# Patient Record
Sex: MALE | Race: WHITE | NOT HISPANIC OR LATINO | Employment: FULL TIME | URBAN - METROPOLITAN AREA
[De-identification: names, ages, dates, MRNs, and addresses within clinical notes are randomized per-mention and may not be internally consistent; named-entity substitution may affect disease eponyms.]

---

## 2017-01-27 ENCOUNTER — ALLSCRIPTS OFFICE VISIT (OUTPATIENT)
Dept: OTHER | Facility: OTHER | Age: 47
End: 2017-01-27

## 2017-01-27 DIAGNOSIS — M79.10 MYALGIA: ICD-10-CM

## 2017-01-27 DIAGNOSIS — M75.01 ADHESIVE CAPSULITIS OF RIGHT SHOULDER: ICD-10-CM

## 2017-01-27 DIAGNOSIS — R76.8 OTHER SPECIFIED ABNORMAL IMMUNOLOGICAL FINDINGS IN SERUM: ICD-10-CM

## 2017-01-31 ENCOUNTER — LAB CONVERSION - ENCOUNTER (OUTPATIENT)
Dept: OTHER | Facility: OTHER | Age: 47
End: 2017-01-31

## 2017-01-31 ENCOUNTER — ALLSCRIPTS OFFICE VISIT (OUTPATIENT)
Dept: OTHER | Facility: OTHER | Age: 47
End: 2017-01-31

## 2017-01-31 LAB
ANA PATTERN 1 (HISTORICAL): ABNORMAL
ANA TITER 1 (HISTORICAL): ABNORMAL TITER
ANTI-NUCLEAR ANTIBODY (ANA) (HISTORICAL): POSITIVE
CK SERPL-CCNC: 60 U/L (ref 44–196)
CREATININE, RANDOM URINE (HISTORICAL): 70 MG/DL (ref 20–370)
HBA1C MFR BLD HPLC: 7.5 % OF TOTAL HGB
LYME IGG/IGM AB (HISTORICAL): NORMAL INDEX
MAGNESIUM, UR (HISTORICAL): 0.4 MG/DL
MICROALBUMIN/CREATININE RATIO (HISTORICAL): 6 MCG/MG CREAT

## 2017-02-02 ENCOUNTER — GENERIC CONVERSION - ENCOUNTER (OUTPATIENT)
Dept: OTHER | Facility: OTHER | Age: 47
End: 2017-02-02

## 2017-02-17 ENCOUNTER — LAB CONVERSION - ENCOUNTER (OUTPATIENT)
Dept: OTHER | Facility: OTHER | Age: 47
End: 2017-02-17

## 2017-02-17 LAB
ALDOLASE (HISTORICAL): 7.4 U/L
ANTI DNA DOUBLE STRANDED (HISTORICAL): <1 IU/ML
BILIRUB UR QL STRIP: NEGATIVE
C3 COMPLEMENT (HISTORICAL): 147 MG/DL (ref 90–180)
C4 COMPLEMENT (HISTORICAL): 29 MG/DL (ref 16–47)
CK SERPL-CCNC: 188 U/L (ref 44–196)
COLOR UR: YELLOW
COMMENT (HISTORICAL): CLEAR
FECAL OCCULT BLOOD DIAGNOSTIC (HISTORICAL): NEGATIVE
GLUCOSE (HISTORICAL): NEGATIVE
KETONES UR STRIP-MCNC: NEGATIVE MG/DL
LEUKOCYTE ESTERASE UR QL STRIP: NEGATIVE
NITRITE UR QL STRIP: NEGATIVE
PH UR STRIP.AUTO: 5.5 [PH] (ref 5–8)
PROT UR STRIP-MCNC: NEGATIVE MG/DL
SCLERODERMA (SCL-70) AB (HISTORICAL): NORMAL AI
SP GR UR STRIP.AUTO: 1.01 (ref 1–1.03)

## 2017-02-21 ENCOUNTER — LAB CONVERSION - ENCOUNTER (OUTPATIENT)
Dept: OTHER | Facility: OTHER | Age: 47
End: 2017-02-21

## 2017-02-21 LAB
25(OH)D3 SERPL-MCNC: 23 NG/ML (ref 30–100)
ALDOLASE (HISTORICAL): 7.4 U/L
ANTI DNA DOUBLE STRANDED (HISTORICAL): <1 IU/ML
C3 COMPLEMENT (HISTORICAL): 147 MG/DL (ref 90–180)
C4 COMPLEMENT (HISTORICAL): 29 MG/DL (ref 16–47)
CK SERPL-CCNC: 188 U/L (ref 44–196)
CONTACT: (HISTORICAL): NORMAL
SCLERODERMA (SCL-70) AB (HISTORICAL): NORMAL AI
SM/RNP ANTIBODY (HISTORICAL): NORMAL AI
SSA (RO) ANTIBODY (HISTORICAL): NORMAL AI
SSB (LA) ANTIBODY (HISTORICAL): NORMAL AI
TEST INFORMATION (HISTORICAL): NORMAL
TEST NAME (HISTORICAL): NORMAL

## 2017-02-23 ENCOUNTER — GENERIC CONVERSION - ENCOUNTER (OUTPATIENT)
Dept: OTHER | Facility: OTHER | Age: 47
End: 2017-02-23

## 2017-03-08 ENCOUNTER — ALLSCRIPTS OFFICE VISIT (OUTPATIENT)
Dept: OTHER | Facility: OTHER | Age: 47
End: 2017-03-08

## 2017-03-30 RX ORDER — ASPIRIN 81 MG/1
TABLET, CHEWABLE ORAL
COMMUNITY
Start: 2007-10-11

## 2017-03-30 RX ORDER — QUINAPRIL 5 1/1
TABLET ORAL
COMMUNITY
Start: 2012-08-31 | End: 2018-12-17 | Stop reason: SDUPTHER

## 2017-03-30 RX ORDER — ESOMEPRAZOLE MAGNESIUM 40 MG/1
CAPSULE, DELAYED RELEASE ORAL
COMMUNITY
Start: 2013-03-06 | End: 2018-03-12 | Stop reason: SDUPTHER

## 2017-03-30 RX ORDER — ALPRAZOLAM 0.25 MG/1
TABLET ORAL
COMMUNITY
Start: 2014-01-03 | End: 2018-01-07

## 2017-03-30 RX ORDER — ROSUVASTATIN CALCIUM 10 MG/1
TABLET, COATED ORAL
COMMUNITY
Start: 2015-03-13 | End: 2018-03-12 | Stop reason: SDUPTHER

## 2017-03-30 RX ORDER — LEVOCETIRIZINE DIHYDROCHLORIDE 5 MG/1
TABLET, FILM COATED ORAL
COMMUNITY
Start: 2015-09-16 | End: 2018-11-14

## 2017-04-03 ENCOUNTER — HOSPITAL ENCOUNTER (OUTPATIENT)
Facility: HOSPITAL | Age: 47
Setting detail: OUTPATIENT SURGERY
Discharge: HOME | End: 2017-04-03
Attending: ORTHOPAEDIC SURGERY | Admitting: ORTHOPAEDIC SURGERY
Payer: COMMERCIAL

## 2017-04-03 ENCOUNTER — ANESTHESIA (OUTPATIENT)
Dept: PERIOP | Facility: HOSPITAL | Age: 47
End: 2017-04-03
Payer: COMMERCIAL

## 2017-04-03 ENCOUNTER — ANESTHESIA EVENT (OUTPATIENT)
Dept: PERIOP | Facility: HOSPITAL | Age: 47
End: 2017-04-03
Payer: COMMERCIAL

## 2017-04-03 VITALS
SYSTOLIC BLOOD PRESSURE: 143 MMHG | BODY MASS INDEX: 26.52 KG/M2 | DIASTOLIC BLOOD PRESSURE: 87 MMHG | HEART RATE: 113 BPM | TEMPERATURE: 97 F | OXYGEN SATURATION: 96 % | HEIGHT: 66 IN | WEIGHT: 165 LBS | RESPIRATION RATE: 18 BRPM

## 2017-04-03 LAB — GLUCOSE SERPL-MCNC: 163 MG/DL (ref 65–140)

## 2017-04-03 PROCEDURE — 82948 REAGENT STRIP/BLOOD GLUCOSE: CPT

## 2017-04-03 RX ORDER — ONDANSETRON 2 MG/ML
4 INJECTION INTRAMUSCULAR; INTRAVENOUS ONCE AS NEEDED
Status: DISCONTINUED | OUTPATIENT
Start: 2017-04-03 | End: 2017-04-03 | Stop reason: HOSPADM

## 2017-04-03 RX ORDER — MORPHINE SULFATE 2 MG/ML
2 INJECTION, SOLUTION INTRAMUSCULAR; INTRAVENOUS EVERY 2 HOUR PRN
Status: DISCONTINUED | OUTPATIENT
Start: 2017-04-03 | End: 2017-04-03 | Stop reason: HOSPADM

## 2017-04-03 RX ORDER — ONDANSETRON 2 MG/ML
4 INJECTION INTRAMUSCULAR; INTRAVENOUS EVERY 6 HOURS PRN
Status: DISCONTINUED | OUTPATIENT
Start: 2017-04-03 | End: 2017-04-03 | Stop reason: HOSPADM

## 2017-04-03 RX ORDER — METOCLOPRAMIDE HYDROCHLORIDE 5 MG/ML
10 INJECTION INTRAMUSCULAR; INTRAVENOUS ONCE
Status: DISCONTINUED | OUTPATIENT
Start: 2017-04-03 | End: 2017-04-03 | Stop reason: HOSPADM

## 2017-04-03 RX ORDER — ACETAMINOPHEN 325 MG/1
650 TABLET ORAL EVERY 4 HOURS PRN
Status: DISCONTINUED | OUTPATIENT
Start: 2017-04-03 | End: 2017-04-03 | Stop reason: HOSPADM

## 2017-04-03 RX ORDER — OXYCODONE HYDROCHLORIDE AND ACETAMINOPHEN 5; 325 MG/1; MG/1
TABLET ORAL
Qty: 50 TABLET | Refills: 0 | Status: SHIPPED | OUTPATIENT
Start: 2017-04-03 | End: 2018-01-07

## 2017-04-03 RX ORDER — SODIUM CHLORIDE, SODIUM LACTATE, POTASSIUM CHLORIDE, CALCIUM CHLORIDE 600; 310; 30; 20 MG/100ML; MG/100ML; MG/100ML; MG/100ML
INJECTION, SOLUTION INTRAVENOUS CONTINUOUS PRN
Status: DISCONTINUED | OUTPATIENT
Start: 2017-04-03 | End: 2017-04-03 | Stop reason: SURG

## 2017-04-03 RX ORDER — PROPOFOL 10 MG/ML
INJECTION, EMULSION INTRAVENOUS AS NEEDED
Status: DISCONTINUED | OUTPATIENT
Start: 2017-04-03 | End: 2017-04-03 | Stop reason: SURG

## 2017-04-03 RX ORDER — MEPERIDINE HYDROCHLORIDE 25 MG/ML
12.5 INJECTION INTRAMUSCULAR; INTRAVENOUS; SUBCUTANEOUS ONCE AS NEEDED
Status: DISCONTINUED | OUTPATIENT
Start: 2017-04-03 | End: 2017-04-03 | Stop reason: HOSPADM

## 2017-04-03 RX ORDER — FENTANYL CITRATE/PF 50 MCG/ML
50 SYRINGE (ML) INJECTION
Status: DISCONTINUED | OUTPATIENT
Start: 2017-04-03 | End: 2017-04-03 | Stop reason: HOSPADM

## 2017-04-03 RX ORDER — LIDOCAINE HYDROCHLORIDE 10 MG/ML
INJECTION, SOLUTION INFILTRATION; PERINEURAL AS NEEDED
Status: DISCONTINUED | OUTPATIENT
Start: 2017-04-03 | End: 2017-04-03 | Stop reason: SURG

## 2017-04-03 RX ORDER — OXYCODONE HYDROCHLORIDE AND ACETAMINOPHEN 5; 325 MG/1; MG/1
2 TABLET ORAL EVERY 4 HOURS PRN
Status: DISCONTINUED | OUTPATIENT
Start: 2017-04-03 | End: 2017-04-03 | Stop reason: HOSPADM

## 2017-04-03 RX ORDER — SODIUM CHLORIDE, SODIUM LACTATE, POTASSIUM CHLORIDE, CALCIUM CHLORIDE 600; 310; 30; 20 MG/100ML; MG/100ML; MG/100ML; MG/100ML
100 INJECTION, SOLUTION INTRAVENOUS CONTINUOUS
Status: DISCONTINUED | OUTPATIENT
Start: 2017-04-03 | End: 2017-04-03 | Stop reason: HOSPADM

## 2017-04-03 RX ORDER — FENTANYL CITRATE 50 UG/ML
INJECTION, SOLUTION INTRAMUSCULAR; INTRAVENOUS AS NEEDED
Status: DISCONTINUED | OUTPATIENT
Start: 2017-04-03 | End: 2017-04-03 | Stop reason: SURG

## 2017-04-03 RX ORDER — MIDAZOLAM HYDROCHLORIDE 1 MG/ML
INJECTION INTRAMUSCULAR; INTRAVENOUS AS NEEDED
Status: DISCONTINUED | OUTPATIENT
Start: 2017-04-03 | End: 2017-04-03 | Stop reason: SURG

## 2017-04-03 RX ORDER — ONDANSETRON 2 MG/ML
INJECTION INTRAMUSCULAR; INTRAVENOUS AS NEEDED
Status: DISCONTINUED | OUTPATIENT
Start: 2017-04-03 | End: 2017-04-03 | Stop reason: SURG

## 2017-04-03 RX ADMIN — LIDOCAINE HYDROCHLORIDE 50 MG: 10 INJECTION, SOLUTION INFILTRATION; PERINEURAL at 12:37

## 2017-04-03 RX ADMIN — FENTANYL CITRATE 50 MCG: 50 INJECTION INTRAMUSCULAR; INTRAVENOUS at 12:43

## 2017-04-03 RX ADMIN — FENTANYL CITRATE 50 MCG: 50 INJECTION INTRAMUSCULAR; INTRAVENOUS at 12:55

## 2017-04-03 RX ADMIN — PROPOFOL 200 MG: 10 INJECTION, EMULSION INTRAVENOUS at 12:37

## 2017-04-03 RX ADMIN — DEXAMETHASONE SODIUM PHOSPHATE 10 MG: 10 INJECTION INTRAMUSCULAR; INTRAVENOUS at 12:42

## 2017-04-03 RX ADMIN — FENTANYL CITRATE 50 MCG: 50 INJECTION INTRAMUSCULAR; INTRAVENOUS at 13:07

## 2017-04-03 RX ADMIN — SODIUM CHLORIDE, SODIUM LACTATE, POTASSIUM CHLORIDE, AND CALCIUM CHLORIDE: .6; .31; .03; .02 INJECTION, SOLUTION INTRAVENOUS at 11:59

## 2017-04-03 RX ADMIN — FENTANYL CITRATE 50 MCG: 50 INJECTION INTRAMUSCULAR; INTRAVENOUS at 13:12

## 2017-04-03 RX ADMIN — MIDAZOLAM HYDROCHLORIDE 2 MG: 1 INJECTION, SOLUTION INTRAMUSCULAR; INTRAVENOUS at 12:30

## 2017-04-03 RX ADMIN — SODIUM CHLORIDE, SODIUM LACTATE, POTASSIUM CHLORIDE, AND CALCIUM CHLORIDE 100 ML/HR: .6; .31; .03; .02 INJECTION, SOLUTION INTRAVENOUS at 11:30

## 2017-04-03 RX ADMIN — OXYCODONE HYDROCHLORIDE AND ACETAMINOPHEN 2 TABLET: 5; 325 TABLET ORAL at 13:40

## 2017-04-03 RX ADMIN — ONDANSETRON 4 MG: 2 INJECTION INTRAMUSCULAR; INTRAVENOUS at 12:46

## 2017-04-04 ENCOUNTER — GENERIC CONVERSION - ENCOUNTER (OUTPATIENT)
Dept: OTHER | Facility: OTHER | Age: 47
End: 2017-04-04

## 2017-04-04 ENCOUNTER — APPOINTMENT (OUTPATIENT)
Dept: PHYSICAL THERAPY | Facility: CLINIC | Age: 47
End: 2017-04-04
Payer: COMMERCIAL

## 2017-04-04 PROCEDURE — 97140 MANUAL THERAPY 1/> REGIONS: CPT

## 2017-04-04 PROCEDURE — 97162 PT EVAL MOD COMPLEX 30 MIN: CPT

## 2017-04-04 PROCEDURE — 97110 THERAPEUTIC EXERCISES: CPT

## 2017-04-05 ENCOUNTER — APPOINTMENT (OUTPATIENT)
Dept: PHYSICAL THERAPY | Facility: CLINIC | Age: 47
End: 2017-04-05
Payer: COMMERCIAL

## 2017-04-05 PROCEDURE — 97110 THERAPEUTIC EXERCISES: CPT

## 2017-04-05 PROCEDURE — 97140 MANUAL THERAPY 1/> REGIONS: CPT

## 2017-04-06 ENCOUNTER — APPOINTMENT (OUTPATIENT)
Dept: PHYSICAL THERAPY | Facility: CLINIC | Age: 47
End: 2017-04-06
Payer: COMMERCIAL

## 2017-04-06 PROCEDURE — 97110 THERAPEUTIC EXERCISES: CPT

## 2017-04-06 PROCEDURE — 97140 MANUAL THERAPY 1/> REGIONS: CPT

## 2017-04-07 ENCOUNTER — APPOINTMENT (OUTPATIENT)
Dept: PHYSICAL THERAPY | Facility: CLINIC | Age: 47
End: 2017-04-07
Payer: COMMERCIAL

## 2017-04-07 PROCEDURE — 97140 MANUAL THERAPY 1/> REGIONS: CPT

## 2017-04-07 PROCEDURE — 97110 THERAPEUTIC EXERCISES: CPT

## 2017-04-10 ENCOUNTER — APPOINTMENT (OUTPATIENT)
Dept: PHYSICAL THERAPY | Facility: CLINIC | Age: 47
End: 2017-04-10
Payer: COMMERCIAL

## 2017-04-10 PROCEDURE — 97140 MANUAL THERAPY 1/> REGIONS: CPT

## 2017-04-10 PROCEDURE — 97110 THERAPEUTIC EXERCISES: CPT

## 2017-04-11 ENCOUNTER — APPOINTMENT (OUTPATIENT)
Dept: PHYSICAL THERAPY | Facility: CLINIC | Age: 47
End: 2017-04-11
Payer: COMMERCIAL

## 2017-04-11 PROCEDURE — 97110 THERAPEUTIC EXERCISES: CPT

## 2017-04-11 PROCEDURE — 97140 MANUAL THERAPY 1/> REGIONS: CPT

## 2017-04-12 ENCOUNTER — APPOINTMENT (OUTPATIENT)
Dept: PHYSICAL THERAPY | Facility: CLINIC | Age: 47
End: 2017-04-12
Payer: COMMERCIAL

## 2017-04-12 PROCEDURE — 97110 THERAPEUTIC EXERCISES: CPT

## 2017-04-12 PROCEDURE — 97140 MANUAL THERAPY 1/> REGIONS: CPT

## 2017-04-13 ENCOUNTER — APPOINTMENT (OUTPATIENT)
Dept: PHYSICAL THERAPY | Facility: CLINIC | Age: 47
End: 2017-04-13
Payer: COMMERCIAL

## 2017-04-13 ENCOUNTER — GENERIC CONVERSION - ENCOUNTER (OUTPATIENT)
Dept: OTHER | Facility: OTHER | Age: 47
End: 2017-04-13

## 2017-04-13 PROCEDURE — 97110 THERAPEUTIC EXERCISES: CPT

## 2017-04-13 PROCEDURE — 97140 MANUAL THERAPY 1/> REGIONS: CPT

## 2017-04-14 ENCOUNTER — ALLSCRIPTS OFFICE VISIT (OUTPATIENT)
Dept: OTHER | Facility: OTHER | Age: 47
End: 2017-04-14

## 2017-04-14 ENCOUNTER — APPOINTMENT (OUTPATIENT)
Dept: PHYSICAL THERAPY | Facility: CLINIC | Age: 47
End: 2017-04-14
Payer: COMMERCIAL

## 2017-04-14 PROCEDURE — 97110 THERAPEUTIC EXERCISES: CPT

## 2017-04-14 PROCEDURE — 97140 MANUAL THERAPY 1/> REGIONS: CPT

## 2017-04-19 ENCOUNTER — APPOINTMENT (OUTPATIENT)
Dept: PHYSICAL THERAPY | Facility: CLINIC | Age: 47
End: 2017-04-19
Payer: COMMERCIAL

## 2017-04-19 PROCEDURE — 97110 THERAPEUTIC EXERCISES: CPT

## 2017-04-19 PROCEDURE — 97140 MANUAL THERAPY 1/> REGIONS: CPT

## 2017-04-20 ENCOUNTER — APPOINTMENT (OUTPATIENT)
Dept: PHYSICAL THERAPY | Facility: CLINIC | Age: 47
End: 2017-04-20
Payer: COMMERCIAL

## 2017-04-20 PROCEDURE — 97140 MANUAL THERAPY 1/> REGIONS: CPT

## 2017-04-26 ENCOUNTER — APPOINTMENT (OUTPATIENT)
Dept: PHYSICAL THERAPY | Facility: CLINIC | Age: 47
End: 2017-04-26
Payer: COMMERCIAL

## 2017-04-26 PROCEDURE — 97112 NEUROMUSCULAR REEDUCATION: CPT

## 2017-04-26 PROCEDURE — 97140 MANUAL THERAPY 1/> REGIONS: CPT

## 2017-04-27 ENCOUNTER — APPOINTMENT (OUTPATIENT)
Dept: PHYSICAL THERAPY | Facility: CLINIC | Age: 47
End: 2017-04-27
Payer: COMMERCIAL

## 2017-05-03 ENCOUNTER — APPOINTMENT (OUTPATIENT)
Dept: PHYSICAL THERAPY | Facility: CLINIC | Age: 47
End: 2017-05-03
Payer: COMMERCIAL

## 2017-05-04 ENCOUNTER — APPOINTMENT (OUTPATIENT)
Dept: PHYSICAL THERAPY | Facility: CLINIC | Age: 47
End: 2017-05-04
Payer: COMMERCIAL

## 2017-05-04 PROCEDURE — 97110 THERAPEUTIC EXERCISES: CPT

## 2017-05-04 PROCEDURE — 97140 MANUAL THERAPY 1/> REGIONS: CPT

## 2017-05-08 ENCOUNTER — APPOINTMENT (OUTPATIENT)
Dept: PHYSICAL THERAPY | Facility: CLINIC | Age: 47
End: 2017-05-08
Payer: COMMERCIAL

## 2017-05-08 PROCEDURE — 97140 MANUAL THERAPY 1/> REGIONS: CPT

## 2017-05-08 PROCEDURE — 97110 THERAPEUTIC EXERCISES: CPT

## 2017-05-10 ENCOUNTER — APPOINTMENT (OUTPATIENT)
Dept: PHYSICAL THERAPY | Facility: CLINIC | Age: 47
End: 2017-05-10
Payer: COMMERCIAL

## 2017-05-10 PROCEDURE — 97140 MANUAL THERAPY 1/> REGIONS: CPT

## 2017-05-10 PROCEDURE — 97110 THERAPEUTIC EXERCISES: CPT

## 2017-05-12 ENCOUNTER — ALLSCRIPTS OFFICE VISIT (OUTPATIENT)
Dept: OTHER | Facility: OTHER | Age: 47
End: 2017-05-12

## 2017-05-15 ENCOUNTER — APPOINTMENT (OUTPATIENT)
Dept: PHYSICAL THERAPY | Facility: CLINIC | Age: 47
End: 2017-05-15
Payer: COMMERCIAL

## 2017-05-17 ENCOUNTER — APPOINTMENT (OUTPATIENT)
Dept: PHYSICAL THERAPY | Facility: CLINIC | Age: 47
End: 2017-05-17
Payer: COMMERCIAL

## 2017-05-17 PROCEDURE — 97140 MANUAL THERAPY 1/> REGIONS: CPT

## 2017-05-17 PROCEDURE — 97110 THERAPEUTIC EXERCISES: CPT

## 2017-05-22 ENCOUNTER — APPOINTMENT (OUTPATIENT)
Dept: PHYSICAL THERAPY | Facility: CLINIC | Age: 47
End: 2017-05-22
Payer: COMMERCIAL

## 2017-05-22 PROCEDURE — 97140 MANUAL THERAPY 1/> REGIONS: CPT

## 2017-05-22 PROCEDURE — 97110 THERAPEUTIC EXERCISES: CPT

## 2017-05-24 ENCOUNTER — APPOINTMENT (OUTPATIENT)
Dept: PHYSICAL THERAPY | Facility: CLINIC | Age: 47
End: 2017-05-24
Payer: COMMERCIAL

## 2017-05-24 PROCEDURE — 97110 THERAPEUTIC EXERCISES: CPT

## 2017-05-24 PROCEDURE — 97140 MANUAL THERAPY 1/> REGIONS: CPT

## 2017-05-31 ENCOUNTER — APPOINTMENT (OUTPATIENT)
Dept: PHYSICAL THERAPY | Facility: CLINIC | Age: 47
End: 2017-05-31
Payer: COMMERCIAL

## 2017-05-31 PROCEDURE — 97110 THERAPEUTIC EXERCISES: CPT

## 2017-05-31 PROCEDURE — 97140 MANUAL THERAPY 1/> REGIONS: CPT

## 2017-06-02 ENCOUNTER — APPOINTMENT (OUTPATIENT)
Dept: PHYSICAL THERAPY | Facility: CLINIC | Age: 47
End: 2017-06-02
Payer: COMMERCIAL

## 2017-06-02 PROCEDURE — 97140 MANUAL THERAPY 1/> REGIONS: CPT

## 2017-06-02 PROCEDURE — 97110 THERAPEUTIC EXERCISES: CPT

## 2017-06-07 ENCOUNTER — APPOINTMENT (OUTPATIENT)
Dept: PHYSICAL THERAPY | Facility: CLINIC | Age: 47
End: 2017-06-07
Payer: COMMERCIAL

## 2017-06-07 PROCEDURE — 97110 THERAPEUTIC EXERCISES: CPT

## 2017-06-07 PROCEDURE — 97140 MANUAL THERAPY 1/> REGIONS: CPT

## 2017-06-09 ENCOUNTER — APPOINTMENT (OUTPATIENT)
Dept: PHYSICAL THERAPY | Facility: CLINIC | Age: 47
End: 2017-06-09
Payer: COMMERCIAL

## 2017-06-09 PROCEDURE — 97140 MANUAL THERAPY 1/> REGIONS: CPT

## 2017-06-09 PROCEDURE — 97110 THERAPEUTIC EXERCISES: CPT

## 2017-06-14 ENCOUNTER — APPOINTMENT (OUTPATIENT)
Dept: PHYSICAL THERAPY | Facility: CLINIC | Age: 47
End: 2017-06-14
Payer: COMMERCIAL

## 2017-06-16 ENCOUNTER — APPOINTMENT (OUTPATIENT)
Dept: PHYSICAL THERAPY | Facility: CLINIC | Age: 47
End: 2017-06-16
Payer: COMMERCIAL

## 2017-06-16 PROCEDURE — 97110 THERAPEUTIC EXERCISES: CPT

## 2017-06-16 PROCEDURE — 97140 MANUAL THERAPY 1/> REGIONS: CPT

## 2017-06-21 ENCOUNTER — APPOINTMENT (OUTPATIENT)
Dept: PHYSICAL THERAPY | Facility: CLINIC | Age: 47
End: 2017-06-21
Payer: COMMERCIAL

## 2017-06-21 PROCEDURE — 97140 MANUAL THERAPY 1/> REGIONS: CPT

## 2017-06-21 PROCEDURE — 97110 THERAPEUTIC EXERCISES: CPT

## 2017-06-23 ENCOUNTER — APPOINTMENT (OUTPATIENT)
Dept: PHYSICAL THERAPY | Facility: CLINIC | Age: 47
End: 2017-06-23
Payer: COMMERCIAL

## 2017-06-23 PROCEDURE — 97110 THERAPEUTIC EXERCISES: CPT

## 2017-06-23 PROCEDURE — 97140 MANUAL THERAPY 1/> REGIONS: CPT

## 2017-06-28 ENCOUNTER — APPOINTMENT (OUTPATIENT)
Dept: PHYSICAL THERAPY | Facility: CLINIC | Age: 47
End: 2017-06-28
Payer: COMMERCIAL

## 2017-06-28 PROCEDURE — 97110 THERAPEUTIC EXERCISES: CPT

## 2017-06-28 PROCEDURE — 97140 MANUAL THERAPY 1/> REGIONS: CPT

## 2017-06-30 ENCOUNTER — APPOINTMENT (OUTPATIENT)
Dept: PHYSICAL THERAPY | Facility: CLINIC | Age: 47
End: 2017-06-30
Payer: COMMERCIAL

## 2017-06-30 PROCEDURE — 97110 THERAPEUTIC EXERCISES: CPT

## 2017-06-30 PROCEDURE — 97140 MANUAL THERAPY 1/> REGIONS: CPT

## 2017-07-03 ENCOUNTER — APPOINTMENT (OUTPATIENT)
Dept: PHYSICAL THERAPY | Facility: CLINIC | Age: 47
End: 2017-07-03
Payer: COMMERCIAL

## 2017-07-03 PROCEDURE — 97140 MANUAL THERAPY 1/> REGIONS: CPT

## 2017-07-03 PROCEDURE — 97110 THERAPEUTIC EXERCISES: CPT

## 2017-07-05 ENCOUNTER — APPOINTMENT (OUTPATIENT)
Dept: PHYSICAL THERAPY | Facility: CLINIC | Age: 47
End: 2017-07-05
Payer: COMMERCIAL

## 2017-07-05 PROCEDURE — 97140 MANUAL THERAPY 1/> REGIONS: CPT

## 2017-07-05 PROCEDURE — 97110 THERAPEUTIC EXERCISES: CPT

## 2017-07-08 DIAGNOSIS — E78.5 HYPERLIPIDEMIA: ICD-10-CM

## 2017-07-08 DIAGNOSIS — I10 ESSENTIAL (PRIMARY) HYPERTENSION: ICD-10-CM

## 2017-07-08 DIAGNOSIS — M75.01 ADHESIVE CAPSULITIS OF RIGHT SHOULDER: ICD-10-CM

## 2017-07-08 DIAGNOSIS — R76.8 OTHER SPECIFIED ABNORMAL IMMUNOLOGICAL FINDINGS IN SERUM: ICD-10-CM

## 2017-07-08 DIAGNOSIS — E11.9 TYPE 2 DIABETES MELLITUS WITHOUT COMPLICATIONS (HCC): ICD-10-CM

## 2017-07-17 ENCOUNTER — APPOINTMENT (OUTPATIENT)
Dept: PHYSICAL THERAPY | Facility: CLINIC | Age: 47
End: 2017-07-17
Payer: COMMERCIAL

## 2017-07-17 PROCEDURE — 97140 MANUAL THERAPY 1/> REGIONS: CPT

## 2017-07-17 PROCEDURE — 97110 THERAPEUTIC EXERCISES: CPT

## 2017-07-19 ENCOUNTER — APPOINTMENT (OUTPATIENT)
Dept: PHYSICAL THERAPY | Facility: CLINIC | Age: 47
End: 2017-07-19
Payer: COMMERCIAL

## 2017-07-19 PROCEDURE — 97140 MANUAL THERAPY 1/> REGIONS: CPT

## 2017-07-19 PROCEDURE — 97110 THERAPEUTIC EXERCISES: CPT

## 2017-07-24 ENCOUNTER — APPOINTMENT (OUTPATIENT)
Dept: PHYSICAL THERAPY | Facility: CLINIC | Age: 47
End: 2017-07-24
Payer: COMMERCIAL

## 2017-07-24 PROCEDURE — 97110 THERAPEUTIC EXERCISES: CPT

## 2017-07-24 PROCEDURE — 97140 MANUAL THERAPY 1/> REGIONS: CPT

## 2017-07-26 ENCOUNTER — APPOINTMENT (OUTPATIENT)
Dept: PHYSICAL THERAPY | Facility: CLINIC | Age: 47
End: 2017-07-26
Payer: COMMERCIAL

## 2017-07-26 PROCEDURE — 97110 THERAPEUTIC EXERCISES: CPT

## 2017-07-26 PROCEDURE — 97140 MANUAL THERAPY 1/> REGIONS: CPT

## 2017-07-31 ENCOUNTER — APPOINTMENT (OUTPATIENT)
Dept: PHYSICAL THERAPY | Facility: CLINIC | Age: 47
End: 2017-07-31
Payer: COMMERCIAL

## 2017-07-31 PROCEDURE — 97110 THERAPEUTIC EXERCISES: CPT

## 2017-07-31 PROCEDURE — 97140 MANUAL THERAPY 1/> REGIONS: CPT

## 2017-08-02 ENCOUNTER — APPOINTMENT (OUTPATIENT)
Dept: PHYSICAL THERAPY | Facility: CLINIC | Age: 47
End: 2017-08-02
Payer: COMMERCIAL

## 2017-08-02 PROCEDURE — 97140 MANUAL THERAPY 1/> REGIONS: CPT

## 2017-08-02 PROCEDURE — 97110 THERAPEUTIC EXERCISES: CPT

## 2017-08-03 ENCOUNTER — ALLSCRIPTS OFFICE VISIT (OUTPATIENT)
Dept: OTHER | Facility: OTHER | Age: 47
End: 2017-08-03

## 2017-08-14 ENCOUNTER — APPOINTMENT (OUTPATIENT)
Dept: PHYSICAL THERAPY | Facility: CLINIC | Age: 47
End: 2017-08-14
Payer: COMMERCIAL

## 2017-08-14 PROCEDURE — 97140 MANUAL THERAPY 1/> REGIONS: CPT

## 2017-08-14 PROCEDURE — 97110 THERAPEUTIC EXERCISES: CPT

## 2017-08-16 ENCOUNTER — APPOINTMENT (OUTPATIENT)
Dept: PHYSICAL THERAPY | Facility: CLINIC | Age: 47
End: 2017-08-16
Payer: COMMERCIAL

## 2017-08-21 ENCOUNTER — APPOINTMENT (OUTPATIENT)
Dept: PHYSICAL THERAPY | Facility: CLINIC | Age: 47
End: 2017-08-21
Payer: COMMERCIAL

## 2017-08-22 ENCOUNTER — GENERIC CONVERSION - ENCOUNTER (OUTPATIENT)
Dept: OTHER | Facility: OTHER | Age: 47
End: 2017-08-22

## 2017-09-21 ENCOUNTER — LAB CONVERSION - ENCOUNTER (OUTPATIENT)
Dept: OTHER | Facility: OTHER | Age: 47
End: 2017-09-21

## 2017-09-21 LAB
A/G RATIO (HISTORICAL): 2.1 (CALC) (ref 1–2.5)
ALBUMIN SERPL BCP-MCNC: 4.8 G/DL (ref 3.6–5.1)
ALP SERPL-CCNC: 101 U/L (ref 40–115)
ALT SERPL W P-5'-P-CCNC: 33 U/L (ref 9–46)
AST SERPL W P-5'-P-CCNC: 20 U/L (ref 10–40)
BASOPHILS # BLD AUTO: 0.8 %
BASOPHILS # BLD AUTO: 50 CELLS/UL (ref 0–200)
BILIRUB SERPL-MCNC: 0.5 MG/DL (ref 0.2–1.2)
BUN SERPL-MCNC: 18 MG/DL (ref 7–25)
BUN/CREA RATIO (HISTORICAL): ABNORMAL (CALC) (ref 6–22)
CALCIUM SERPL-MCNC: 10.1 MG/DL (ref 8.6–10.3)
CHLORIDE SERPL-SCNC: 100 MMOL/L (ref 98–110)
CHOLEST SERPL-MCNC: 132 MG/DL
CHOLEST/HDLC SERPL: 4.9 (CALC)
CO2 SERPL-SCNC: 29 MMOL/L (ref 20–31)
CREAT SERPL-MCNC: 0.92 MG/DL (ref 0.6–1.35)
CREATININE, RANDOM URINE (HISTORICAL): 106 MG/DL (ref 20–370)
DEPRECATED RDW RBC AUTO: 13.4 % (ref 11–15)
EGFR AFRICAN AMERICAN (HISTORICAL): 115 ML/MIN/1.73M2
EGFR-AMERICAN CALC (HISTORICAL): 99 ML/MIN/1.73M2
EOSINOPHIL # BLD AUTO: 233 CELLS/UL (ref 15–500)
EOSINOPHIL # BLD AUTO: 3.7 %
GAMMA GLOBULIN (HISTORICAL): 2.3 G/DL (CALC) (ref 1.9–3.7)
GLUCOSE (HISTORICAL): 197 MG/DL (ref 65–99)
HBA1C MFR BLD HPLC: 8.2 % OF TOTAL HGB
HCT VFR BLD AUTO: 46.2 % (ref 38.5–50)
HDLC SERPL-MCNC: 27 MG/DL
HGB BLD-MCNC: 14.8 G/DL (ref 13.2–17.1)
LDL CHOLESTEROL (HISTORICAL): 61 MG/DL (CALC)
LYMPHOCYTES # BLD AUTO: 1966 CELLS/UL (ref 850–3900)
LYMPHOCYTES # BLD AUTO: 31.2 %
MAGNESIUM, UR (HISTORICAL): 0.3 MG/DL
MCH RBC QN AUTO: 26.2 PG (ref 27–33)
MCHC RBC AUTO-ENTMCNC: 32 G/DL (ref 32–36)
MCV RBC AUTO: 81.9 FL (ref 80–100)
MICROALBUMIN/CREATININE RATIO (HISTORICAL): 3 MCG/MG CREAT
MONOCYTES # BLD AUTO: 473 CELLS/UL (ref 200–950)
MONOCYTES (HISTORICAL): 7.5 %
NEUTROPHILS # BLD AUTO: 3578 CELLS/UL (ref 1500–7800)
NEUTROPHILS # BLD AUTO: 56.8 %
NON-HDL-CHOL (CHOL-HDL) (HISTORICAL): 105 MG/DL (CALC)
PLATELET # BLD AUTO: 286 THOUSAND/UL (ref 140–400)
PMV BLD AUTO: 10.5 FL (ref 7.5–12.5)
POTASSIUM SERPL-SCNC: 4.4 MMOL/L (ref 3.5–5.3)
RBC # BLD AUTO: 5.64 MILLION/UL (ref 4.2–5.8)
SODIUM SERPL-SCNC: 138 MMOL/L (ref 135–146)
TOTAL PROTEIN (HISTORICAL): 7.1 G/DL (ref 6.1–8.1)
TRIGL SERPL-MCNC: 366 MG/DL
WBC # BLD AUTO: 6.3 THOUSAND/UL (ref 3.8–10.8)

## 2017-09-22 ENCOUNTER — GENERIC CONVERSION - ENCOUNTER (OUTPATIENT)
Dept: OTHER | Facility: OTHER | Age: 47
End: 2017-09-22

## 2017-09-27 ENCOUNTER — ALLSCRIPTS OFFICE VISIT (OUTPATIENT)
Dept: OTHER | Facility: OTHER | Age: 47
End: 2017-09-27

## 2017-11-01 ENCOUNTER — ALLSCRIPTS OFFICE VISIT (OUTPATIENT)
Dept: OTHER | Facility: OTHER | Age: 47
End: 2017-11-01

## 2017-11-02 NOTE — PROGRESS NOTES
Assessment  1  Abdominal pain of multiple sites (789 09) (R10 9)   2  Diarrhea due to drug (787 91,E980 5) (K52 1)   3  Family history of liver cancer (V16 0) (Z80 0) : Mother    Plan  Abdominal pain of multiple sites, Diarrhea due to drug    · Loperamide HCl - 2 MG Oral Capsule; TAKE 2 CAPSULES AT 1ST DIARRHEAL  BOWEL MOVEMENT, THEN TAKE 1 CAPSULE AT [facility] ADDITIONAL BOWEL  MOVEMENT  MAXIMUM 8 CAPSULES IN 24 HOURS    Discussion/Summary    -patient developed gastrointestinal symptoms and frequent episodes of diarrhea following increase of his dose of GI LP 1 agonist  These are at the side effects that are common to medication including feeling of malaise, nausea, vomiting, abdominal pain and diarrhea  advised not to take this upcoming dose of Trulcity to make certain that his symptoms resolvedfor Imodium 2 milligrams provided  Instructions given on use of medicationhis gastrointestinal side effects are indeed related to the Trulicity then we will have to reduce his dosage back to 0 75 milligrams weekly which she was toleratingthis time I do not feel that there is a need for the patient to have labs completed  The patient was counseled regarding instructions for management,-- impressions  Possible side effects of new medications were reviewed with the patient/guardian today  The treatment plan was reviewed with the patient/guardian  The patient/guardian understands and agrees with the treatment plan      Chief Complaint  Shakiness and diarrhea for about 2 weeks  History of Present Illness  HPI: Patient presents complaining of watery diarrhea and stomach upset for the past 2 weeks  No documented fever but the patient did have chills  He states that his symptoms initially started 2 weeks ago on Tuesday  No foreign travel  Patient states that he had gone to sleep perfectly fine on Tuesday and then woke up Tuesday morning very early with sweating, shaking chills abdominal discomfort and watery brown diarrhea   No blood in stool  No fever  Patient states that he is eating and drinking fluids but he has not had a normal bowel movement  No camping or hiking  No sick contacts at home  The only significant change was that the patient's dose of true [place] was doubled and he took his initial dose this Saturday prior to his symptoms developing  Patient did try taking probiotic for the diarrhea and he has frequently been eating bananas with no improvement      Review of Systems    Constitutional: feeling poorly,-- chills,-- feeling tired-- and-- recent 6 lb weight loss, but-- as noted in HPI-- and-- no fever  ENT: no complaints of earache, no loss of hearing, no nosebleeds or nasal discharge, no sore throat or hoarseness  Cardiovascular: no complaints of slow or fast heart rate, no chest pain, no palpitations, no leg claudication or lower extremity edema  Respiratory: no complaints of shortness of breath, no wheezing or cough, no dyspnea on exertion, no orthopnea or PND  Gastrointestinal: abdominal pain-- and-- diarrhea, but-- as noted in HPI,-- no nausea,-- no vomiting-- and-- no blood in stools  Musculoskeletal: no complaints of arthralgia, no myalgia, no joint swelling or stiffness, no limb pain or swelling  Integumentary: no complaints of skin rash or lesion, no itching or dry skin, no skin wounds  Neurological: no complaints of headache, no confusion, no numbness or tingling, no dizziness or fainting  Active Problems  1  Agatston coronary artery calcium score between 100 and 199 (793 2) (R93 1)   2  Allergic rhinitis, seasonal (477 9) (J30 2)   3  Decreased range of motion of right shoulder (719 51) (M25 611)   4  Essential hypertension (401 9) (I10)   5  Eustachian tube dysfunction, right (381 81) (H69 81)   6  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)   7  Headache (784 0) (R51)   8  Hyperlipidemia (272 4) (E78 5)   9  Incomplete tear of right rotator cuff (840 4) (M75 111)   10   Intractable headache (784 0) (R51)   11  Myalgia (729 1) (M79 1)   12  Positive ALESHIA (antinuclear antibody) (795 79) (R76 8)   13  Type 2 diabetes mellitus (250 00) (E11 9)    Past Medical History  1  History of _   2  History of Acute frontal sinusitis, recurrence not specified (461 1) (J01 10)   3  History of Acute maxillary sinusitis (461 0) (J01 00)   4  History of Adhesive capsulitis of right shoulder (726 0) (M75 01)   5  History of Anal fissure (565 0) (K60 2)   6  History of Blood blister (919 2) (T14 8XXA)   7  History of Chest pain (786 50) (R07 9)   8  History of Chronic frontal sinusitis (473 1) (J32 1)   9  History of Cough (786 2) (R05)   10  History of Fracture of great toe (826 0) (S92 403A)   11  History of acute bronchitis (V12 69) (Z87 09)   12  History of acute bronchitis (V12 69) (Z87 09)   13  History of acute bronchitis (V12 69) (Z87 09)   14  History of acute conjunctivitis (V12 49) (Z86 69)   15  History of acute sinusitis (V12 69) (Z87 09)   16  History of benign neoplasm of skin (V13 3) (Z87 2)   17  History of chest pain (V13 89) (Z87 898)   18  History of folliculitis (G02 7) (C34 2)   19  History of gastroesophageal reflux (GERD) (V12 79) (Z87 19)   20  History of headache (V13 89) (Z87 898)   21  History of low back pain (V13 59) (Z87 39)   22  History of Itching (698 9) (L29 9)   23  History of Muscle weakness (generalized) (728 87) (M62 81)   24  History of Pain in wrist, unspecified laterality (719 43) (M25 539)   25  History of Palpitations (785 1) (R00 2)   26  History of Palpitations (785 1) (R00 2)   27  History of Rectal pain (569 42) (K62 89)   28  History of Shoulder joint pain, unspecified laterality  Active Problems And Past Medical History Reviewed: The active problems and past medical history were reviewed and updated today  Family History  Mother    1  Family history of Diabetes Mellitus (V18 0)   2  Family history of liver cancer (V16 0) (Z80 0)   3   Family history of Hypertension (V17 49)    Social History   · Alcohol Use (History)   · Caffeine Use   · Marital History - Currently    · Never A Smoker   · 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  The social history was reviewed and updated today  The social history was reviewed and is unchanged  Surgical History  1  History of Hernia Repair    Current Meds   1  Adult Aspirin Low Strength 81 MG CHEW; 1 PO Q D;   Therapy: 73EQB7118 to  Requested for: 51HNL3173 Recorded   2  ALPRAZolam 0 25 MG Oral Tablet; 1 po q 8 hrs prn; Therapy: 31ZYO4727 to (Last Rx:35Zwf6528) Ordered   3  Esomeprazole Magnesium 40 MG Oral Capsule Delayed Release; TAKE ONE   CAPSULE BY MOUTH EVERY DAY; Therapy: 19SAD2351 to (HFCIUAJB:04XIN7781)  Requested for: 19QXT6866; Last   Rx:00Lac7826 Ordered   4  FreeStyle InsuLinx Test In Vitro Strip; USE 1 STRIP TWICE DAILY; Therapy: 04PQF1571 to (Evaluate:82Rgj9750)  Requested for: 09XBK2315; Last   Rx:20Jan2016 Ordered   5  Janumet XR  MG Oral Tablet Extended Release 24 Hour; TAKE TWO TABLETS   BY MOUTH EVERY DAY; Therapy: 98GXM8094 to (Evaluate:02Jan2018)  Requested for: 59AAY3027; Last   Rx:71Bkw6350 Ordered   6  Levocetirizine Dihydrochloride 5 MG Oral Tablet; take 1 tab po QD; Therapy: 90KZV5727 to (Last Rx:40Ruc5087)  Requested for: 17Pum0426 Ordered   7  Mometasone Furoate 50 MCG/ACT Nasal Suspension; use 2 sprays in each nostril once   daily; Therapy: 95KRW0954 to (Evaluate:72Umo3122)  Requested for: 77JRL0751; Last   EE:80XQM6896 Ordered   8  Montelukast Sodium 10 MG Oral Tablet; take one tablet by mouth every day; Therapy: 93AWC7865 to (Evaluate:12Nov2017)  Requested for: 96FWB6369; Last   Rx:17Nov2016 Ordered   9  Qnasl 80 MCG/ACT Nasal Aerosol Solution; INSTILL 1 SQUIRT Twice daily; Therapy: 33Qgj4845 to (Last Rx:82Kzh9882)  Requested for: 02Nzb5200 Ordered   10  Quinapril HCl - 5 MG Oral Tablet; take one tablet by mouth every day;     Therapy: 53Fnc0368 to (Evaluate:96Yqb6074)  Requested for: 02ABC4032; Last    Rx:41Nxo2422 Ordered   11  Rosuvastatin Calcium 10 MG Oral Tablet; TAKE ONE TABLET BY MOUTH EVERY DAY    AT BEDTIME; Therapy: 24GOY8502 to (Rosie Cabezas)  Requested for: 73ENI6199; Last    Rx:74Axl4010 Ordered    The medication list was reviewed and updated today  Allergies  1  No Known Drug Allergies    Vitals   Recorded: 75OFS6824 10:36AM   Temperature 97 7 F   Heart Rate 104   Respiration 16   Systolic 520   Diastolic 74   Height 5 ft 6 in   Weight 172 lb    BMI Calculated 27 76   BSA Calculated 1 88     Physical Exam    Constitutional   General appearance: No acute distress, well appearing and well nourished  Ears, Nose, Mouth, and Throat   Oropharynx: Normal with no erythema, edema, exudate or lesions  Pulmonary   Respiratory effort: No increased work of breathing or signs of respiratory distress  Auscultation of lungs: Clear to auscultation, equal breath sounds bilaterally, no wheezes, no rales, no rhonci  Cardiovascular   Auscultation of heart: Normal rate and rhythm, normal S1 and S2, without murmurs  Abdomen   Abdomen: Non-tender, no masses  Liver and spleen: No hepatomegaly or splenomegaly           Future Appointments    Date/Time Provider Specialty Site   02/14/2018 09:00 AM Talat Lara DO Family Medicine [address]      Signatures   Electronically signed by : John Braun DO; Nov 1 2017 12:38PM EST                       (Author)

## 2017-12-27 ENCOUNTER — ALLSCRIPTS OFFICE VISIT (OUTPATIENT)
Dept: OTHER | Facility: OTHER | Age: 47
End: 2017-12-27

## 2017-12-28 NOTE — PROGRESS NOTES
Assessment   1  Peyronie's disease (607 85) (N48 6)   2  Essential hypertension (401 9) (I10)   3  Diarrhea due to drug (787 91,E980 5) (K52 1)    Plan   Essential hypertension    · DilTIAZem HCl - 30 MG Oral Tablet; Take 1 tablet daily    Discussion/Summary      -once again with seem that the patient had reaction toTrulicity with significant gastrointestinal side effects  Not certain if this would be a coincidence from him is having a gastrointestinal illness and also resuming the drug  Gastrointestinal side effects of the most common of this class of agent  He was able to tolerate the lower dosage previously very well refrain from replacing Trulicity at this time and the patient will only continue on Janumet for treatment of his diabetes  He is due for repeat hemoglobin A1c in February describes would be Peyronie's disease however normally the calcified plaque and Wagner on the same side  Intralesional calcium channel blockers or collagenase are usually recommended  At this time I will place him on a low-dose oral calcium channel blocker as a trial to see if this actually makes a difference  I am not certain that this is an accurate diagnosis for him at this time  May need referral to Urology is due for follow-up after labs are completed in February  The was counseled regarding instructions for management,-- prognosis,-- impressions,-- risks and benefits of treatment options  Possible side effects of new medications were reviewed with the patient/guardian today  The treatment plan was reviewed with the patient/guardian  The patient/guardian understands and agrees with the treatment plan      Chief Complaint   Discuss medications  History of Present Illness   Patient presents to discuss medications  Patient had significant gastrointestinal upset including nausea, vomiting and diarrhea after having his dose of Trulicity increased to 1 5 mg weekly   After that episode had resolved in November he was reduced back down to 0 75 mg once weekly however 3-4 days afterwards he once again developed the same gastrointestinal symptoms which did frandy on their own  Patient is presently only on Janumet for treatment of his diabetes  Patient also complains of a bend of his penis to the right with erection  He has noticed this the last 2 times while having sexual intercourse  The bend is not painful  Review of Systems        Constitutional: No fever or chills, feels well, no tiredness, no recent weight gain or weight loss-- and-- not feeling poorly  Eyes: as noted in HPI,-- no eye pain,-- no eyesight problems,-- eyes not red-- and-- no purulent discharge from the eyes  ENT: no complaints of earache, no hearing loss, no nosebleeds, no nasal discharge, no sore throat, no hoarseness  Cardiovascular: No complaints of slow heart rate, no fast heart rate, no chest pain, no palpitations, no leg claudication, no lower extremity  Respiratory: No complaints of shortness of breath, no wheezing, no cough, no SOB on exertion, no orthopnea or PND  Gastrointestinal: No complaints of abdominal pain, no constipation, no nausea or vomiting, no diarrhea or bloody stools-- and-- as noted in HPI  Genitourinary: as noted in HPI  Musculoskeletal: as noted in HPI,-- no arthralgias,-- no myalgias-- and-- no joint stiffness  Integumentary: No complaints of skin rash or skin lesions, no itching, no skin wound, no dry skin  Neurological: as noted in HPI-- and-- no headache  Psychiatric: Is not suicidal, no sleep disturbances, no anxiety or depression, no change in personality, no emotional problems  Endocrine: No complaints of proptosis, no hot flashes, no muscle weakness, no erectile dysfunction, no deepening of the voice, no feelings of weakness  Hematologic/Lymphatic: No complaints of swollen glands, no swollen glands in the neck, does not bleed easily, no easy bruising        Active Problems 1  Abdominal pain of multiple sites (789 09) (R10 9)   2  Agatston coronary artery calcium score between 100 and 199 (793 2) (R93 1)   3  Allergic rhinitis, seasonal (477 9) (J30 2)   4  Decreased range of motion of right shoulder (719 51) (M25 611)   5  Diarrhea due to drug (787 91,E980 5) (K52 1)   6  Essential hypertension (401 9) (I10)   7  Eustachian tube dysfunction, right (381 81) (H69 81)   8  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)   9  Headache (784 0) (R51)   10  Hyperlipidemia (272 4) (E78 5)   11  Incomplete tear of right rotator cuff (840 4) (M75 111)   12  Intractable headache (784 0) (R51)   13  Myalgia (729 1) (M79 1)   14  Positive ALESHIA (antinuclear antibody) (795 79) (R76 8)   15  Type 2 diabetes mellitus (250 00) (E11 9)    Past Medical History   1  History of _   2  History of Acute frontal sinusitis, recurrence not specified (461 1) (J01 10)   3  History of Acute maxillary sinusitis (461 0) (J01 00)   4  History of Adhesive capsulitis of right shoulder (726 0) (M75 01)   5  History of Anal fissure (565 0) (K60 2)   6  History of Blood blister (919 2) (T14 8XXA)   7  History of Chest pain (786 50) (R07 9)   8  History of Chronic frontal sinusitis (473 1) (J32 1)   9  History of Cough (786 2) (R05)   10  History of Fracture of great toe (826 0) (S92 403A)   11  History of acute bronchitis (V12 69) (Z87 09)   12  History of acute bronchitis (V12 69) (Z87 09)   13  History of acute bronchitis (V12 69) (Z87 09)   14  History of acute conjunctivitis (V12 49) (Z86 69)   15  History of acute sinusitis (V12 69) (Z87 09)   16  History of benign neoplasm of skin (V13 3) (Z87 2)   17  History of chest pain (V13 89) (Z87 898)   18  History of folliculitis (H97 9) (O83 6)   19  History of gastroesophageal reflux (GERD) (V12 79) (Z87 19)   20  History of headache (V13 89) (Z87 898)   21  History of low back pain (V13 59) (Z87 39)   22  History of Itching (698 9) (L29 9)   23   History of Muscle weakness (generalized) (728 87) (M62 81)   24  History of Pain in wrist, unspecified laterality (719 43) (M25 539)   25  History of Palpitations (785 1) (R00 2)   26  History of Palpitations (785 1) (R00 2)   27  History of Rectal pain (569 42) (K62 89)   28  History of Shoulder joint pain, unspecified laterality     The active problems and past medical history were reviewed and updated today  Surgical History   1  History of Hernia Repair     The surgical history was reviewed and updated today  Family History   Mother    1  Family history of Diabetes Mellitus (V18 0)   2  Family history of liver cancer (V16 0) (Z80 0)   3  Family history of Hypertension (V17 49)     The family history was reviewed and updated today  Social History    · Alcohol Use (History)   · Caffeine Use   · Marital History - Currently    · Never A Smoker   · Occupation:  The social history was reviewed and updated today  The social history was reviewed and is unchanged  Current Meds    1  Adult Aspirin Low Strength 81 MG CHEW; 1 PO Q D;     Therapy: 48WES7882 to  Requested for: 18UEY3618 Recorded   2  ALPRAZolam 0 25 MG Oral Tablet; 1 po q 8 hrs prn; Therapy: 15BGN2677 to (Last Rx:23Xfz1551) Ordered   3  Esomeprazole Magnesium 40 MG Oral Capsule Delayed Release; TAKE ONE CAPSULE     BY MOUTH EVERY DAY; Therapy: 27NZI0631 to (SLLUTRFT:45PVS0442)  Requested for: 81EFQ4820; Last     Rx:29Kdj9120 Ordered   4  FreeStyle InsuLinx Test In Vitro Strip; USE 1 STRIP TWICE DAILY; Therapy: 85UAC0260 to (Evaluate:37Xbn4010)  Requested for: 38PGB4669; Last     Rx:20Jan2016 Ordered   5  Janumet XR  MG Oral Tablet Extended Release 24 Hour; TAKE TWO TABLETS     BY MOUTH EVERY DAY; Therapy: 58FKG9579 to (Evaluate:02Jan2018)  Requested for: 45DOH4514; Last     Rx:64Zks0311 Ordered   6  Levocetirizine Dihydrochloride 5 MG Oral Tablet; take 1 tab po QD;      Therapy: 16FNA5042 to (Last Rx:16Sep2015)  Requested for: 15WBT9504 Ordered   7  Loperamide HCl - 2 MG Oral Capsule; TAKE 2 CAPSULES AT 1ST DIARRHEAL BOWEL     MOVEMENT, THEN TAKE 1 CAPSULE AT [facility] ADDITIONAL BOWEL MOVEMENT  MAXIMUM 8 CAPSULES IN 24 HOURS; Therapy: 04HFN6739 to (Last Rx:43Dia4234)  Requested for: 47AHG2956 Ordered   8  Mometasone Furoate 50 MCG/ACT Nasal Suspension; use 2 sprays in each nostril once     daily; Therapy: 01TMZ9827 to (Evaluate:43Pwr3876)  Requested for: 74RVX4297; Last     YT:21DCD2318 Ordered   9  Montelukast Sodium 10 MG Oral Tablet; take one tablet by mouth every day; Therapy: 20PBL5454 to (Evaluate:66Baj9925)  Requested for: 79NLD3745; Last     Rx:59Aso5203 Ordered   10  Qnasl 80 MCG/ACT Nasal Aerosol Solution; INSTILL 1 SQUIRT Twice daily; Therapy: 27Kdl7770 to (Last Rx:13Dvi4776)  Requested for: 80Vyj0825 Ordered   11  Quinapril HCl - 5 MG Oral Tablet; take one tablet by mouth every day; Therapy: 38Jji2942 to (Evaluate:43Rbc6843)  Requested for: 65Dao4780; Last      Rx:21Wbo7589 Ordered   12  Rosuvastatin Calcium 10 MG Oral Tablet; TAKE ONE TABLET BY MOUTH EVERY DAY AT      BEDTIME; Therapy: 74PDH4041 to (41820 61 83 15)  Requested for: 92DSV7008; Last      Rx:47Vco6507 Ordered     The medication list was reviewed and updated today  Allergies   1  No Known Drug Allergies    Vitals   Vital Signs    Recorded: 04XOI1211 04:08PM   Heart Rate 90   Respiration 16   Systolic 864   Diastolic 64   Height 5 ft 6 in   Weight 171 lb    BMI Calculated 27 6   BSA Calculated 1 87   O2 Saturation 98     Physical Exam        Constitutional      General appearance: No acute distress, well appearing and well nourished  Ears, Nose, Mouth, and Throat      Oropharynx: Normal with no erythema, edema, exudate or lesions  Neck      Neck: Supple, symmetric, trachea midline, no masses  Thyroid: Normal, no thyromegaly         Pulmonary      Respiratory effort: No increased work of breathing or signs of respiratory distress  Auscultation of lungs: Clear to auscultation  Cardiovascular      Palpation of heart: Normal PMI, no thrills  Auscultation of heart: Normal rate and rhythm, normal S1 and S2, no murmurs  Abdomen      Abdomen: Non-tender, no masses  Genitourinary      Penis: Normal, no lesions  -- Normal appearing flaccid penis  No plaques were actually able to be palpated        Future Appointments      Date/Time Provider Specialty Site   02/14/2018 09:00 AM Abbie Hicks DO [facility]     Signatures    Electronically signed by : Hammad Fitzpatrick DO; Dec 27 2017  5:03PM EST                       (Author)

## 2018-01-07 ENCOUNTER — HOSPITAL ENCOUNTER (OUTPATIENT)
Facility: HOSPITAL | Age: 48
Setting detail: OBSERVATION
Discharge: HOME | End: 2018-01-08
Attending: EMERGENCY MEDICINE | Admitting: FAMILY MEDICINE
Payer: COMMERCIAL

## 2018-01-07 ENCOUNTER — APPOINTMENT (EMERGENCY)
Dept: RADIOLOGY | Facility: HOSPITAL | Age: 48
End: 2018-01-07
Payer: COMMERCIAL

## 2018-01-07 DIAGNOSIS — R07.9 CHEST PAIN, UNSPECIFIED TYPE: Primary | ICD-10-CM

## 2018-01-07 PROBLEM — E78.5 HLD (HYPERLIPIDEMIA): Chronic | Status: ACTIVE | Noted: 2018-01-07

## 2018-01-07 PROBLEM — E11.65 TYPE 2 DIABETES MELLITUS WITH HYPERGLYCEMIA, WITHOUT LONG-TERM CURRENT USE OF INSULIN (HCC): Chronic | Status: ACTIVE | Noted: 2018-01-07

## 2018-01-07 PROBLEM — I10 ESSENTIAL HYPERTENSION: Chronic | Status: ACTIVE | Noted: 2018-01-07

## 2018-01-07 LAB
ALBUMIN SERPL BCP-MCNC: 4 G/DL (ref 3.5–5)
ALP SERPL-CCNC: 122 U/L (ref 46–116)
ALT SERPL W P-5'-P-CCNC: 36 U/L (ref 12–78)
ANION GAP SERPL CALCULATED.3IONS-SCNC: 9 MMOL/L (ref 4–13)
APTT PPP: 28 SECONDS (ref 23–35)
AST SERPL W P-5'-P-CCNC: 13 U/L (ref 5–45)
BASOPHILS # BLD AUTO: 0.03 THOUSANDS/ΜL (ref 0–0.1)
BASOPHILS NFR BLD AUTO: 1 % (ref 0–1)
BILIRUB SERPL-MCNC: 0.3 MG/DL (ref 0.2–1)
BUN SERPL-MCNC: 18 MG/DL (ref 5–25)
CALCIUM SERPL-MCNC: 9.5 MG/DL (ref 8.3–10.1)
CHLORIDE SERPL-SCNC: 99 MMOL/L (ref 100–108)
CO2 SERPL-SCNC: 26 MMOL/L (ref 21–32)
CREAT SERPL-MCNC: 0.91 MG/DL (ref 0.6–1.3)
EOSINOPHIL # BLD AUTO: 0.25 THOUSAND/ΜL (ref 0–0.61)
EOSINOPHIL NFR BLD AUTO: 4 % (ref 0–6)
ERYTHROCYTE [DISTWIDTH] IN BLOOD BY AUTOMATED COUNT: 12.7 % (ref 11.6–15.1)
GFR SERPL CREATININE-BSD FRML MDRD: 101 ML/MIN/1.73SQ M
GLUCOSE SERPL-MCNC: 291 MG/DL (ref 65–140)
GLUCOSE SERPL-MCNC: 377 MG/DL (ref 65–140)
HCT VFR BLD AUTO: 39.3 % (ref 36.5–49.3)
HGB BLD-MCNC: 13 G/DL (ref 12–17)
INR PPP: 0.78 (ref 0.86–1.16)
LYMPHOCYTES # BLD AUTO: 1.74 THOUSANDS/ΜL (ref 0.6–4.47)
LYMPHOCYTES NFR BLD AUTO: 30 % (ref 14–44)
MCH RBC QN AUTO: 25.8 PG (ref 26.8–34.3)
MCHC RBC AUTO-ENTMCNC: 33.1 G/DL (ref 31.4–37.4)
MCV RBC AUTO: 78 FL (ref 82–98)
MONOCYTES # BLD AUTO: 0.41 THOUSAND/ΜL (ref 0.17–1.22)
MONOCYTES NFR BLD AUTO: 7 % (ref 4–12)
NEUTROPHILS # BLD AUTO: 3.34 THOUSANDS/ΜL (ref 1.85–7.62)
NEUTS SEG NFR BLD AUTO: 58 % (ref 43–75)
PLATELET # BLD AUTO: 238 THOUSANDS/UL (ref 149–390)
PMV BLD AUTO: 10.9 FL (ref 8.9–12.7)
POTASSIUM SERPL-SCNC: 4.1 MMOL/L (ref 3.5–5.3)
PROT SERPL-MCNC: 7.2 G/DL (ref 6.4–8.2)
PROTHROMBIN TIME: 11.1 SECONDS (ref 12.1–14.4)
RBC # BLD AUTO: 5.04 MILLION/UL (ref 3.88–5.62)
SODIUM SERPL-SCNC: 134 MMOL/L (ref 136–145)
TROPONIN I SERPL-MCNC: <0.02 NG/ML
TROPONIN I SERPL-MCNC: <0.02 NG/ML
WBC # BLD AUTO: 5.77 THOUSAND/UL (ref 4.31–10.16)

## 2018-01-07 PROCEDURE — 82948 REAGENT STRIP/BLOOD GLUCOSE: CPT

## 2018-01-07 PROCEDURE — 96360 HYDRATION IV INFUSION INIT: CPT

## 2018-01-07 PROCEDURE — 85610 PROTHROMBIN TIME: CPT | Performed by: PHYSICIAN ASSISTANT

## 2018-01-07 PROCEDURE — 93005 ELECTROCARDIOGRAM TRACING: CPT

## 2018-01-07 PROCEDURE — 83036 HEMOGLOBIN GLYCOSYLATED A1C: CPT | Performed by: PHYSICIAN ASSISTANT

## 2018-01-07 PROCEDURE — 71046 X-RAY EXAM CHEST 2 VIEWS: CPT

## 2018-01-07 PROCEDURE — 85025 COMPLETE CBC W/AUTO DIFF WBC: CPT | Performed by: PHYSICIAN ASSISTANT

## 2018-01-07 PROCEDURE — 80053 COMPREHEN METABOLIC PANEL: CPT | Performed by: PHYSICIAN ASSISTANT

## 2018-01-07 PROCEDURE — 85730 THROMBOPLASTIN TIME PARTIAL: CPT | Performed by: PHYSICIAN ASSISTANT

## 2018-01-07 PROCEDURE — 93005 ELECTROCARDIOGRAM TRACING: CPT | Performed by: EMERGENCY MEDICINE

## 2018-01-07 PROCEDURE — 84484 ASSAY OF TROPONIN QUANT: CPT | Performed by: PHYSICIAN ASSISTANT

## 2018-01-07 PROCEDURE — 99285 EMERGENCY DEPT VISIT HI MDM: CPT

## 2018-01-07 PROCEDURE — 36415 COLL VENOUS BLD VENIPUNCTURE: CPT | Performed by: PHYSICIAN ASSISTANT

## 2018-01-07 RX ORDER — ONDANSETRON 2 MG/ML
4 INJECTION INTRAMUSCULAR; INTRAVENOUS EVERY 6 HOURS PRN
Status: DISCONTINUED | OUTPATIENT
Start: 2018-01-07 | End: 2018-01-08 | Stop reason: HOSPADM

## 2018-01-07 RX ORDER — QUINAPRIL 10 MG/1
5 TABLET ORAL DAILY
Status: DISCONTINUED | OUTPATIENT
Start: 2018-01-08 | End: 2018-01-08 | Stop reason: HOSPADM

## 2018-01-07 RX ORDER — MONTELUKAST SODIUM 10 MG/1
10 TABLET ORAL
COMMUNITY
End: 2018-03-05 | Stop reason: SDUPTHER

## 2018-01-07 RX ORDER — IBUPROFEN 200 MG
200 TABLET ORAL EVERY 6 HOURS PRN
COMMUNITY
End: 2019-12-11

## 2018-01-07 RX ORDER — ASPIRIN 81 MG/1
81 TABLET, CHEWABLE ORAL DAILY
Status: DISCONTINUED | OUTPATIENT
Start: 2018-01-08 | End: 2018-01-08 | Stop reason: HOSPADM

## 2018-01-07 RX ORDER — PANTOPRAZOLE SODIUM 40 MG/1
40 TABLET, DELAYED RELEASE ORAL
Status: DISCONTINUED | OUTPATIENT
Start: 2018-01-08 | End: 2018-01-08 | Stop reason: HOSPADM

## 2018-01-07 RX ORDER — PRAVASTATIN SODIUM 80 MG/1
80 TABLET ORAL
Status: DISCONTINUED | OUTPATIENT
Start: 2018-01-08 | End: 2018-01-08 | Stop reason: HOSPADM

## 2018-01-07 RX ORDER — MONTELUKAST SODIUM 10 MG/1
10 TABLET ORAL
Status: DISCONTINUED | OUTPATIENT
Start: 2018-01-07 | End: 2018-01-08 | Stop reason: HOSPADM

## 2018-01-07 RX ORDER — ASPIRIN 81 MG/1
162 TABLET, CHEWABLE ORAL ONCE
Status: COMPLETED | OUTPATIENT
Start: 2018-01-07 | End: 2018-01-07

## 2018-01-07 RX ORDER — AMOXICILLIN 500 MG
1 CAPSULE ORAL DAILY
COMMUNITY

## 2018-01-07 RX ADMIN — INSULIN LISPRO 4 UNITS: 100 INJECTION, SOLUTION INTRAVENOUS; SUBCUTANEOUS at 23:03

## 2018-01-07 RX ADMIN — MONTELUKAST SODIUM 10 MG: 10 TABLET, FILM COATED ORAL at 23:04

## 2018-01-07 RX ADMIN — ASPIRIN 81 MG 162 MG: 81 TABLET ORAL at 19:58

## 2018-01-07 RX ADMIN — SODIUM CHLORIDE 1000 ML: 0.9 INJECTION, SOLUTION INTRAVENOUS at 19:32

## 2018-01-08 ENCOUNTER — APPOINTMENT (OUTPATIENT)
Dept: NON INVASIVE DIAGNOSTICS | Facility: HOSPITAL | Age: 48
End: 2018-01-08
Payer: COMMERCIAL

## 2018-01-08 VITALS
HEART RATE: 106 BPM | OXYGEN SATURATION: 97 % | WEIGHT: 169.97 LBS | TEMPERATURE: 98.1 F | DIASTOLIC BLOOD PRESSURE: 79 MMHG | SYSTOLIC BLOOD PRESSURE: 122 MMHG | RESPIRATION RATE: 18 BRPM | BODY MASS INDEX: 27.43 KG/M2

## 2018-01-08 LAB
ANION GAP SERPL CALCULATED.3IONS-SCNC: 11 MMOL/L (ref 4–13)
ATRIAL RATE: 98 BPM
BUN SERPL-MCNC: 15 MG/DL (ref 5–25)
CALCIUM SERPL-MCNC: 8.8 MG/DL (ref 8.3–10.1)
CHLORIDE SERPL-SCNC: 100 MMOL/L (ref 100–108)
CO2 SERPL-SCNC: 24 MMOL/L (ref 21–32)
CREAT SERPL-MCNC: 0.76 MG/DL (ref 0.6–1.3)
ERYTHROCYTE [DISTWIDTH] IN BLOOD BY AUTOMATED COUNT: 12.8 % (ref 11.6–15.1)
EST. AVERAGE GLUCOSE BLD GHB EST-MCNC: 220 MG/DL
GFR SERPL CREATININE-BSD FRML MDRD: 109 ML/MIN/1.73SQ M
GLUCOSE P FAST SERPL-MCNC: 303 MG/DL (ref 65–99)
GLUCOSE SERPL-MCNC: 290 MG/DL (ref 65–140)
GLUCOSE SERPL-MCNC: 303 MG/DL (ref 65–140)
GLUCOSE SERPL-MCNC: 325 MG/DL (ref 65–140)
HBA1C MFR BLD: 9.3 % (ref 4.2–6.3)
HCT VFR BLD AUTO: 39.7 % (ref 36.5–49.3)
HGB BLD-MCNC: 13 G/DL (ref 12–17)
MCH RBC QN AUTO: 25.7 PG (ref 26.8–34.3)
MCHC RBC AUTO-ENTMCNC: 32.7 G/DL (ref 31.4–37.4)
MCV RBC AUTO: 79 FL (ref 82–98)
P AXIS: 39 DEGREES
PLATELET # BLD AUTO: 216 THOUSANDS/UL (ref 149–390)
PMV BLD AUTO: 10.8 FL (ref 8.9–12.7)
POTASSIUM SERPL-SCNC: 3.8 MMOL/L (ref 3.5–5.3)
PR INTERVAL: 170 MS
QRS AXIS: 13 DEGREES
QRSD INTERVAL: 76 MS
QT INTERVAL: 342 MS
QTC INTERVAL: 426 MS
RBC # BLD AUTO: 5.05 MILLION/UL (ref 3.88–5.62)
SODIUM SERPL-SCNC: 135 MMOL/L (ref 136–145)
T WAVE AXIS: 21 DEGREES
TROPONIN I SERPL-MCNC: <0.02 NG/ML
TROPONIN I SERPL-MCNC: <0.02 NG/ML
VENTRICULAR RATE: 93 BPM
WBC # BLD AUTO: 5.54 THOUSAND/UL (ref 4.31–10.16)

## 2018-01-08 PROCEDURE — 84484 ASSAY OF TROPONIN QUANT: CPT | Performed by: FAMILY MEDICINE

## 2018-01-08 PROCEDURE — 82948 REAGENT STRIP/BLOOD GLUCOSE: CPT

## 2018-01-08 PROCEDURE — 85027 COMPLETE CBC AUTOMATED: CPT | Performed by: PHYSICIAN ASSISTANT

## 2018-01-08 PROCEDURE — 93017 CV STRESS TEST TRACING ONLY: CPT

## 2018-01-08 PROCEDURE — 80048 BASIC METABOLIC PNL TOTAL CA: CPT | Performed by: PHYSICIAN ASSISTANT

## 2018-01-08 RX ADMIN — PANTOPRAZOLE SODIUM 40 MG: 40 TABLET, DELAYED RELEASE ORAL at 05:10

## 2018-01-08 RX ADMIN — QUINAPRIL 5 MG: 10 TABLET ORAL at 09:53

## 2018-01-08 RX ADMIN — ASPIRIN 81 MG 81 MG: 81 TABLET ORAL at 10:00

## 2018-01-08 RX ADMIN — INSULIN LISPRO 4 UNITS: 100 INJECTION, SOLUTION INTRAVENOUS; SUBCUTANEOUS at 10:01

## 2018-01-08 RX ADMIN — DILTIAZEM HYDROCHLORIDE 30 MG: 30 TABLET, FILM COATED ORAL at 10:00

## 2018-01-08 NOTE — ED PROVIDER NOTES
History  Chief Complaint   Patient presents with    Chest Pain     Pt presents to the ED with chest pain onset 1 hr ago  Pt reports onset of across the chest discomfort desribed as tightness with radiation into the right arm  Denies dizziness  Denies nausea  Denies SOB  56-year-old male presents to the emergency department with complaints of chest pain  States that he has not been feeling his normal self over the course of the day and took a nap earlier  States that when he woke up approximately 2 hours ago had some dull centralized chest pain with radiation to both arms  States that he was slightly sweaty upon awakening and has had some nausea without vomiting  No diarrhea or fevers  No history of her conditions  Patient states that his right upper arm was also bothering him a little bit yesterday and felt more like a squeezing feeling  He denies any injuries to these areas  Patient has history of hypertension, hyperlipidemia, and diabetes  States that he goes for a stress test every year through work  States that he takes a baby aspirin every day but that when pain started earlier he took a 2nd baby aspirin          History provided by:  Patient   used: No    Chest Pain   Pain location:  Substernal area  Pain quality: dull and pressure    Pain radiates to:  L arm and R arm  Pain radiates to the back: no    Pain severity:  Mild  Onset quality:  Gradual  Duration:  2 hours  Timing:  Constant  Progression:  Improving  Chronicity:  New  Context: not breathing, no drug use, not eating, no intercourse, not lifting, no movement, not raising an arm, not at rest, no stress and no trauma    Worsened by:  Nothing tried  Ineffective treatments:  Aspirin  Associated symptoms: diaphoresis and nausea    Associated symptoms: no abdominal pain, no AICD problem, no altered mental status, no anorexia, no anxiety, no back pain, no claudication, no cough, no dizziness, no dysphagia, no fatigue, no fever, no headache, no heartburn, no lower extremity edema, no numbness, no orthopnea, no palpitations, no PND, no shortness of breath, no syncope, not vomiting and no weakness        Prior to Admission Medications   Prescriptions Last Dose Informant Patient Reported? Taking?    Omega-3 Fatty Acids (FISH OIL) 1200 MG CAPS 1/7/2018 at Unknown time  Yes Yes   Sig: Take 1 capsule by mouth daily   SITagliptin-MetFORMIN HCl ER (JANUMET XR)  MG TB24 1/7/2018 at Unknown time  Yes Yes   Sig: Janumet XR  MG Oral Tablet Extended Release 24 Hour  TAKE TWO TABLETS BY MOUTH EVERY DAY   Quantity: 60;  Refills: 5       Manchester Lewis DO;  Started 11-Oct-2013  Active   aspirin 81 mg chewable tablet 1/7/2018 at Unknown time  Yes Yes   Sig: Adult Aspirin Low Strength 81 MG CHEW  1 PO Q D   Quantity: 0;  Refills: 0       [employer], Provider M D ;  Started 11-Oct-2007  Active   diltiazem (CARDIZEM) 30 mg tablet 1/7/2018 at Unknown time  Yes Yes   Sig: Take 30 mg by mouth daily   esomeprazole (NexIUM) 40 MG capsule 1/7/2018 at Unknown time  Yes Yes   Sig: Esomeprazole Magnesium 40 MG Oral Capsule Delayed Release  TAKE ONE CAPSULE BY MOUTH EVERY DAY   Quantity: 30;  Refills: 5       Manchester Lewis DO;  Started 6-Mar-2013  Active   levocetirizine (XYZAL) 5 MG tablet 1/7/2018 at Unknown time  Yes Yes   Sig: Levocetirizine Dihydrochloride 5 MG Oral Tablet  take 1 tab po QD   Quantity: 30;  Refills: 1       George Lilly DO;  Started 16-Sep-2015  Active   montelukast (SINGULAIR) 10 mg tablet 1/7/2018 at Unknown time  Yes Yes   Sig: Take 10 mg by mouth daily at bedtime   quinapril (ACCUPRIL) 5 mg tablet 1/7/2018 at Unknown time  Yes Yes   Sig: Quinapril HCl - 5 MG Oral Tablet  take one tablet by mouth every day   Quantity: 90;  Refills: 3       Blossom Lewis DO;  Started 31-Aug-2012  Active   rosuvastatin (CRESTOR) 10 MG tablet 1/7/2018 at Unknown time  Yes Yes   Sig: Crestor 10 MG Oral Tablet  TAKE ONE TABLET BY MOUTH EVERY DAY AT BEDTIME   Quantity: 30;  Refills: 5       Hobert Mom DO;  Started 13-Mar-2015  Active      Facility-Administered Medications: None       Past Medical History:   Diagnosis Date    Anxiety     only when flying    Diabetes mellitus ([address] )     GERD (gastroesophageal reflux disease)     Hyperlipidemia     Hypertension        Past Surgical History:   Procedure Laterality Date    HERNIA REPAIR Bilateral     [place] MANIPULATAZ SHLDR JT W ANESTHESIA Right 4/3/2017    Procedure: SHOULDER MANIPULATION UNDER ANESTHESIA ;  Surgeon: Leo Corado MD;  Location: [facility];  Service: Orthopedics       History reviewed  No pertinent family history  I have reviewed and agree with the history as documented  Social History   Substance Use Topics    Smoking status: Never Smoker    Smokeless tobacco: Never Used    Alcohol use Yes      Comment: socially        Review of Systems   Constitutional: Positive for diaphoresis  Negative for activity change, appetite change, chills, fatigue and fever  HENT: Negative for congestion, dental problem, drooling, ear discharge, ear pain, mouth sores, nosebleeds, rhinorrhea, sore throat and trouble swallowing  Eyes: Negative for pain, discharge and itching  Respiratory: Negative for cough, chest tightness, shortness of breath and wheezing  Cardiovascular: Positive for chest pain  Negative for palpitations, orthopnea, claudication, syncope and PND  Gastrointestinal: Positive for nausea  Negative for abdominal pain, anorexia, blood in stool, constipation, diarrhea, heartburn and vomiting  Endocrine: Negative for cold intolerance and heat intolerance  Genitourinary: Negative for difficulty urinating, dysuria, flank pain, frequency and urgency  Musculoskeletal: Negative for back pain  Skin: Negative for rash and wound  Allergic/Immunologic: Negative for food allergies and immunocompromised state     Neurological: Negative for dizziness, seizures, syncope, weakness, numbness and headaches  Psychiatric/Behavioral: Negative for agitation, behavioral problems and confusion  Physical Exam  ED Triage Vitals   Temperature Pulse Respirations Blood Pressure SpO2   01/07/18 1747 01/07/18 1747 01/07/18 1747 01/07/18 1747 01/07/18 1747   98 1 °F (36 7 °C) 98 18 147/80 97 %      Temp Source Heart Rate Source Patient Position - Orthostatic VS BP Location FiO2 (%)   01/07/18 1747 01/07/18 2015 01/07/18 1747 01/07/18 1747 --   Oral Monitor Sitting Left arm       Pain Score       01/07/18 1747       5           Orthostatic Vital Signs  Vitals:    01/07/18 1747 01/07/18 2015   BP: 147/80 137/90   Pulse: 98 96   Patient Position - Orthostatic VS: Sitting Sitting       Physical Exam   Constitutional: He is oriented to person, place, and time  He appears well-developed and well-nourished  No distress  HENT:   Head: Normocephalic and atraumatic  Right Ear: External ear normal    Left Ear: External ear normal    Mouth/Throat: Oropharynx is clear and moist  No oropharyngeal exudate  Eyes: Conjunctivae are normal    Neck: No JVD present  No tracheal deviation present  Cardiovascular: Normal rate, regular rhythm and normal heart sounds  Exam reveals no gallop and no friction rub  No murmur heard  Pulmonary/Chest: Effort normal and breath sounds normal  No respiratory distress  He has no wheezes  He has no rales  He exhibits no tenderness  Abdominal: Soft  Bowel sounds are normal  He exhibits no distension  There is no tenderness  There is no guarding  Musculoskeletal: Normal range of motion  He exhibits no edema, tenderness or deformity  Lymphadenopathy:     He has no cervical adenopathy  Neurological: He is alert and oriented to person, place, and time  Skin: Skin is warm and dry  No rash noted  He is not diaphoretic  No erythema  Psychiatric: He has a normal mood and affect  His behavior is normal    Nursing note and vitals reviewed        ED Medications  Medications sodium chloride 0 9 % bolus 1,000 mL (1,000 mL Intravenous New Bag 1/7/18 1932)   aspirin chewable tablet 162 mg (162 mg Oral Given 1/7/18 1958)       Diagnostic Studies  Results Reviewed     Procedure Component Value Units Date/Time    Protime-INR [[id number]]  (Abnormal) Collected:  01/07/18 1931    Lab Status:  Final result Specimen:  Blood from Arm, Right Updated:  01/07/18 2013     Protime 11 1 (L) seconds      INR 0 78 (L)    APTT [[id number]]  (Normal) Collected:  01/07/18 1931    Lab Status:  Final result Specimen:  Blood from Arm, Right Updated:  01/07/18 2013     PTT 28 seconds     Narrative: Therapeutic Heparin Range = 60-90 seconds    Troponin I [[id number]]  (Normal) Collected:  01/07/18 1931    Lab Status:  Final result Specimen:  Blood from Arm, Right Updated:  01/07/18 2007     Troponin I <0 02 ng/mL     Narrative:         [employer] Chemistry analyzer 99% cutoff is > 0 04 ng/mL in network labs    o cTnI 99% cutoff is useful only when applied to patients in the clinical setting of myocardial ischemia  o cTnI 99% cutoff should be interpreted in the context of clinical history, ECG findings and possibly cardiac imaging to establish correct diagnosis  o cTnI 99% cutoff may be suggestive but clearly not indicative of a coronary event without the clinical setting of myocardial ischemia      Comprehensive metabolic panel [[id number]]  (Abnormal) Collected:  01/07/18 1931    Lab Status:  Final result Specimen:  Blood from Arm, Right Updated:  01/07/18 2005     Sodium 134 (L) mmol/L      Potassium 4 1 mmol/L      Chloride 99 (L) mmol/L      CO2 26 mmol/L      Anion Gap 9 mmol/L      BUN 18 mg/dL      Creatinine 0 91 mg/dL      Glucose 377 (H) mg/dL      Calcium 9 5 mg/dL      AST 13 U/L      ALT 36 U/L      Alkaline Phosphatase 122 (H) U/L      Total Protein 7 2 g/dL      Albumin 4 0 g/dL      Total Bilirubin 0 30 mg/dL      eGFR 101 ml/min/1 73sq m     Narrative:         National Kidney Disease Education Program recommendations are as follows:  GFR calculation is accurate only with a steady state creatinine  Chronic Kidney disease less than 60 ml/min/1 73 sq  meters  Kidney failure less than 15 ml/min/1 73 sq  meters  CBC and differential [[id number]]  (Abnormal) Collected:  01/07/18 1931    Lab Status:  Final result Specimen:  Blood from Arm, Right Updated:  01/07/18 1958     WBC 5 77 Thousand/uL      RBC 5 04 Million/uL      Hemoglobin 13 0 g/dL      Hematocrit 39 3 %      MCV 78 (L) fL      MCH 25 8 (L) pg      MCHC 33 1 g/dL      RDW 12 7 %      MPV 10 9 fL      Platelets 645 Thousands/uL      Neutrophils Relative 58 %      Lymphocytes Relative 30 %      Monocytes Relative 7 %      Eosinophils Relative 4 %      Basophils Relative 1 %      Neutrophils Absolute 3 34 Thousands/µL      Lymphocytes Absolute 1 74 Thousands/µL      Monocytes Absolute 0 41 Thousand/µL      Eosinophils Absolute 0 25 Thousand/µL      Basophils Absolute 0 03 Thousands/µL     POCT urinalysis dipstick [[id number]]     Lab Status:  No result Specimen:  Urine                  XR chest 2 views   ED Interpretation by Louise Ruelas PA-C (01/07 1946)   Clear lungs      Final Result by Catie Dudley MD (01/07 2026)      No active pulmonary disease           Workstation performed: [id number]                    Procedures  ECG 12 Lead Documentation  Date/Time: 1/7/2018 7:08 PM  Performed by: Onesimo Santana  Authorized by: Jade WELLER     Indications / Diagnosis:  Pain  ECG reviewed by me, the ED Provider: yes    Patient location:  ED  Previous ECG:     Previous ECG:  Unavailable  Interpretation:     Interpretation: normal    Rate:     ECG rate:  94    ECG rate assessment: tachycardic    Rhythm:     Rhythm: sinus tachycardia    Ectopy:     Ectopy: none    QRS:     QRS axis:  Normal    QRS intervals:  Normal  Conduction:     Conduction: normal    ST segments:     ST segments:  Normal  T waves:     T waves: normal             Phone Contacts  ED Phone Contact    ED Course  ED Course                                MDM  Number of Diagnoses or Management Options  Chest pain, unspecified type:   Diagnosis management comments: Differential diagnosis includes but not limited to:  Acute coronary syndrome, viral syndrome  Amount and/or Complexity of Data Reviewed  Clinical lab tests: ordered and reviewed  Tests in the radiology section of CPT®: ordered and reviewed  Independent visualization of images, tracings, or specimens: yes      CritCare Time    Disposition  Final diagnoses:   Chest pain, unspecified type     Time reflects when diagnosis was documented in both MDM as applicable and the Disposition within this note     Time User Action Codes Description Comment    1/7/2018  8:50 PM Denisse Rahman Add [R07 9] Chest pain, unspecified type       ED Disposition     ED Disposition Condition Comment    Admit  Case was discussed with MATIAS and the patient's admission status was agreed to be Admission Status: observation status to the service of Dr Nelson Late   Follow-up Information    None       Patient's Medications   Discharge Prescriptions    No medications on file     No discharge procedures on file      ED Provider  Electronically Signed by           Femi Aden PA-C  01/07/18 8151

## 2018-01-08 NOTE — DISCHARGE SUMMARY
Discharge- Rigoberto Unger 1970, 56 y o  male MRN: [id number]    Unit/Bed#: -01 Encounter: [id number]    Primary Care Provider: Nissa Loera DO   Date and time admitted to hospital: 1/7/2018  6:46 PM        * Chest pain   Assessment & Plan    · Patient with atypical chest pain  · Stress test reviewed with Cardiology verbally stress test negative discharge to home outpatient follow-up        Type 2 diabetes mellitus with hyperglycemia, without long-term current use of insulin (HCC)   Assessment & Plan    · Elevated sugars this admission reviewed with patient  · Patient has not changed outpatient regimen with last hemoglobin A1c of 7 1 will follow up as an outpatient        Essential hypertension   Assessment & Plan    · Continue current treatment regimen            Consultations During Hospital Stay:  · None    Procedures Performed:     · Stress EKG negative per discussion with Cardiology  · Chest x-ray no active disease    Significant Findings / Test Results:     · None    Incidental Findings:   · None     Test Results Pending at Discharge (will require follow up): · None     Outpatient Tests Requested:  · None    Complications:  None    Reason for Admission:  Chest pain    Hospital Course:     Rigoberto Unger is a 56 y o  male patient who originally presented to the hospital on 1/7/2018 due to chest pain  Patient reported waking up with substernal chest pain radiating to the right arm was no real associated symptoms other than maybe some clammy feelings  Pain was self-limited no aggravating or relieving maneuvers patient was admitted for observation underwent a stress EKG as above which was negative being discharged home with advice to follow up with his PCP    Please see above list of diagnoses and related plan for additional information  Condition at Discharge: good     Discharge Day Visit / Exam:     Subjective:  Can I go home?   Vitals: Blood Pressure: 122/79 (01/08/18 0956)  Pulse: (!) 106 (01/08/18 3449)  Temperature: 98 1 °F (36 7 °C) (01/08/18 0956)  Temp Source: Oral (01/08/18 0956)  Respirations: 18 (01/08/18 0956)  Weight - Scale: 77 1 kg (169 lb 15 6 oz) (01/07/18 2205)  SpO2: 97 % (01/07/18 2205)  Exam:   Physical Exam   Constitutional: He is oriented to person, place, and time  No distress  Cardiovascular: Normal rate  Exam reveals no gallop and no friction rub  No murmur heard  Pulmonary/Chest: Effort normal  No respiratory distress  He has no wheezes  He has no rales  Abdominal: Soft  He exhibits no distension  There is no tenderness  There is no rebound and no guarding  Musculoskeletal: He exhibits no edema or tenderness  Neurological: He is alert and oriented to person, place, and time  Skin: He is not diaphoretic  Discussion with Family:  Patient's wife at bedside    Discharge instructions/Information to patient and family:   See after visit summary for information provided to patient and family  Provisions for Follow-Up Care:  See after visit summary for information related to follow-up care and any pertinent home health orders  Disposition:     Home    For Discharges to [facility] SNF:   · Not Applicable to this Patient - Not Applicable to this Patient    Planned Readmission: no     Discharge Statement:  I spent 30 minutes discharging the patient  This time was spent on the day of discharge  I had direct contact with the patient on the day of discharge  Greater than 50% of the total time was spent examining patient, answering all patient questions, arranging and discussing plan of care with patient as well as directly providing post-discharge instructions  Additional time then spent on discharge activities  Discharge Medications:  See after visit summary for reconciled discharge medications provided to patient and family        ** Please Note: This note has been constructed using a voice recognition system **

## 2018-01-08 NOTE — ASSESSMENT & PLAN NOTE
· Elevated sugars this admission reviewed with patient    · Patient has not changed outpatient regimen with last hemoglobin A1c of 7 1 will follow up as an outpatient

## 2018-01-08 NOTE — ASSESSMENT & PLAN NOTE
Assessment: POA, patient with chest pain at home, central discomfort radiating to right arm  NO exacerbating or alleviating factors  MAGDA = 2  EKG WNL, Troponin WNL  CXR normal      Plan: Admit to med/surg under observation status with telemetry monitoring  Troponin  EKG in AM  Exercise stress test  Consider cardiology consultation if abnormalities arise

## 2018-01-08 NOTE — PLAN OF CARE
CARDIOVASCULAR - ADULT     Maintains optimal cardiac output and hemodynamic stability Progressing     Absence of cardiac dysrhythmias or at baseline rhythm Progressing        DISCHARGE PLANNING     Discharge to home or other facility with appropriate resources Progressing        GASTROINTESTINAL - ADULT     Minimal or absence of nausea and/or vomiting Progressing     Maintains or returns to baseline bowel function Progressing     Maintains adequate nutritional intake Progressing        INFECTION - ADULT     Absence or prevention of progression during hospitalization Progressing     Absence of fever/infection during neutropenic period Progressing        Knowledge Deficit     Patient/family/caregiver demonstrates understanding of disease process, treatment plan, medications, and discharge instructions Progressing        PAIN - ADULT     Verbalizes/displays adequate comfort level or baseline comfort level Progressing        Potential for Falls     Patient will remain free of falls Progressing        RESPIRATORY - ADULT     Achieves optimal ventilation and oxygenation Progressing        SAFETY ADULT     Maintain or return to baseline ADL function Progressing     Maintain or return mobility status to optimal level Progressing

## 2018-01-08 NOTE — H&P
[address] [facility]  H&P- Kira Gallegos 1970, 56 y o  male MRN: [id number]    Unit/Bed#: [facility] Encounter: [id number]    Primary Care Provider: Roel Hart DO   Date and time admitted to hospital: 1/7/2018  6:46 PM        * Chest pain   Assessment & Plan    Assessment: POA, patient with chest pain at home, central discomfort radiating to right arm  NO exacerbating or alleviating factors  MAGDA = 2  EKG WNL, Troponin WNL  CXR normal      Plan: Admit to med/surg under observation status with telemetry monitoring  Troponin  EKG in AM  Exercise stress test  Consider cardiology consultation if abnormalities arise  Type 2 diabetes mellitus with hyperglycemia, without long-term current use of insulin (HCC)   Assessment & Plan    Assessment: BG elevated on admission at 377  On Janumet at home, as per patient last A1c was 7 1  Plan: Check A1c  SSI by weight, patient may refuse  Amicable to fingersticks  Essential hypertension   Assessment & Plan    Assessment: BP acceptable    Plan: continue home management - Diltiazem, Accupril        HLD (hyperlipidemia)   Assessment & Plan    Assessment: Stable    Plan: continue statin              VTE Prophylaxis: Patient has refused VTE prophylaxis  / reason for no mechanical VTE prophylaxis None, patient ambulatory    Code Status: Full code   POLST: POLST form is not discussed and not completed at this time  Discussion with family: Discussed with family at bedside     Anticipated Length of Stay:  Patient will be admitted on an Observation basis with an anticipated length of stay of  Less than 2 midnights  Justification for Hospital Stay: ACS rule out     Total Time for Visit, including Counseling / Coordination of Care: 1 hour  Greater than 50% of this total time spent on direct patient counseling and coordination of care      Chief Complaint:   Chest pain    History of Present Illness:    Kira Gallegos is a 56 y o  male with a history of type 2 diabetes mellitus, HTN, HLD who presents with chest pain  Patient states when he off from work today he laid down on the couch take a nap  He states that he got up he had some centralized chest discomfort that radiated to his right arm  He also reports that he woke up slightly clammy  He states there are no exacerbating or alleviating factors to his chest pain  Still reports some slight discomfort at this time  Denies any shortness of breath, wheezing, or coughing with this  Denies any palpitations  Denies any headaches or changes in vision  Denies any abdominal pain, nausea, vomiting, or diarrhea, however he does state that he was recently taken off [facility] as was given him multiple abdominal problems  He states that this never happened to him before  He denies history of smoking  He states that he does have a family history of heart attack in his grandmother  Reports that he has multiple risk factors from his mother including hypertension, hyperlipidemia, and type 2 diabetes  And he has refused VTE prophylaxis due to a fear of needles as well as he is considering possibly refusing sliding scale insulin due to fear of needles  I encouraged the patient to at least take sliding scale insulin given his blood sugar was elevated at 377 on admission, he will think about it  Review of Systems:    Review of Systems   Constitutional: Positive for diaphoresis  Negative for appetite change, chills, fatigue and fever  HENT: Negative for congestion, rhinorrhea and sore throat  Respiratory: Negative for cough, chest tightness, shortness of breath and wheezing  Cardiovascular: Positive for chest pain  Negative for palpitations and leg swelling  Gastrointestinal: Positive for abdominal pain (due to trulicity, resolving) and diarrhea (due to trulicity, resolving)  Negative for constipation, nausea and vomiting  Genitourinary: Negative for dysuria     Musculoskeletal: Negative for arthralgias and myalgias  Neurological: Negative for dizziness, syncope, weakness, light-headedness, numbness and headaches  All other systems reviewed and are negative  Past Medical and Surgical History:     Past Medical History:   Diagnosis Date    Anxiety     only when flying    Diabetes mellitus ([address] )     GERD (gastroesophageal reflux disease)     Hyperlipidemia     Hypertension        Past Surgical History:   Procedure Laterality Date    HERNIA REPAIR Bilateral     W[place] MANIPULATN SHLDR JT W ANESTHESIA Right 4/3/2017    Procedure: SHOULDER MANIPULATION UNDER ANESTHESIA ;  Surgeon: Judit Mart MD;  Location: [facility];  Service: Orthopedics       Meds/Allergies:    Prior to Admission medications    Medication Sig Start Date End Date Taking?  Authorizing Provider   aspirin 81 mg chewable tablet Adult Aspirin Low Strength 81 MG CHEW  1 PO Q D   Quantity: 0;  Refills: 0       Allscripts, Provider M D ;  Started 11-Oct-2007  Active 10/11/07  Yes Historical Provider, MD   diltiazem (CARDIZEM) 30 mg tablet Take 30 mg by mouth daily   Yes Historical Provider, MD   esomeprazole (NexIUM) 40 MG capsule Esomeprazole Magnesium 40 MG Oral Capsule Delayed Release  TAKE ONE CAPSULE BY MOUTH EVERY DAY   Quantity: 30;  Refills: 5       [place] Meera DO;  Started 6-Mar-2013  Active 3/6/13  Yes Historical Provider, MD   levocetirizine (XYZAL) 5 MG tablet Levocetirizine Dihydrochloride 5 MG Oral Tablet  take 1 tab po QD   Quantity: 30;  Refills: 1       Girma Meera DO;  Started 16-Sep-2015  Active 9/16/15  Yes Historical Provider, MD   montelukast (SINGULAIR) 10 mg tablet Take 10 mg by mouth daily at bedtime   Yes Historical Provider, MD   Omega-3 Fatty Acids (FISH OIL) 1200 MG CAPS Take 1 capsule by mouth daily   Yes Historical Provider, MD   quinapril (ACCUPRIL) 5 mg tablet Quinapril HCl - 5 MG Oral Tablet  take one tablet by mouth every day   Quantity: 90;  Refills: 3       [place] Meera DO;  Started 31-Aug-2012  Active 8/31/12  Yes Historical Provider, MD   rosuvastatin (CRESTOR) 10 MG tablet Crestor 10 MG Oral Tablet  TAKE ONE TABLET BY MOUTH EVERY DAY AT BEDTIME   Quantity: 30;  Refills: 5       Cynthia Dad DO;  Started 13-Mar-2015  Active 3/13/15  Yes Historical Provider, MD   SITagliptin-MetFORMIN HCl ER (JANUMET XR)  MG TB24 Janumet XR  MG Oral Tablet Extended Release 24 Hour  TAKE TWO TABLETS BY MOUTH EVERY DAY   Quantity: 60;  Refills: 5       Cynthia Dad DO;  Started 11-Oct-2013  Active 10/11/13  Yes Historical Provider, MD   ALPRAZolam [place]) 0 25 mg tablet ALPRAZolam 0 25 MG Oral Tablet  1 po q 8 hrs prn   Quantity: 30;  Refills: 1       Cynthia Dad DO;  Started 3-Laton-2014  Active 1/3/14 1/7/18  Historical Provider, MD   Dulaglutide (TRULICITY) 6 53 CP/6 3WB SOPN Trulicity 1 57 MD/5 2ID Subcutaneous Solution Pen-injector  INJECT 1 UNIT Weekly   Quantity: 1;  Refills: 5       Cynthia Dad DO;  Started 27-Jan-2016  Active0 5 ML Pen (4 Pens) 1/27/16 1/7/18  Historical Provider, MD   oxyCODONE-acetaminophen (PERCOCET) 5-325 mg per tablet Take 1-2 tablets by mouth every 4 hours as needed for pain  4/3/17 1/7/18  Mikel Qiu MD     I have reviewed home medications with patient personally  Allergies: No Known Allergies    Social History:     Marital Status: /Civil Union   Occupation: Noncontributory  Patient Pre-hospital Living Situation: Home with family  Patient Pre-hospital Level of Mobility: Full  Patient Pre-hospital Diet Restrictions: None  Substance Use History:   History   Alcohol Use    Yes     Comment: socially     History   Smoking Status    Never Smoker   Smokeless Tobacco    Never Used     History   Drug Use No       Family History:    History reviewed  No pertinent family history      Physical Exam:     Vitals:   Blood Pressure: 139/86 (01/07/18 2100)  Pulse: 81 (01/07/18 2100)  Temperature: 98 1 °F (36 7 °C) (01/07/18 1747)  Temp Source: Oral (01/07/18 1747)  Respirations: 18 (01/07/18 2100)  Weight - Scale: 77 1 kg (170 lb) (01/07/18 1747)  SpO2: 97 % (01/07/18 2100)    Physical Exam   Constitutional: He is oriented to person, place, and time  Vital signs are normal  He appears well-developed and well-nourished  Non-toxic appearance  No distress  HENT:   Head: Normocephalic and atraumatic  Mouth/Throat: Mucous membranes are not dry  Eyes: Conjunctivae and EOM are normal  Pupils are equal, round, and reactive to light  Pupils are equal    Neck: Neck supple  Cardiovascular: Normal rate, regular rhythm, S1 normal, S2 normal, normal heart sounds and intact distal pulses  Exam reveals no S3 and no S4  No murmur heard  Pulmonary/Chest: Effort normal and breath sounds normal  No accessory muscle usage  No respiratory distress  He has no decreased breath sounds  He has no wheezes  He has no rhonchi  He has no rales  He exhibits no tenderness  Abdominal: Soft  He exhibits no distension and no mass  Bowel sounds are increased  There is no tenderness  There is no rigidity, no rebound and no guarding  Neurological: He is alert and oriented to person, place, and time  He has normal strength  He displays no tremor  No cranial nerve deficit or sensory deficit  He displays no seizure activity  GCS eye subscore is 4  GCS verbal subscore is 5  GCS motor subscore is 6  Skin: Skin is warm and dry  Vitals reviewed  Additional Data:     Lab Results: I have personally reviewed pertinent reports          Results from last 7 days  Lab Units 01/07/18 1931   WBC Thousand/uL 5 77   HEMOGLOBIN g/dL 13 0   HEMATOCRIT % 39 3   PLATELETS Thousands/uL 238   NEUTROS PCT % 58   LYMPHS PCT % 30   MONOS PCT % 7   EOS PCT % 4       Results from last 7 days  Lab Units 01/07/18 1931   SODIUM mmol/L 134*   POTASSIUM mmol/L 4 1   CHLORIDE mmol/L 99*   CO2 mmol/L 26   BUN mg/dL 18   CREATININE mg/dL 0 91   CALCIUM mg/dL 9 5   TOTAL PROTEIN g/dL 7 2   BILIRUBIN TOTAL mg/dL 0 30   ALK PHOS U/L 122* ALT U/L 36   AST U/L 13   GLUCOSE RANDOM mg/dL 377*       Results from last 7 days  Lab Units 01/07/18 1931   INR  0 78*       Imaging: I have personally reviewed pertinent reports  XR chest 2 views   ED Interpretation by Fransisca Cosme PA-C (01/07 1946)   Clear lungs      Final Result by Abraham Lind MD (01/07 2026)      No active pulmonary disease  Workstation performed: [id number]             EKG, Pathology, and Other Studies Reviewed on Admission:   · EKG: NSR, 93 BPM  · CXR: no active pulmonary disease     Allscripts / Epic Records Reviewed: Yes     ** Please Note: This note has been constructed using a voice recognition system   **

## 2018-01-08 NOTE — ASSESSMENT & PLAN NOTE
· Patient with atypical chest pain  · Stress test reviewed with Cardiology verbally stress test negative discharge to home outpatient follow-up

## 2018-01-08 NOTE — ASSESSMENT & PLAN NOTE
Assessment: BG elevated on admission at 377  On Janumet at home, as per patient last A1c was 7 1  Plan: Check A1c  SSI by weight, patient may refuse  Amicable to fingersticks

## 2018-01-08 NOTE — CASE MANAGEMENT
Initial Clinical Review    Admission: Date/Time/Statement: 01/07/18 @ 2052 Observation Written     Orders Placed This Encounter   Procedures    Place in Observation (expected length of stay for this patient is less than two midnights)     Standing Status:   Standing     Number of Occurrences:   1     Order Specific Question:   Admitting Physician     Answer:   Kimmie Aguilera     Order Specific Question:   Level of Care     Answer:   Med Surg [16]         ED: Date/Time/Mode of Arrival:   ED Arrival Information     Expected Arrival Acuity Means of Arrival Escorted By Service Admission Type    - 1/7/2018 17:38 - [facility]    Arrival Complaint    Chest Pain           Chief Complaint:   Chief Complaint   Patient presents with    Chest Pain     Pt presents to the ED with chest pain onset 1 hr ago  Pt reports onset of across the chest discomfort desribed as tightness with radiation into the right arm  Denies dizziness  Denies nausea  Denies SOB  History of Illness: 60-year-old male presents to the emergency department with complaints of chest pain  States that he has not been feeling his normal self over the course of the day and took a nap earlier  States that when he woke up approximately 2 hours ago had some dull centralized chest pain with radiation to both arms  States that he was slightly sweaty upon awakening and has had some nausea without vomiting  No diarrhea or fevers  No history of her conditions  Patient states that his right upper arm was also bothering him a little bit yesterday and felt more like a squeezing feeling  He denies any injuries to these areas  Patient has history of hypertension, hyperlipidemia, and diabetes  States that he goes for a stress test every year through work  States that he takes a baby aspirin every day but that when pain started earlier he took a 2nd baby aspirin      ED Vital Signs:   ED Triage Vitals   Temperature Pulse Respirations Blood Pressure SpO2   01/07/18 1747 01/07/18 1747 01/07/18 1747 01/07/18 1747 01/07/18 1747   98 1 °F (36 7 °C) 98 18 147/80 97 %      Temp Source Heart Rate Source Patient Position - Orthostatic VS BP Location FiO2 (%)   01/07/18 1747 01/07/18 2015 01/07/18 1747 01/07/18 1747 --   Oral Monitor Sitting Left arm       Pain Score       01/07/18 1747       5        Wt Readings from Last 1 Encounters:   01/07/18 77 1 kg (169 lb 15 6 oz)       Vital Signs (abnormal): WNL    Abnormal Labs/Diagnostic Test Results:   SODIUM 134*   CHLORIDE 99*   ALK PHOS 122*   GLUCOSE RANDOM 377*     INR   0 78*     CXR:  NAD  EKG:  NSR, rate 93    ED Treatment:   Medication Administration from 01/07/2018 1738 to 01/07/2018 2134       Date/Time Order Dose Route Action     01/07/2018 1932 sodium chloride 0 9 % bolus 1,000 mL 1,000 mL Intravenous New Bag     01/07/2018 1958 aspirin chewable tablet 162 mg 162 mg Oral Given          Past Medical/Surgical History: Active Ambulatory Problems     Diagnosis Date Noted    No Active Ambulatory Problems     Resolved Ambulatory Problems     Diagnosis Date Noted    No Resolved Ambulatory Problems     Past Medical History:   Diagnosis Date    Anxiety     Diabetes mellitus ([facility] )     GERD (gastroesophageal reflux disease)     Hyperlipidemia     Hypertension        Admitting Diagnosis: Chest pain [R07 9]  Chest pain, unspecified type [R07 9]    Age/Sex: 56 y o  male    Assessment/Plan:   * Chest pain   Assessment & Plan     Assessment: POA, patient with chest pain at home, central discomfort radiating to right arm  NO exacerbating or alleviating factors  MAGDA = 2  EKG WNL, Troponin WNL  CXR normal       Plan: Admit to med/surg under observation status with telemetry monitoring  Troponin   EKG in AM  Exercise stress test  Consider cardiology consultation if abnormalities arise         Type 2 diabetes mellitus with hyperglycemia, without long-term current use of insulin ([facility] ) Assessment & Plan     Assessment: BG elevated on admission at 377  On Janumet at home, as per patient last A1c was 7  1      Plan: Check A1c  SSI by weight, patient may refuse  Amicable to fingersticks         Essential hypertension   Assessment & Plan     Assessment: BP acceptable     Plan: continue home management - Diltiazem, Accupril       HLD (hyperlipidemia)   Assessment & Plan     Assessment: Stable     Plan: continue statin            VTE Prophylaxis: Patient has refused VTE prophylaxis  / reason for no mechanical VTE prophylaxis None, patient ambulatory    Code Status: Full code   POLST: POLST form is not discussed and not completed at this time  Discussion with family: Discussed with family at bedside      Anticipated Length of Stay:  Patient will be admitted on an Observation basis with an anticipated length of stay of  Less than 2 midnights     Justification for Hospital Stay: ACS rule out     Admission Orders:  Telemetry, trop q3h  Accuchecks QAC and QHS with coverage  Exercise stress test    Scheduled Meds:   aspirin 81 mg Oral Daily   diltiazem 30 mg Oral Daily   insulin lispro 1-6 Units Subcutaneous TID AC   insulin lispro 1-6 Units Subcutaneous HS   montelukast 10 mg Oral HS   pantoprazole 40 mg Oral Early Morning   pravastatin 80 mg Oral Daily With Dinner   quinapril 5 mg Oral Daily     Continuous Infusions:    PRN Meds: ondansetron

## 2018-01-09 LAB
ARRHY DURING EX: NORMAL
CHEST PAIN STATEMENT: NORMAL
MAX DIASTOLIC BP: 90 MMHG
MAX HEART RATE: 173 BPM
MAX PREDICTED HEART RATE: 174 BPM
MAX. SYSTOLIC BP: 188 MMHG
PROTOCOL NAME: NORMAL
REASON FOR TERMINATION: NORMAL
TARGET HR FORMULA: NORMAL
TEST INDICATION: NORMAL
TIME IN EXERCISE PHASE: NORMAL

## 2018-01-10 NOTE — RESULT NOTES
Discussion/Summary   Hemoglobin A1c elevated at 8 2% and triglycerides are also elevated at 366     Verified Results  (1) LIPID PANEL, FASTING 20Sep2017 08:29AM [employer] [facility]     Test Name Result Flag Reference   CHOLESTEROL, TOTAL 132 mg/dL  <200   HDL CHOLESTEROL 27 mg/dL L >35   TRIGLICERIDES 130 mg/dL H <150   LDL-CHOLESTEROL 61 mg/dL (calc)     Reference range: <100     Desirable range <100 mg/dL for patients with CHD or  diabetes and <70 mg/dL for diabetic patients with  known heart disease  LDL-C is now calculated using the Benjamin-Velázquez   calculation, which is a validated novel method providing   better accuracy than the Friedewald equation in the   estimation of LDL-C  Monisha Sinha  [employer]  2036;171(78): 1818-3378   (http://iTwixie/faq/QLV555)   CHOL/HDLC RATIO 4 9 (calc)  <5 0   NON HDL CHOLESTEROL 105 mg/dL (calc)  <130   For patients with diabetes plus 1 major ASCVD risk   factor, treating to a non-HDL-C goal of <100 mg/dL   (LDL-C of <70 mg/dL) is considered a therapeutic   option  (Q) MICROALBUMIN, RANDOM URINE (W/CREATININE) 20Sep2017 08:29AM [employer] [facility]     Test Name Result Flag Reference   CREATININE, RANDOM URINE 106 mg/dL     MICROALBUMIN 0 3 mg/dL     Reference Range  Not established   MICROALBUMIN/CREATININE$RATIO, RANDOM URINE 3 mcg/mg creat  <30   The ADA defines abnormalities in albumin  excretion as follows:     Category         Result (mcg/mg creatinine)     Normal                    <30  Microalbuminuria            Clinical albuminuria   > OR = 300     The ADA recommends that at least two of three  specimens collected within a 3-6 month period be  abnormal before considering a patient to be  within a diagnostic category       (1) COMPREHENSIVE METABOLIC PANEL [id number] 56:65LA [employer] [facility]     Test Name Result Flag Reference   GLUCOSE 197 mg/dL H 65-99   Fasting reference interval     For someone without known diabetes, a glucose  value >125 mg/dL indicates that they may have  diabetes and this should be confirmed with a  follow-up test    UREA NITROGEN (BUN) 18 mg/dL  7-25   CREATININE 0 92 mg/dL  0 60-1 35   eGFR NON-AFR   AMERICAN 99 mL/min/1 73m2  > OR = 60   eGFR AFRICAN AMERICAN 115 mL/min/1 73m2  > OR = 60   BUN/CREATININE RATIO   0-09   NOT APPLICABLE (calc)   SODIUM 138 mmol/L  135-146   POTASSIUM 4 4 mmol/L  3 5-5 3   CHLORIDE 100 mmol/L     CARBON DIOXIDE 29 mmol/L  20-31   CALCIUM 10 1 mg/dL  8 6-10 3   PROTEIN, TOTAL 7 1 g/dL  6 1-8 1   ALBUMIN 4 8 g/dL  3 6-5 1   GLOBULIN 2 3 g/dL (calc)  1 9-3 7   ALBUMIN/GLOBULIN RATIO 2 1 (calc)  1 0-2 5   BILIRUBIN, TOTAL 0 5 mg/dL  0 2-1 2   ALKALINE PHOSPHATASE 101 U/L     AST 20 U/L  10-40   ALT 33 U/L  9-46     (1) CBC/PLT/DIFF 20Sep2017 08:29AM Shanna Pérez     Test Name Result Flag Reference   WHITE BLOOD CELL COUNT 6 3 Thousand/uL  3 8-10 8   RED BLOOD CELL COUNT 5 64 Million/uL  4 20-5 80   HEMOGLOBIN 14 8 g/dL  13 2-17 1   HEMATOCRIT 46 2 %  38 5-50 0   MCV 81 9 fL  80 0-100 0   MCH 26 2 pg L 27 0-33 0   MCHC 32 0 g/dL  32 0-36 0   RDW 13 4 %  11 0-15 0   PLATELET COUNT 347 Thousand/uL  140-400   ABSOLUTE NEUTROPHILS 3578 cells/uL  4677-4635   ABSOLUTE LYMPHOCYTES 1966 cells/uL  850-3900   ABSOLUTE MONOCYTES 473 cells/uL  200-950   ABSOLUTE EOSINOPHILS 233 cells/uL     ABSOLUTE BASOPHILS 50 cells/uL  0-200   NEUTROPHILS 56 8 %     LYMPHOCYTES 31 2 %     MONOCYTES 7 5 %     EOSINOPHILS 3 7 %     BASOPHILS 0 8 %     MPV 10 5 fL  7 5-12 5     (Q) HEMOGLOBIN A1c [id number] 08:29AM Shanna Pérez   REPORT COMMENT:  FASTING:YES     Test Name Result Flag Reference   HEMOGLOBIN A1c 8 2 % of total Hgb H <5 7   For someone without known diabetes, a hemoglobin A1c  value of 6 5% or greater indicates that they may have   diabetes and this should be confirmed with a follow-up   test      For someone with known diabetes, a value <7% indicates   that their diabetes is well controlled and a value   greater than or equal to 7% indicates suboptimal   control  A1c targets should be individualized based on   duration of diabetes, age, comorbid conditions, and   other considerations  Currently, no consensus exists regarding use of  hemoglobin A1c for diagnosis of diabetes for children

## 2018-01-10 NOTE — RESULT NOTES
Verified Results  (1) MICROALBUMIN CREATININE RATIO, RANDOM URINE 20Jan2016 09:47PM [facility] Staff     Test Name Result Flag Reference   MICROALBUMIN/ CREAT R <13 mg/g creatinine  0-30   MICROALBUMIN,URINE <5 0 mg/L  0 0-20 0   CREATININE URINE 38 3 mg/dL       (1) COMPREHENSIVE METABOLIC PANEL [id number] 33:82JQ Blasi, [address] [facility] Kidney Disease Education Program recommendations are as follows:  GFR calculation is accurate only with a steady state creatinine  Chronic Kidney disease less than 60 ml/min/1 73 sq  meters  Kidney failure less than 15 ml/min/1 73 sq  meters  Test Name Result Flag Reference   GLUCOSE,RANDM 214 mg/dL H    SODIUM 137 mmol/L  136-145   POTASSIUM 4 3 mmol/L  3 5-5 3   CHLORIDE 103 mmol/L  100-108   CARBON DIOXIDE 24 mmol/L  21-32   ANION GAP (CALC) 10 mmol/L  4-13   BLOOD UREA NITROGEN 18 mg/dL  5-25   CREATININE 0 91 mg/dL  0 60-1 30   Standardized to IDMS reference method   CALCIUM 9 1 mg/dL  8 3-10 1   BILI, TOTAL 0 29 mg/dL  0 20-1 00   ALK PHOSPHATAS 92 U/L     ALT (SGPT) 27 U/L  12-78   AST(SGOT) 15 U/L  5-45   ALBUMIN 4 5 g/dL  3 5-5 0   TOTAL PROTEIN 7 6 g/dL  6 4-8 2   eGFR Non-African American      >60 0 ml/min/1 73sq m     (1) LIPID PANEL FASTING W DIRECT LDL REFLEX 20Jan2016 09:30PM Jacqualyn Staff   Triglyceride:         Normal              <150 mg/dl       Borderline High    150-199 mg/dl       High               200-499 mg/dl       Very High          >499 mg/dl  Cholesterol:         Desirable        <200 mg/dl      Borderline High  200-239 mg/dl      High             >239 mg/dl  HDL Cholesterol:        High    >59 mg/dL      Low     <41 mg/dL  LDL Cholesterol:        Optimal          <100 mg/dl         Near Optimal     100-129 mg/dl        Above Optimal          Borderline High   130-159 mg/dl          High              160-189 mg/dl          Very High        >189 mg/dl  LDL CALCULATED:    This screening LDL is a calculated result    It does not have the accuracy of the Direct Measured LDL in the monitoring of patients with hyperlipidemia and/or statin therapy  Direct Measure LDL ([id number]) must be ordered separately in these patients  Test Name Result Flag Reference   CHOLESTEROL 150 mg/dL     LDL CHOLESTEROL CALCULATED 44 mg/dL  0-100   TRIGLYCERIDES 390 mg/dL H <=150   HDL,DIRECT 28 mg/dL L 40-60     (1) HEMOGLOBIN A1C 20Jan2016 09:21AM [facility]   5 7-6 4% impaired fasting glucose  >=6 5% diagnosis of diabetes    Falsely low levels are seen in conditions linked to short RBC life span-  hemolytic anemia, and splenomegaly  Falsely elevated levels are seen in situations where there is an increased production of RBC- receipt of erythropoietin or blood transfusions  Adopted from ADA-Clinical Practice Recommendations     Test Name Result Flag Reference   HEMOGLOBIN A1C 8 9 % H 4 0-5 6   EST  AVG   GLUCOSE 209 mg/dl

## 2018-01-11 NOTE — RESULT NOTES
Message   Patient is due to schedule result follow-up and follow-up of diabetes  Please notify     Verified Results  (1) CBC/PLT/DIFF [id number] 12:00AM Delcie Stallion     Test Name Result Flag Reference   WHITE BLOOD CELL COUNT 7 6 Thousand/uL  3 8-10 8   RED BLOOD CELL COUNT 5 93 Million/uL H 4 20-5 80   HEMOGLOBIN 15 0 g/dL  13 2-17 1   HEMATOCRIT 47 8 %  38 5-50 0   MCV 80 7 fL  80 0-100 0   MCH 25 4 pg L 27 0-33 0   MCHC 31 4 g/dL L 32 0-36 0   RDW 14 0 %  11 0-15 0   PLATELET COUNT 123 Thousand/uL  140-400   MPV 9 1 fL  7 5-11 5   ABSOLUTE NEUTROPHILS 4887 cells/uL  0904-3463   ABSOLUTE LYMPHOCYTES 1854 cells/uL  850-3900   ABSOLUTE MONOCYTES 608 cells/uL  200-950   ABSOLUTE EOSINOPHILS 205 cells/uL     ABSOLUTE BASOPHILS 46 cells/uL  0-200   NEUTROPHILS 64 3 %     LYMPHOCYTES 24 4 %     MONOCYTES 8 0 %     EOSINOPHILS 2 7 %     BASOPHILS 0 6 %       (1) COMPREHENSIVE METABOLIC PANEL [id number] 59:83TE Delcie Stallion     Test Name Result Flag Reference   GLUCOSE 170 mg/dL H 65-99   Fasting reference interval   UREA NITROGEN (BUN) 16 mg/dL  7-25   CREATININE 0 82 mg/dL  0 60-1 35   eGFR NON-AFR   AMERICAN 107 mL/min/1 73m2  > OR = 60   eGFR AFRICAN AMERICAN 124 mL/min/1 73m2  > OR = 60   BUN/CREATININE RATIO   1-82   NOT APPLICABLE (calc)   SODIUM 138 mmol/L  135-146   POTASSIUM 4 3 mmol/L  3 5-5 3   CHLORIDE 100 mmol/L     CARBON DIOXIDE 28 mmol/L  20-31   CALCIUM 9 9 mg/dL  8 6-10 3   PROTEIN, TOTAL 7 3 g/dL  6 1-8 1   ALBUMIN 4 8 g/dL  3 6-5 1   GLOBULIN 2 5 g/dL (calc)  1 9-3 7   ALBUMIN/GLOBULIN RATIO 1 9 (calc)  1 0-2 5   BILIRUBIN, TOTAL 0 5 mg/dL  0 2-1 2   ALKALINE PHOSPHATASE 79 U/L     AST 20 U/L  10-40   ALT 29 U/L  9-46     (1) LIPID PANEL, FASTING [id number] 12:00AM Delcie Stallion     Test Name Result Flag Reference   CHOLESTEROL, TOTAL 145 mg/dL  125-200   HDL CHOLESTEROL 36 mg/dL L > OR = 40   TRIGLICERIDES 537 mg/dL H <150   LDL-CHOLESTEROL 74 mg/dL (calc)  <130   Desirable range <100 mg/dL for patients with CHD or  diabetes and <70 mg/dL for diabetic patients with  known heart disease  CHOL/HDLC RATIO 4 0 (calc)  < OR = 5 0   NON HDL CHOLESTEROL 109 mg/dL (calc)     Target for non-HDL cholesterol is 30 mg/dL higher than   LDL cholesterol target  See Below     We received your handwritten test order and  performed the AMA defined lipid panel  If  this is not what you intended to order, please  contact your local   immediately so that we may adjust our billing  appropriately  You may also inquire about  alternative or additional testing  (Q) MICROALBUMIN, RANDOM URINE (W/CREATININE) [id number] 12:00AM eNovancela ZIMPERIUM     Test Name Result Flag Reference   CREATININE, RANDOM URINE 20 mg/dL     MICROALBUMIN <0 2 mg/dL     Reference Range  Not established   MICROALBUMIN/CREATININE$RATIO, RANDOM URINE NOTE mcg/mg creat  <30   The microalbumin value is less than 0 2 mg/dL  therefore we are unable to calculate excretion  and/or creatinine ratio  The ADA defines abnormalities in albumin  excretion as follows:     Category         Result (mcg/mg creatinine)     Normal                    <30  Microalbuminuria            Clinical albuminuria   > OR = 300     The ADA recommends that at least two of three  specimens collected within a 3-6 month period be  abnormal before considering a patient to be  within a diagnostic category  (Q) HEMOGLOBIN A1c [id number] 12:00AM eNovancela ZIMPERIUM     Test Name Result Flag Reference   HEMOGLOBIN A1c 7 2 % of total Hgb H <5 7   According to ADA guidelines, hemoglobin A1c <7 0%  represents optimal control in non-pregnant diabetic  patients  Different metrics may apply to specific  patient populations  Standards of Medical Care in    Diabetes Care   2013;36:s11-s66     For the purpose of screening for the presence of  diabetes  <5 7%       Consistent with the absence of diabetes  5 7-6 4%    Consistent with increased risk for diabetes              (prediabetes)  >or=6 5%    Consistent with diabetes     This assay result is consistent with diabetes  mellitus  Currently, no consensus exists for use of hemoglobin  A1c for diagnosis of diabetes for children

## 2018-01-12 VITALS
HEART RATE: 88 BPM | BODY MASS INDEX: 27.48 KG/M2 | HEIGHT: 66 IN | RESPIRATION RATE: 16 BRPM | WEIGHT: 171 LBS | DIASTOLIC BLOOD PRESSURE: 64 MMHG | SYSTOLIC BLOOD PRESSURE: 128 MMHG

## 2018-01-12 VITALS
SYSTOLIC BLOOD PRESSURE: 126 MMHG | DIASTOLIC BLOOD PRESSURE: 77 MMHG | BODY MASS INDEX: 27.48 KG/M2 | HEART RATE: 94 BPM | WEIGHT: 171 LBS | HEIGHT: 66 IN

## 2018-01-12 VITALS
HEIGHT: 66 IN | DIASTOLIC BLOOD PRESSURE: 78 MMHG | HEART RATE: 84 BPM | WEIGHT: 166 LBS | BODY MASS INDEX: 26.68 KG/M2 | RESPIRATION RATE: 16 BRPM | SYSTOLIC BLOOD PRESSURE: 122 MMHG

## 2018-01-13 VITALS
WEIGHT: 169 LBS | RESPIRATION RATE: 16 BRPM | SYSTOLIC BLOOD PRESSURE: 128 MMHG | BODY MASS INDEX: 27.16 KG/M2 | HEIGHT: 66 IN | HEART RATE: 72 BPM | DIASTOLIC BLOOD PRESSURE: 64 MMHG

## 2018-01-13 VITALS
RESPIRATION RATE: 16 BRPM | DIASTOLIC BLOOD PRESSURE: 74 MMHG | SYSTOLIC BLOOD PRESSURE: 132 MMHG | HEART RATE: 104 BPM | TEMPERATURE: 97.7 F | WEIGHT: 172 LBS | HEIGHT: 66 IN | BODY MASS INDEX: 27.64 KG/M2

## 2018-01-13 VITALS
HEIGHT: 66 IN | SYSTOLIC BLOOD PRESSURE: 128 MMHG | BODY MASS INDEX: 27.48 KG/M2 | HEART RATE: 74 BPM | RESPIRATION RATE: 16 BRPM | DIASTOLIC BLOOD PRESSURE: 68 MMHG | WEIGHT: 171 LBS

## 2018-01-13 NOTE — RESULT NOTES
Verified Results  *(Q) VITAMIN D, 25-HYDROXY, LC/MS/MS [id number] 08:17AM Ada Kurt     Test Name Result Flag Reference   VITAMIN D, 25-OH, TOTAL 23 ng/mL L    Vitamin D Status         25-OH Vitamin D:     Deficiency:                    <20 ng/mL  Insufficiency:             20 - 29 ng/mL  Optimal:                 > or = 30 ng/mL     For 25-OH Vitamin D testing on patients on   D2-supplementation and patients for whom quantitation   of D2 and D3 fractions is required, the QuestAssureD(TM)  25-OH VIT D, (D2,D3), LC/MS/MS is recommended: order   code 15414 (patients >2yrs)  For more information on this test, go to:  http://"[employer]/faq/WAD484  (This link is being provided for   informational/educational purposes only )     (Q) COMPLEMENT COMP C3 + C4 [id number] 08:17AM Ada Kurt     Test Name Result Flag Reference   COMPLEMENT COMPONENT C3C 147 mg/dL     COMPLEMENT COMPONENT C4C 29 mg/dL  16-47     (1) CK (CPK) [id number] 08:17AM Ada Mahnomen     Test Name Result Flag Reference   CREATINE KINASE, TOTAL 188 U/L       (1) DNA (DS) ANTIBODY [id number] 08:17AM Ada Mahnomen     Test Name Result Flag Reference   DNA (DS) ANTIBODY <1 IU/mL     IU/mL       Interpretation                             < or = 4    Negative                             5-9         Indeterminate                             > or = 10   Positive     (Q) SCL-70 ANTIBODY [id number] 08:17AM Ada Mahnomen     Test Name Result Flag Reference   SCL-70 ANTIBODY <1 0 NEG AI  <1 0 NEG     (1) ALDOLASE [id number] 08:17AM Ada Kurt   REPORT COMMENT:  OTHER ID [id number] REQS [id number] AND [id number]  FASTING:YES PATIENT UNABLE TO VOID; ADVISED TO RETURN FOR CO     Test Name Result Flag Reference   ALDOLASE 7 4 U/L  < OR = 8 1     (Q) COMPLEMENT COMP C3 + C4 [id number] 08:17AM Ada Mahnomen     Test Name Result Flag Reference   COMPLEMENT COMPONENT C3C 147 mg/dL     COMPLEMENT COMPONENT C4C 29 mg/dL  16-47 (1) CK (CPK) [id number] 08:17AM Hartwell Gutter     Test Name Result Flag Reference   CREATINE KINASE, TOTAL 188 U/L       (1) DNA (DS) ANTIBODY [id number] 08:17AM Hartwell Gutter     Test Name Result Flag Reference   DNA (DS) ANTIBODY <1 IU/mL     IU/mL       Interpretation                             < or = 4    Negative                             5-9         Indeterminate                             > or = 10   Positive     (Q) SM/RNP ANTIBODY [id number] 08:17AM Kolton Gutter     Test Name Result Flag Reference   SM/RNP ANTIBODY <1 0 NEG AI  <1 0 NEG     (Q) SCL-70 ANTIBODY [id number] 08:17AM Hartwell Gutter     Test Name Result Flag Reference   SCL-70 ANTIBODY <1 0 NEG AI  <1 0 NEG     (Q) SJOGRENS ANTIBODIES (SS-A,SS-B) [id number] 08:17AM Kolton Gutter     Test Name Result Flag Reference   SJOGREN'S ANTIBODY (SS-A) <1 0 NEG AI  <1 0 NEG   SJOGREN'S ANTIBODY (SS-B) <1 0 NEG AI  <1 0 NEG     (Q) TEST AUTHORIZATION [id number] 08:17AM Kolton Hollingsworthter     Test Name Result Flag Reference   TEST(S) ORDERED ON$REQUISITION      SM/RNP ANTIBODY SJOGREN'S ANT   TEST CODE: [id number]     CLIENT CONTACT: CYNTHIA S     REPORT ALWAYS MESSAGE$SIGNATURE See Below     The laboratory testing on this patient was verbally requested  or confirmed by the ordering physician or his or her authorized  representative after contact with an employee of [facility]  Federal regulations require that we maintain on file written  authorization for all laboratory testing  Accordingly we are asking  that the ordering physician or his or her authorized representative  sign a copy of this report and promptly return it to the client            Signature:____________________________________________________     (1) ALDOLASE [id number] 08:17AM Kolton Fuller   REPORT COMMENT:  OTHER ID [id number] REQS [id number] AND [id number]  FASTING:YES PATIENT UNABLE TO VOID; ADVISED TO RETURN FOR CO     Test Name Result Flag Reference ALDOLASE 7 4 U/L  < OR = 8 1       Discussion/Summary   Extensive specialized testing does not show any significant abnormality that would explain the high ALESHIA titer previously reported  I would still like you to see Dr Naik Dose the rheumatologist  I did notice that between January and February there was an elevation of your CK which is muscle enzymes  Please stop taking Niacin at this time as the combination of Crestor and niacin can cause muscle inflammation  As discussed

## 2018-01-14 VITALS
HEART RATE: 106 BPM | WEIGHT: 166 LBS | BODY MASS INDEX: 26.68 KG/M2 | SYSTOLIC BLOOD PRESSURE: 124 MMHG | HEIGHT: 66 IN | DIASTOLIC BLOOD PRESSURE: 80 MMHG

## 2018-01-15 VITALS
DIASTOLIC BLOOD PRESSURE: 86 MMHG | SYSTOLIC BLOOD PRESSURE: 135 MMHG | HEIGHT: 66 IN | BODY MASS INDEX: 27.48 KG/M2 | HEART RATE: 111 BPM | WEIGHT: 171 LBS

## 2018-01-15 NOTE — PROGRESS NOTES
Assessment    1  Headache (784 0) (R51)   2  Acute frontal sinusitis, recurrence not specified (461 1) (J01 10)    Plan  Acute frontal sinusitis, recurrence not specified    · Moxifloxacin HCl - 400 MG Oral Tablet (Avelox); TAKE 1 TABLET ONCE DAILY  Paronychia of toe of left foot    · Cephalexin 500 MG Oral Capsule  Type 2 diabetes mellitus    · FreeStyle InsuLinx Test In Vitro Strip; USE 1 STRIP TWICE DAILY    Discussion/Summary    - I believe that the patient's weeklong history of headaches are likely related to acute frontal sinusitis  Will treat patient with a ten-day course of Avelox  He should start to notice improvement in his headaches  If his headaches do not improve or worsening then I would recommend evaluation with ophthalmologist  He is overdue for eye examination  Patient is a diabetic   -Patient will be having his annual physical examination done in February which is comprehensive including a whole-body CT scan, dilated eye examination, full neurologic examination  The patient was counseled regarding instructions for management, prognosis, impressions, risks and benefits of treatment options  Chief Complaint  Discuss headaches      History of Present Illness  HPI: Vision presents with his wife complaining of a one-week history of severe temporal headache which has been every day for the past 7 days  No fevers or chills  No blurring of vision  No nausea or vomiting  No diplopia  Patient did try taking ibuprofen which does help transiently  Patient does note some nasal congestion  He is having difficulty falling asleep due to the headaches  Headaches do not awaken him from sleep      Review of Systems    Constitutional: feeling poorly and feeling tired, but no fever and no chills  ENT: nasal discharge, but no earache, no nosebleeds, no sore throat, no hearing loss and no hoarseness     Cardiovascular: no complaints of slow or fast heart rate, no chest pain, no palpitations, no leg claudication or lower extremity edema  Respiratory: no complaints of shortness of breath, no wheezing or cough, no dyspnea on exertion, no orthopnea or PND  Neurological: headache, but as noted in HPI, no numbness, no confusion and no dizziness  Active Problems    1  Acromioclavicular sprain (840 0) (S43 50XA)   2  Anxiety disorder (300 00) (F41 9)   3  Decreased libido (799 81) (R68 82)   4  Esophageal reflux (530 81) (K21 9)   5  Essential hypertension (401 9) (I10)   6  Eustachian tube dysfunction, right (381 81) (H69 81)   7  Hyperlipidemia (272 4) (E78 5)   8  Paronychia of toe of left foot (681 11) (L03 032)   9  Skin lesion (709 9) (L98 9)   10  Subungual hemorrhage (703 8) (L60 8)   11  Tinea corporis (110 5) (B35 4)   12  Tinea cruris (110 3) (B35 6)   13  Type 2 diabetes mellitus (250 00) (E11 9)    Past Medical History    1  History of _   2  History of Acute maxillary sinusitis (461 0) (J01 00)   3  History of Anal fissure (565 0) (K60 2)   4  History of Blood blister (919 2) (T14 8)   5  History of Chest pain (786 50) (R07 9)   6  History of Cough (786 2) (R05)   7  History of Fracture of great toe (826 0) (S92 403A)   8  History of acute bronchitis (V12 69) (Z87 09)   9  History of acute bronchitis (V12 69) (Z87 09)   10  History of acute bronchitis (V12 69) (Z87 09)   11  History of acute conjunctivitis (V12 49) (Z86 69)   12  History of acute sinusitis (V12 69) (Z87 09)   13  History of benign neoplasm of skin (V13 3) (Z87 2)   14  History of chest pain (V13 89) (Z87 898)   15  History of folliculitis (K49 5) (W89 9)   16  History of headache (V13 89) (Z87 898)   17  History of low back pain (V13 59) (Z87 39)   18  History of Itching (698 9) (L29 9)   19  History of Muscle weakness (generalized) (728 87) (M62 81)   20  History of Pain in wrist, unspecified laterality (719 43) (M25 539)   21  History of Palpitations (785 1) (R00 2)   22  History of Palpitations (785 1) (R00 2)   23   History of Rectal pain (566 42) (D81 46)   24  History of Shoulder joint pain, unspecified laterality (719 41) (R54 155)  Active Problems And Past Medical History Reviewed: The active problems and past medical history were reviewed and updated today  Family History    1  Family history of Diabetes Mellitus (V18 0)   2  Family history of Hypertension (V17 49)  Family History Reviewed: The family history was reviewed and updated today  Social History    · Alcohol Use (History)   · Caffeine Use   · Marital History - Currently    · Never A Smoker   · 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  The social history was reviewed and updated today  The social history was reviewed and is unchanged  Surgical History    1  History of Hernia Repair    Current Meds   1  Adult Aspirin Low Strength 81 MG CHEW; 1 PO Q D;   Therapy: 01HRI4653 to  Requested for: 88XUJ1017 Recorded   2  ALPRAZolam 0 25 MG Oral Tablet; 1 po  bid prn; Therapy: 69AAV8198 to (Last Rx:81Pbd5249) Ordered   3  Cephalexin 500 MG Oral Capsule; TAKE 1 CAPSULE 3 times daily; Therapy: 27YDA1337 to 9615 9032)  Requested for: 48CDD2252; Last   Rx:22Oct2015 Ordered   4  Crestor 10 MG Oral Tablet; TAKE 1 TABLET AT BEDTIME; Therapy: 13XBI8406 to (Arti Edgar)  Requested for: 74ZTP8027; Last   Rx:30Sep2015 Ordered   5  Esomeprazole Magnesium 40 MG Oral Capsule Delayed Release; take 1 tablet by   mouth every day; Therapy: 06LHH1971 to (Last Rx:30Sep2015)  Requested for: 99Kcc3476 Ordered   6  Farxiga 5 MG Oral Tablet; Take 1 tablet daily; Therapy: 75LCX5710 to (Arti Edgar)  Requested for: 15UPY5803; Last   Rx:30Sep2015 Ordered   7  FreeStyle InsuLinx Test In Vitro Strip; USE 1 STRIP TWICE DAILY; Therapy: 29TPO8679 to 9822 7859)  Requested for: 95UXU1440; Last   Rx:08Jan2014 Ordered   8  Janumet XR  MG Oral Tablet Extended Release 24 Hour; TAKE TWO TABLETS   BY MOUTH EVERY DAY;    Therapy: 19XKS5730 to (Last Rx:12Jan2016) Requested for: 12Jan2016 Ordered   9  Levocetirizine Dihydrochloride 5 MG Oral Tablet; take 1 tab po QD; Therapy: 96NLY5992 to (Last Rx:16Sep2015)  Requested for: 16Sep2015 Ordered   10  Niacin ER (Antihyperlipidemic) 500 MG Oral Tablet Extended Release; TAKE ONE    TABLET BY MOUTH AT BEDTIME; Therapy: 67VVY3639 to (Last Rx:30Sep2015)  Requested for: 30Sep2015 Ordered   11  Quinapril HCl - 5 MG Oral Tablet; take one tablet by mouth every day; Therapy: 42Kvw0812 to (Evaluate:04Jan2017)  Requested for: 16TZX8761; Last    Rx:10Jan2016 Ordered    The medication list was reviewed and updated today  Allergies    1  No Known Drug Allergies    Vitals   Recorded: 20Jan2016 08:34AM   Heart Rate 72   Respiration 14   Systolic 445   Diastolic 62   Height 5 ft 6 in   Weight 171 lb    BMI Calculated 27 6   BSA Calculated 1 88     Physical Exam    Constitutional   General appearance: No acute distress, well appearing and well nourished  Head and Face   Head and face: Normal     Palpation of the face and sinuses: Abnormal   Examination of the Sinuses: right frontal tenderness, left frontal tenderness, right ethmoid tenderness and left ethmoid tenderness, but non-tender right maxillary and non-tender left maxillary  Eyes   Conjunctiva and lids: No erythema, swelling or discharge  Pupils and irises: Equal, round, reactive to light  Ophthalmoscopic examination: Normal fundi and optic discs  Ears, Nose, Mouth, and Throat   Nasal mucosa, septum, and turbinates: Abnormal   There was a mucoid discharge from both nares  The bilateral nasal mucosa was boggy and edematous  Oropharynx: Normal with no erythema, edema, exudate or lesions  Neck   Neck: Supple, symmetric, trachea midline, no masses  Thyroid: Normal, no thyromegaly  Pulmonary   Respiratory effort: No increased work of breathing or signs of respiratory distress  Auscultation of lungs: Clear to auscultation      Cardiovascular   Auscultation of heart: Normal rate and rhythm, normal S1 and S2, no murmurs         Signatures   Electronically signed by : Mary Link DO; Jan 20 2016  9:05AM EST                       (Author)

## 2018-01-15 NOTE — MISCELLANEOUS
Message  Called and spoke with patient  MRI of the shoulder shows a partial thickness tear of the critical zone of the distal supraspinatus tendon not exceeding a cross-sectional involvement of 50%  Will trial 6 weeks of physical therapy  If no improvement then we'll provide referral to Dr Dee Malcolm for surgical intervention  Plan  Tear of right supraspinatus tendon, initial encounter    · *1 - [facility]  Consult, partial thickness tear of the right  supraspinatus tendon   6 weeks of physical therapy treatment  Status: Hold For -  Scheduling  Requested for: 28LLU8146  Care Summary provided  : Yes    Signatures   Electronically signed by : Jaylan Ahn DO; Sep 19 2016  8:08PM EST                       (Author)

## 2018-01-15 NOTE — RESULT NOTES
Verified Results  CT SINUS WO CONTRAST [id number] 04:27PM Marietta Rising     Test Name Result Flag Reference   CT SINUS WO CONTRAST (Report)     CT SINUSES     INDICATION: Sinusitis, unresponsive to antibiotic therapy     COMPARISON: None  TECHNIQUE: Axial CT imaging through the sinuses was performed  In addition, sagittal and coronal reformatted images were submitted for interpretation  This examination, like all CT scans performed in the [place], was performed    utilizing techniques to minimize radiation dose exposure, including the use of iterative reconstruction and automated exposure control  IMAGE QUALITY: Diagnostic  FINDINGS:      FRONTAL SINUSES: Very minimal mucosal disease the base of the left frontal sinus     ETHMOID AIR CELLS: Clear save for a right posterior ethmoid air cell  MAXILLARY SINUSES: Tiny retention cyst or polyp along the medial wall of the right maxillary sinus  SPHENOID SINUSES: Clear  NASAL SEPTUM AND CAVITY: Minor leftward deviation of the anterior nasal septum  Normal nasal cavity  ANTERIOR OSTEOMEATAL COMPLEX: Patent  Sphenoethmoidal recesses intact  OSSEOUS STRUCTURES: No bony erosion or destruction  Normal cribiform plate and Planum Spendoidale  Visulaized mastoid temporal bones are clear  The bony walls of the carotid canals are intact  SOFT TISSUES: Normal        IMPRESSION:     Minor scattered mucosal disease  Primary ostia of the paranasal sinuses are clear  Workstation performed: [id number]     Signed by: Kaur Mcgee MD   3/15/16       Plan  Allergic rhinitis, seasonal    · Montelukast Sodium 10 MG Oral Tablet; 1 every day    Discussion/Summary   Mild mucosal disease  Continue Nasonex, levocetirizine  Will add montelukast 10 mg once daily as discussed   No other significant abnormality on CT scan

## 2018-01-18 NOTE — RESULT NOTES
Verified Results  (1) LIPID PANEL, FASTING [id number] 10:16AM [employer]     Test Name Result Flag Reference   CHOLESTEROL, TOTAL 118 mg/dL L 125-200   HDL CHOLESTEROL 31 mg/dL L > OR = 40   TRIGLICERIDES 275 mg/dL H <150   LDL-CHOLESTEROL 50 mg/dL (calc)  <130   Desirable range <100 mg/dL for patients with CHD or  diabetes and <70 mg/dL for diabetic patients with  known heart disease  CHOL/HDLC RATIO 3 8 (calc)  < OR = 5 0   NON HDL CHOLESTEROL 87 mg/dL (calc)     Target for non-HDL cholesterol is 30 mg/dL higher than   LDL cholesterol target  (1) CBC/PLT/DIFF [id number] 10:16AM [employer]     Test Name Result Flag Reference   WHITE BLOOD CELL COUNT 8 0 Thousand/uL  3 8-10 8   RED BLOOD CELL COUNT 5 87 Million/uL H 4 20-5 80   HEMOGLOBIN 14 8 g/dL  13 2-17 1   HEMATOCRIT 45 5 %  38 5-50 0   MCV 77 5 fL L 80 0-100 0   MCH 25 1 pg L 27 0-33 0   MCHC 32 4 g/dL  32 0-36 0   RDW 13 4 %  11 0-15 0   PLATELET COUNT 213 Thousand/uL  140-400   MPV 8 6 fL  7 5-12 5   ABSOLUTE NEUTROPHILS 5552 cells/uL  4868-8457   ABSOLUTE LYMPHOCYTES 1768 cells/uL  850-3900   ABSOLUTE MONOCYTES 504 cells/uL  200-950   ABSOLUTE EOSINOPHILS 120 cells/uL     ABSOLUTE BASOPHILS 56 cells/uL  0-200   NEUTROPHILS 69 4 %     LYMPHOCYTES 22 1 %     MONOCYTES 6 3 %     EOSINOPHILS 1 5 %     BASOPHILS 0 7 %       (1) COMPREHENSIVE METABOLIC PANEL [id number] 75:12OD [employer]     Test Name Result Flag Reference   GLUCOSE 185 mg/dL H 65-99   Fasting reference interval   UREA NITROGEN (BUN) 17 mg/dL  7-25   CREATININE 0 83 mg/dL  0 60-1 35   eGFR NON-AFR   AMERICAN 106 mL/min/1 73m2  > OR = 60   eGFR AFRICAN AMERICAN 123 mL/min/1 73m2  > OR = 60   BUN/CREATININE RATIO   7-91   NOT APPLICABLE (calc)   SODIUM 137 mmol/L  135-146   POTASSIUM 4 5 mmol/L  3 5-5 3   CHLORIDE 97 mmol/L L    CARBON DIOXIDE 30 mmol/L  20-31   CALCIUM 9 6 mg/dL  8 6-10 3   PROTEIN, TOTAL 7 1 g/dL  6 1-8 1   ALBUMIN 4 7 g/dL  3 6-5 1   GLOBULIN 2 4 g/dL (calc)  1 9-3 7   ALBUMIN/GLOBULIN RATIO 2 0 (calc)  1 0-2 5   BILIRUBIN, TOTAL 0 6 mg/dL  0 2-1 2   ALKALINE PHOSPHATASE 100 U/L     AST 13 U/L  10-40   ALT 20 U/L  9-46     (Q) MICROALBUMIN, RANDOM URINE (W/CREATININE) [id number] 10:16AM [facility]     Test Name Result Flag Reference   CREATININE, RANDOM URINE 70 mg/dL     MICROALBUMIN 0 4 mg/dL     Reference Range  Not established   MICROALBUMIN/CREATININE$RATIO, RANDOM URINE 6 mcg/mg creat  <30   The ADA defines abnormalities in albumin  excretion as follows:     Category         Result (mcg/mg creatinine)     Normal                    <30  Microalbuminuria            Clinical albuminuria   > OR = 300     The ADA recommends that at least two of three  specimens collected within a 3-6 month period be  abnormal before considering a patient to be  within a diagnostic category  (1) CK (CPK) [id number] 10:16AM [employer] [employer]     Test Name Result Flag Reference   CREATINE KINASE, TOTAL 60 U/L       (Q) LYME DISEASE AB, TOTAL W/REFL WB (IGG, IGM) [id number] 10:16AM Gia Lax     Test Name Result Flag Reference   LYME AB SCREEN < OR = 0 90 index     Index                 Interpretation                     -----                 --------------                     < or = 0 90           Negative                     0  91-1 09             Equivocal                     > or = 1 10           Positive     The use of purified VlsE-1 and PepC10 antigens in this  assay provides improved specificity compared to assays  that utilize whole cell lysates of B  burgdorferi, the  causative agent of Lyme disease, and slightly better  sensitivity compared to the C6 antibody assay  As recommended by the Food and Drug   Administration (FDA), all samples with positive or   equivocal results in a Borrelia burgdorferi antibody    EIA (screening) will be tested using a blot method     Positive or equivocal screening test results should not   be interpreted as truly positive until verified as such   using a supplemental assay (e g , B  burgdorferi blot)  The screening test and/or blot for B  burgdorferi   antibodies may be falsely negative in early stages of   Lyme disease, including the period when erythema   migrans is apparent  (Q) ALESHIA SCREEN, IFA, WITH REFLEX TO TITER AND PATTERN [id number] 10:16AM Squire Gowers     Test Name Result Flag Reference   ALESHIA SCREEN, IFA POSITIVE A NEGATIVE   ALESHIA IFA is a first line screen for detecting the  presence of up to approximately 150 autoantibodies in  various autoimmune diseases  A positive ALESHIA IFA result  is suggestive of autoimmune disease and reflexes to  titer and pattern  Further laboratory testing may be  considered if clinically indicated  Visit Physician FAQs for interpretation of all  antibodies in the [place], prevalence, and association  with diseases at [email or web address]   ALESHIA PATTERN SPECKLED A    Speckled pattern is associated with mixed connective  tissue disease (MCTD), systemic lupus erythematosus  (SLE), Sjogren's syndrome, dermatomyositis, and  systemic sclerosis/polymyositis overlap  ALESHIA TITER 1:640 titer H    Reference Range                  <1:40        Negative                  1:40-1:80    Low Antibody Level                  >1:80        Elevated Antibody Level     (Q) HEMOGLOBIN A1c [id number] 10:16AM S[facility] Gowers   REPORT COMMENT:  FASTING:YES     Test Name Result Flag Reference   HEMOGLOBIN A1c 7 5 % of total Hgb H <5 7   According to ADA guidelines, hemoglobin A1c <7 0%  represents optimal control in non-pregnant diabetic  patients  Different metrics may apply to specific  patient populations  Standards of Medical Care in    Diabetes Care   2013;36:s11-s66     For the purpose of screening for the presence of  diabetes  <5 7%       Consistent with the absence of diabetes  5 7-6 4%    Consistent with increased risk for diabetes (prediabetes)  >or=6 5%    Consistent with diabetes     This assay result is consistent with diabetes  mellitus  Currently, no consensus exists for use of hemoglobin  A1c for diagnosis of diabetes for children  Discussion/Summary   He will A1c is fairly well-controlled at 7 5%  Cholesterol profile is excellent  Lyme serology is negative  ALESHIA is strongly positive  As discussed he will need to be seen by rheumatologist  Further blood work and urinalysis to be completed for rheumatologist  See order  I will contact you when I have obtained an appointment with Dr Mónica Barbosa or one of her partners   No change in medications

## 2018-01-23 VITALS
HEIGHT: 66 IN | SYSTOLIC BLOOD PRESSURE: 128 MMHG | OXYGEN SATURATION: 98 % | DIASTOLIC BLOOD PRESSURE: 64 MMHG | WEIGHT: 171 LBS | RESPIRATION RATE: 16 BRPM | BODY MASS INDEX: 27.48 KG/M2 | HEART RATE: 90 BPM

## 2018-01-30 DIAGNOSIS — E11.9 TYPE 2 DIABETES MELLITUS WITHOUT COMPLICATION, WITHOUT LONG-TERM CURRENT USE OF INSULIN (HCC): ICD-10-CM

## 2018-01-30 DIAGNOSIS — I10 ESSENTIAL HYPERTENSION: Primary | ICD-10-CM

## 2018-02-03 DIAGNOSIS — F40.243 FEAR OF FLYING: ICD-10-CM

## 2018-02-03 DIAGNOSIS — E78.5 HYPERLIPIDEMIA: ICD-10-CM

## 2018-02-03 DIAGNOSIS — E11.9 TYPE 2 DIABETES MELLITUS WITHOUT COMPLICATIONS (HCC): ICD-10-CM

## 2018-02-03 DIAGNOSIS — I10 ESSENTIAL (PRIMARY) HYPERTENSION: ICD-10-CM

## 2018-02-09 LAB
ALBUMIN SERPL-MCNC: 4.4 G/DL (ref 3.6–5.1)
ALBUMIN/CREAT UR: 4 MCG/MG CREAT
ALBUMIN/GLOB SERPL: 2 (CALC) (ref 1–2.5)
ALP SERPL-CCNC: 85 U/L (ref 40–115)
ALT SERPL-CCNC: 17 U/L (ref 9–46)
AST SERPL-CCNC: 11 U/L (ref 10–40)
BASOPHILS # BLD AUTO: 51 CELLS/UL (ref 0–200)
BASOPHILS NFR BLD AUTO: 0.8 %
BILIRUB SERPL-MCNC: 0.5 MG/DL (ref 0.2–1.2)
BUN SERPL-MCNC: 18 MG/DL (ref 7–25)
BUN/CREAT SERPL: ABNORMAL (CALC) (ref 6–22)
CALCIUM SERPL-MCNC: 9.4 MG/DL (ref 8.6–10.3)
CHLORIDE SERPL-SCNC: 98 MMOL/L (ref 98–110)
CHOLEST SERPL-MCNC: 138 MG/DL
CHOLEST/HDLC SERPL: 4.6 (CALC)
CO2 SERPL-SCNC: 28 MMOL/L (ref 20–31)
CREAT SERPL-MCNC: 0.9 MG/DL (ref 0.6–1.35)
CREAT UR-MCNC: 141 MG/DL (ref 20–370)
EOSINOPHIL # BLD AUTO: 198 CELLS/UL (ref 15–500)
EOSINOPHIL NFR BLD AUTO: 3.1 %
ERYTHROCYTE [DISTWIDTH] IN BLOOD BY AUTOMATED COUNT: 12.7 % (ref 11–15)
GLOBULIN SER CALC-MCNC: 2.2 G/DL (CALC) (ref 1.9–3.7)
GLUCOSE SERPL-MCNC: 322 MG/DL (ref 65–99)
HBA1C MFR BLD: 10.1 % OF TOTAL HGB
HCT VFR BLD AUTO: 44.2 % (ref 38.5–50)
HDLC SERPL-MCNC: 30 MG/DL
HGB BLD-MCNC: 14.2 G/DL (ref 13.2–17.1)
LDLC SERPL CALC-MCNC: 73 MG/DL (CALC)
LYMPHOCYTES # BLD AUTO: 1798 CELLS/UL (ref 850–3900)
LYMPHOCYTES NFR BLD AUTO: 28.1 %
MCH RBC QN AUTO: 26.2 PG (ref 27–33)
MCHC RBC AUTO-ENTMCNC: 32.1 G/DL (ref 32–36)
MCV RBC AUTO: 81.5 FL (ref 80–100)
MICROALBUMIN UR-MCNC: 0.6 MG/DL
MONOCYTES # BLD AUTO: 474 CELLS/UL (ref 200–950)
MONOCYTES NFR BLD AUTO: 7.4 %
NEUTROPHILS # BLD AUTO: 3878 CELLS/UL (ref 1500–7800)
NEUTROPHILS NFR BLD AUTO: 60.6 %
NONHDLC SERPL-MCNC: 108 MG/DL (CALC)
PLATELET # BLD AUTO: 250 THOUSAND/UL (ref 140–400)
PMV BLD REES-ECKER: 10.7 FL (ref 7.5–12.5)
POTASSIUM SERPL-SCNC: 4.4 MMOL/L (ref 3.5–5.3)
PROT SERPL-MCNC: 6.6 G/DL (ref 6.1–8.1)
RBC # BLD AUTO: 5.42 MILLION/UL (ref 4.2–5.8)
SL AMB EGFR AFRICAN AMERICAN: 118 ML/MIN/1.73M2
SL AMB EGFR NON AFRICAN AMERICAN: 102 ML/MIN/1.73M2
SODIUM SERPL-SCNC: 136 MMOL/L (ref 135–146)
TRIGL SERPL-MCNC: 293 MG/DL
WBC # BLD AUTO: 6.4 THOUSAND/UL (ref 3.8–10.8)

## 2018-02-13 RX ORDER — LOPERAMIDE HYDROCHLORIDE 2 MG/1
CAPSULE ORAL
COMMUNITY
Start: 2017-11-01 | End: 2018-09-05 | Stop reason: ALTCHOICE

## 2018-02-13 RX ORDER — MOMETASONE FUROATE 50 UG/1
2 SPRAY, METERED NASAL DAILY
COMMUNITY
Start: 2016-03-02 | End: 2018-09-05 | Stop reason: ALTCHOICE

## 2018-02-14 ENCOUNTER — OFFICE VISIT (OUTPATIENT)
Dept: FAMILY MEDICINE CLINIC | Facility: CLINIC | Age: 48
End: 2018-02-14
Payer: COMMERCIAL

## 2018-02-14 VITALS
WEIGHT: 168 LBS | SYSTOLIC BLOOD PRESSURE: 124 MMHG | HEART RATE: 92 BPM | HEIGHT: 66 IN | RESPIRATION RATE: 16 BRPM | DIASTOLIC BLOOD PRESSURE: 68 MMHG | BODY MASS INDEX: 27 KG/M2

## 2018-02-14 DIAGNOSIS — I10 ESSENTIAL HYPERTENSION: Chronic | ICD-10-CM

## 2018-02-14 DIAGNOSIS — M70.62 TROCHANTERIC BURSITIS OF BOTH HIPS: ICD-10-CM

## 2018-02-14 DIAGNOSIS — M70.61 TROCHANTERIC BURSITIS OF BOTH HIPS: ICD-10-CM

## 2018-02-14 DIAGNOSIS — E78.2 MIXED HYPERLIPIDEMIA: Primary | Chronic | ICD-10-CM

## 2018-02-14 DIAGNOSIS — E11.65 TYPE 2 DIABETES MELLITUS WITH HYPERGLYCEMIA, WITHOUT LONG-TERM CURRENT USE OF INSULIN (HCC): Chronic | ICD-10-CM

## 2018-02-14 PROBLEM — R76.8 ANA POSITIVE: Status: ACTIVE | Noted: 2017-05-01

## 2018-02-14 PROBLEM — N48.6 PEYRONIE'S DISEASE: Status: ACTIVE | Noted: 2017-12-27

## 2018-02-14 PROBLEM — M25.511 SHOULDER PAIN, RIGHT: Status: ACTIVE | Noted: 2017-05-01

## 2018-02-14 PROBLEM — F40.243 FEAR OF FLYING: Status: ACTIVE | Noted: 2017-08-03

## 2018-02-14 PROBLEM — E55.9 VITAMIN D DEFICIENCY: Status: ACTIVE | Noted: 2017-05-01

## 2018-02-14 PROBLEM — R07.9 CHEST PAIN: Status: RESOLVED | Noted: 2018-01-07 | Resolved: 2018-02-14

## 2018-02-14 PROBLEM — E11.9 TYPE 2 DIABETES MELLITUS (HCC): Chronic | Status: ACTIVE | Noted: 2018-01-07

## 2018-02-14 PROBLEM — M79.10 MYALGIA: Status: ACTIVE | Noted: 2017-01-27

## 2018-02-14 PROBLEM — R76.8 POSITIVE ANA (ANTINUCLEAR ANTIBODY): Status: ACTIVE | Noted: 2017-02-02

## 2018-02-14 PROCEDURE — 99215 OFFICE O/P EST HI 40 MIN: CPT | Performed by: FAMILY MEDICINE

## 2018-02-14 RX ORDER — MELOXICAM 15 MG/1
15 TABLET ORAL DAILY
Qty: 30 TABLET | Refills: 1 | Status: SHIPPED | OUTPATIENT
Start: 2018-02-14 | End: 2018-05-04 | Stop reason: SDUPTHER

## 2018-02-14 NOTE — PROGRESS NOTES
Assessment/Plan:    No problem-specific Assessment & Plan notes found for this encounter  Problem List Items Addressed This Visit     Type 2 diabetes mellitus ([address] ) (Chronic)     Not ideally controlled  Hemoglobin A1c of 10 1%  Patient will continue on Janumet  Will add [place]  Recheck diabetic parameters in 4 months  Relevant Medications    Dapagliflozin Propanediol (FARXIGA) 10 MG TABS    Other Relevant Orders    CBC and differential    Comprehensive metabolic panel    Hemoglobin A1c    Microalbumin / creatinine urine ratio    Lipid panel    Essential hypertension (Chronic)     Hypertension is well controlled  No change in medications  Relevant Orders    CBC and differential    Comprehensive metabolic panel    Hemoglobin A1c    Microalbumin / creatinine urine ratio    Lipid panel    Hyperlipidemia - Primary (Chronic)     Hyperlipidemia is well controlled on current dose of Crestor  Continue same  Recheck lipid profile in 4 months         Relevant Orders    CBC and differential    Comprehensive metabolic panel    Hemoglobin A1c    Microalbumin / creatinine urine ratio    Lipid panel    Trochanteric bursitis of both hips     Patient will be having examination through [facility] which is a comprehensive examination  They do performed whole body scan including x-rays of the hips  I believe that he has trochanteric bursitis  Meloxicam prescribed  It is something of a question since the patient did have positive ALESHIA but no significant rheumatologic findings  May need referral back to Rheumatology  Relevant Medications    meloxicam (MOBIC) 15 mg tablet    Other Relevant Orders    XR hip/pelv 2-3 vws left if performed    XR hip/pelv 2-3 vws right if performed            Subjective:      Patient ID: Lilliana Bejarano is a 56 y o  male  Patient presents for 6 month follow-up of chronic conditions including hypertension, hyperlipidemia, diabetes mellitus type 2    Patient does complain of recent upper respiratory symptoms for the past 2 days including sore throat and minimally productive cough  No fevers or chills  No sick contacts in his home  Labs reviewed which showed normal CBC, CMP with the exception of a glucose of 322  Hemoglobin A1c increased from 9 3-10  1  Patient had the discontinue trucityl to gastrointestinal side effects  He was unable to tolerate even lower dosage of medication  Patient did have ER evaluation for chest pain going down his right arm  It was felt that this was not cardiac  He did have an exercise stress test where he exercised for 10 minutes and achieved target heart rate  No evidence of ischemia on EKG  No subsequent episodes  Patient does not need refills of medications at this time  Patient also complains of pain in bilateral hips and bilateral shoulders  He does have history of adhesive capsulitis of both shoulders  Left shoulder required manipulation under anesthesia  Right shoulder he was able to improve with physical therapy  The patient did have rheumatologic workup which did show a positive ALESHIA with a concentration of 1-640 in a speckled pattern  He was sent over to Rheumatology but nothing was ever found  He does take ibuprofen at times           Past Medical History:   Diagnosis Date    Anxiety     only when flying    Chronic frontal sinusitis     last assessed 03/02/2016    Diabetes mellitus [address] [address] )     Fracture of great toe     last assessed 11/06/2014    GERD (gastroesophageal reflux disease)     Hyperlipidemia     Hypertension     Palpitations     last assessed 11/06/2012       Family History   Problem Relation Age of Onset    Diabetes Mother     Liver cancer Mother     Hypertension Mother        Past Surgical History:   Procedure Laterality Date    HERNIA REPAIR Bilateral     PA INDERJIT RUIZ JT W ANESTHESIA Right 4/3/2017    Procedure: SHOULDER MANIPULATION UNDER ANESTHESIA ;  Surgeon: Elizabeth Lara MD; [facility];  Service: Orthopedics        reports that he has never smoked  He has never used smokeless tobacco  He reports that he drinks alcohol  He reports that he does not use drugs        Current Outpatient Prescriptions:     aspirin 81 mg chewable tablet, Adult Aspirin Low Strength 81 MG CHEW 1 PO Q D  Quantity: 0;  Refills: 0    [employer], Provider M D ;  Started 11-Oct-2007 Active, Disp: , Rfl:     Beclomethasone Dipropionate (QNASL) 80 MCG/ACT AERS, 1 Squirt into each nostril 2 (two) times a day, Disp: , Rfl:     CHOLECALCIFEROL PO, Take 1,000 Units by mouth, Disp: , Rfl:     diltiazem (CARDIZEM) 30 mg tablet, Take 30 mg by mouth daily, Disp: , Rfl:     diltiazem (CARDIZEM) 30 mg tablet, Take 1 tablet (30 mg total) by mouth daily, Disp: 90 tablet, Rfl: 1    esomeprazole (NexIUM) 40 MG capsule, Esomeprazole Magnesium 40 MG Oral Capsule Delayed Release TAKE ONE CAPSULE BY MOUTH EVERY DAY  Quantity: 30;  Refills: 5    Priya Phan DO;  Started 6-Mar-2013 Active, Disp: , Rfl:     glucose blood (FREESTYLE INSULINX TEST) test strip, 1 Squirt by In Vitro route 2 (two) times a day, Disp: , Rfl:     ibuprofen (MOTRIN) 200 mg tablet, Take 200 mg by mouth every 6 (six) hours as needed for mild pain, Disp: , Rfl:     levocetirizine (XYZAL) 5 MG tablet, Levocetirizine Dihydrochloride 5 MG Oral Tablet take 1 tab po QD  Quantity: 30;  Refills: 1    George Lilly DO;  Started 16-Sep-2015 Active, Disp: , Rfl:     loperamide (IMODIUM) 2 mg capsule, Take by mouth, Disp: , Rfl:     mometasone (NASONEX) 50 mcg/act nasal spray, 2 sprays into each nostril daily, Disp: , Rfl:     montelukast (SINGULAIR) 10 mg tablet, Take 10 mg by mouth daily at bedtime, Disp: , Rfl:     Omega-3 Fatty Acids (FISH OIL) 1200 MG CAPS, Take 1 capsule by mouth daily, Disp: , Rfl:     quinapril (ACCUPRIL) 5 mg tablet, Quinapril HCl - 5 MG Oral Tablet take one tablet by mouth every day  Quantity: 90;  Refills: 3    Priya Phan DO;  Started 31-Aug-2012 Active, Disp: , Rfl:     rosuvastatin (CRESTOR) 10 MG tablet, Crestor 10 MG Oral Tablet TAKE ONE TABLET BY MOUTH EVERY DAY AT BEDTIME  Quantity: 30;  Refills: 5    Stockton Moots DO;  Started 13-Mar-2015 Active, Disp: , Rfl:     SITagliptin-MetFORMIN HCl ER (JANUMET XR)  MG TB24, Take 2 tablets by mouth daily, Disp: , Rfl:     SITagliptin-MetFORMIN HCl ER (JANUMET XR)  MG TB24, Take 2 tablets by mouth daily, Disp: 180 tablet, Rfl: 1    The following portions of the patient's history were reviewed and updated as appropriate: allergies, current medications, past family history, past medical history, past social history, past surgical history and problem list     Review of Systems   Constitutional: Negative  HENT: Negative  Eyes: Negative  Respiratory: Negative  Cardiovascular: Negative  Gastrointestinal: Negative  Endocrine: Negative  Genitourinary: Negative  Musculoskeletal: Positive for arthralgias (Bilateral hip and shoulder pain  )  Skin: Negative  Allergic/Immunologic: Negative  Neurological: Negative  Hematological: Negative  Psychiatric/Behavioral: Negative  All other systems reviewed and are negative  @lab@    Objective:    /68 (Cuff Size: Standard)   Pulse 92   Resp 16   Ht 5' 6" (1 676 m)   Wt 76 2 kg (168 lb)   BMI 27 12 kg/m²      Physical Exam   Constitutional: He is oriented to person, place, and time  He appears well-developed and well-nourished  HENT:   Head: Normocephalic  Eyes: Conjunctivae and EOM are normal  Pupils are equal, round, and reactive to light  Neck: Normal range of motion  Neck supple  Cardiovascular: Normal rate, regular rhythm and normal heart sounds  Pulmonary/Chest: Effort normal and breath sounds normal    Abdominal: Soft  Bowel sounds are normal    Musculoskeletal: He exhibits tenderness (Tenderness with palpation of bilateral hips  No crepitation    Negative TAYLER maneuver)  Neurological: He is alert and oriented to person, place, and time  Psychiatric: He has a normal mood and affect  His behavior is normal  Judgment and thought content normal    Nursing note and vitals reviewed  Problem List Items Addressed This Visit     Type 2 diabetes mellitus ([address] ) (Chronic)     Not ideally controlled  Hemoglobin A1c of 10 1%  Patient will continue on Janumet  Will add [place]  Recheck diabetic parameters in 4 months  Relevant Medications    Dapagliflozin Propanediol (FARXIGA) 10 MG TABS    Other Relevant Orders    CBC and differential    Comprehensive metabolic panel    Hemoglobin A1c    Microalbumin / creatinine urine ratio    Lipid panel    Essential hypertension (Chronic)     Hypertension is well controlled  No change in medications  Relevant Orders    CBC and differential    Comprehensive metabolic panel    Hemoglobin A1c    Microalbumin / creatinine urine ratio    Lipid panel    Hyperlipidemia - Primary (Chronic)     Hyperlipidemia is well controlled on current dose of Crestor  Continue same  Recheck lipid profile in 4 months         Relevant Orders    CBC and differential    Comprehensive metabolic panel    Hemoglobin A1c    Microalbumin / creatinine urine ratio    Lipid panel    Trochanteric bursitis of both hips     Patient will be having examination through [facility] which is a comprehensive examination  They do performed whole body scan including x-rays of the hips  I believe that he has trochanteric bursitis  Meloxicam prescribed  It is something of a question since the patient did have positive ALESHIA but no significant rheumatologic findings  May need referral back to Rheumatology           Relevant Medications    meloxicam (MOBIC) 15 mg tablet    Other Relevant Orders    XR hip/pelv 2-3 vws left if performed    XR hip/pelv 2-3 vws right if performed

## 2018-02-14 NOTE — ASSESSMENT & PLAN NOTE
Hyperlipidemia is well controlled on current dose of Crestor  Continue same    Recheck lipid profile in 4 months

## 2018-02-14 NOTE — ASSESSMENT & PLAN NOTE
Patient will be having examination through [facility] which is a comprehensive examination  They do performed whole body scan including x-rays of the hips  I believe that he has trochanteric bursitis  Meloxicam prescribed  It is something of a question since the patient did have positive ALESHIA but no significant rheumatologic findings  May need referral back to Rheumatology

## 2018-02-14 NOTE — ASSESSMENT & PLAN NOTE
Not ideally controlled  Hemoglobin A1c of 10 1%  Patient will continue on Janumet  Will add [place]  Recheck diabetic parameters in 4 months

## 2018-03-05 DIAGNOSIS — J30.9 ALLERGIC RHINITIS, UNSPECIFIED CHRONICITY, UNSPECIFIED SEASONALITY, UNSPECIFIED TRIGGER: ICD-10-CM

## 2018-03-05 DIAGNOSIS — I10 ESSENTIAL HYPERTENSION: Primary | ICD-10-CM

## 2018-03-05 RX ORDER — MONTELUKAST SODIUM 10 MG/1
10 TABLET ORAL
Qty: 90 TABLET | Refills: 0 | Status: SHIPPED | OUTPATIENT
Start: 2018-03-05 | End: 2020-11-09 | Stop reason: SDUPTHER

## 2018-03-12 DIAGNOSIS — E78.5 HYPERLIPIDEMIA, UNSPECIFIED HYPERLIPIDEMIA TYPE: ICD-10-CM

## 2018-03-12 DIAGNOSIS — K21.9 GASTROESOPHAGEAL REFLUX DISEASE, ESOPHAGITIS PRESENCE NOT SPECIFIED: Primary | ICD-10-CM

## 2018-03-12 RX ORDER — ROSUVASTATIN CALCIUM 10 MG/1
10 TABLET, COATED ORAL DAILY
Qty: 90 TABLET | Refills: 1 | Status: SHIPPED | OUTPATIENT
Start: 2018-03-12 | End: 2018-09-17 | Stop reason: SDUPTHER

## 2018-03-12 RX ORDER — ESOMEPRAZOLE MAGNESIUM 40 MG/1
40 CAPSULE, DELAYED RELEASE ORAL DAILY
Qty: 90 CAPSULE | Refills: 1 | Status: SHIPPED | OUTPATIENT
Start: 2018-03-12 | End: 2018-09-17 | Stop reason: SDUPTHER

## 2018-03-27 DIAGNOSIS — E11.9 TYPE 2 DIABETES MELLITUS WITHOUT COMPLICATIONS (HCC): ICD-10-CM

## 2018-03-27 DIAGNOSIS — I10 ESSENTIAL (PRIMARY) HYPERTENSION: ICD-10-CM

## 2018-03-27 DIAGNOSIS — E78.5 HYPERLIPIDEMIA: ICD-10-CM

## 2018-04-11 LAB
LEFT EYE DIABETIC RETINOPATHY: NORMAL
RIGHT EYE DIABETIC RETINOPATHY: NORMAL

## 2018-04-11 PROCEDURE — 3072F LOW RISK FOR RETINOPATHY: CPT | Performed by: FAMILY MEDICINE

## 2018-05-04 DIAGNOSIS — M70.62 TROCHANTERIC BURSITIS OF BOTH HIPS: ICD-10-CM

## 2018-05-04 DIAGNOSIS — M70.61 TROCHANTERIC BURSITIS OF BOTH HIPS: ICD-10-CM

## 2018-05-04 RX ORDER — MELOXICAM 15 MG/1
15 TABLET ORAL DAILY
Qty: 90 TABLET | Refills: 0 | Status: SHIPPED | OUTPATIENT
Start: 2018-05-04 | End: 2019-12-11 | Stop reason: SDUPTHER

## 2018-05-16 ENCOUNTER — OFFICE VISIT (OUTPATIENT)
Dept: FAMILY MEDICINE CLINIC | Facility: CLINIC | Age: 48
End: 2018-05-16
Payer: COMMERCIAL

## 2018-05-16 VITALS
RESPIRATION RATE: 16 BRPM | HEIGHT: 66 IN | BODY MASS INDEX: 27.32 KG/M2 | DIASTOLIC BLOOD PRESSURE: 74 MMHG | WEIGHT: 170 LBS | TEMPERATURE: 97.3 F | SYSTOLIC BLOOD PRESSURE: 118 MMHG

## 2018-05-16 DIAGNOSIS — M62.830 MUSCLE SPASM OF BACK: Primary | ICD-10-CM

## 2018-05-16 PROCEDURE — 99213 OFFICE O/P EST LOW 20 MIN: CPT | Performed by: FAMILY MEDICINE

## 2018-05-16 RX ORDER — METAXALONE 800 MG/1
800 TABLET ORAL 3 TIMES DAILY PRN
Qty: 60 TABLET | Refills: 1 | Status: SHIPPED | OUTPATIENT
Start: 2018-05-16 | End: 2021-10-18 | Stop reason: ALTCHOICE

## 2018-05-16 NOTE — ASSESSMENT & PLAN NOTE
I believe that the but the pain at the patient is manifesting is likely related from muscular spasm  Questionable if there is something discogenic which is causing radiation of pain in a dermatomal distribution bilaterally  Patient has not tried taking any nonsteroidal anti-inflammatories  I would recommend this as well as muscle relaxant  Pain is not terribly severe  He will be going for whole body assessment in 2 weeks at [facility]  The facility normally performs imaging of the spine which is part of their assessment  If he can wait until that time then I would do so

## 2018-05-16 NOTE — PROGRESS NOTES
Subjective:      Patient ID: Sander Massey is a 56 y o  male  Patient presents complaining of a 7 to ten-day history of bilateral back pain that does seem to radiate to the front of his abdomen  Normal bowel movements  Urinating normally  Patient states that he did move his cousin out of his basement at around the time when the back discomfort started  Describes the pain as a throbbing almost like a pulsating sensation  It is not always there  There are times when it can resolve  He has not tried taking any anti-inflammatories for this  Pain is not severe  Does not keep him up at night  Past Medical History:   Diagnosis Date    Anxiety     only when flying    Chronic frontal sinusitis     last assessed 03/02/2016    Diabetes mellitus ([address] )     Fracture of great toe     last assessed 11/06/2014    GERD (gastroesophageal reflux disease)     Hyperlipidemia     Hypertension     Palpitations     last assessed 11/06/2012       Family History   Problem Relation Age of Onset    Diabetes Mother     Liver cancer Mother     Hypertension Mother        Past Surgical History:   Procedure Laterality Date    HERNIA REPAIR Bilateral     IN MANIPULATN SHLDR JT W ANESTHESIA Right 4/3/2017    Procedure: SHOULDER MANIPULATION UNDER ANESTHESIA ;  Surgeon: Paul Bergman MD;  [facility];  Service: Orthopedics        reports that he has never smoked  He has never used smokeless tobacco  He reports that he drinks alcohol  He reports that he does not use drugs        Current Outpatient Prescriptions:     aspirin 81 mg chewable tablet, Adult Aspirin Low Strength 81 MG CHEW 1 PO Q D  Quantity: 0;  Refills: 0    [employer], Provider M D ;  Started 11-Oct-2007 Active, Disp: , Rfl:     Beclomethasone Dipropionate (QNASL) 80 MCG/ACT AERS, 1 Squirt into each nostril 2 (two) times a day, Disp: , Rfl:     CHOLECALCIFEROL PO, Take 1,000 Units by mouth, Disp: , Rfl:     Dapagliflozin Propanediol (Eleazar Garcia) 10 MG TABS, Take 1 tablet (10 mg total) by mouth daily, Disp: 30 tablet, Rfl: 5    diltiazem (CARDIZEM) 30 mg tablet, Take 1 tablet (30 mg total) by mouth daily, Disp: 90 tablet, Rfl: 1    diltiazem (CARDIZEM) 30 mg tablet, Take 1 tablet (30 mg total) by mouth daily, Disp: 90 tablet, Rfl: 1    esomeprazole (NexIUM) 40 MG capsule, Take 1 capsule (40 mg total) by mouth daily, Disp: 90 capsule, Rfl: 1    glucose blood (FREESTYLE INSULINX TEST) test strip, 1 Squirt by In Vitro route 2 (two) times a day, Disp: , Rfl:     ibuprofen (MOTRIN) 200 mg tablet, Take 200 mg by mouth every 6 (six) hours as needed for mild pain, Disp: , Rfl:     levocetirizine (XYZAL) 5 MG tablet, Levocetirizine Dihydrochloride 5 MG Oral Tablet take 1 tab po QD  Quantity: 30;  Refills: 1    Cyn Ao DO;  Started 16-Sep-2015 Active, Disp: , Rfl:     loperamide (IMODIUM) 2 mg capsule, Take by mouth, Disp: , Rfl:     meloxicam (MOBIC) 15 mg tablet, Take 1 tablet (15 mg total) by mouth daily, Disp: 90 tablet, Rfl: 0    mometasone (NASONEX) 50 mcg/act nasal spray, 2 sprays into each nostril daily, Disp: , Rfl:     montelukast (SINGULAIR) 10 mg tablet, Take 1 tablet (10 mg total) by mouth daily at bedtime, Disp: 90 tablet, Rfl: 0    Omega-3 Fatty Acids (FISH OIL) 1200 MG CAPS, Take 1 capsule by mouth daily, Disp: , Rfl:     quinapril (ACCUPRIL) 5 mg tablet, Quinapril HCl - 5 MG Oral Tablet take one tablet by mouth every day  Quantity: 90;  Refills: 3    Cyn Ao DO;  Started 31-Aug-2012 Active, Disp: , Rfl:     rosuvastatin (CRESTOR) 10 MG tablet, Take 1 tablet (10 mg total) by mouth daily, Disp: 90 tablet, Rfl: 1    SITagliptin-MetFORMIN HCl ER (JANUMET XR)  MG TB24, Take 2 tablets by mouth daily, Disp: 180 tablet, Rfl: 1    metaxalone (SKELAXIN) 800 mg tablet, Take 1 tablet (800 mg total) by mouth 3 (three) times a day as needed for muscle spasms, Disp: 60 tablet, Rfl: 1        Review of Systems   Constitutional: Negative  Respiratory: Negative  Cardiovascular: Negative  Musculoskeletal: Positive for back pain and myalgias  Objective:    /74   Temp (!) 97 3 °F (36 3 °C) (Tympanic)   Resp 16   Ht 5' 6" (1 676 m)   Wt 77 1 kg (170 lb)   BMI 27 44 kg/m²      Physical Exam   Constitutional: He appears well-developed and well-nourished  Neck: Normal range of motion  Neck supple  Cardiovascular: Normal rate, regular rhythm and normal heart sounds  No murmur heard  Musculoskeletal:        Thoracic back: He exhibits tenderness and spasm  He exhibits no bony tenderness and no swelling  Back:    Nursing note and vitals reviewed  No results found for this or any previous visit (from the past 1008 hour(s))  Assessment/Plan:    No problem-specific Assessment & Plan notes found for this encounter  Problem List Items Addressed This Visit     Muscle spasm of back - Primary       I believe that the but the pain at the patient is manifesting is likely related from muscular spasm  Questionable if there is something discogenic which is causing radiation of pain in a dermatomal distribution bilaterally  Patient has not tried taking any nonsteroidal anti-inflammatories  I would recommend this as well as muscle relaxant  Pain is not terribly severe  He will be going for whole body assessment in 2 weeks at [facility]  The facility normally performs imaging of the spine which is part of their assessment  If he can wait until that time then I would do so           Relevant Medications    metaxalone (SKELAXIN) 800 mg tablet

## 2018-06-01 DIAGNOSIS — Z86.59 HISTORY OF ANXIETY: Primary | ICD-10-CM

## 2018-06-01 RX ORDER — ALPRAZOLAM 0.25 MG/1
0.25 TABLET ORAL EVERY 8 HOURS PRN
Qty: 30 TABLET | Refills: 0 | OUTPATIENT
Start: 2018-06-01 | End: 2019-02-27 | Stop reason: SDUPTHER

## 2018-06-20 ENCOUNTER — OFFICE VISIT (OUTPATIENT)
Dept: FAMILY MEDICINE CLINIC | Facility: CLINIC | Age: 48
End: 2018-06-20
Payer: COMMERCIAL

## 2018-06-20 VITALS
OXYGEN SATURATION: 98 % | HEIGHT: 66 IN | DIASTOLIC BLOOD PRESSURE: 68 MMHG | WEIGHT: 170 LBS | SYSTOLIC BLOOD PRESSURE: 112 MMHG | BODY MASS INDEX: 27.32 KG/M2 | HEART RATE: 90 BPM | RESPIRATION RATE: 16 BRPM

## 2018-06-20 DIAGNOSIS — A09 TRAVELER'S DIARRHEA: ICD-10-CM

## 2018-06-20 DIAGNOSIS — E78.2 MIXED HYPERLIPIDEMIA: Chronic | ICD-10-CM

## 2018-06-20 DIAGNOSIS — R93.1 AGATSTON CORONARY ARTERY CALCIUM SCORE BETWEEN 100 AND 199: ICD-10-CM

## 2018-06-20 DIAGNOSIS — N52.2 DRUG-INDUCED ERECTILE DYSFUNCTION: ICD-10-CM

## 2018-06-20 DIAGNOSIS — E55.9 VITAMIN D DEFICIENCY: ICD-10-CM

## 2018-06-20 DIAGNOSIS — I10 ESSENTIAL HYPERTENSION: Chronic | ICD-10-CM

## 2018-06-20 DIAGNOSIS — F52.8 PSYCHOSEXUAL DYSFUNCTION ASSOCIATED WITH INHIBITED LIBIDO: ICD-10-CM

## 2018-06-20 DIAGNOSIS — E11.9 TYPE 2 DIABETES MELLITUS WITHOUT COMPLICATION, WITHOUT LONG-TERM CURRENT USE OF INSULIN (HCC): Primary | Chronic | ICD-10-CM

## 2018-06-20 PROCEDURE — 99215 OFFICE O/P EST HI 40 MIN: CPT | Performed by: FAMILY MEDICINE

## 2018-06-20 RX ORDER — LEVOFLOXACIN 500 MG/1
500 TABLET, FILM COATED ORAL EVERY 24 HOURS
Qty: 10 TABLET | Refills: 0 | Status: SHIPPED | OUTPATIENT
Start: 2018-06-20 | End: 2018-06-30

## 2018-06-20 RX ORDER — PIOGLITAZONEHYDROCHLORIDE 30 MG/1
30 TABLET ORAL DAILY
Qty: 30 TABLET | Refills: 3 | Status: SHIPPED | OUTPATIENT
Start: 2018-06-20 | End: 2018-09-05 | Stop reason: SDUPTHER

## 2018-06-20 RX ORDER — TADALAFIL 10 MG/1
TABLET ORAL
Qty: 10 TABLET | Refills: 3 | Status: SHIPPED | OUTPATIENT
Start: 2018-06-20 | End: 2018-06-27

## 2018-06-20 RX ORDER — ONDANSETRON 4 MG/1
4 TABLET, FILM COATED ORAL EVERY 8 HOURS PRN
Qty: 30 TABLET | Refills: 0 | Status: SHIPPED | OUTPATIENT
Start: 2018-06-20 | End: 2018-09-05 | Stop reason: ALTCHOICE

## 2018-06-20 RX ORDER — METRONIDAZOLE 500 MG/1
500 TABLET ORAL EVERY 12 HOURS SCHEDULED
Qty: 20 TABLET | Refills: 0 | Status: SHIPPED | OUTPATIENT
Start: 2018-06-20 | End: 2018-06-30

## 2018-06-20 NOTE — PROGRESS NOTES
Subjective:      Patient ID: Haile Santillan is a 56 y o  male  Patient presents for follow-up of chronic medical conditions including diabetes mellitus type 2, hypertension, hyperlipidemia  Patient did go for his annual comprehensive evaluation and [facility]  Patient did bring in copies of his results from that testing  Patient's hemoglobin A1c is elevated at 10 1  His last hemoglobin A1c was 9 3  He was taken off of trulicity due to severe gastrointestinal  Side effects thus we knew that his hemoglobin A1c would increase  Patient does admit that his diet has not been ideal  And he is hoping to get back on to his regular dietary routine  Patient has been compliant with taking Janumet and [place]  It was recommended that he double his dose of rosuvastatin however his cholesterol profile showed  Total cholesterol 142, triglycerides of 365, HDL 34, LDL of 66  High sensitivity C-reactive protein was 1 1  The remainder of his labs looked good  PSA of 0 7, TSH of 2 17, free T4 of 1 18, normal renal functions  Patient did undergo stress test and whole body CT scan  He did well in the stress test   No evidence of ischemia  Patient states that he generally feels well  He has no complaints at this time  No muscle or joint complaints  I did review his summary report from the [facility] in detail  This is an expansive volume          Past Medical History:   Diagnosis Date    Anxiety     only when flying    Chronic frontal sinusitis     last assessed 03/02/2016    Diabetes mellitus ([address] )     Fracture of great toe     last assessed 11/06/2014    GERD (gastroesophageal reflux disease)     Hyperlipidemia     Hypertension     Palpitations     last assessed 11/06/2012       Family History   Problem Relation Age of Onset    Diabetes Mother     Liver cancer Mother     Hypertension Mother        Past Surgical History:   Procedure Laterality Date    HERNIA REPAIR Bilateral     AL MANIPULATN SHLDR JT W ANESTHESIA Right 4/3/2017    Procedure: SHOULDER MANIPULATION UNDER ANESTHESIA ;  Surgeon: Yumi Zhao MD;  Location: [facility];  Service: Orthopedics        reports that he has never smoked  He has never used smokeless tobacco  He reports that he drinks alcohol  He reports that he does not use drugs        Current Outpatient Prescriptions:     ALPRAZolam (XANAX) 0 25 mg tablet, Take 1 tablet (0 25 mg total) by mouth every 8 (eight) hours as needed for anxiety, Disp: 30 tablet, Rfl: 0    aspirin 81 mg chewable tablet, Adult Aspirin Low Strength 81 MG CHEW 1 PO Q D  Quantity: 0;  Refills: 0    [employer], Provider M D ;  Started 11-Oct-2007 Active, Disp: , Rfl:     Beclomethasone Dipropionate (QNASL) 80 MCG/ACT AERS, 1 Squirt into each nostril 2 (two) times a day, Disp: , Rfl:     CHOLECALCIFEROL PO, Take 1,000 Units by mouth, Disp: , Rfl:     Dapagliflozin Propanediol (FARXIGA) 10 MG TABS, Take 1 tablet (10 mg total) by mouth daily, Disp: 30 tablet, Rfl: 5    diltiazem (CARDIZEM) 30 mg tablet, Take 1 tablet (30 mg total) by mouth daily, Disp: 90 tablet, Rfl: 1    diltiazem (CARDIZEM) 30 mg tablet, Take 1 tablet (30 mg total) by mouth daily, Disp: 90 tablet, Rfl: 1    esomeprazole (NexIUM) 40 MG capsule, Take 1 capsule (40 mg total) by mouth daily, Disp: 90 capsule, Rfl: 1    glucose blood (FREESTYLE INSULINX TEST) test strip, 1 Squirt by In Vitro route 2 (two) times a day, Disp: , Rfl:     levocetirizine (XYZAL) 5 MG tablet, Levocetirizine Dihydrochloride 5 MG Oral Tablet take 1 tab po QD  Quantity: 30;  Refills: 1    George Lilly DO;  Started 16-Sep-2015 Active, Disp: , Rfl:     meloxicam (MOBIC) 15 mg tablet, Take 1 tablet (15 mg total) by mouth daily, Disp: 90 tablet, Rfl: 0    metaxalone (SKELAXIN) 800 mg tablet, Take 1 tablet (800 mg total) by mouth 3 (three) times a day as needed for muscle spasms, Disp: 60 tablet, Rfl: 1    mometasone (NASONEX) 50 mcg/act nasal spray, 2 sprays into each nostril daily, Disp: , Rfl:     montelukast (SINGULAIR) 10 mg tablet, Take 1 tablet (10 mg total) by mouth daily at bedtime, Disp: 90 tablet, Rfl: 0    Omega-3 Fatty Acids (FISH OIL) 1200 MG CAPS, Take 1 capsule by mouth daily, Disp: , Rfl:     quinapril (ACCUPRIL) 5 mg tablet, Quinapril HCl - 5 MG Oral Tablet take one tablet by mouth every day  Quantity: 90;  Refills: 3    Rohini Craig DO;  Started 31-Aug-2012 Active, Disp: , Rfl:     rosuvastatin (CRESTOR) 10 MG tablet, Take 1 tablet (10 mg total) by mouth daily, Disp: 90 tablet, Rfl: 1    SITagliptin-MetFORMIN HCl ER (JANUMET XR)  MG TB24, Take 2 tablets by mouth daily, Disp: 180 tablet, Rfl: 1    ibuprofen (MOTRIN) 200 mg tablet, Take 200 mg by mouth every 6 (six) hours as needed for mild pain, Disp: , Rfl:     loperamide (IMODIUM) 2 mg capsule, Take by mouth, Disp: , Rfl:     The following portions of the patient's history were reviewed and updated as appropriate: allergies, current medications, past family history, past medical history, past social history, past surgical history and problem list     Review of Systems   Constitutional: Negative  HENT: Negative  Eyes: Negative  Respiratory: Negative  Cardiovascular: Negative  Gastrointestinal: Negative  Endocrine: Negative  Genitourinary: Negative  Musculoskeletal: Negative  Skin: Negative  Allergic/Immunologic: Negative  Neurological: Negative  Hematological: Negative  Psychiatric/Behavioral: Negative  All other systems reviewed and are negative  Objective:    /68   Pulse 90   Resp 16   Ht 5' 6" (1 676 m)   Wt 77 1 kg (170 lb)   SpO2 98%   BMI 27 44 kg/m²      Physical Exam   Constitutional: He is oriented to person, place, and time  He appears well-developed and well-nourished  HENT:   Head: Normocephalic     Eyes: Conjunctivae and EOM are normal  Pupils are equal, round, and reactive to light    Neck: Normal range of motion  Neck supple  Cardiovascular: Normal rate, regular rhythm and normal heart sounds  Pulses are no weak pulses  Pulses:       Dorsalis pedis pulses are 2+ on the right side, and 2+ on the left side  Posterior tibial pulses are 2+ on the right side, and 2+ on the left side  Pulmonary/Chest: Effort normal and breath sounds normal    Abdominal: Soft  Bowel sounds are normal    Musculoskeletal: Normal range of motion  Feet:   Right Foot:   Skin Integrity: Negative for ulcer, skin breakdown, erythema, warmth, callus or dry skin  Left Foot:   Skin Integrity: Negative for ulcer, skin breakdown, erythema, warmth, callus or dry skin  Neurological: He is alert and oriented to person, place, and time  He has normal reflexes  Skin: Skin is warm and dry  Psychiatric: He has a normal mood and affect  His behavior is normal  Judgment and thought content normal    Nursing note and vitals reviewed  Patient's shoes and socks removed  Right Foot/Ankle   Right Foot Inspection  Skin Exam: skin normal and skin intact no dry skin, no warmth, no callus, no erythema, no maceration, no abnormal color, no pre-ulcer, no ulcer and no callus                          Toe Exam: ROM and strength within normal limits  Sensory   Vibration: intact  Proprioception: intact   Monofilament testing: intact  Vascular  Capillary refills: < 3 seconds  The right DP pulse is 2+  The right PT pulse is 2+  Left Foot/Ankle  Left Foot Inspection  Skin Exam: skin normal and skin intactno dry skin, no warmth, no erythema, no maceration, normal color, no pre-ulcer, no ulcer and no callus                         Toe Exam: ROM and strength within normal limits                   Sensory   Vibration: intact  Proprioception: intact  Monofilament: intact  Vascular  Capillary refills: < 3 seconds  The left DP pulse is 2+  The left PT pulse is 2+  Assign Risk Category:  No deformity present;  No loss of protective sensation; No weak pulses       Risk: 0      No results found for this or any previous visit (from the past 1008 hour(s))  Assessment/Plan:    No problem-specific Assessment & Plan notes found for this encounter  Problem List Items Addressed This Visit     Type 2 diabetes mellitus ([address] ) - Primary (Chronic)     Lab Results   Component Value Date    HGBA1C 10 1 (H) 02/08/2018       No results for input(s): POCGLU in the last 72 hours  Blood Sugar Average: Last 72 hrs:      patient's hemoglobin A1c has gone up since being taken off Trulicity  I would not like to place him back on to any of the incretin mimetics for fear of the same side effects  Patient will remain on [address]  Will add Actos 30 mg once daily  I did advise the patient of risk of hypoglycemia with this medication  He will also make adjustments to his diet  Repeat hemoglobin A1c and urine for microalbumin in 3 months  Will have close follow-up  Relevant Medications    pioglitazone (ACTOS) 30 mg tablet    Other Relevant Orders    CBC and differential    Hemoglobin A1C    Microalbumin / creatinine urine ratio    Essential hypertension (Chronic)       Hypertension is very well controlled on Cardizem and Accupril  Continue same  Re-evaluate blood pressure in 3 months         Relevant Orders    CBC and differential    Hyperlipidemia (Chronic)      I believe that the patient's cholesterol is adequately controlled  His LDL is less than 70  Triglycerides are significantly elevated due to his poorly controlled diabetes  I would not increase his dose of Crestor  Too low  Of an LDL does increase the risk for hemorrhagic stroke this which shown in several recent Scientific studies  Relevant Orders    CBC and differential    Comprehensive metabolic panel    Lipid panel    Vitamin D deficiency      Advised patient on vitamin-D supplementation at minimum 2000 international units once daily    Repeat vitamin-D level with next set of labs  Relevant Orders    CBC and differential    Agatston coronary artery calcium score between 100 and 199      Patient will eventually need follow-up with Cardiology  He did well on stress test   We need to manage his risk factors better including hypertension, hyperlipidemia and diabetes in particular         Relevant Orders    CBC and differential    Drug-induced erectile dysfunction      Patient likely has erectile dysfunction due to calcium channel blocker  Diabetes can also have impact on this as well  Patient given a prescription for Cialis 10 mg to be used on a p r n  Basis  I did warn the patient of side effects such as headache, vision discoloration and priapism         Relevant Medications    tadalafil (CIALIS) 10 MG tablet    Other Relevant Orders    CBC and differential    Psychosexual dysfunction associated with inhibited libido       Check free and total testosterone levels with next set of labs         Relevant Orders    CBC and differential    Testosterone, free, total    Traveler's diarrhea       Patient will be traveling to [facility] in the near future  I did provide him with prescriptions for Zofran, Levaquin and Flagyl to take along with him for his trip         Relevant Medications    levofloxacin (LEVAQUIN) 500 mg tablet    ondansetron (ZOFRAN) 4 mg tablet    metroNIDAZOLE (FLAGYL) 500 mg tablet    Other Relevant Orders    CBC and differential          I have spent 52 minutes with Patient  today in which greater than 50% of this time was spent in counseling/coordination of care regarding Diagnostic results, Prognosis, Risks and benefits of tx options, Intructions for management, Patient and family education, Importance of tx compliance, Risk factor reductions and Impressions

## 2018-06-24 PROBLEM — M79.10 MYALGIA: Status: RESOLVED | Noted: 2017-01-27 | Resolved: 2018-06-24

## 2018-06-24 PROBLEM — R76.8 POSITIVE ANA (ANTINUCLEAR ANTIBODY): Status: RESOLVED | Noted: 2017-02-02 | Resolved: 2018-06-24

## 2018-06-24 PROBLEM — M62.830 MUSCLE SPASM OF BACK: Status: RESOLVED | Noted: 2018-05-16 | Resolved: 2018-06-24

## 2018-06-24 PROBLEM — M25.511 SHOULDER PAIN, RIGHT: Status: RESOLVED | Noted: 2017-05-01 | Resolved: 2018-06-24

## 2018-06-24 PROBLEM — M70.62 TROCHANTERIC BURSITIS OF BOTH HIPS: Status: RESOLVED | Noted: 2018-02-14 | Resolved: 2018-06-24

## 2018-06-24 PROBLEM — M70.61 TROCHANTERIC BURSITIS OF BOTH HIPS: Status: RESOLVED | Noted: 2018-02-14 | Resolved: 2018-06-24

## 2018-06-25 NOTE — ASSESSMENT & PLAN NOTE
Lab Results   Component Value Date    HGBA1C 10 1 (H) 02/08/2018       No results for input(s): POCGLU in the last 72 hours  Blood Sugar Average: Last 72 hrs:      patient's hemoglobin A1c has gone up since being taken off Trulicity  I would not like to place him back on to any of the incretin mimetics for fear of the same side effects  Patient will remain on [address]  Will add Actos 30 mg once daily  I did advise the patient of risk of hypoglycemia with this medication  He will also make adjustments to his diet  Repeat hemoglobin A1c and urine for microalbumin in 3 months  Will have close follow-up

## 2018-06-25 NOTE — ASSESSMENT & PLAN NOTE
Hypertension is very well controlled on Cardizem and Accupril  Continue same    Re-evaluate blood pressure in 3 months

## 2018-06-25 NOTE — ASSESSMENT & PLAN NOTE
I believe that the patient's cholesterol is adequately controlled  His LDL is less than 70  Triglycerides are significantly elevated due to his poorly controlled diabetes  I would not increase his dose of Crestor  Too low  Of an LDL does increase the risk for hemorrhagic stroke this which shown in several recent Scientific studies

## 2018-06-25 NOTE — ASSESSMENT & PLAN NOTE
Advised patient on vitamin-D supplementation at minimum 2000 international units once daily  Repeat vitamin-D level with next set of labs

## 2018-06-25 NOTE — ASSESSMENT & PLAN NOTE
Patient likely has erectile dysfunction due to calcium channel blocker  Diabetes can also have impact on this as well  Patient given a prescription for Cialis 10 mg to be used on a p r n  Basis    I did warn the patient of side effects such as headache, vision discoloration and priapism

## 2018-06-25 NOTE — ASSESSMENT & PLAN NOTE
Patient will eventually need follow-up with Cardiology    He did well on stress test   We need to manage his risk factors better including hypertension, hyperlipidemia and diabetes in particular

## 2018-06-25 NOTE — ASSESSMENT & PLAN NOTE
Patient will be traveling to [facility] in the near future    I did provide him with prescriptions for Zofran, Levaquin and Flagyl to take along with him for his trip

## 2018-06-26 ENCOUNTER — TELEPHONE (OUTPATIENT)
Dept: FAMILY MEDICINE CLINIC | Facility: CLINIC | Age: 48
End: 2018-06-26

## 2018-06-27 DIAGNOSIS — N52.2 DRUG-INDUCED ERECTILE DYSFUNCTION: Primary | ICD-10-CM

## 2018-06-27 RX ORDER — SILDENAFIL CITRATE 20 MG/1
TABLET ORAL
Qty: 20 TABLET | Refills: 1 | Status: SHIPPED | OUTPATIENT
Start: 2018-06-27 | End: 2018-09-05 | Stop reason: ALTCHOICE

## 2018-06-27 NOTE — TELEPHONE ENCOUNTER
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 ?  I had given him a printed prescription    Please find out where he would like me to send the medication to

## 2018-07-06 ENCOUNTER — TRANSCRIBE ORDERS (OUTPATIENT)
Dept: LAB | Facility: CLINIC | Age: 48
End: 2018-07-06

## 2018-07-06 ENCOUNTER — HOSPITAL ENCOUNTER (EMERGENCY)
Facility: HOSPITAL | Age: 48
Discharge: HOME | End: 2018-07-06
Attending: EMERGENCY MEDICINE
Payer: COMMERCIAL

## 2018-07-06 ENCOUNTER — APPOINTMENT (OUTPATIENT)
Dept: LAB | Facility: CLINIC | Age: 48
End: 2018-07-06
Payer: COMMERCIAL

## 2018-07-06 VITALS
HEART RATE: 88 BPM | RESPIRATION RATE: 18 BRPM | BODY MASS INDEX: 26.37 KG/M2 | SYSTOLIC BLOOD PRESSURE: 138 MMHG | TEMPERATURE: 97.8 F | OXYGEN SATURATION: 97 % | WEIGHT: 163.36 LBS | DIASTOLIC BLOOD PRESSURE: 87 MMHG

## 2018-07-06 VITALS
HEART RATE: 72 BPM | OXYGEN SATURATION: 100 % | TEMPERATURE: 98.9 F | RESPIRATION RATE: 14 BRPM | SYSTOLIC BLOOD PRESSURE: 125 MMHG | DIASTOLIC BLOOD PRESSURE: 75 MMHG

## 2018-07-06 DIAGNOSIS — E86.0 DEHYDRATION: Primary | ICD-10-CM

## 2018-07-06 DIAGNOSIS — R10.9 ABDOMINAL PAIN WITH VOMITING: Primary | ICD-10-CM

## 2018-07-06 DIAGNOSIS — R82.4 KETONURIA: ICD-10-CM

## 2018-07-06 DIAGNOSIS — E11.9 TYPE 2 DIABETES MELLITUS WITHOUT COMPLICATION, WITHOUT LONG-TERM CURRENT USE OF INSULIN (HCC): Chronic | ICD-10-CM

## 2018-07-06 DIAGNOSIS — E11.9 TYPE 2 DIABETES MELLITUS WITHOUT COMPLICATION, WITHOUT LONG-TERM CURRENT USE OF INSULIN (HCC): Primary | Chronic | ICD-10-CM

## 2018-07-06 DIAGNOSIS — R11.10 ABDOMINAL PAIN WITH VOMITING: Primary | ICD-10-CM

## 2018-07-06 LAB
ACETONE SERPL-MCNC: ABNORMAL MG/DL
ALBUMIN SERPL BCP-MCNC: 4.3 G/DL (ref 3.5–5)
ALP SERPL-CCNC: 88 U/L (ref 46–116)
ALT SERPL W P-5'-P-CCNC: 25 U/L (ref 12–78)
ANION GAP SERPL CALCULATED.3IONS-SCNC: 11 MMOL/L (ref 4–13)
AST SERPL W P-5'-P-CCNC: 9 U/L (ref 5–45)
ATRIAL RATE: 83 BPM
BACTERIA UR QL AUTO: ABNORMAL /HPF
BASOPHILS # BLD AUTO: 0.04 THOUSANDS/ΜL (ref 0–0.1)
BASOPHILS NFR BLD AUTO: 1 % (ref 0–1)
BILIRUB SERPL-MCNC: 0.9 MG/DL (ref 0.2–1)
BILIRUB UR QL STRIP: NEGATIVE
BUN SERPL-MCNC: 15 MG/DL (ref 5–25)
CALCIUM SERPL-MCNC: 9.1 MG/DL (ref 8.3–10.1)
CHLORIDE SERPL-SCNC: 96 MMOL/L (ref 100–108)
CLARITY UR: CLEAR
CO2 SERPL-SCNC: 29 MMOL/L (ref 21–32)
COLOR UR: YELLOW
CREAT SERPL-MCNC: 0.86 MG/DL (ref 0.6–1.3)
EOSINOPHIL # BLD AUTO: 0.04 THOUSAND/ΜL (ref 0–0.61)
EOSINOPHIL NFR BLD AUTO: 1 % (ref 0–6)
ERYTHROCYTE [DISTWIDTH] IN BLOOD BY AUTOMATED COUNT: 13.1 % (ref 11.6–15.1)
GFR SERPL CREATININE-BSD FRML MDRD: 104 ML/MIN/1.73SQ M
GLUCOSE SERPL-MCNC: 308 MG/DL (ref 65–140)
GLUCOSE UR STRIP-MCNC: ABNORMAL MG/DL
HCT VFR BLD AUTO: 44.1 % (ref 36.5–49.3)
HGB BLD-MCNC: 15 G/DL (ref 12–17)
HGB UR QL STRIP.AUTO: ABNORMAL
KETONES UR STRIP-MCNC: ABNORMAL MG/DL
LEUKOCYTE ESTERASE UR QL STRIP: NEGATIVE
LIPASE SERPL-CCNC: 115 U/L (ref 73–393)
LYMPHOCYTES # BLD AUTO: 1.57 THOUSANDS/ΜL (ref 0.6–4.47)
LYMPHOCYTES NFR BLD AUTO: 20 % (ref 14–44)
MCH RBC QN AUTO: 26 PG (ref 26.8–34.3)
MCHC RBC AUTO-ENTMCNC: 34 G/DL (ref 31.4–37.4)
MCV RBC AUTO: 76 FL (ref 82–98)
MONOCYTES # BLD AUTO: 0.56 THOUSAND/ΜL (ref 0.17–1.22)
MONOCYTES NFR BLD AUTO: 7 % (ref 4–12)
NEUTROPHILS # BLD AUTO: 5.86 THOUSANDS/ΜL (ref 1.85–7.62)
NEUTS SEG NFR BLD AUTO: 73 % (ref 43–75)
NITRITE UR QL STRIP: NEGATIVE
NON-SQ EPI CELLS URNS QL MICRO: ABNORMAL /HPF
P AXIS: 50 DEGREES
PH UR STRIP.AUTO: 6 [PH] (ref 4.5–8)
PLATELET # BLD AUTO: 313 THOUSANDS/UL (ref 149–390)
PMV BLD AUTO: 9.7 FL (ref 8.9–12.7)
POTASSIUM SERPL-SCNC: 4.1 MMOL/L (ref 3.5–5.3)
PR INTERVAL: 166 MS
PROT SERPL-MCNC: 7.7 G/DL (ref 6.4–8.2)
PROT UR STRIP-MCNC: NEGATIVE MG/DL
QRS AXIS: 34 DEGREES
QRSD INTERVAL: 82 MS
QT INTERVAL: 348 MS
QTC INTERVAL: 408 MS
RBC # BLD AUTO: 5.78 MILLION/UL (ref 3.88–5.62)
RBC #/AREA URNS AUTO: ABNORMAL /HPF
SODIUM SERPL-SCNC: 136 MMOL/L (ref 136–145)
SP GR UR STRIP.AUTO: 1.02 (ref 1–1.03)
T WAVE AXIS: 57 DEGREES
TROPONIN I SERPL-MCNC: <0.02 NG/ML
UROBILINOGEN UR QL STRIP.AUTO: 0.2 E.U./DL
VENTRICULAR RATE: 83 BPM
WBC # BLD AUTO: 8.07 THOUSAND/UL (ref 4.31–10.16)
WBC #/AREA URNS AUTO: ABNORMAL /HPF

## 2018-07-06 PROCEDURE — 85025 COMPLETE CBC W/AUTO DIFF WBC: CPT | Performed by: PHYSICIAN ASSISTANT

## 2018-07-06 PROCEDURE — 80053 COMPREHEN METABOLIC PANEL: CPT | Performed by: PHYSICIAN ASSISTANT

## 2018-07-06 PROCEDURE — 96360 HYDRATION IV INFUSION INIT: CPT

## 2018-07-06 PROCEDURE — 99284 EMERGENCY DEPT VISIT MOD MDM: CPT

## 2018-07-06 PROCEDURE — 93005 ELECTROCARDIOGRAM TRACING: CPT

## 2018-07-06 PROCEDURE — 93010 ELECTROCARDIOGRAM REPORT: CPT | Performed by: INTERNAL MEDICINE

## 2018-07-06 PROCEDURE — 82009 KETONE BODYS QUAL: CPT

## 2018-07-06 PROCEDURE — 84484 ASSAY OF TROPONIN QUANT: CPT | Performed by: PHYSICIAN ASSISTANT

## 2018-07-06 PROCEDURE — 99283 EMERGENCY DEPT VISIT LOW MDM: CPT

## 2018-07-06 PROCEDURE — 81001 URINALYSIS AUTO W/SCOPE: CPT

## 2018-07-06 PROCEDURE — 83690 ASSAY OF LIPASE: CPT | Performed by: PHYSICIAN ASSISTANT

## 2018-07-06 PROCEDURE — 36415 COLL VENOUS BLD VENIPUNCTURE: CPT | Performed by: PHYSICIAN ASSISTANT

## 2018-07-06 RX ORDER — ONDANSETRON 4 MG/1
TABLET, FILM COATED ORAL
Qty: 12 TABLET | Refills: 0 | Status: SHIPPED | OUTPATIENT
Start: 2018-07-06 | End: 2018-09-05 | Stop reason: ALTCHOICE

## 2018-07-06 RX ORDER — DICYCLOMINE HCL 20 MG
20 TABLET ORAL EVERY 6 HOURS
Qty: 20 TABLET | Refills: 0 | Status: SHIPPED | OUTPATIENT
Start: 2018-07-06 | End: 2018-09-05 | Stop reason: ALTCHOICE

## 2018-07-06 RX ORDER — MAGNESIUM HYDROXIDE/ALUMINUM HYDROXICE/SIMETHICONE 120; 1200; 1200 MG/30ML; MG/30ML; MG/30ML
15 SUSPENSION ORAL ONCE
Status: COMPLETED | OUTPATIENT
Start: 2018-07-06 | End: 2018-07-06

## 2018-07-06 RX ORDER — DICYCLOMINE HCL 20 MG
20 TABLET ORAL ONCE
Status: COMPLETED | OUTPATIENT
Start: 2018-07-06 | End: 2018-07-06

## 2018-07-06 RX ADMIN — SODIUM CHLORIDE 1000 ML: 0.9 INJECTION, SOLUTION INTRAVENOUS at 16:26

## 2018-07-06 RX ADMIN — ALUMINUM HYDROXIDE, MAGNESIUM HYDROXIDE, AND SIMETHICONE 15 ML: 200; 200; 20 SUSPENSION ORAL at 09:38

## 2018-07-06 RX ADMIN — LIDOCAINE HYDROCHLORIDE 15 ML: 20 SOLUTION ORAL; TOPICAL at 09:39

## 2018-07-06 RX ADMIN — DICYCLOMINE HYDROCHLORIDE 20 MG: 20 TABLET ORAL at 09:37

## 2018-07-06 NOTE — ED NOTES
Pt  Ambulated out of dept , vss, normal gait, no acute distress       Theo Uribe, AXEL  07/06/18 [id number]

## 2018-07-06 NOTE — ED PROVIDER NOTES
History  Chief Complaint   Patient presents with    Abdominal Pain     Pt complains of abdominal pain that starte Tuesday "shortly after the passing of his dog"  Pt leaves for [facility] tomorrow and wants to make sure that there is nothing major going on  Patient is a 52-year-old male with a history of diabetes who reports epigastric abdominal discomfort and cramping that started Tuesday night with associated vomiting  Patient reports much stress as his dog passed away this week and he is preparing to leave for a vacation to [facility] tomorrow  He denies bloody or bilious vomit  He has had some associated loose stools  Feels that symptoms are likely worse at night because he has been eating greasy meals this week  Yesterday during the day he had a banana and yogurt which he tolerated fine with no symptoms  Last night symptoms recurred current shortly after eating pizza  Currently complains of mild epigastric discomfort  Denies fevers, chills, chest pain, shortness of breath, urinary symptoms  No recent travel or sick contacts  Prior to Admission Medications   Prescriptions Last Dose Informant Patient Reported? Taking?    ALPRAZolam (XANAX) 0 25 mg tablet   No No   Sig: Take 1 tablet (0 25 mg total) by mouth every 8 (eight) hours as needed for anxiety   Beclomethasone Dipropionate (QNASL) 80 MCG/ACT AERS   Yes No   Si Squirt into each nostril 2 (two) times a day   CHOLECALCIFEROL PO   Yes No   Sig: Take 1,000 Units by mouth   Dapagliflozin Propanediol (FARXIGA) 10 MG TABS   No No   Sig: Take 1 tablet (10 mg total) by mouth daily   Omega-3 Fatty Acids (FISH OIL) 1200 MG CAPS   Yes No   Sig: Take 1 capsule by mouth daily   SITagliptin-MetFORMIN HCl ER (JANUMET XR)  MG TB24   No No   Sig: Take 2 tablets by mouth daily   aspirin 81 mg chewable tablet   Yes No   Sig: Adult Aspirin Low Strength 81 MG CHEW  1 PO Q D   Quantity: 0;  Refills: 0       [employer], Provider M D ;  Started 11-Oct-2007  Active   diltiazem (CARDIZEM) 30 mg tablet   No No   Sig: Take 1 tablet (30 mg total) by mouth daily   diltiazem (CARDIZEM) 30 mg tablet   No No   Sig: Take 1 tablet (30 mg total) by mouth daily   esomeprazole (NexIUM) 40 MG capsule   No No   Sig: Take 1 capsule (40 mg total) by mouth daily   glucose blood (FREESTYLE INSULINX TEST) test strip   Yes No   Si Squirt by In Vitro route 2 (two) times a day   ibuprofen (MOTRIN) 200 mg tablet   Yes No   Sig: Take 200 mg by mouth every 6 (six) hours as needed for mild pain   levocetirizine (XYZAL) 5 MG tablet   Yes No   Sig: Levocetirizine Dihydrochloride 5 MG Oral Tablet  take 1 tab po QD   Quantity: 30;  Refills: 1       Augusta Links DO;  Started 16-Sep-2015  Active   loperamide (IMODIUM) 2 mg capsule   Yes No   Sig: Take by mouth   meloxicam (MOBIC) 15 mg tablet   No No   Sig: Take 1 tablet (15 mg total) by mouth daily   metaxalone (SKELAXIN) 800 mg tablet   No No   Sig: Take 1 tablet (800 mg total) by mouth 3 (three) times a day as needed for muscle spasms   mometasone (NASONEX) 50 mcg/act nasal spray   Yes No   Si sprays into each nostril daily   montelukast (SINGULAIR) 10 mg tablet   No No   Sig: Take 1 tablet (10 mg total) by mouth daily at bedtime   ondansetron (ZOFRAN) 4 mg tablet   No No   Sig: Take 1 tablet (4 mg total) by mouth every 8 (eight) hours as needed for nausea or vomiting   pioglitazone (ACTOS) 30 mg tablet   No No   Sig: Take 1 tablet (30 mg total) by mouth daily   quinapril (ACCUPRIL) 5 mg tablet   Yes No   Sig: Quinapril HCl - 5 MG Oral Tablet  take one tablet by mouth every day   Quantity: 90;  Refills: 3       Inhale [employer] DO;  Started 31-Aug-2012  Active   rosuvastatin (CRESTOR) 10 MG tablet   No No   Sig: Take 1 tablet (10 mg total) by mouth daily   sildenafil (REVATIO) 20 mg tablet   No No   Si tablet by mouth 30-45 minutes before activity      Facility-Administered Medications: None       Past Medical History: Diagnosis Date    Anxiety     only when flying    Chronic frontal sinusitis     last assessed 03/02/2016    Diabetes mellitus ([address] )     Fracture of great toe     last assessed 11/06/2014    GERD (gastroesophageal reflux disease)     Hyperlipidemia     Hypertension     Palpitations     last assessed 11/06/2012       Past Surgical History:   Procedure Laterality Date    HERNIA REPAIR Bilateral     IN MANIPULATN SHLDR JT W ANESTHESIA Right 4/3/2017    Procedure: SHOULDER MANIPULATION UNDER ANESTHESIA ;  Surgeon: Cheyenne Rondon MD;  Location: [facility];  Service: Orthopedics       Family History   Problem Relation Age of Onset    Diabetes Mother     Liver cancer Mother     Hypertension Mother      I have reviewed and agree with the history as documented  Social History   Substance Use Topics    Smoking status: Never Smoker    Smokeless tobacco: Never Used    Alcohol use Yes      Comment: socially        Review of Systems   Constitutional: Negative for chills and fever  HENT: Negative for congestion, rhinorrhea, sinus pain and sore throat  Respiratory: Negative for cough, shortness of breath and wheezing  Cardiovascular: Negative for chest pain  Gastrointestinal: Positive for abdominal pain, diarrhea and vomiting  Negative for nausea  Musculoskeletal: Negative for arthralgias and myalgias  Neurological: Negative for dizziness and headaches  All other systems reviewed and are negative  Physical Exam  Physical Exam   Constitutional: He is oriented to person, place, and time  He appears well-developed and well-nourished  No distress  HENT:   Head: Normocephalic and atraumatic  Right Ear: Hearing, tympanic membrane, external ear and ear canal normal    Left Ear: Hearing, tympanic membrane, external ear and ear canal normal    Nose: Nose normal  No mucosal edema or rhinorrhea  Right sinus exhibits no maxillary sinus tenderness  Left sinus exhibits no maxillary sinus tenderness  Mouth/Throat: Uvula is midline, oropharynx is clear and moist and mucous membranes are normal  No oropharyngeal exudate  Eyes: Conjunctivae, EOM and lids are normal  Pupils are equal, round, and reactive to light  Neck: Normal range of motion  Neck supple  Cardiovascular: Normal rate, regular rhythm and normal heart sounds  Pulmonary/Chest: Effort normal and breath sounds normal    Abdominal: Soft  Normal appearance and bowel sounds are normal  There is no tenderness  Musculoskeletal:   Normal gait and station  Non-focal with FROM upper, lower extremities, neck, chest and back   Lymphadenopathy:     He has no cervical adenopathy  Neurological: He is alert and oriented to person, place, and time  Appropriate speech and behavior   Non-focal  No sensory deficits noted, no motor deficits noted       Vital Signs  ED Triage Vitals   Temperature Pulse Respirations Blood Pressure SpO2   07/06/18 0850 07/06/18 0850 07/06/18 0850 07/06/18 0850 07/06/18 0850   97 8 °F (36 6 °C) 100 16 143/94 97 %      Temp Source Heart Rate Source Patient Position - Orthostatic VS BP Location FiO2 (%)   07/06/18 0850 07/06/18 0850 07/06/18 1009 07/06/18 0850 --   Oral Monitor Lying Right arm       Pain Score       07/06/18 0850       No Pain           Vitals:    07/06/18 0850 07/06/18 1009   BP: 143/94 138/87   Pulse: 100 88   Patient Position - Orthostatic VS:  Lying       Visual Acuity      ED Medications  Medications   aluminum-magnesium hydroxide-simethicone (MYLANTA) 200-200-20 mg/5 mL oral suspension 15 mL (15 mL Oral Given 7/6/18 0938)   lidocaine viscous (XYLOCAINE) 2 % mucosal solution 15 mL (15 mL Swish & Swallow Given 7/6/18 0939)   dicyclomine (BENTYL) tablet 20 mg (20 mg Oral Given 7/6/18 0937)       Diagnostic Studies  Results Reviewed     Procedure Component Value Units Date/Time    Urine Microscopic [[id number]]  (Abnormal) Collected:  07/06/18 1053    Lab Status:  Final result Specimen:  Urine from Urine, Clean Catch Updated:  07/06/18 1101     RBC, UA None Seen /hpf      WBC, UA 0-1 (A) /hpf      Epithelial Cells Occasional /hpf      Bacteria, UA Occasional /hpf     ED Urine Macroscopic [[id number]]  (Abnormal) Collected:  07/06/18 1053    Lab Status:  Final result Specimen:  Urine Updated:  07/06/18 1046     Color, UA Yellow     Clarity, UA Clear     pH, UA 6 0     Leukocytes, UA Negative     Nitrite, UA Negative     Protein, UA Negative mg/dl      Glucose,  (1/2%) (A) mg/dl      Ketones, UA 80 (3+) (A) mg/dl      Urobilinogen, UA 0 2 E U /dl      Bilirubin, UA Negative     Blood, UA Trace (A)     Specific [place], UA 1 020    Narrative:       CLINITEK RESULT    Troponin I [[id number]]  (Normal) Collected:  07/06/18 0947    Lab Status:  Final result Specimen:  Blood from Arm, Right Updated:  07/06/18 1016     Troponin I <0 02 ng/mL     Comprehensive metabolic panel [[id number]]  (Abnormal) Collected:  07/06/18 0947    Lab Status:  Final result Specimen:  Blood from Arm, Right Updated:  07/06/18 1013     Sodium 136 mmol/L      Potassium 4 1 mmol/L      Chloride 96 (L) mmol/L      CO2 29 mmol/L      Anion Gap 11 mmol/L      BUN 15 mg/dL      Creatinine 0 86 mg/dL      Glucose 308 (H) mg/dL      Calcium 9 1 mg/dL      AST 9 U/L      ALT 25 U/L      Alkaline Phosphatase 88 U/L      Total Protein 7 7 g/dL      Albumin 4 3 g/dL      Total Bilirubin 0 90 mg/dL      eGFR 104 ml/min/1 73sq m     Narrative:         National Kidney Disease Education Program recommendations are as follows:  GFR calculation is accurate only with a steady state creatinine  Chronic Kidney disease less than 60 ml/min/1 73 sq  meters  Kidney failure less than 15 ml/min/1 73 sq  meters      Lipase [[id number]]  (Normal) Collected:  07/06/18 0947    Lab Status:  Final result Specimen:  Blood from Arm, Right Updated:  07/06/18 1013     Lipase 115 u/L     CBC and differential [[id number]]  (Abnormal) Collected:  07/06/18 0947    Lab Status:  Final result Specimen:  Blood from Arm, Right Updated:  07/06/18 0954     WBC 8 07 Thousand/uL      RBC 5 78 (H) Million/uL      Hemoglobin 15 0 g/dL      Hematocrit 44 1 %      MCV 76 (L) fL      MCH 26 0 (L) pg      MCHC 34 0 g/dL      RDW 13 1 %      MPV 9 7 fL      Platelets 895 Thousands/uL      Neutrophils Relative 73 %      Lymphocytes Relative 20 %      Monocytes Relative 7 %      Eosinophils Relative 1 %      Basophils Relative 1 %      Neutrophils Absolute 5 86 Thousands/µL      Lymphocytes Absolute 1 57 Thousands/µL      Monocytes Absolute 0 56 Thousand/µL      Eosinophils Absolute 0 04 Thousand/µL      Basophils Absolute 0 04 Thousands/µL                  No orders to display              Procedures  ECG 12 Lead Documentation  Date/Time: 7/6/2018 10:30 AM  Performed by: Asim Clemons  Authorized by: Asim Clemons     ECG reviewed by me, the ED Provider: yes    Patient location:  ED  Previous ECG:     Previous ECG:  Compared to current    Comparison ECG info:  Nsr rate 93    Similarity:  No change    Comparison to cardiac monitor: No    Interpretation:     Interpretation: normal    Rate:     ECG rate:  83    ECG rate assessment: normal    Rhythm:     Rhythm: sinus rhythm    Ectopy:     Ectopy: none    QRS:     QRS axis:  Normal  Conduction:     Conduction: normal    ST segments:     ST segments:  Normal  T waves:     T waves: normal             Phone Contacts  ED Phone Contact    ED Course                               MDM  Number of Diagnoses or Management Options  Abdominal pain with vomiting:   Diagnosis management comments: Plan is to check routine labs, urine and EKG  Initially pt offered fluids (bordeline tachycardic may be mildly dehydrated) and IV medication which patient declined  Will administer her Maalox, lidocaine and Bentyl for symptoms then reassess  1045:  Upon reassessment patient states that his abdominal cramping has improved since taking the Bentyl    Lab results reviewed CBC unremarkable CMP shows elevated glucose at 306 which pt admits is typical for him  Made aware urinalysis shows trace blood, glucose and ketones which likely indicates some degree of dehydration  The patient is stable for discharge  Plan is to prescribe Bentyl for symptoms  Recommend patient follow up with PCP for recheck  Increase his fluid/water intake and discussed at length to make healthy dietary changes  Both verbal and written discharge instructions provided  Adequate time was allowed to answer any questions  Recommend follow up with family doctor or referral physician as discussed, sooner if symptoms persist, change or worsen  Red flag symptoms as well as reasons to return to the ED discussed  Pt verbally understood treatment plan and was discharged home in stable condition  CritCare Time    Disposition  Final diagnoses:   Abdominal pain with vomiting     Time reflects when diagnosis was documented in both MDM as applicable and the Disposition within this note     Time User Action Codes Description Comment    7/6/2018 10:58 AM Savi COULTER Add [R10 9,  R11 10] Abdominal pain with vomiting       ED Disposition     ED Disposition Condition Comment    Discharge  Van Penaloza discharge to home/self care  Condition at discharge: Good        Follow-up Information     Follow up With Specialties Details Why Contact Jackson Aquino,  Family Medicine  Recommend follow up with family physician for recheck in 3-5 days, sooner symptoms change or worsen return to ED  [address]  [facility] [address] [phone]      [facility]  Consider consultation with GI specialist if symptoms persist  [address] [place] [id number] [phone]          Patient's Medications   Discharge Prescriptions    DICYCLOMINE (BENTYL) 20 MG TABLET    Take 1 tablet (20 mg total) by mouth every 6 (six) hours As needed for abdominal cramping         Start Date: 7/6/2018  End Date: --       Order Dose: 20 mg       Quantity: 20 tablet    Refills: 0    ONDANSETRON (ZOFRAN) 4 MG TABLET    Take 1-2 tabs every 8 hours as needed for nausea/vomiting       Start Date: 7/6/2018  End Date: --       Order Dose: --       Quantity: 12 tablet    Refills: 0     No discharge procedures on file      ED Provider  Electronically Signed by           Alison Hwang PA-C  07/06/18 1101

## 2018-07-06 NOTE — ED PROVIDER NOTES
History  Chief Complaint   Patient presents with    Abnormal Lab     Pt states that he was told to come back to the ED for "enzymes in my blood"  A 52-year-old male we presents for repeat evaluation  Patient was seen earlier was having some what appeared to be stress-induced abdominal pain  Patient was offered and recommended to stay in ED or possible leaving admission for IV fluids  For which patient declined  Went to the family doctor had a stat labs checked which was the acetone which was positive +3  Patient was then advised to come to the emergency department  Patient's PCP called who I spoke with personally  Patient now states he feels great other than slightly anxious for few reasons  One because he has a flight scheduled in 8 hr to [facility], as well as that he was advised by his family doctor to come back to the hospital   Says other than this anxious feeling though he feels perfectly fine  Does not feel weak does not feel dehydrated denies any associated abdominal pain that he had earlier in the day  No nausea no vomiting, eating well drinking well  Patient states he was advised come back for IV fluids  No chest pain or shortness of breath no fevers no chills no nausea vomiting        History provided by:  Patient and spouse (Call from patient's PCP)      Prior to Admission Medications   Prescriptions Last Dose Informant Patient Reported? Taking?    ALPRAZolam (XANAX) 0 25 mg tablet   No No   Sig: Take 1 tablet (0 25 mg total) by mouth every 8 (eight) hours as needed for anxiety   Beclomethasone Dipropionate (QNASL) 80 MCG/ACT AERS   Yes No   Si Squirt into each nostril 2 (two) times a day   CHOLECALCIFEROL PO   Yes No   Sig: Take 1,000 Units by mouth   Dapagliflozin Propanediol (FARXIGA) 10 MG TABS   No No   Sig: Take 1 tablet (10 mg total) by mouth daily   Omega-3 Fatty Acids (FISH OIL) 1200 MG CAPS   Yes No   Sig: Take 1 capsule by mouth daily   SITagliptin-MetFORMIN HCl ER (JANUMET XR)  MG TB24   No No   Sig: Take 2 tablets by mouth daily   aspirin 81 mg chewable tablet   Yes No   Sig: Adult Aspirin Low Strength 81 MG CHEW  1 PO Q D   Quantity: 0;  Refills: 0       [employer], Provider M D ;  Started 11-Oct-2007  Active   dicyclomine (BENTYL) 20 mg tablet   No No   Sig: Take 1 tablet (20 mg total) by mouth every 6 (six) hours As needed for abdominal cramping     diltiazem (CARDIZEM) 30 mg tablet   No No   Sig: Take 1 tablet (30 mg total) by mouth daily   diltiazem (CARDIZEM) 30 mg tablet   No No   Sig: Take 1 tablet (30 mg total) by mouth daily   esomeprazole (NexIUM) 40 MG capsule   No No   Sig: Take 1 capsule (40 mg total) by mouth daily   glucose blood (FREESTYLE INSULINX TEST) test strip   Yes No   Si Squirt by In Vitro route 2 (two) times a day   ibuprofen (MOTRIN) 200 mg tablet   Yes No   Sig: Take 200 mg by mouth every 6 (six) hours as needed for mild pain   levocetirizine (XYZAL) 5 MG tablet   Yes No   Sig: Levocetirizine Dihydrochloride 5 MG Oral Tablet  take 1 tab po QD   Quantity: 30;  Refills: 1       Kelsy Look DO;  Started 16-Sep-2015  Active   loperamide (IMODIUM) 2 mg capsule   Yes No   Sig: Take by mouth   meloxicam (MOBIC) 15 mg tablet   No No   Sig: Take 1 tablet (15 mg total) by mouth daily   metaxalone (SKELAXIN) 800 mg tablet   No No   Sig: Take 1 tablet (800 mg total) by mouth 3 (three) times a day as needed for muscle spasms   mometasone (NASONEX) 50 mcg/act nasal spray   Yes No   Si sprays into each nostril daily   montelukast (SINGULAIR) 10 mg tablet   No No   Sig: Take 1 tablet (10 mg total) by mouth daily at bedtime   ondansetron (ZOFRAN) 4 mg tablet   No No   Sig: Take 1 tablet (4 mg total) by mouth every 8 (eight) hours as needed for nausea or vomiting   ondansetron (ZOFRAN) 4 mg tablet   No No   Sig: Take 1-2 tabs every 8 hours as needed for nausea/vomiting   pioglitazone (ACTOS) 30 mg tablet   No No   Sig: Take 1 tablet (30 mg total) by mouth daily   quinapril (ACCUPRIL) 5 mg tablet   Yes No   Sig: Quinapril HCl - 5 MG Oral Tablet  take one tablet by mouth every day   Quantity: 90;  Refills: 3       Tari Paulino DO;  Started 31-Aug-2012  Active   rosuvastatin (CRESTOR) 10 MG tablet   No No   Sig: Take 1 tablet (10 mg total) by mouth daily   sildenafil (REVATIO) 20 mg tablet   No No   Si tablet by mouth 30-45 minutes before activity      Facility-Administered Medications: None       Past Medical History:   Diagnosis Date    Anxiety     only when flying    Chronic frontal sinusitis     last assessed 2016    Diabetes mellitus ([facility] [address] )     Fracture of great toe     last assessed 2014    GERD (gastroesophageal reflux disease)     Hyperlipidemia     Hypertension     Palpitations     last assessed 2012       Past Surgical History:   Procedure Laterality Date    HERNIA REPAIR Bilateral     AR MANIPULATN SHLDR JT W ANESTHESIA Right 4/3/2017    Procedure: SHOULDER MANIPULATION UNDER ANESTHESIA ;  Surgeon: Samia Cortez MD;  Location: [facility];  Service: Orthopedics       Family History   Problem Relation Age of Onset    Diabetes Mother     Liver cancer Mother     Hypertension Mother      I have reviewed and agree with the history as documented  Social History   Substance Use Topics    Smoking status: Never Smoker    Smokeless tobacco: Never Used    Alcohol use Yes      Comment: socially        Review of Systems   Constitutional: Negative for activity change, chills, diaphoresis and fever  HENT: Negative for congestion, sinus pressure and sore throat  Eyes: Negative for pain and visual disturbance  Respiratory: Negative for cough, chest tightness, shortness of breath, wheezing and stridor  Cardiovascular: Negative for chest pain and palpitations  Gastrointestinal: Negative for abdominal distention, abdominal pain, constipation, diarrhea, nausea and vomiting  Genitourinary: Negative for dysuria and frequency  Musculoskeletal: Negative for neck pain and neck stiffness  Skin: Negative for rash  Neurological: Negative for dizziness, speech difficulty, light-headedness, numbness and headaches  Psychiatric/Behavioral: The patient is nervous/anxious  Physical Exam  Physical Exam   Constitutional: He is oriented to person, place, and time  He appears well-developed  No distress  HENT:   Head: Normocephalic and atraumatic  Eyes: Pupils are equal, round, and reactive to light  Neck: Normal range of motion  Neck supple  No tracheal deviation present  Cardiovascular: Normal rate, regular rhythm, normal heart sounds and intact distal pulses  No murmur heard  Pulmonary/Chest: Effort normal and breath sounds normal  No stridor  No respiratory distress  Abdominal: Soft  He exhibits no distension  There is no tenderness  There is no rebound and no guarding  Musculoskeletal: Normal range of motion  Neurological: He is alert and oriented to person, place, and time  Skin: Skin is warm and dry  He is not diaphoretic  No erythema  No pallor  Psychiatric: He has a normal mood and affect  Vitals reviewed        Vital Signs  ED Triage Vitals [07/06/18 1443]   Temperature Pulse Respirations Blood Pressure SpO2   98 9 °F (37 2 °C) 103 16 135/77 95 %      Temp Source Heart Rate Source Patient Position - Orthostatic VS BP Location FiO2 (%)   Oral Monitor Sitting Left arm --      Pain Score       No Pain           Vitals:    07/06/18 1443 07/06/18 1707   BP: 135/77 125/75   Pulse: 103 72   Patient Position - Orthostatic VS: Sitting Sitting       Visual Acuity      ED Medications  Medications   sodium chloride 0 9 % bolus 1,000 mL (0 mL Intravenous Stopped 7/6/18 1706)   sodium chloride 0 9 % bolus 1,000 mL (0 mL Intravenous Stopped 7/6/18 1626)       Diagnostic Studies  Results Reviewed     None                 No orders to display              Procedures  Procedures       Phone Contacts  ED Phone Contact    ED Course                               MDM  Number of Diagnoses or Management Options  Dehydration: new and requires workup  Diagnosis management comments: Initial ED assessment and plan:  78-year-old type 2 diabetic, presents with an outpatient elevated acetone  Seen in the emergency department earlier in the day  Clinically he looks well, and subjectively he says he feels great  Will give patient 2 L of IV fluids  , discussed with patient about admission to hospital for continued fluids to clear acid stone and to recheck acid tone to ensure clearance, patient says he has a flight in the next few hours and was really hoping to get IV fluids and be discharged  Ultimate ED disposition:  Patient was discharged after 2 L of IV fluids  We again discussed with his wife present rechecking blood work, I explained to him that her recheck it and his acetone is elevated or of is sugar still high or electrolytes have them change, my recommendation would be to be admitted to the hospital for which patient says there is no way he would do as he is leaving for vacation very shortly  I explained I am still happy to check blood work, but he will be signing out against medical advice if anything was abnormal, patient is wife understood this and said that regardless of whether was normal of abnormal they would be leaving to go on vacation anyway  Decision was made not to recheck blood work and discharge patient    Patient states he still feels great       Amount and/or Complexity of Data Reviewed  Decide to obtain previous medical records or to obtain history from someone other than the patient: yes  Obtain history from someone other than the patient: yes  Review and summarize past medical records: yes      CritCare Time    Disposition  Final diagnoses:   Dehydration     Time reflects when diagnosis was documented in both MDM as applicable and the Disposition within this note     Time User Action Codes Description Comment 7/6/2018  4:47 PM Becca Jenkins [[id number]] Dehydration       ED Disposition     ED Disposition Condition Comment    Discharge  Eloisa Viramontes discharge to home/self care      Condition at discharge: Good        Follow-up Information    None         Discharge Medication List as of 7/6/2018  4:48 PM      CONTINUE these medications which have NOT CHANGED    Details   ALPRAZolam (XANAX) 0 25 mg tablet Take 1 tablet (0 25 mg total) by mouth every 8 (eight) hours as needed for anxiety, Starting Fri 6/1/2018, Phone In      aspirin 81 mg chewable tablet Adult Aspirin Low Strength 81 MG CHEW  1 PO Q D   Quantity: 0;  Refills: 0       [employer], Provider M D ;  Started 11-Oct-2007  Active, Historical Med      Beclomethasone Dipropionate (QNASL) 80 MCG/ACT AERS 1 Squirt into each nostril 2 (two) times a day, Starting Wed 4/27/2016, Historical Med      CHOLECALCIFEROL PO Take 1,000 Units by mouth, Historical Med      Dapagliflozin Propanediol (FARXIGA) 10 MG TABS Take 1 tablet (10 mg total) by mouth daily, Starting Wed 2/14/2018, Normal      dicyclomine (BENTYL) 20 mg tablet Take 1 tablet (20 mg total) by mouth every 6 (six) hours As needed for abdominal cramping , Starting Fri 7/6/2018, Print      !! diltiazem (CARDIZEM) 30 mg tablet Take 1 tablet (30 mg total) by mouth daily, Starting Tue 1/30/2018, Normal      !! diltiazem (CARDIZEM) 30 mg tablet Take 1 tablet (30 mg total) by mouth daily, Starting Mon 3/5/2018, Normal      esomeprazole (NexIUM) 40 MG capsule Take 1 capsule (40 mg total) by mouth daily, Starting Mon 3/12/2018, Normal      glucose blood (FREESTYLE INSULINX TEST) test strip 1 Squirt by In Vitro route 2 (two) times a day, Starting Wed 1/8/2014, Historical Med      ibuprofen (MOTRIN) 200 mg tablet Take 200 mg by mouth every 6 (six) hours as needed for mild pain, Historical Med      levocetirizine (XYZAL) 5 MG tablet Levocetirizine Dihydrochloride 5 MG Oral Tablet  take 1 tab po QD   Quantity: 30;  [facility];  Started 16-Sep-2015  Active, Historical Med      loperamide (IMODIUM) 2 mg capsule Take by mouth, Starting Wed 11/1/2017, Historical Med      meloxicam (MOBIC) 15 mg tablet Take 1 tablet (15 mg total) by mouth daily, Starting Fri 5/4/2018, Normal      metaxalone (SKELAXIN) 800 mg tablet Take 1 tablet (800 mg total) by mouth 3 (three) times a day as needed for muscle spasms, Starting Wed 5/16/2018, Normal      mometasone (NASONEX) 50 mcg/act nasal spray 2 sprays into each nostril daily, Starting Wed 3/2/2016, Historical Med      montelukast (SINGULAIR) 10 mg tablet Take 1 tablet (10 mg total) by mouth daily at bedtime, Starting Mon 3/5/2018, Normal      Omega-3 Fatty Acids (FISH OIL) 1200 MG CAPS Take 1 capsule by mouth daily, Historical Med      !! ondansetron (ZOFRAN) 4 mg tablet Take 1 tablet (4 mg total) by mouth every 8 (eight) hours as needed for nausea or vomiting, Starting Wed 6/20/2018, Normal      !! ondansetron (ZOFRAN) 4 mg tablet Take 1-2 tabs every 8 hours as needed for nausea/vomiting, Print      pioglitazone (ACTOS) 30 mg tablet Take 1 tablet (30 mg total) by mouth daily, Starting Wed 6/20/2018, Normal      quinapril (ACCUPRIL) 5 mg tablet Quinapril HCl - 5 MG Oral Tablet  take one tablet by mouth every day   Quantity: 90;  Refills: 3       Joseph Acharya DO;  Started 31-Aug-2012  Active, Historical Med      rosuvastatin (CRESTOR) 10 MG tablet Take 1 tablet (10 mg total) by mouth daily, Starting Mon 3/12/2018, Normal      sildenafil (REVATIO) 20 mg tablet 1 tablet by mouth 30-45 minutes before activity, Print      SITagliptin-MetFORMIN HCl ER (JANUMET XR)  MG TB24 Take 2 tablets by mouth daily, Starting Tue 1/30/2018, Normal       !! - Potential duplicate medications found  Please discuss with provider  No discharge procedures on file      ED Provider  Electronically Signed by           Kaylen Elena DO  07/07/18 1584

## 2018-07-06 NOTE — DISCHARGE INSTRUCTIONS
Abdominal Pain   WHAT YOU NEED TO KNOW:   Abdominal pain can be dull, achy, or sharp  You may have pain in one area of your abdomen, or in your entire abdomen  Your pain may be caused by a condition such as constipation, food sensitivity or poisoning, infection, or a blockage  Abdominal pain can also be from a hernia, appendicitis, or an ulcer  Liver, gallbladder, or kidney conditions can also cause abdominal pain  The cause of your abdominal pain may be unknown  DISCHARGE INSTRUCTIONS:   Return to the emergency department if:   · You have new chest pain or shortness of breath  · You have pulsing pain in your upper abdomen or lower back that suddenly becomes constant  · Your pain is in the right lower abdominal area and worsens with movement  · You have a fever over 100 4°F (38°C) or shaking chills  · You are vomiting and cannot keep food or liquids down  · Your pain does not improve or gets worse over the next 8 to 12 hours  · You see blood in your vomit or bowel movements, or they look black and tarry  · Your skin or the whites of your eyes turn yellow  · You are a woman and have a large amount of vaginal bleeding that is not your monthly period  Contact your healthcare provider if:   · You have pain in your lower back  · You are a man and have pain in your testicles  · You have pain when you urinate  · You have questions or concerns about your condition or care  Follow up with your healthcare provider within 24 hours or as directed:  Write down your questions so you remember to ask them during your visits  Medicines:   · Medicines  may be given to calm your stomach and prevent vomiting or to decrease pain  Ask how to take pain medicine safely  · Take your medicine as directed  Contact your healthcare provider if you think your medicine is not helping or if you have side effects  Tell him of her if you are allergic to any medicine   Keep a list of the medicines, vitamins, and herbs you take  Include the amounts, and when and why you take them  Bring the list or the pill bottles to follow-up visits  Carry your medicine list with you in case of an emergency  © 2017 [employer] Information is for End User's use only and may not be sold, redistributed or otherwise used for commercial purposes  All illustrations and images included in CareNotes® are the copyrighted property of A [employer] M [employer]  or [employer] Reynoso  The above information is an  only  It is not intended as medical advice for individual conditions or treatments  Talk to your doctor, nurse or pharmacist before following any medical regimen to see if it is safe and effective for you  Acute Nausea and Vomiting   WHAT YOU NEED TO KNOW:   Acute nausea and vomiting start suddenly, worsen quickly, and last a short time  DISCHARGE INSTRUCTIONS:   Return to the emergency department if:   · You see blood in your vomit or your bowel movements  · You have sudden, severe pain in your chest and upper abdomen after hard vomiting or retching  · You have swelling in your neck and chest      · You are dizzy, cold, and thirsty and your eyes and mouth are dry  · You are urinating very little or not at all  · You have muscle weakness, leg cramps, and trouble breathing  · Your heart is beating much faster than normal      · You continue to vomit for more than 48 hours  Contact your healthcare provider if:   · You have frequent dry heaves (vomiting but nothing comes out)  · Your nausea and vomiting does not get better or go away after you use medicine  · You have questions or concerns about your condition or treatment  Medicines: You may need any of the following:  · Medicines  may be given to calm your stomach and stop your vomiting   You may also need medicines to help you feel more relaxed or to stop nausea and vomiting caused by motion sickness  · Gastrointestinal stimulants  are used to help empty your stomach and bowels  This may help decrease nausea and vomiting  · Take your medicine as directed  Contact your healthcare provider if you think your medicine is not helping or if you have side effects  Tell him or her if you are allergic to any medicine  Keep a list of the medicines, vitamins, and herbs you take  Include the amounts, and when and why you take them  Bring the list or the pill bottles to follow-up visits  Carry your medicine list with you in case of an emergency  Prevent or manage acute nausea and vomiting:   · Do not drink alcohol  Alcohol may upset or irritate your stomach  Too much alcohol can also cause acute nausea and vomiting  · Control stress  Headaches due to stress may cause nausea and vomiting  Find ways to relax and manage your stress  Get more rest and sleep  · Drink more liquids as directed  Vomiting can lead to dehydration  It is important to drink more liquids to help replace lost body fluids  Ask your healthcare provider how much liquid to drink each day and which liquids are best for you  Your provider may recommend that you drink an oral rehydration solution (ORS)  ORS contains water, salts, and sugar that are needed to replace the lost body fluids  Ask what kind of ORS to use, how much to drink, and where to get it  · Eat smaller meals, more often  Eat small amounts of food every 2 to 3 hours, even if you are not hungry  Food in your stomach may decrease your nausea  · Talk to your healthcare provider before you take over-the-counter (OTC) medicines  These medicines can cause serious problems if you use certain other medicines, or you have a medical condition  You may have problems if you use too much or use them for longer than the label says  Follow directions on the label carefully    Follow up with your healthcare provider as directed:  Write down your questions so you remember to ask them during your follow-up visits  © 2017 [employer] Information is for End User's use only and may not be sold, redistributed or otherwise used for commercial purposes  All illustrations and images included in CareNotes® are the copyrighted property of A D [employer] M , Inc  or Adriano Reynoso  The above information is an  only  It is not intended as medical advice for individual conditions or treatments  Talk to your doctor, nurse or pharmacist before following any medical regimen to see if it is safe and effective for you

## 2018-07-06 NOTE — DISCHARGE INSTRUCTIONS
Dehydration   WHAT YOU NEED TO KNOW:   Dehydration is a condition that develops when your body does not have enough fluid  You may become dehydrated if you do not drink enough water or lose too much fluid  Fluid loss may also cause loss of electrolytes (minerals), such as sodium  DISCHARGE INSTRUCTIONS:   Return to the emergency department if:   · You have a seizure  · You are confused or cannot think clearly  · You are extremely sleepy, or another person cannot wake you  · You become dizzy or faint when you stand  · You are not able to urinate  · You have trouble breathing  · You have a fast or irregular heartbeat  · Your hands or feet are cold, or your face is pale  Contact your healthcare provider if:   · You have trouble drinking liquids because you are vomiting  · Your symptoms get worse  · You have a fever  · You feel very weak or tired  · You have questions or concerns about your condition or care  Follow up with your healthcare provider as directed:  Write down your questions so you remember to ask them during your visits  Prevent or manage dehydration:   · Drink liquids as directed  Liquids that contain water, sugar, and minerals can help your body hold in fluid and help prevent dehydration  Drink liquids throughout the day, not just when you feel thirsty  Men should drink about 3 liters (13 eight-ounce cups) of liquid each day  Women should drink about 2 liters (9 eight-ounce cups) of liquid each day  Drink even more liquid if you will be outdoors, in the sun for a long time, or exercising  · Stay cool  Limit the time you spend outdoors during the hottest part of the day  Dress in lightweight clothes  · Keep track of how often you urinate  If you urinate less than usual or your urine is darker, drink more liquids    © 2017 [employer] Information is for End User's use only and may not be sold, redistributed or otherwise used for commercial purposes  All illustrations and images included in CareNotes® are the copyrighted property of [employer] M [employer] Inc  or Adriano Reynoso  The above information is an  only  It is not intended as medical advice for individual conditions or treatments  Talk to your doctor, nurse or pharmacist before following any medical regimen to see if it is safe and effective for you

## 2018-08-06 DIAGNOSIS — E11.9 TYPE 2 DIABETES MELLITUS WITHOUT COMPLICATION, WITHOUT LONG-TERM CURRENT USE OF INSULIN (HCC): ICD-10-CM

## 2018-08-06 DIAGNOSIS — I10 ESSENTIAL HYPERTENSION: ICD-10-CM

## 2018-09-04 LAB
ALBUMIN SERPL-MCNC: 4.7 G/DL (ref 3.6–5.1)
ALBUMIN/GLOB SERPL: 2 (CALC) (ref 1–2.5)
ALP SERPL-CCNC: 85 U/L (ref 40–115)
ALT SERPL-CCNC: 17 U/L (ref 9–46)
AST SERPL-CCNC: 12 U/L (ref 10–40)
BASOPHILS # BLD AUTO: 51 CELLS/UL (ref 0–200)
BASOPHILS NFR BLD AUTO: 0.7 %
BILIRUB SERPL-MCNC: 0.6 MG/DL (ref 0.2–1.2)
BUN SERPL-MCNC: 17 MG/DL (ref 7–25)
BUN/CREAT SERPL: ABNORMAL (CALC) (ref 6–22)
CALCIUM SERPL-MCNC: 9.4 MG/DL (ref 8.6–10.3)
CHLORIDE SERPL-SCNC: 96 MMOL/L (ref 98–110)
CHOLEST SERPL-MCNC: 153 MG/DL
CHOLEST/HDLC SERPL: 4.6 (CALC)
CO2 SERPL-SCNC: 27 MMOL/L (ref 20–32)
CREAT SERPL-MCNC: 0.82 MG/DL (ref 0.6–1.35)
EOSINOPHIL # BLD AUTO: 168 CELLS/UL (ref 15–500)
EOSINOPHIL NFR BLD AUTO: 2.3 %
ERYTHROCYTE [DISTWIDTH] IN BLOOD BY AUTOMATED COUNT: 13.1 % (ref 11–15)
GLOBULIN SER CALC-MCNC: 2.3 G/DL (CALC) (ref 1.9–3.7)
GLUCOSE SERPL-MCNC: 304 MG/DL (ref 65–99)
HBA1C MFR BLD: 10.6 % OF TOTAL HGB
HCT VFR BLD AUTO: 44.8 % (ref 38.5–50)
HDLC SERPL-MCNC: 33 MG/DL
HGB BLD-MCNC: 14.6 G/DL (ref 13.2–17.1)
LDLC SERPL CALC-MCNC: 86 MG/DL (CALC)
LYMPHOCYTES # BLD AUTO: 1679 CELLS/UL (ref 850–3900)
LYMPHOCYTES NFR BLD AUTO: 23 %
MCH RBC QN AUTO: 26.8 PG (ref 27–33)
MCHC RBC AUTO-ENTMCNC: 32.6 G/DL (ref 32–36)
MCV RBC AUTO: 82.4 FL (ref 80–100)
MONOCYTES # BLD AUTO: 540 CELLS/UL (ref 200–950)
MONOCYTES NFR BLD AUTO: 7.4 %
NEUTROPHILS # BLD AUTO: 4862 CELLS/UL (ref 1500–7800)
NEUTROPHILS NFR BLD AUTO: 66.6 %
NONHDLC SERPL-MCNC: 120 MG/DL (CALC)
PLATELET # BLD AUTO: 263 THOUSAND/UL (ref 140–400)
PMV BLD REES-ECKER: 10.2 FL (ref 7.5–12.5)
POTASSIUM SERPL-SCNC: 4.4 MMOL/L (ref 3.5–5.3)
PROT SERPL-MCNC: 7 G/DL (ref 6.1–8.1)
RBC # BLD AUTO: 5.44 MILLION/UL (ref 4.2–5.8)
SL AMB EGFR AFRICAN AMERICAN: 122 ML/MIN/1.73M2
SL AMB EGFR NON AFRICAN AMERICAN: 105 ML/MIN/1.73M2
SODIUM SERPL-SCNC: 134 MMOL/L (ref 135–146)
TESTOST FREE SERPL-MCNC: 60.1 PG/ML (ref 35–155)
TESTOST SERPL-MCNC: 302 NG/DL (ref 250–1100)
TRIGL SERPL-MCNC: 245 MG/DL
WBC # BLD AUTO: 7.3 THOUSAND/UL (ref 3.8–10.8)

## 2018-09-05 ENCOUNTER — OFFICE VISIT (OUTPATIENT)
Dept: FAMILY MEDICINE CLINIC | Facility: CLINIC | Age: 48
End: 2018-09-05
Payer: COMMERCIAL

## 2018-09-05 VITALS
HEART RATE: 97 BPM | OXYGEN SATURATION: 97 % | RESPIRATION RATE: 16 BRPM | SYSTOLIC BLOOD PRESSURE: 110 MMHG | WEIGHT: 168.2 LBS | BODY MASS INDEX: 27.03 KG/M2 | HEIGHT: 66 IN | DIASTOLIC BLOOD PRESSURE: 68 MMHG

## 2018-09-05 DIAGNOSIS — E11.00 TYPE 2 DIABETES MELLITUS WITH HYPEROSMOLARITY WITHOUT COMA, WITHOUT LONG-TERM CURRENT USE OF INSULIN (HCC): Primary | ICD-10-CM

## 2018-09-05 DIAGNOSIS — I10 ESSENTIAL HYPERTENSION: Chronic | ICD-10-CM

## 2018-09-05 DIAGNOSIS — E55.9 VITAMIN D DEFICIENCY: ICD-10-CM

## 2018-09-05 DIAGNOSIS — E11.9 TYPE 2 DIABETES MELLITUS WITHOUT COMPLICATION, WITHOUT LONG-TERM CURRENT USE OF INSULIN (HCC): Chronic | ICD-10-CM

## 2018-09-05 DIAGNOSIS — E78.2 MIXED HYPERLIPIDEMIA: Chronic | ICD-10-CM

## 2018-09-05 DIAGNOSIS — Z23 NEED FOR TDAP VACCINATION: ICD-10-CM

## 2018-09-05 PROBLEM — A09 TRAVELER'S DIARRHEA: Status: RESOLVED | Noted: 2018-06-20 | Resolved: 2018-09-05

## 2018-09-05 PROCEDURE — 3008F BODY MASS INDEX DOCD: CPT | Performed by: FAMILY MEDICINE

## 2018-09-05 PROCEDURE — 99215 OFFICE O/P EST HI 40 MIN: CPT | Performed by: FAMILY MEDICINE

## 2018-09-05 PROCEDURE — 3078F DIAST BP <80 MM HG: CPT | Performed by: FAMILY MEDICINE

## 2018-09-05 PROCEDURE — 3074F SYST BP LT 130 MM HG: CPT | Performed by: FAMILY MEDICINE

## 2018-09-05 RX ORDER — GLIPIZIDE 5 MG/1
5 TABLET, FILM COATED, EXTENDED RELEASE ORAL DAILY
Qty: 30 TABLET | Refills: 3 | Status: SHIPPED | OUTPATIENT
Start: 2018-09-05 | End: 2019-01-04 | Stop reason: SDUPTHER

## 2018-09-05 RX ORDER — PIOGLITAZONEHYDROCHLORIDE 45 MG/1
45 TABLET ORAL DAILY
Qty: 30 TABLET | Refills: 3 | Status: SHIPPED | OUTPATIENT
Start: 2018-09-05 | End: 2019-01-04 | Stop reason: SDUPTHER

## 2018-09-06 PROBLEM — E11.9 TYPE 2 DIABETES MELLITUS (HCC): Chronic | Status: RESOLVED | Noted: 2018-01-07 | Resolved: 2018-09-06

## 2018-09-06 PROBLEM — R82.4 KETONURIA: Status: RESOLVED | Noted: 2018-07-06 | Resolved: 2018-09-06

## 2018-09-06 NOTE — ASSESSMENT & PLAN NOTE
I believe that the patient's cholesterol is adequately controlled  His LDL is less than 70  Triglycerides are significantly elevated due to his poorly controlled diabetes  I would not increase his dose of Crestor

## 2018-09-06 NOTE — ASSESSMENT & PLAN NOTE
Lab Results   Component Value Date    HGBA1C 10 6 (H) 08/30/2018       No results for input(s): POCGLU in the last 72 hours  Patient is adamant that he does not want to go on to insulin therapy  I did have a long discussion about the advances in insulin therapy  Clearly has had a significant worsening of his diabetic control  Patient attributes this to dietary indiscretion and decreased physical activity  He plans to make significant changes  His wife and he have joined a gym  Will try to maximize his oral agents  I did explain to the patient that at some point in time these will not be  Adequate for controlling his diabetes and eventually he will need to consider insulin therapy  Patient is reluctant to do so especially with his job change  Patient continues on maximized amounts of Januvia, metformin  Will maximize his dose of pioglitazone, add Glucotrol  He remains on max dose of Alondra Lecanto  Patient will have short-term interim follow-up of diabetes with hemoglobin A1c being repeated in 3 months  I also provided an order for continuous blood glucose monitor that ties into his smart phone  Hopefully this will give a better indication of his glycemic control  Patient has not been testing his blood sugars  If this is not effective then I would like to refer the patient to Endocrinology to work a straight a plan of management    I do believe that the patient will need to be transitioned to insulin therapy    Blood Sugar Average: Last 72 hrs:

## 2018-09-06 NOTE — PROGRESS NOTES
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 that he needs to try to fix  This is somewhat stressful for him  Patient does admit to significant dietary indiscretion over the summer just with [facility] outs as well as 2 very large scale trips that he had taken to the [facility]  When he was away he was drinking alcohol as well as eating some foods that are not ideal for a diabetic  Patient has also not been working out as much  He and his wife recently joined the gym  He has no blurring of vision  No excessive urination  No excessive thirst   At 1 point time patient was hospitalized with dehydration and mild ketoacidosis  Patient is very adamant that he does not want to go on to insulin therapy  Back in January his hemoglobin A1c was 7 5%  This most recent hemoglobin A1c is 10 6%          Past Medical History:   Diagnosis Date    Anxiety     only when flying    Chronic frontal sinusitis     last assessed 03/02/2016    Diabetes mellitus ([address] )     Fracture of great toe     last assessed 11/06/2014    GERD (gastroesophageal reflux disease)     Hyperlipidemia     Hypertension     Palpitations     last assessed 11/06/2012       Family History   Problem Relation Age of Onset    Diabetes Mother     Liver cancer Mother     Hypertension Mother        Past Surgical History:   Procedure Laterality Date    HERNIA REPAIR Bilateral     WI INDERJIT SHLDR JT W ANESTHESIA Right 4/3/2017    Procedure: SHOULDER MANIPULATION UNDER ANESTHESIA ;  Surgeon: Jennifer Posey MD;  Location: [facility];  Service: Orthopedics        reports that he has never smoked  He has never used smokeless tobacco  He reports that he drinks alcohol  He reports that he does not use drugs        Current Outpatient Prescriptions:     aspirin 81 mg chewable tablet, Adult Aspirin Low Strength 81 MG CHEW 1 PO Q D  Quantity: 0;  Refills: 0    [employer], Provider M D ;  Started 11-Oct-2007 Active, Disp: , Rfl:     Beclomethasone Dipropionate (QNASL) 80 MCG/ACT AERS, 1 Squirt into each nostril 2 (two) times a day, Disp: , Rfl:     CHOLECALCIFEROL PO, Take 1,000 Units by mouth, Disp: , Rfl:     Dapagliflozin Propanediol (FARXIGA) 10 MG TABS, Take 1 tablet (10 mg total) by mouth daily, Disp: 30 tablet, Rfl: 5    diltiazem (CARDIZEM) 30 mg tablet, Take 1 tablet (30 mg total) by mouth daily, Disp: 90 tablet, Rfl: 1    esomeprazole (NexIUM) 40 MG capsule, Take 1 capsule (40 mg total) by mouth daily, Disp: 90 capsule, Rfl: 1    glucose blood (FREESTYLE INSULINX TEST) test strip, 1 Squirt by In Vitro route 2 (two) times a day, Disp: , Rfl:     levocetirizine (XYZAL) 5 MG tablet, Levocetirizine Dihydrochloride 5 MG Oral Tablet take 1 tab po QD  Quantity: 30;  Refills: 1    George Lilly DO;  Started 16-Sep-2015 Active, Disp: , Rfl:     meloxicam (MOBIC) 15 mg tablet, Take 1 tablet (15 mg total) by mouth daily, Disp: 90 tablet, Rfl: 0    metaxalone (SKELAXIN) 800 mg tablet, Take 1 tablet (800 mg total) by mouth 3 (three) times a day as needed for muscle spasms, Disp: 60 tablet, Rfl: 1    montelukast (SINGULAIR) 10 mg tablet, Take 1 tablet (10 mg total) by mouth daily at bedtime, Disp: 90 tablet, Rfl: 0    Omega-3 Fatty Acids (FISH OIL) 1200 MG CAPS, Take 1 capsule by mouth daily, Disp: , Rfl:     pioglitazone (ACTOS) 45 mg tablet, Take 1 tablet (45 mg total) by mouth daily, Disp: 30 tablet, Rfl: 3    quinapril (ACCUPRIL) 5 mg tablet, Quinapril HCl - 5 MG Oral Tablet take one tablet by mouth every day  Quantity: 90;  Refills: 3    Tari Paulino DO;  Started 31-Aug-2012 Active, Disp: , Rfl:     rosuvastatin (CRESTOR) 10 MG tablet, Take 1 tablet (10 mg total) by mouth daily, Disp: 90 tablet, Rfl: 1    SITagliptin-MetFORMIN HCl ER (JANUMET XR)  MG TB24, Take 2 tablets by mouth daily, Disp: 180 tablet, Rfl: 0    ALPRAZolam (XANAX) 0 25 mg tablet, Take 1 tablet (0 25 mg total) by mouth every 8 (eight) hours as needed for anxiety (Patient not taking: Reported on 9/5/2018 ), Disp: 30 tablet, Rfl: 0    glipiZIDE (GLUCOTROL XL) 5 mg 24 hr tablet, Take 1 tablet (5 mg total) by mouth daily, Disp: 30 tablet, Rfl: 3    ibuprofen (MOTRIN) 200 mg tablet, Take 200 mg by mouth every 6 (six) hours as needed for mild pain, Disp: , Rfl:     The following portions of the patient's history were reviewed and updated as appropriate: allergies, current medications, past family history, past medical history, past social history, past surgical history and problem list     Review of Systems   Constitutional: Negative  HENT: Negative  Eyes: Negative  Respiratory: Negative  Cardiovascular: Negative  Gastrointestinal: Negative  Endocrine: Negative  Genitourinary: Negative  Musculoskeletal: Negative  Skin: Negative  Allergic/Immunologic: Negative  Neurological: Negative  Hematological: Negative  Psychiatric/Behavioral: Negative  All other systems reviewed and are negative  Objective:    /68   Pulse 97   Resp 16   Ht 5' 6" (1 676 m)   Wt 76 3 kg (168 lb 3 2 oz)   SpO2 97%   BMI 27 15 kg/m²      Physical Exam   Constitutional: He is oriented to person, place, and time  He appears well-developed and well-nourished  HENT:   Head: Normocephalic     Eyes: Conjunctivae and EOM are normal  Pupils are equal, round, and reactive to light  Neck: Normal range of motion  Neck supple  Cardiovascular: Normal rate, regular rhythm and normal heart sounds  Pulses are no weak pulses  Pulses:       Dorsalis pedis pulses are 2+ on the right side, and 2+ on the left side  Posterior tibial pulses are 2+ on the right side, and 2+ on the left side  Pulmonary/Chest: Effort normal and breath sounds normal    Abdominal: Soft  Bowel sounds are normal    Musculoskeletal: Normal range of motion  He exhibits no tenderness  Feet:   Right Foot:   Skin Integrity: Negative for ulcer, skin breakdown, erythema, warmth, callus or dry skin  Left Foot:   Skin Integrity: Negative for ulcer, skin breakdown, erythema, warmth, callus or dry skin  Neurological: He is alert and oriented to person, place, and time  Psychiatric: He has a normal mood and affect  His behavior is normal  Judgment and thought content normal    Nursing note and vitals reviewed  Patient's shoes and socks removed  Right Foot/Ankle   Right Foot Inspection  Skin Exam: skin normal and skin intact no dry skin, no warmth, no callus, no erythema, no maceration, no abnormal color, no pre-ulcer, no ulcer and no callus                          Toe Exam: ROM and strength within normal limits  Sensory   Vibration: intact  Proprioception: intact   Monofilament testing: intact  Vascular  Capillary refills: < 3 seconds  The right DP pulse is 2+  The right PT pulse is 2+  Left Foot/Ankle  Left Foot Inspection  Skin Exam: skin normal and skin intactno dry skin, no warmth, no erythema, no maceration, normal color, no pre-ulcer, no ulcer and no callus                         Toe Exam: ROM and strength within normal limits                   Sensory   Vibration: intact  Proprioception: intact  Monofilament: intact  Vascular  Capillary refills: < 3 seconds  The left DP pulse is 2+  The left PT pulse is 2+  Assign Risk Category:  No deformity present;  No loss of protective sensation;  No weak pulses       Risk: 0      Recent Results (from the past 1008 hour(s))   Lipid panel    Collection Time: 08/30/18  8:40 AM   Result Value Ref Range    Total Cholesterol 153 <200 mg/dL    HDL 33 (L) >40 mg/dL    Triglycerides 245 (H) <150 mg/dL    LDL Direct 86 mg/dL (calc)    Chol HDLC Ratio 4 6 <5 0 (calc)    Non-HDL Cholesterol 120 <130 mg/dL (calc)   Comprehensive metabolic panel    Collection Time: 08/30/18  8:40 AM   Result Value Ref Range    Glucose 304 (H) 65 - 99 mg/dL    BUN 17 7 - 25 mg/dL    Creatinine 0 82 0 60 - 1 35 mg/dL    eGFR Non  105 > OR = 60 mL/min/1 73m2    SL AMB EGFR  122 > OR = 60 mL/min/1 73m2    SL AMB BUN/CREATININE RATIO NOT APPLICABLE 6 - 22 (calc)    Sodium 134 (L) 135 - 146 mmol/L    SL AMB POTASSIUM 4 4 3 5 - 5 3 mmol/L    Chloride 96 (L) 98 - 110 mmol/L    CO2 27 20 - 32 mmol/L    SL AMB CALCIUM 9 4 8 6 - 10 3 mg/dL    SL AMB PROTEIN, TOTAL 7 0 6 1 - 8 1 g/dL    Albumin 4 7 3 6 - 5 1 g/dL    Globulin 2 3 1 9 - 3 7 g/dL (calc)    SL AMB ALBUMIN/GLOBULIN RATIO 2 0 1 0 - 2 5 (calc)    TOTAL BILIRUBIN 0 6 0 2 - 1 2 mg/dL    Alkaline Phosphatase 85 40 - 115 U/L    SL AMB AST 12 10 - 40 U/L    SL AMB ALT 17 9 - 46 U/L   CBC and differential    Collection Time: 08/30/18  8:40 AM   Result Value Ref Range    White Blood Cell Count 7 3 3 8 - 10 8 Thousand/uL    Red Blood Cell Count 5 44 4 20 - 5 80 Million/uL    Hemoglobin 14 6 13 2 - 17 1 g/dL    HCT 44 8 38 5 - 50 0 %    MCV 82 4 80 0 - 100 0 fL    MCH 26 8 (L) 27 0 - 33 0 pg    MCHC 32 6 32 0 - 36 0 g/dL    RDW 13 1 11 0 - 15 0 %    Platelet Count 998 605 - 400 Thousand/uL    SL AMB MPV 10 2 7 5 - 12 5 fL    Neutrophils (Absolute) 4,862 1,500 - 7,800 cells/uL    Lymphocytes (Absolute) 1,679 850 - 3,900 cells/uL    Monocytes (Absolute) 540 200 - 950 cells/uL    Eosinophils (Absolute) 168 15 - 500 cells/uL    Basophils (Absolute) 51 0 - 200 cells/uL    Neutrophils 66 6 %    Lymphocytes 23 0 % Monocytes 7 4 %    Eosinophils 2 3 %    Basophils Relative 0 7 %   Testosterone, free, total    Collection Time: 08/30/18  8:40 AM   Result Value Ref Range    Testosterone, Total, LC/ 250 - 1,100 ng/dL    Testosterone, Free 60 1 35 0 - 155 0 pg/mL   Hemoglobin A1c (w/out EAG)    Collection Time: 08/30/18  8:40 AM   Result Value Ref Range    Hemoglobin A1C 10 6 (H) <5 7 % of total Hgb       Assessment/Plan:    No problem-specific Assessment & Plan notes found for this encounter  Problem List Items Addressed This Visit     RESOLVED: Type 2 diabetes mellitus (HCC) (Chronic)    Relevant Medications    pioglitazone (ACTOS) 45 mg tablet    glipiZIDE (GLUCOTROL XL) 5 mg 24 hr tablet    Other Relevant Orders    Continous glucose monitoring dexcom placement    Continous glucose monitoring dexcom intrepretation    Hemoglobin A1C    Microalbumin / creatinine urine ratio    Essential hypertension (Chronic)       Hypertension is very well controlled on Cardizem and Accupril  Continue same  Re-evaluate blood pressure in 3 months         Relevant Orders    CBC and differential    TSH, 3rd generation with Free T4 reflex    Hyperlipidemia (Chronic)      I believe that the patient's cholesterol is adequately controlled  His LDL is less than 70  Triglycerides are significantly elevated due to his poorly controlled diabetes  I would not increase his dose of Crestor  Relevant Orders    Comprehensive metabolic panel    Lipid panel    Vitamin D deficiency      Continue on vitamin-D supplementation  Check vitamin-D level in 3 months  Relevant Orders    Vitamin D 1,25 dihydroxy    Type 2 diabetes mellitus with hyperosmolarity without coma, without long-term current use of insulin ([facility] [address] ) - Primary     Lab Results   Component Value Date    HGBA1C 10 6 (H) 08/30/2018       No results for input(s): POCGLU in the last 72 hours  Patient is adamant that he does not want to go on to insulin therapy  I did have a long discussion about the advances in insulin therapy  Clearly has had a significant worsening of his diabetic control  Patient attributes this to dietary indiscretion and decreased physical activity  He plans to make significant changes  His wife and he have joined a gym  Will try to maximize his oral agents  I did explain to the patient that at some point in time these will not be  Adequate for controlling his diabetes and eventually he will need to consider insulin therapy  Patient is reluctant to do so especially with his job change  Patient continues on maximized amounts of Januvia, metformin  Will maximize his dose of pioglitazone, add Glucotrol  He remains on max dose of [place]  Patient will have short-term interim follow-up of diabetes with hemoglobin A1c being repeated in 3 months  I also provided an order for continuous blood glucose monitor that ties into his smart phone  Hopefully this will give a better indication of his glycemic control  Patient has not been testing his blood sugars  If this is not effective then I would like to refer the patient to Endocrinology to work a straight a plan of management    I do believe that the patient will need to be transitioned to insulin therapy    Blood Sugar Average: Last 72 hrs:           Relevant Medications    pioglitazone (ACTOS) 45 mg tablet    glipiZIDE (GLUCOTROL XL) 5 mg 24 hr tablet    Other Relevant Orders    Continous glucose monitoring dexcom placement    Continous glucose monitoring dexcom intrepretation    Hemoglobin A1C    Microalbumin / creatinine urine ratio      Other Visit Diagnoses     Need for Tdap vaccination        Relevant Orders    TDAP VACCINE GREATER THAN OR EQUAL TO 6YO IM

## 2018-09-17 DIAGNOSIS — E78.5 HYPERLIPIDEMIA, UNSPECIFIED HYPERLIPIDEMIA TYPE: ICD-10-CM

## 2018-09-17 DIAGNOSIS — K21.9 GASTROESOPHAGEAL REFLUX DISEASE, ESOPHAGITIS PRESENCE NOT SPECIFIED: ICD-10-CM

## 2018-09-17 RX ORDER — ROSUVASTATIN CALCIUM 10 MG/1
10 TABLET, COATED ORAL DAILY
Qty: 90 TABLET | Refills: 1 | Status: SHIPPED | OUTPATIENT
Start: 2018-09-17 | End: 2019-03-18 | Stop reason: SDUPTHER

## 2018-09-17 RX ORDER — ESOMEPRAZOLE MAGNESIUM 40 MG/1
40 CAPSULE, DELAYED RELEASE ORAL DAILY
Qty: 90 CAPSULE | Refills: 1 | Status: SHIPPED | OUTPATIENT
Start: 2018-09-17 | End: 2019-03-18 | Stop reason: SDUPTHER

## 2018-10-08 DIAGNOSIS — Z88.9 MULTIPLE ALLERGIES: Primary | ICD-10-CM

## 2018-11-10 DIAGNOSIS — E11.9 TYPE 2 DIABETES MELLITUS WITHOUT COMPLICATION, WITHOUT LONG-TERM CURRENT USE OF INSULIN (HCC): ICD-10-CM

## 2018-11-12 RX ORDER — SITAGLIPTIN AND METFORMIN HYDROCHLORIDE 1000; 50 MG/1; MG/1
TABLET, FILM COATED, EXTENDED RELEASE ORAL
Qty: 180 TABLET | Refills: 0 | Status: SHIPPED | OUTPATIENT
Start: 2018-11-12 | End: 2019-02-10 | Stop reason: SDUPTHER

## 2018-11-14 ENCOUNTER — OFFICE VISIT (OUTPATIENT)
Dept: FAMILY MEDICINE CLINIC | Facility: CLINIC | Age: 48
End: 2018-11-14
Payer: COMMERCIAL

## 2018-11-14 VITALS
HEART RATE: 88 BPM | HEIGHT: 66 IN | RESPIRATION RATE: 16 BRPM | SYSTOLIC BLOOD PRESSURE: 126 MMHG | DIASTOLIC BLOOD PRESSURE: 78 MMHG | OXYGEN SATURATION: 99 % | BODY MASS INDEX: 28.77 KG/M2 | WEIGHT: 179 LBS

## 2018-11-14 DIAGNOSIS — E11.65 TYPE 2 DIABETES MELLITUS WITH HYPERGLYCEMIA, WITHOUT LONG-TERM CURRENT USE OF INSULIN (HCC): Chronic | ICD-10-CM

## 2018-11-14 DIAGNOSIS — J30.1 SEASONAL ALLERGIC RHINITIS DUE TO POLLEN: ICD-10-CM

## 2018-11-14 DIAGNOSIS — E11.00 TYPE 2 DIABETES MELLITUS WITH HYPEROSMOLARITY WITHOUT COMA, WITHOUT LONG-TERM CURRENT USE OF INSULIN (HCC): Primary | ICD-10-CM

## 2018-11-14 PROCEDURE — 99213 OFFICE O/P EST LOW 20 MIN: CPT | Performed by: FAMILY MEDICINE

## 2018-11-14 PROCEDURE — 3008F BODY MASS INDEX DOCD: CPT | Performed by: FAMILY MEDICINE

## 2018-11-14 PROCEDURE — 1036F TOBACCO NON-USER: CPT | Performed by: FAMILY MEDICINE

## 2018-11-14 RX ORDER — OLOPATADINE HYDROCHLORIDE 2 MG/ML
1 SOLUTION/ DROPS OPHTHALMIC DAILY
Qty: 5 ML | Refills: 3 | Status: SHIPPED | OUTPATIENT
Start: 2018-11-14 | End: 2021-10-18 | Stop reason: ALTCHOICE

## 2018-11-14 RX ORDER — FEXOFENADINE HCL 180 MG/1
180 TABLET ORAL DAILY
Qty: 30 TABLET | Refills: 5 | Status: SHIPPED | OUTPATIENT
Start: 2018-11-14

## 2018-11-14 NOTE — PATIENT INSTRUCTIONS

## 2018-11-14 NOTE — ASSESSMENT & PLAN NOTE
Lab Results   Component Value Date    HGBA1C 10 6 (H) 08/30/2018       No results for input(s): POCGLU in the last 72 hours  Blood Sugar Average: Last 72 hrs:    Lab Results   Component Value Date    HGBA1C 10 6 (H) 08/30/2018       No results for input(s): POCGLU in the last 72 hours  Patient is adamant that he does not want to go on to insulin therapy  I did have a long discussion about the advances in insulin therapy  Clearly has had a significant worsening of his diabetic control  Patient attributes this to dietary indiscretion and decreased physical activity  He plans to make significant changes  His wife and he have joined a gym  Will try to maximize his oral agents  I did explain to the patient that at some point in time these will not be  Adequate for controlling his diabetes and eventually he will need to consider insulin therapy  Patient is reluctant to do so especially with his job change  Patient continues on maximized amounts of Januvia, metformin  Will maximize his dose of pioglitazone, add Glucotrol  He remains on max dose of [place]  Patient will have short-term interim follow-up of diabetes with hemoglobin A1c being repeated in 3 months  I also provided an order for continuous blood glucose monitor that ties into his smart phone  Hopefully this will give a better indication of his glycemic control  Patient has not been testing his blood sugars  If this is not effective then I would like to refer the patient to Endocrinology to work a straight a plan of management    I do believe that the patient will need to be transitioned to insulin therapy    Blood Sugar Average: Last 72 hrs:

## 2018-11-14 NOTE — ASSESSMENT & PLAN NOTE
Patient is to continue on QNASL, Singulair and will switch from a Xyzal to [facility]  [place]    If patient is still having I allergies then I did provide a prescription for Pataday eyedrops

## 2018-11-14 NOTE — PROGRESS NOTES
Subjective:      Patient ID: Gilberto Smith is a 53 y o  male  Patient presents complaining of severe environmental allergies for the last 2 months  Patient has significant itching, watering of eyes, nasal congestion, postnasal drip  Patient is always concerned that it is his diabetes that is causing worsening of his eyes however his eyes are very red and itchy  No fevers or chills  Patient is using QNASL nasal spray, Singulair and Xyzal   Still with symptoms  Past Medical History:   Diagnosis Date    Anxiety     only when flying    Chronic frontal sinusitis     last assessed 03/02/2016    Diabetes mellitus ([address] )     Fracture of great toe     last assessed 11/06/2014    GERD (gastroesophageal reflux disease)     Hyperlipidemia     Hypertension     Palpitations     last assessed 11/06/2012       Family History   Problem Relation Age of Onset    Diabetes Mother     Liver cancer Mother     Hypertension Mother        Past Surgical History:   Procedure Laterality Date    HERNIA REPAIR Bilateral     MA MANIPULATN SHLDR JT W ANESTHESIA Right 4/3/2017    Procedure: SHOULDER MANIPULATION UNDER ANESTHESIA ;  Surgeon: Nilo Mittal MD;  Location: [facility];  Service: Orthopedics        reports that he has never smoked  He has never used smokeless tobacco  He reports that he drinks alcohol  He reports that he does not use drugs        Current Outpatient Prescriptions:     ALPRAZolam (XANAX) 0 25 mg tablet, Take 1 tablet (0 25 mg total) by mouth every 8 (eight) hours as needed for anxiety (Patient not taking: Reported on 9/5/2018 ), Disp: 30 tablet, Rfl: 0    aspirin 81 mg chewable tablet, Adult Aspirin Low Strength 81 MG CHEW 1 PO Q D  Quantity: 0;  Refills: 0    [employer], Provider M D ;  Started 11-Oct-2007 Active, Disp: , Rfl:     Beclomethasone Dipropionate 80 MCG/ACT AERS, 1 spray each nostril 2 x daily, Disp: 1 Inhaler, Rfl: 0    CHOLECALCIFEROL PO, Take 1,000 Units by mouth, Disp: , Rfl:   Dapagliflozin Propanediol (FARXIGA) 10 MG TABS, Take 1 tablet (10 mg total) by mouth daily, Disp: 30 tablet, Rfl: 5    diltiazem (CARDIZEM) 30 mg tablet, Take 1 tablet (30 mg total) by mouth daily, Disp: 90 tablet, Rfl: 1    esomeprazole (NexIUM) 40 MG capsule, Take 1 capsule (40 mg total) by mouth daily, Disp: 90 capsule, Rfl: 1    fexofenadine (ALLEGRA) 180 MG tablet, Take 1 tablet (180 mg total) by mouth daily, Disp: 30 tablet, Rfl: 5    glipiZIDE (GLUCOTROL XL) 5 mg 24 hr tablet, Take 1 tablet (5 mg total) by mouth daily, Disp: 30 tablet, Rfl: 3    glucose blood (FREESTYLE INSULINX TEST) test strip, 1 Squirt by In Vitro route 2 (two) times a day, Disp: , Rfl:     ibuprofen (MOTRIN) 200 mg tablet, Take 200 mg by mouth every 6 (six) hours as needed for mild pain, Disp: , Rfl:     JANUMET XR  MG TB24, TAKE TWO TABLETS BY MOUTH EVERY DAY, Disp: 180 tablet, Rfl: 0    meloxicam (MOBIC) 15 mg tablet, Take 1 tablet (15 mg total) by mouth daily, Disp: 90 tablet, Rfl: 0    metaxalone (SKELAXIN) 800 mg tablet, Take 1 tablet (800 mg total) by mouth 3 (three) times a day as needed for muscle spasms, Disp: 60 tablet, Rfl: 1    montelukast (SINGULAIR) 10 mg tablet, Take 1 tablet (10 mg total) by mouth daily at bedtime, Disp: 90 tablet, Rfl: 0    olopatadine HCl (PATADAY) 0 2 % opth drops, Administer 1 drop to both eyes daily, Disp: 5 mL, Rfl: 3    Omega-3 Fatty Acids (FISH OIL) 1200 MG CAPS, Take 1 capsule by mouth daily, Disp: , Rfl:     pioglitazone (ACTOS) 45 mg tablet, Take 1 tablet (45 mg total) by mouth daily, Disp: 30 tablet, Rfl: 3    quinapril (ACCUPRIL) 5 mg tablet, Quinapril HCl - 5 MG Oral Tablet take one tablet by mouth every day  Quantity: 90;  Refills: 3    Hobert Mom ;  Started 31-Aug-2012 Active, Disp: , Rfl:     rosuvastatin (CRESTOR) 10 MG tablet, Take 1 tablet (10 mg total) by mouth daily, Disp: 90 tablet, Rfl: 1    The following portions of the patient's history were reviewed and updated as appropriate: allergies, current medications, past family history, past medical history, past social history, past surgical history and problem list     Review of Systems   Constitutional: Negative  Negative for chills, fatigue and fever  HENT: Positive for congestion, postnasal drip, rhinorrhea and sore throat  Negative for ear discharge, ear pain, facial swelling and trouble swallowing  Eyes: Positive for itching  Negative for discharge, redness and visual disturbance  Respiratory: Positive for cough  Negative for wheezing  Cardiovascular: Negative  Objective:    /78   Pulse 88   Resp 16   Ht 5' 6" (1 676 m)   Wt 81 2 kg (179 lb)   SpO2 99%   BMI 28 89 kg/m²      Physical Exam   Constitutional: He appears well-developed and well-nourished  HENT:   Head: Normocephalic and atraumatic  Nose: Mucosal edema and rhinorrhea present  Mouth/Throat: Oropharynx is clear and moist    Neck: Normal range of motion  Neck supple  Cardiovascular: Normal rate, regular rhythm and normal heart sounds  Pulmonary/Chest: Effort normal  He has no wheezes  He has rhonchi  Skin: Skin is warm and dry  Nursing note and vitals reviewed  No results found for this or any previous visit (from the past 1008 hour(s))  Assessment/Plan:    Type 2 diabetes mellitus with hyperosmolarity without coma, without long-term current use of insulin (HCC)  Lab Results   Component Value Date    HGBA1C 10 6 (H) 08/30/2018       No results for input(s): POCGLU in the last 72 hours  Blood Sugar Average: Last 72 hrs:    Lab Results   Component Value Date    HGBA1C 10 6 (H) 08/30/2018       No results for input(s): POCGLU in the last 72 hours  Patient is adamant that he does not want to go on to insulin therapy  I did have a long discussion about the advances in insulin therapy  Clearly has had a significant worsening of his diabetic control    Patient attributes this to dietary indiscretion and decreased physical activity  He plans to make significant changes  His wife and he have joined a gym  Will try to maximize his oral agents  I did explain to the patient that at some point in time these will not be  Adequate for controlling his diabetes and eventually he will need to consider insulin therapy  Patient is reluctant to do so especially with his job change  Patient continues on maximized amounts of Januvia, metformin  Will maximize his dose of pioglitazone, add Glucotrol  He remains on max dose of Munith  Patient will have short-term interim follow-up of diabetes with hemoglobin A1c being repeated in 3 months  I also provided an order for continuous blood glucose monitor that ties into his smart phone  Hopefully this will give a better indication of his glycemic control  Patient has not been testing his blood sugars  If this is not effective then I would like to refer the patient to Endocrinology to work a straight a plan of management  I do believe that the patient will need to be transitioned to insulin therapy    Blood Sugar Average: Last 72 hrs: Allergic rhinitis, seasonal  Patient is to continue on QNASL, Singulair and will switch from a Xyzal to R R  [place]  If patient is still having I allergies then I did provide a prescription for Pataday eyedrops           Problem List Items Addressed This Visit     Allergic rhinitis, seasonal     Patient is to continue on QNASL, Singulair and will switch from a Xyzal to R R  [place]    If patient is still having I allergies then I did provide a prescription for Pataday eyedrops         Relevant Medications    fexofenadine (ALLEGRA) 180 MG tablet    olopatadine HCl (PATADAY) 0 2 % opth drops    Type 2 diabetes mellitus with hyperosmolarity without coma, without long-term current use of insulin (HCC) - Primary     Lab Results   Component Value Date    HGBA1C 10 6 (H) 08/30/2018       No results for input(s): POCGLU in the last 72 hours  Blood Sugar Average: Last 72 hrs:    Lab Results   Component Value Date    HGBA1C 10 6 (H) 08/30/2018       No results for input(s): POCGLU in the last 72 hours  Patient is adamant that he does not want to go on to insulin therapy  I did have a long discussion about the advances in insulin therapy  Clearly has had a significant worsening of his diabetic control  Patient attributes this to dietary indiscretion and decreased physical activity  He plans to make significant changes  His wife and he have joined a gym  Will try to maximize his oral agents  I did explain to the patient that at some point in time these will not be  Adequate for controlling his diabetes and eventually he will need to consider insulin therapy  Patient is reluctant to do so especially with his job change  Patient continues on maximized amounts of Januvia, metformin  Will maximize his dose of pioglitazone, add Glucotrol  He remains on max dose of Story  Patient will have short-term interim follow-up of diabetes with hemoglobin A1c being repeated in 3 months  I also provided an order for continuous blood glucose monitor that ties into his smart phone  Hopefully this will give a better indication of his glycemic control  Patient has not been testing his blood sugars  If this is not effective then I would like to refer the patient to Endocrinology to work a straight a plan of management  I do believe that the patient will need to be transitioned to insulin therapy    Blood Sugar Average: Last 72 hrs:             Relevant Medications    Dapagliflozin Propanediol (FARXIGA) 10 MG TABS      Other Visit Diagnoses     Type 2 diabetes mellitus with hyperglycemia, without long-term current use of insulin (HCC)  (Chronic)       Relevant Medications    Dapagliflozin Propanediol (FARXIGA) 10 MG TABS            BMI Counseling: Body mass index is 28 89 kg/m²   Discussed with patient's BMI with him  The BMI is above average  BMI counseling and education was provided to the patient  Nutrition recommendations include reducing portion sizes, decreasing overall calorie intake, 3-5 servings of fruits/vegetables daily, reducing fast food intake, consuming healthier snacks, decreasing soda and/or juice intake, moderation in carbohydrate intake, increasing intake of lean protein, reducing intake of saturated fat and trans fat and reducing intake of cholesterol

## 2018-12-17 DIAGNOSIS — I10 ESSENTIAL HYPERTENSION: Primary | ICD-10-CM

## 2018-12-17 PROCEDURE — 4010F ACE/ARB THERAPY RXD/TAKEN: CPT | Performed by: FAMILY MEDICINE

## 2018-12-17 RX ORDER — QUINAPRIL 5 1/1
5 TABLET ORAL DAILY
Qty: 90 TABLET | Refills: 1 | Status: SHIPPED | OUTPATIENT
Start: 2018-12-17 | End: 2019-06-03 | Stop reason: SDUPTHER

## 2019-01-04 DIAGNOSIS — E11.00 TYPE 2 DIABETES MELLITUS WITH HYPEROSMOLARITY WITHOUT COMA, WITHOUT LONG-TERM CURRENT USE OF INSULIN (HCC): ICD-10-CM

## 2019-01-04 DIAGNOSIS — E11.9 TYPE 2 DIABETES MELLITUS WITHOUT COMPLICATION, WITHOUT LONG-TERM CURRENT USE OF INSULIN (HCC): Chronic | ICD-10-CM

## 2019-01-04 RX ORDER — GLIPIZIDE 5 MG/1
TABLET, FILM COATED, EXTENDED RELEASE ORAL
Qty: 30 TABLET | Refills: 3 | Status: SHIPPED | OUTPATIENT
Start: 2019-01-04 | End: 2019-05-10 | Stop reason: SDUPTHER

## 2019-01-04 RX ORDER — PIOGLITAZONEHYDROCHLORIDE 45 MG/1
TABLET ORAL
Qty: 30 TABLET | Refills: 3 | Status: SHIPPED | OUTPATIENT
Start: 2019-01-04 | End: 2019-05-10 | Stop reason: SDUPTHER

## 2019-01-05 DIAGNOSIS — E11.9 TYPE 2 DIABETES MELLITUS WITHOUT COMPLICATION, WITHOUT LONG-TERM CURRENT USE OF INSULIN (HCC): Chronic | ICD-10-CM

## 2019-01-05 DIAGNOSIS — E11.00 TYPE 2 DIABETES MELLITUS WITH HYPEROSMOLARITY WITHOUT COMA, WITHOUT LONG-TERM CURRENT USE OF INSULIN (HCC): ICD-10-CM

## 2019-01-07 RX ORDER — PIOGLITAZONEHYDROCHLORIDE 45 MG/1
TABLET ORAL
Qty: 30 TABLET | Refills: 3 | Status: SHIPPED | OUTPATIENT
Start: 2019-01-07 | End: 2019-01-21 | Stop reason: SDUPTHER

## 2019-01-07 RX ORDER — GLIPIZIDE 5 MG/1
TABLET, FILM COATED, EXTENDED RELEASE ORAL
Qty: 30 TABLET | Refills: 3 | Status: SHIPPED | OUTPATIENT
Start: 2019-01-07 | End: 2019-01-21 | Stop reason: SDUPTHER

## 2019-01-14 LAB
1,25(OH)2D SERPL-MCNC: 31 PG/ML (ref 18–72)
1,25(OH)2D2 SERPL-MCNC: <8 PG/ML
1,25(OH)2D3 SERPL-MCNC: 31 PG/ML
ALBUMIN SERPL-MCNC: 4.8 G/DL (ref 3.6–5.1)
ALBUMIN/GLOB SERPL: 2 (CALC) (ref 1–2.5)
ALP SERPL-CCNC: 88 U/L (ref 40–115)
ALT SERPL-CCNC: 15 U/L (ref 9–46)
AST SERPL-CCNC: 13 U/L (ref 10–40)
BASOPHILS # BLD AUTO: 57 CELLS/UL (ref 0–200)
BASOPHILS NFR BLD AUTO: 0.7 %
BILIRUB SERPL-MCNC: 0.4 MG/DL (ref 0.2–1.2)
BUN SERPL-MCNC: 22 MG/DL (ref 7–25)
BUN/CREAT SERPL: ABNORMAL (CALC) (ref 6–22)
CALCIUM SERPL-MCNC: 9.9 MG/DL (ref 8.6–10.3)
CHLORIDE SERPL-SCNC: 98 MMOL/L (ref 98–110)
CHOLEST SERPL-MCNC: 140 MG/DL
CHOLEST/HDLC SERPL: 5.2 (CALC)
CO2 SERPL-SCNC: 28 MMOL/L (ref 20–32)
CREAT SERPL-MCNC: 0.99 MG/DL (ref 0.6–1.35)
EOSINOPHIL # BLD AUTO: 227 CELLS/UL (ref 15–500)
EOSINOPHIL NFR BLD AUTO: 2.8 %
ERYTHROCYTE [DISTWIDTH] IN BLOOD BY AUTOMATED COUNT: 13.1 % (ref 11–15)
GLOBULIN SER CALC-MCNC: 2.4 G/DL (CALC) (ref 1.9–3.7)
GLUCOSE SERPL-MCNC: 185 MG/DL (ref 65–99)
HBA1C MFR BLD: 7.4 % OF TOTAL HGB
HCT VFR BLD AUTO: 44.5 % (ref 38.5–50)
HDLC SERPL-MCNC: 27 MG/DL
HGB BLD-MCNC: 14.5 G/DL (ref 13.2–17.1)
LDLC SERPL CALC-MCNC: 69 MG/DL (CALC)
LYMPHOCYTES # BLD AUTO: 2074 CELLS/UL (ref 850–3900)
LYMPHOCYTES NFR BLD AUTO: 25.6 %
MCH RBC QN AUTO: 26.6 PG (ref 27–33)
MCHC RBC AUTO-ENTMCNC: 32.6 G/DL (ref 32–36)
MCV RBC AUTO: 81.5 FL (ref 80–100)
MONOCYTES # BLD AUTO: 640 CELLS/UL (ref 200–950)
MONOCYTES NFR BLD AUTO: 7.9 %
NEUTROPHILS # BLD AUTO: 5103 CELLS/UL (ref 1500–7800)
NEUTROPHILS NFR BLD AUTO: 63 %
NONHDLC SERPL-MCNC: 113 MG/DL (CALC)
PLATELET # BLD AUTO: 321 THOUSAND/UL (ref 140–400)
PMV BLD REES-ECKER: 10.6 FL (ref 7.5–12.5)
POTASSIUM SERPL-SCNC: 4.7 MMOL/L (ref 3.5–5.3)
PROT SERPL-MCNC: 7.2 G/DL (ref 6.1–8.1)
RBC # BLD AUTO: 5.46 MILLION/UL (ref 4.2–5.8)
SL AMB EGFR AFRICAN AMERICAN: 105 ML/MIN/1.73M2
SL AMB EGFR NON AFRICAN AMERICAN: 90 ML/MIN/1.73M2
SODIUM SERPL-SCNC: 135 MMOL/L (ref 135–146)
TRIGL SERPL-MCNC: 362 MG/DL
TSH SERPL-ACNC: 2.54 MIU/L (ref 0.4–4.5)
WBC # BLD AUTO: 8.1 THOUSAND/UL (ref 3.8–10.8)

## 2019-01-21 ENCOUNTER — OFFICE VISIT (OUTPATIENT)
Dept: FAMILY MEDICINE CLINIC | Facility: CLINIC | Age: 49
End: 2019-01-21
Payer: COMMERCIAL

## 2019-01-21 VITALS
HEART RATE: 96 BPM | TEMPERATURE: 98.5 F | RESPIRATION RATE: 18 BRPM | BODY MASS INDEX: 28.06 KG/M2 | HEIGHT: 66 IN | OXYGEN SATURATION: 97 % | WEIGHT: 174.6 LBS | DIASTOLIC BLOOD PRESSURE: 70 MMHG | SYSTOLIC BLOOD PRESSURE: 122 MMHG

## 2019-01-21 DIAGNOSIS — E78.2 MIXED HYPERLIPIDEMIA: Chronic | ICD-10-CM

## 2019-01-21 DIAGNOSIS — E11.65 TYPE 2 DIABETES MELLITUS WITH HYPERGLYCEMIA, WITHOUT LONG-TERM CURRENT USE OF INSULIN (HCC): Chronic | ICD-10-CM

## 2019-01-21 DIAGNOSIS — J30.1 SEASONAL ALLERGIC RHINITIS DUE TO POLLEN: ICD-10-CM

## 2019-01-21 DIAGNOSIS — J01.00 ACUTE NON-RECURRENT MAXILLARY SINUSITIS: ICD-10-CM

## 2019-01-21 DIAGNOSIS — E11.00 TYPE 2 DIABETES MELLITUS WITH HYPEROSMOLARITY WITHOUT COMA, WITHOUT LONG-TERM CURRENT USE OF INSULIN (HCC): Primary | ICD-10-CM

## 2019-01-21 DIAGNOSIS — I10 ESSENTIAL HYPERTENSION: Chronic | ICD-10-CM

## 2019-01-21 DIAGNOSIS — D22.9 NEVUS: ICD-10-CM

## 2019-01-21 DIAGNOSIS — E55.9 VITAMIN D DEFICIENCY: ICD-10-CM

## 2019-01-21 PROCEDURE — 3078F DIAST BP <80 MM HG: CPT | Performed by: FAMILY MEDICINE

## 2019-01-21 PROCEDURE — 1036F TOBACCO NON-USER: CPT | Performed by: FAMILY MEDICINE

## 2019-01-21 PROCEDURE — 3008F BODY MASS INDEX DOCD: CPT | Performed by: FAMILY MEDICINE

## 2019-01-21 PROCEDURE — 99215 OFFICE O/P EST HI 40 MIN: CPT | Performed by: FAMILY MEDICINE

## 2019-01-21 PROCEDURE — 3074F SYST BP LT 130 MM HG: CPT | Performed by: FAMILY MEDICINE

## 2019-01-21 RX ORDER — AZELASTINE 1 MG/ML
1 SPRAY, METERED NASAL 2 TIMES DAILY
Qty: 1 BOTTLE | Refills: 0 | Status: SHIPPED | OUTPATIENT
Start: 2019-01-21 | End: 2019-02-20 | Stop reason: SDUPTHER

## 2019-01-21 RX ORDER — AZITHROMYCIN 250 MG/1
TABLET, FILM COATED ORAL
Qty: 6 TABLET | Refills: 0 | Status: SHIPPED | OUTPATIENT
Start: 2019-01-21 | End: 2019-01-25

## 2019-01-21 NOTE — ASSESSMENT & PLAN NOTE
Lab Results   Component Value Date    HGBA1C 7 4 (H) 01/09/2019       No results for input(s): POCGLU in the last 72 hours  Blood Sugar Average: Last 72 hrs:     Significant improvement in his glycemic control  Patient remains on glipizide, Actos, Janumet, Farxiga  Patient is commended on his dietary modification and recent weight loss  Patient was intolerant of incretin mimetics    I feel confident that as the patient continues to improve his diet and lose weight that his diabetic control will continue to improve  Refill provided of [place]    Recheck diabetic parameters in 4 months

## 2019-01-21 NOTE — ASSESSMENT & PLAN NOTE
Patient does have some environmental allergies  I believe that this is causing his sinus pressure  In the past he was using QNASL nasal spray and Singulair  Will switch the patient on to Astelin nasal spray to see this make some improvement    He was given a prescription for course of Zithromax if this does not work

## 2019-01-21 NOTE — ASSESSMENT & PLAN NOTE
Hyperlipidemia and remains well controlled on Crestor 10 mg once daily    Repeat lipid profile in 4 months

## 2019-01-21 NOTE — PROGRESS NOTES
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  Things at work are improving as far as reordered eyes in the store is concerned this has him less stressed  Hemoglobin A1c improved from 10 6-7 4%  Urine is negative for microalbumin  Cholesterol has improved  Triglycerides are still slightly elevated  No paresthesias of his feet  Patient does complain of worsening nasal congestion and sinus pressure especially along both maxillary sinuses  No fevers or chills  Patient would eventually like to have the mole at the medial aspect of his right eyebrow removed by dermatology  No other complaints or concerns        Past Medical History:   Diagnosis Date    Anxiety     only when flying    Chronic frontal sinusitis     last assessed 03/02/2016    Diabetes mellitus ([address] )     Fracture of great toe     last assessed 11/06/2014    GERD (gastroesophageal reflux disease)     Hyperlipidemia     Hypertension     Palpitations     last assessed 11/06/2012       Family History   Problem Relation Age of Onset    Diabetes Mother     Liver cancer Mother     Hypertension Mother        Past Surgical History:   Procedure Laterality Date    HERNIA REPAIR Bilateral     WI MANIPULATAZ SHLDR JT W ANESTHESIA Right 4/3/2017    Procedure: SHOULDER MANIPULATION UNDER ANESTHESIA ;  Surgeon: Dayron Vernon MD;  Location: [facility];  Service: Orthopedics        reports that he has never smoked  He has never used smokeless tobacco  He reports that he drinks alcohol  He reports that he does not use drugs        Current Outpatient Prescriptions:    ALPRAZolam (XANAX) 0 25 mg tablet, Take 1 tablet (0 25 mg total) by mouth every 8 (eight) hours as needed for anxiety, Disp: 30 tablet, Rfl: 0    aspirin 81 mg chewable tablet, Adult Aspirin Low Strength 81 MG CHEW 1 PO Q D  Quantity: 0;  Refills: 0    [employer], Provider M D ;  Started 11-Oct-2007 Active, Disp: , Rfl:     Beclomethasone Dipropionate 80 MCG/ACT AERS, 1 spray each nostril 2 x daily, Disp: 1 Inhaler, Rfl: 0    CHOLECALCIFEROL PO, Take 1,000 Units by mouth, Disp: , Rfl:     Dapagliflozin Propanediol (FARXIGA) 10 MG TABS, Take 1 tablet (10 mg total) by mouth daily, Disp: 30 tablet, Rfl: 5    diltiazem (CARDIZEM) 30 mg tablet, Take 1 tablet (30 mg total) by mouth daily, Disp: 90 tablet, Rfl: 1    esomeprazole (NexIUM) 40 MG capsule, Take 1 capsule (40 mg total) by mouth daily, Disp: 90 capsule, Rfl: 1    fexofenadine (ALLEGRA) 180 MG tablet, Take 1 tablet (180 mg total) by mouth daily, Disp: 30 tablet, Rfl: 5    glipiZIDE (GLUCOTROL XL) 5 mg 24 hr tablet, TAKE ONE TABLET BY MOUTH EVERY DAY, Disp: 30 tablet, Rfl: 3    glucose blood (FREESTYLE INSULINX TEST) test strip, 1 Squirt by In Vitro route 2 (two) times a day, Disp: , Rfl:     meloxicam (MOBIC) 15 mg tablet, Take 1 tablet (15 mg total) by mouth daily, Disp: 90 tablet, Rfl: 0    metaxalone (SKELAXIN) 800 mg tablet, Take 1 tablet (800 mg total) by mouth 3 (three) times a day as needed for muscle spasms, Disp: 60 tablet, Rfl: 1    montelukast (SINGULAIR) 10 mg tablet, Take 1 tablet (10 mg total) by mouth daily at bedtime, Disp: 90 tablet, Rfl: 0    Omega-3 Fatty Acids (FISH OIL) 1200 MG CAPS, Take 1 capsule by mouth daily, Disp: , Rfl:     pioglitazone (ACTOS) 45 mg tablet, TAKE ONE TABLET BY MOUTH EVERY DAY, Disp: 30 tablet, Rfl: 3    quinapril (ACCUPRIL) 5 mg tablet, Take 1 tablet (5 mg total) by mouth daily, Disp: 90 tablet, Rfl: 1    rosuvastatin (CRESTOR) 10 MG tablet, Take 1 tablet (10 mg total) by mouth daily, Disp: 90 tablet, Rfl: 1    azelastine (ASTELIN) 0 1 % nasal spray, 1 spray into each nostril 2 (two) times a day Use in each nostril as directed, Disp: 1 Bottle, Rfl: 0    azithromycin (ZITHROMAX) 250 mg tablet, Take 2 tablets today then 1 tablet daily x 4 days, Disp: 6 tablet, Rfl: 0    ibuprofen (MOTRIN) 200 mg tablet, Take 200 mg by mouth every 6 (six) hours as needed for mild pain, Disp: , Rfl:     JANUMET XR  MG TB24, TAKE TWO TABLETS BY MOUTH EVERY DAY, Disp: 180 tablet, Rfl: 0    olopatadine HCl (PATADAY) 0 2 % opth drops, Administer 1 drop to both eyes daily (Patient not taking: Reported on 1/21/2019 ), Disp: 5 mL, Rfl: 3    The following portions of the patient's history were reviewed and updated as appropriate: allergies, current medications, past family history, past medical history, past social history, past surgical history and problem list     Review of Systems   Constitutional: Positive for unexpected weight change (Recent weight loss)  Negative for chills and fatigue  HENT: Positive for sinus pain and sinus pressure  Negative for ear discharge, ear pain, facial swelling, sore throat and trouble swallowing  Eyes: Negative  Negative for discharge and redness  Respiratory: Negative  Negative for wheezing  Cardiovascular: Negative  Gastrointestinal: Negative  Endocrine: Negative  Genitourinary: Negative  Musculoskeletal: Negative  Skin: Negative  Allergic/Immunologic: Negative  Neurological: Negative  Hematological: Negative  Psychiatric/Behavioral: Negative  All other systems reviewed and are negative  Objective:    /70   Pulse 96   Temp 98 5 °F (36 9 °C)   Resp 18   Ht 5' 6" (1 676 m)   Wt 79 2 kg (174 lb 9 6 oz)   SpO2 97%   BMI 28 18 kg/m²      Physical Exam   Constitutional: He is oriented to person, place, and time  He appears well-developed and well-nourished  HENT:   Head: Normocephalic and atraumatic     Right Ear: Hearing, tympanic membrane, external ear and ear canal normal    Left Ear: Hearing, tympanic membrane, external ear and ear canal normal    Nose: Mucosal edema present  No rhinorrhea  Right sinus exhibits maxillary sinus tenderness  Right sinus exhibits no frontal sinus tenderness  Left sinus exhibits maxillary sinus tenderness  Left sinus exhibits no frontal sinus tenderness  Mouth/Throat: Oropharynx is clear and moist and mucous membranes are normal    Eyes: Pupils are equal, round, and reactive to light  Conjunctivae and EOM are normal    Neck: Normal range of motion  Neck supple  Cardiovascular: Normal rate, regular rhythm and normal heart sounds  Pulmonary/Chest: Effort normal and breath sounds normal  He has no wheezes  He has no rhonchi  Abdominal: Soft  Bowel sounds are normal    Musculoskeletal: Normal range of motion  Neurological: He is alert and oriented to person, place, and time  Skin: Skin is warm and dry  Psychiatric: He has a normal mood and affect  His behavior is normal  Judgment and thought content normal    Nursing note and vitals reviewed          Recent Results (from the past 1008 hour(s))   Lipid panel    Collection Time: 01/09/19  7:28 AM   Result Value Ref Range    Total Cholesterol 140 <200 mg/dL    HDL 27 (L) >40 mg/dL    Triglycerides 362 (H) <150 mg/dL    LDL Direct 69 mg/dL (calc)    Chol HDLC Ratio 5 2 (H) <5 0 (calc)    Non-HDL Cholesterol 113 <130 mg/dL (calc)   Comprehensive metabolic panel    Collection Time: 01/09/19  7:28 AM   Result Value Ref Range    Glucose, Random 185 (H) 65 - 99 mg/dL    BUN 22 7 - 25 mg/dL    Creatinine 0 99 0 60 - 1 35 mg/dL    eGFR Non  90 > OR = 60 mL/min/1 73m2    SL AMB EGFR  105 > OR = 60 mL/min/1 73m2    SL AMB BUN/CREATININE RATIO NOT APPLICABLE 6 - 22 (calc)    Sodium 135 135 - 146 mmol/L    Potassium 4 7 3 5 - 5 3 mmol/L    Chloride 98 98 - 110 mmol/L    CO2 28 20 - 32 mmol/L    SL AMB CALCIUM 9 9 8 6 - 10 3 mg/dL SL AMB PROTEIN, TOTAL 7 2 6 1 - 8 1 g/dL    Albumin 4 8 3 6 - 5 1 g/dL    Globulin 2 4 1 9 - 3 7 g/dL (calc)    Albumin/Globulin Ratio 2 0 1 0 - 2 5 (calc)    TOTAL BILIRUBIN 0 4 0 2 - 1 2 mg/dL    Alkaline Phosphatase 88 40 - 115 U/L    SL AMB AST 13 10 - 40 U/L    SL AMB ALT 15 9 - 46 U/L   Vitamin D 1,25 dihydroxy    Collection Time: 01/09/19  7:28 AM   Result Value Ref Range    Total 1,25-Dihydroxy,Vitamin D 31 18 - 72 pg/mL    Vit D, 1,25-Dihydroxy 31 pg/mL    1,25-Dihydroxy, Vitamin D-2 <8 pg/mL   CBC and differential    Collection Time: 01/09/19  7:28 AM   Result Value Ref Range    White Blood Cell Count 8 1 3 8 - 10 8 Thousand/uL    Red Blood Cell Count 5 46 4 20 - 5 80 Million/uL    Hemoglobin 14 5 13 2 - 17 1 g/dL    HCT 44 5 38 5 - 50 0 %    MCV 81 5 80 0 - 100 0 fL    MCH 26 6 (L) 27 0 - 33 0 pg    MCHC 32 6 32 0 - 36 0 g/dL    RDW 13 1 11 0 - 15 0 %    Platelet Count 228 944 - 400 Thousand/uL    SL AMB MPV 10 6 7 5 - 12 5 fL    Neutrophils (Absolute) 5,103 1,500 - 7,800 cells/uL    Lymphocytes (Absolute) 2,074 850 - 3,900 cells/uL    Monocytes (Absolute) 640 200 - 950 cells/uL    Eosinophils (Absolute) 227 15 - 500 cells/uL    Basophils ABS 57 0 - 200 cells/uL    Neutrophils 63 %    Lymphocytes 25 6 %    Monocytes 7 9 %    Eosinophils 2 8 %    Basophils PCT 0 7 %   TSH, 3rd generation with Free T4 reflex    Collection Time: 01/09/19  7:28 AM   Result Value Ref Range    TSH W/RFX TO FREE T4 2 54 0 40 - 4 50 mIU/L   Hemoglobin A1c (w/out EAG)    Collection Time: 01/09/19  7:28 AM   Result Value Ref Range    Hemoglobin A1C 7 4 (H) <5 7 % of total Hgb       Assessment/Plan:    Type 2 diabetes mellitus with hyperosmolarity without coma, without long-term current use of insulin (HCC)  Lab Results   Component Value Date    HGBA1C 7 4 (H) 01/09/2019       No results for input(s): POCGLU in the last 72 hours  Blood Sugar Average: Last 72 hrs:     Significant improvement in his glycemic control    Patient remains on glipizide, Actos, Janumet, Farxiga  Patient is commended on his dietary modification and recent weight loss  Patient was intolerant of incretin mimetics    I feel confident that as the patient continues to improve his diet and lose weight that his diabetic control will continue to improve  Refill provided of Dario Blake  Recheck diabetic parameters in 4 months    Allergic rhinitis, seasonal  Patient does have some environmental allergies  I believe that this is causing his sinus pressure  In the past he was using QNASL nasal spray and Singulair  Will switch the patient on to Astelin nasal spray to see this make some improvement  He was given a prescription for course of Zithromax if this does not work    Essential hypertension  Hypertension is well controlled on Cardizem and Accupril  Continue same dosages  Refills not necessary at this time    Hyperlipidemia  Hyperlipidemia and remains well controlled on Crestor 10 mg once daily  Repeat lipid profile in 4 months    Kulwant  Referral to Dermatology             Problem List Items Addressed This Visit     Essential hypertension (Chronic)     Hypertension is well controlled on Cardizem and Accupril  Continue same dosages  Refills not necessary at this time         Relevant Orders    CBC and differential    TSH, 3rd generation with Free T4 reflex    Hyperlipidemia (Chronic)     Hyperlipidemia and remains well controlled on Crestor 10 mg once daily  Repeat lipid profile in 4 months         Relevant Orders    Comprehensive metabolic panel    Lipid panel    Vitamin D deficiency    Allergic rhinitis, seasonal     Patient does have some environmental allergies  I believe that this is causing his sinus pressure  In the past he was using QNASL nasal spray and Singulair  Will switch the patient on to Astelin nasal spray to see this make some improvement    He was given a prescription for course of Zithromax if this does not work         Relevant Medications azelastine (ASTELIN) 0 1 % nasal spray    Type 2 diabetes mellitus with hyperosmolarity without coma, without long-term current use of insulin (HCC) - Primary     Lab Results   Component Value Date    HGBA1C 7 4 (H) 01/09/2019       No results for input(s): POCGLU in the last 72 hours  Blood Sugar Average: Last 72 hrs:     Significant improvement in his glycemic control  Patient remains on glipizide, Actos, Janumet, Farxiga  Patient is commended on his dietary modification and recent weight loss  Patient was intolerant of incretin mimetics    I feel confident that as the patient continues to improve his diet and lose weight that his diabetic control will continue to improve  Refill provided of Santino Wild  Recheck diabetic parameters in 4 months         Relevant Medications    Dapagliflozin Propanediol (FARXIGA) 10 MG TABS    Other Relevant Orders    Hemoglobin A1C    Microalbumin / creatinine urine ratio    Nevus     Referral to Dermatology         Relevant Orders    Ambulatory referral to Dermatology      Other Visit Diagnoses     Acute non-recurrent maxillary sinusitis        Relevant Medications    azithromycin (ZITHROMAX) 250 mg tablet    Type 2 diabetes mellitus with hyperglycemia, without long-term current use of insulin (HCC)  (Chronic)       Relevant Medications    Dapagliflozin Propanediol (FARXIGA) 10 MG TABS            I have spent 41 minutes with Patient  today in which greater than 50% of this time was spent in counseling/coordination of care regarding Diagnostic results, Prognosis, Risks and benefits of tx options, Intructions for management, Patient and family education, Importance of tx compliance, Risk factor reductions and Impressions

## 2019-01-21 NOTE — ASSESSMENT & PLAN NOTE
Hypertension is well controlled on Cardizem and Accupril  Continue same dosages    Refills not necessary at this time

## 2019-02-10 DIAGNOSIS — E11.9 TYPE 2 DIABETES MELLITUS WITHOUT COMPLICATION, WITHOUT LONG-TERM CURRENT USE OF INSULIN (HCC): ICD-10-CM

## 2019-02-10 RX ORDER — SITAGLIPTIN AND METFORMIN HYDROCHLORIDE 1000; 50 MG/1; MG/1
TABLET, FILM COATED, EXTENDED RELEASE ORAL
Qty: 180 TABLET | Refills: 0 | Status: SHIPPED | OUTPATIENT
Start: 2019-02-10 | End: 2019-05-14 | Stop reason: SDUPTHER

## 2019-02-20 DIAGNOSIS — J30.1 SEASONAL ALLERGIC RHINITIS DUE TO POLLEN: ICD-10-CM

## 2019-02-20 RX ORDER — AZELASTINE 1 MG/ML
SPRAY, METERED NASAL
Qty: 30 ML | Refills: 0 | Status: SHIPPED | OUTPATIENT
Start: 2019-02-20

## 2019-02-27 DIAGNOSIS — Z86.59 HISTORY OF ANXIETY: ICD-10-CM

## 2019-02-27 RX ORDER — ALPRAZOLAM 0.25 MG/1
0.25 TABLET ORAL EVERY 8 HOURS PRN
Qty: 30 TABLET | Refills: 0 | Status: SHIPPED | OUTPATIENT
Start: 2019-02-27 | End: 2021-06-02 | Stop reason: SDUPTHER

## 2019-03-18 DIAGNOSIS — K21.9 GASTROESOPHAGEAL REFLUX DISEASE, ESOPHAGITIS PRESENCE NOT SPECIFIED: ICD-10-CM

## 2019-03-18 DIAGNOSIS — E78.5 HYPERLIPIDEMIA, UNSPECIFIED HYPERLIPIDEMIA TYPE: ICD-10-CM

## 2019-03-18 RX ORDER — ESOMEPRAZOLE MAGNESIUM 40 MG/1
40 CAPSULE, DELAYED RELEASE ORAL DAILY
Qty: 90 CAPSULE | Refills: 1 | Status: SHIPPED | OUTPATIENT
Start: 2019-03-18 | End: 2019-09-08 | Stop reason: SDUPTHER

## 2019-03-18 RX ORDER — ROSUVASTATIN CALCIUM 10 MG/1
10 TABLET, COATED ORAL DAILY
Qty: 90 TABLET | Refills: 1 | Status: SHIPPED | OUTPATIENT
Start: 2019-03-18 | End: 2019-09-08 | Stop reason: SDUPTHER

## 2019-04-23 DIAGNOSIS — J30.9 ALLERGIC RHINITIS, UNSPECIFIED SEASONALITY, UNSPECIFIED TRIGGER: Primary | ICD-10-CM

## 2019-05-10 DIAGNOSIS — E11.9 TYPE 2 DIABETES MELLITUS WITHOUT COMPLICATION, WITHOUT LONG-TERM CURRENT USE OF INSULIN (HCC): Chronic | ICD-10-CM

## 2019-05-10 DIAGNOSIS — E11.00 TYPE 2 DIABETES MELLITUS WITH HYPEROSMOLARITY WITHOUT COMA, WITHOUT LONG-TERM CURRENT USE OF INSULIN (HCC): ICD-10-CM

## 2019-05-12 RX ORDER — GLIPIZIDE 5 MG/1
TABLET, FILM COATED, EXTENDED RELEASE ORAL
Qty: 30 TABLET | Refills: 3 | Status: SHIPPED | OUTPATIENT
Start: 2019-05-12 | End: 2019-08-27 | Stop reason: SDUPTHER

## 2019-05-12 RX ORDER — PIOGLITAZONEHYDROCHLORIDE 45 MG/1
TABLET ORAL
Qty: 30 TABLET | Refills: 3 | Status: SHIPPED | OUTPATIENT
Start: 2019-05-12 | End: 2019-09-19 | Stop reason: SDUPTHER

## 2019-05-14 DIAGNOSIS — E11.9 TYPE 2 DIABETES MELLITUS WITHOUT COMPLICATION, WITHOUT LONG-TERM CURRENT USE OF INSULIN (HCC): ICD-10-CM

## 2019-05-15 RX ORDER — SITAGLIPTIN AND METFORMIN HYDROCHLORIDE 1000; 50 MG/1; MG/1
TABLET, FILM COATED, EXTENDED RELEASE ORAL
Qty: 180 TABLET | Refills: 0 | Status: SHIPPED | OUTPATIENT
Start: 2019-05-15 | End: 2019-08-12 | Stop reason: SDUPTHER

## 2019-05-30 LAB
ALBUMIN SERPL-MCNC: 4.5 G/DL (ref 3.6–5.1)
ALBUMIN/CREAT UR: 3 MCG/MG CREAT
ALBUMIN/GLOB SERPL: 2 (CALC) (ref 1–2.5)
ALP SERPL-CCNC: 70 U/L (ref 40–115)
ALT SERPL-CCNC: 21 U/L (ref 9–46)
AST SERPL-CCNC: 14 U/L (ref 10–40)
BASOPHILS # BLD AUTO: 39 CELLS/UL (ref 0–200)
BASOPHILS NFR BLD AUTO: 0.7 %
BILIRUB SERPL-MCNC: 0.3 MG/DL (ref 0.2–1.2)
BUN SERPL-MCNC: 20 MG/DL (ref 7–25)
BUN/CREAT SERPL: ABNORMAL (CALC) (ref 6–22)
CALCIUM SERPL-MCNC: 9.3 MG/DL (ref 8.6–10.3)
CHLORIDE SERPL-SCNC: 104 MMOL/L (ref 98–110)
CHOLEST SERPL-MCNC: 128 MG/DL
CHOLEST/HDLC SERPL: 4.3 (CALC)
CO2 SERPL-SCNC: 29 MMOL/L (ref 20–32)
CREAT SERPL-MCNC: 0.85 MG/DL (ref 0.6–1.35)
CREAT UR-MCNC: 68 MG/DL (ref 20–320)
EOSINOPHIL # BLD AUTO: 190 CELLS/UL (ref 15–500)
EOSINOPHIL NFR BLD AUTO: 3.4 %
ERYTHROCYTE [DISTWIDTH] IN BLOOD BY AUTOMATED COUNT: 13.7 % (ref 11–15)
GLOBULIN SER CALC-MCNC: 2.3 G/DL (CALC) (ref 1.9–3.7)
GLUCOSE SERPL-MCNC: 212 MG/DL (ref 65–99)
HBA1C MFR BLD: 7.5 % OF TOTAL HGB
HCT VFR BLD AUTO: 44.3 % (ref 38.5–50)
HDLC SERPL-MCNC: 30 MG/DL
HGB BLD-MCNC: 14.2 G/DL (ref 13.2–17.1)
LDLC SERPL CALC-MCNC: 65 MG/DL (CALC)
LYMPHOCYTES # BLD AUTO: 1663 CELLS/UL (ref 850–3900)
LYMPHOCYTES NFR BLD AUTO: 29.7 %
MCH RBC QN AUTO: 26.2 PG (ref 27–33)
MCHC RBC AUTO-ENTMCNC: 32.1 G/DL (ref 32–36)
MCV RBC AUTO: 81.9 FL (ref 80–100)
MICROALBUMIN UR-MCNC: 0.2 MG/DL
MONOCYTES # BLD AUTO: 426 CELLS/UL (ref 200–950)
MONOCYTES NFR BLD AUTO: 7.6 %
NEUTROPHILS # BLD AUTO: 3282 CELLS/UL (ref 1500–7800)
NEUTROPHILS NFR BLD AUTO: 58.6 %
NONHDLC SERPL-MCNC: 98 MG/DL (CALC)
PLATELET # BLD AUTO: 286 THOUSAND/UL (ref 140–400)
PMV BLD REES-ECKER: 10.2 FL (ref 7.5–12.5)
POTASSIUM SERPL-SCNC: 4.5 MMOL/L (ref 3.5–5.3)
PROT SERPL-MCNC: 6.8 G/DL (ref 6.1–8.1)
RBC # BLD AUTO: 5.41 MILLION/UL (ref 4.2–5.8)
SL AMB EGFR AFRICAN AMERICAN: 120 ML/MIN/1.73M2
SL AMB EGFR NON AFRICAN AMERICAN: 104 ML/MIN/1.73M2
SODIUM SERPL-SCNC: 140 MMOL/L (ref 135–146)
TRIGL SERPL-MCNC: 312 MG/DL
TSH SERPL-ACNC: 2.73 MIU/L (ref 0.4–4.5)
WBC # BLD AUTO: 5.6 THOUSAND/UL (ref 3.8–10.8)

## 2019-06-03 DIAGNOSIS — I10 ESSENTIAL HYPERTENSION: ICD-10-CM

## 2019-06-03 PROCEDURE — 4010F ACE/ARB THERAPY RXD/TAKEN: CPT | Performed by: FAMILY MEDICINE

## 2019-06-03 RX ORDER — QUINAPRIL 5 1/1
5 TABLET ORAL DAILY
Qty: 90 TABLET | Refills: 1 | Status: SHIPPED | OUTPATIENT
Start: 2019-06-03 | End: 2019-12-10 | Stop reason: SDUPTHER

## 2019-06-12 ENCOUNTER — OFFICE VISIT (OUTPATIENT)
Dept: FAMILY MEDICINE CLINIC | Facility: CLINIC | Age: 49
End: 2019-06-12
Payer: COMMERCIAL

## 2019-06-12 VITALS
OXYGEN SATURATION: 98 % | HEART RATE: 72 BPM | HEIGHT: 66 IN | WEIGHT: 169 LBS | RESPIRATION RATE: 16 BRPM | BODY MASS INDEX: 27.16 KG/M2 | DIASTOLIC BLOOD PRESSURE: 72 MMHG | SYSTOLIC BLOOD PRESSURE: 126 MMHG

## 2019-06-12 DIAGNOSIS — R93.1 AGATSTON CORONARY ARTERY CALCIUM SCORE BETWEEN 100 AND 199: ICD-10-CM

## 2019-06-12 DIAGNOSIS — E11.00 TYPE 2 DIABETES MELLITUS WITH HYPEROSMOLARITY WITHOUT COMA, WITHOUT LONG-TERM CURRENT USE OF INSULIN (HCC): Primary | ICD-10-CM

## 2019-06-12 DIAGNOSIS — E78.2 MIXED HYPERLIPIDEMIA: Chronic | ICD-10-CM

## 2019-06-12 DIAGNOSIS — I10 ESSENTIAL HYPERTENSION: Chronic | ICD-10-CM

## 2019-06-12 DIAGNOSIS — R76.8 ANA POSITIVE: ICD-10-CM

## 2019-06-12 DIAGNOSIS — B35.1 ONYCHOMYCOSIS: ICD-10-CM

## 2019-06-12 DIAGNOSIS — E55.9 VITAMIN D DEFICIENCY: ICD-10-CM

## 2019-06-12 PROCEDURE — 1036F TOBACCO NON-USER: CPT | Performed by: FAMILY MEDICINE

## 2019-06-12 PROCEDURE — 3008F BODY MASS INDEX DOCD: CPT | Performed by: FAMILY MEDICINE

## 2019-06-12 PROCEDURE — 3078F DIAST BP <80 MM HG: CPT | Performed by: FAMILY MEDICINE

## 2019-06-12 PROCEDURE — 3074F SYST BP LT 130 MM HG: CPT | Performed by: FAMILY MEDICINE

## 2019-06-12 PROCEDURE — 99215 OFFICE O/P EST HI 40 MIN: CPT | Performed by: FAMILY MEDICINE

## 2019-06-12 RX ORDER — TERBINAFINE HYDROCHLORIDE 250 MG/1
250 TABLET ORAL DAILY
Qty: 30 TABLET | Refills: 2 | Status: SHIPPED | OUTPATIENT
Start: 2019-06-12 | End: 2019-09-10

## 2019-07-04 DIAGNOSIS — J30.9 ALLERGIC RHINITIS, UNSPECIFIED SEASONALITY, UNSPECIFIED TRIGGER: ICD-10-CM

## 2019-07-06 RX ORDER — BECLOMETHASONE DIPROPIONATE 80 UG/1
AEROSOL, METERED NASAL
Qty: 10.6 G | Refills: 1 | Status: SHIPPED | OUTPATIENT
Start: 2019-07-06 | End: 2019-09-06 | Stop reason: SDUPTHER

## 2019-07-10 DIAGNOSIS — E11.65 TYPE 2 DIABETES MELLITUS WITH HYPERGLYCEMIA, WITHOUT LONG-TERM CURRENT USE OF INSULIN (HCC): Chronic | ICD-10-CM

## 2019-07-10 RX ORDER — DAPAGLIFLOZIN 10 MG/1
TABLET, FILM COATED ORAL
Qty: 30 TABLET | Refills: 5 | Status: SHIPPED | OUTPATIENT
Start: 2019-07-10 | End: 2020-01-06

## 2019-07-29 ENCOUNTER — TELEPHONE (OUTPATIENT)
Dept: FAMILY MEDICINE CLINIC | Facility: CLINIC | Age: 49
End: 2019-07-29

## 2019-07-29 DIAGNOSIS — N52.2 DRUG-INDUCED ERECTILE DYSFUNCTION: Primary | ICD-10-CM

## 2019-07-29 RX ORDER — SILDENAFIL CITRATE 20 MG/1
TABLET ORAL
Qty: 20 TABLET | Refills: 1 | Status: SHIPPED | OUTPATIENT
Start: 2019-07-29 | End: 2019-12-11

## 2019-07-29 NOTE — TELEPHONE ENCOUNTER
Received call from Tonia Bowman at [facility] for medication refill  Would not go to the line and I did not get the name of the medication he was requesting a refill on  Tonia Bowman just requested to speak with Dr Andrey Siu  I advised him that Dr Andrey Siu had appointments all afternoon and I would forward the message

## 2019-07-29 NOTE — TELEPHONE ENCOUNTER
I spoke with Chemo Galeana and the prescription is for Sildalafil 20mg  He stated that Moises Ambrose takes it very infrequently

## 2019-08-12 DIAGNOSIS — E11.9 TYPE 2 DIABETES MELLITUS WITHOUT COMPLICATION, WITHOUT LONG-TERM CURRENT USE OF INSULIN (HCC): ICD-10-CM

## 2019-08-14 ENCOUNTER — OFFICE VISIT (OUTPATIENT)
Dept: FAMILY MEDICINE CLINIC | Facility: CLINIC | Age: 49
End: 2019-08-14
Payer: COMMERCIAL

## 2019-08-14 VITALS
TEMPERATURE: 98.1 F | HEART RATE: 80 BPM | WEIGHT: 168 LBS | RESPIRATION RATE: 16 BRPM | BODY MASS INDEX: 27 KG/M2 | DIASTOLIC BLOOD PRESSURE: 62 MMHG | HEIGHT: 66 IN | SYSTOLIC BLOOD PRESSURE: 102 MMHG | OXYGEN SATURATION: 98 %

## 2019-08-14 DIAGNOSIS — J06.9 VIRAL URI WITH COUGH: Primary | ICD-10-CM

## 2019-08-14 PROCEDURE — 1036F TOBACCO NON-USER: CPT | Performed by: FAMILY MEDICINE

## 2019-08-14 PROCEDURE — 99213 OFFICE O/P EST LOW 20 MIN: CPT | Performed by: FAMILY MEDICINE

## 2019-08-14 PROCEDURE — 3008F BODY MASS INDEX DOCD: CPT | Performed by: FAMILY MEDICINE

## 2019-08-14 NOTE — PROGRESS NOTES
Assessment/Plan:   Diagnoses and all orders for this visit:    Viral URI with cough  - advised supportive care with DayQuill/NyQuill, Robitussin for cough  - cont nasal sprays and inhalers   - encouraged fluids, hot tea with honey to soothe the throat  - educated that can take 7-10days to resolve  - frequent hand-washing to prevent the spread of infection   - advised to call the office IF have a fever >101, cough not improving - pt aware and agreeable         Subjective:    Patient ID: Annalee Conroy is a 51 y o  male  49yo M presents to the office with his daughter for eval of cold/chest congestion  - just got back from [facility] on Saturday   - started on Sunday and got progressively worse   - (+) HA, nasal congestion, cough productive for mucus, sore throat, "chest ache", wheezing  - denies F/C, N/V, earache, CP, palpitations, SOB, abd pain  - denies sick contacts  - great appetite and good PO intake   - has been taking his inhalers and taking his allergy medication       The following portions of the patient's history were reviewed and updated as appropriate: allergies, current medications, past family history, past medical history, past social history, past surgical history and problem list     Review of Systems  as per HPI    Objective:  /62   Pulse 80   Temp 98 1 °F (36 7 °C)   Resp 16   Ht 5' 6" (1 676 m)   Wt 76 2 kg (168 lb)   SpO2 98%   BMI 27 12 kg/m²    Physical Exam   Constitutional: He is oriented to person, place, and time  He appears well-developed and well-nourished  No distress  HENT:   Head: Normocephalic and atraumatic  Right Ear: Tympanic membrane, external ear and ear canal normal  No drainage, swelling or tenderness  No middle ear effusion  Left Ear: Tympanic membrane, external ear and ear canal normal  No drainage, swelling or tenderness  No middle ear effusion  Nose: Mucosal edema present  No rhinorrhea   Right sinus exhibits no maxillary sinus tenderness and no frontal sinus tenderness  Left sinus exhibits no maxillary sinus tenderness and no frontal sinus tenderness  Mouth/Throat: Uvula is midline, oropharynx is clear and moist and mucous membranes are normal  No trismus in the jaw  No uvula swelling  No oropharyngeal exudate, posterior oropharyngeal edema, posterior oropharyngeal erythema or tonsillar abscesses  Eyes: Pupils are equal, round, and reactive to light  Conjunctivae and EOM are normal  Right eye exhibits no discharge  Left eye exhibits no discharge  No scleral icterus  Neck: Normal range of motion  Neck supple  Cardiovascular: Normal rate, regular rhythm and normal heart sounds  Pulmonary/Chest: Effort normal and breath sounds normal  No stridor  No respiratory distress  He has no wheezes  He has no rales  He exhibits no tenderness  Dry cough    Abdominal: Soft  Bowel sounds are normal    Musculoskeletal: Normal range of motion  Lymphadenopathy:     He has no cervical adenopathy  Neurological: He is alert and oriented to person, place, and time  Skin: Skin is warm  He is not diaphoretic  Psychiatric: He has a normal mood and affect  His behavior is normal    Vitals reviewed

## 2019-08-27 DIAGNOSIS — E11.00 TYPE 2 DIABETES MELLITUS WITH HYPEROSMOLARITY WITHOUT COMA, WITHOUT LONG-TERM CURRENT USE OF INSULIN (HCC): ICD-10-CM

## 2019-08-27 DIAGNOSIS — E11.9 TYPE 2 DIABETES MELLITUS WITHOUT COMPLICATION, WITHOUT LONG-TERM CURRENT USE OF INSULIN (HCC): Chronic | ICD-10-CM

## 2019-08-27 RX ORDER — GLIPIZIDE 5 MG/1
TABLET, FILM COATED, EXTENDED RELEASE ORAL
Qty: 30 TABLET | Refills: 3 | Status: SHIPPED | OUTPATIENT
Start: 2019-08-27 | End: 2020-02-10

## 2019-09-06 DIAGNOSIS — J30.9 ALLERGIC RHINITIS, UNSPECIFIED SEASONALITY, UNSPECIFIED TRIGGER: ICD-10-CM

## 2019-09-06 RX ORDER — BECLOMETHASONE DIPROPIONATE 80 UG/1
AEROSOL, METERED NASAL
Qty: 10.6 G | Refills: 1 | Status: SHIPPED | OUTPATIENT
Start: 2019-09-06 | End: 2019-11-11 | Stop reason: SDUPTHER

## 2019-09-08 DIAGNOSIS — K21.9 GASTROESOPHAGEAL REFLUX DISEASE, ESOPHAGITIS PRESENCE NOT SPECIFIED: ICD-10-CM

## 2019-09-08 DIAGNOSIS — E78.5 HYPERLIPIDEMIA, UNSPECIFIED HYPERLIPIDEMIA TYPE: ICD-10-CM

## 2019-09-09 RX ORDER — ESOMEPRAZOLE MAGNESIUM 40 MG/1
CAPSULE, DELAYED RELEASE ORAL
Qty: 90 CAPSULE | Refills: 1 | Status: SHIPPED | OUTPATIENT
Start: 2019-09-09 | End: 2020-03-10

## 2019-09-09 RX ORDER — ROSUVASTATIN CALCIUM 10 MG/1
TABLET, COATED ORAL
Qty: 90 TABLET | Refills: 1 | Status: SHIPPED | OUTPATIENT
Start: 2019-09-09 | End: 2020-02-14

## 2019-09-19 DIAGNOSIS — E11.9 TYPE 2 DIABETES MELLITUS WITHOUT COMPLICATION, WITHOUT LONG-TERM CURRENT USE OF INSULIN (HCC): Chronic | ICD-10-CM

## 2019-09-19 RX ORDER — PIOGLITAZONEHYDROCHLORIDE 45 MG/1
TABLET ORAL
Qty: 30 TABLET | Refills: 3 | Status: SHIPPED | OUTPATIENT
Start: 2019-09-19 | End: 2020-06-12

## 2019-09-20 DIAGNOSIS — E11.9 TYPE 2 DIABETES MELLITUS WITHOUT COMPLICATION, WITHOUT LONG-TERM CURRENT USE OF INSULIN (HCC): Chronic | ICD-10-CM

## 2019-09-20 RX ORDER — PIOGLITAZONEHYDROCHLORIDE 45 MG/1
TABLET ORAL
Qty: 30 TABLET | Refills: 3 | Status: SHIPPED | OUTPATIENT
Start: 2019-09-20 | End: 2020-04-17

## 2019-10-07 LAB — HBA1C MFR BLD HPLC: 7.3 %

## 2019-11-11 DIAGNOSIS — J30.9 ALLERGIC RHINITIS, UNSPECIFIED SEASONALITY, UNSPECIFIED TRIGGER: ICD-10-CM

## 2019-11-11 RX ORDER — BECLOMETHASONE DIPROPIONATE 80 UG/1
AEROSOL, METERED NASAL
Qty: 10.6 G | Refills: 1 | Status: SHIPPED | OUTPATIENT
Start: 2019-11-11 | End: 2020-11-25

## 2019-11-12 DIAGNOSIS — E11.9 TYPE 2 DIABETES MELLITUS WITHOUT COMPLICATION, WITHOUT LONG-TERM CURRENT USE OF INSULIN (HCC): ICD-10-CM

## 2019-11-13 RX ORDER — SITAGLIPTIN AND METFORMIN HYDROCHLORIDE 1000; 50 MG/1; MG/1
TABLET, FILM COATED, EXTENDED RELEASE ORAL
Qty: 180 TABLET | Refills: 0 | Status: SHIPPED | OUTPATIENT
Start: 2019-11-13 | End: 2020-02-10

## 2019-12-10 DIAGNOSIS — I10 ESSENTIAL HYPERTENSION: ICD-10-CM

## 2019-12-10 PROCEDURE — 4010F ACE/ARB THERAPY RXD/TAKEN: CPT | Performed by: FAMILY MEDICINE

## 2019-12-10 RX ORDER — QUINAPRIL 5 1/1
TABLET ORAL
Qty: 90 TABLET | Refills: 1 | Status: SHIPPED | OUTPATIENT
Start: 2019-12-10 | End: 2020-06-05

## 2019-12-11 ENCOUNTER — OFFICE VISIT (OUTPATIENT)
Dept: FAMILY MEDICINE CLINIC | Facility: CLINIC | Age: 49
End: 2019-12-11
Payer: COMMERCIAL

## 2019-12-11 VITALS
SYSTOLIC BLOOD PRESSURE: 120 MMHG | HEART RATE: 90 BPM | RESPIRATION RATE: 16 BRPM | WEIGHT: 173 LBS | OXYGEN SATURATION: 97 % | HEIGHT: 66 IN | DIASTOLIC BLOOD PRESSURE: 70 MMHG | BODY MASS INDEX: 27.8 KG/M2

## 2019-12-11 DIAGNOSIS — E78.2 MIXED HYPERLIPIDEMIA: Chronic | ICD-10-CM

## 2019-12-11 DIAGNOSIS — M70.62 TROCHANTERIC BURSITIS OF BOTH HIPS: ICD-10-CM

## 2019-12-11 DIAGNOSIS — I10 ESSENTIAL HYPERTENSION: Chronic | ICD-10-CM

## 2019-12-11 DIAGNOSIS — E55.9 VITAMIN D DEFICIENCY: ICD-10-CM

## 2019-12-11 DIAGNOSIS — E11.00 TYPE 2 DIABETES MELLITUS WITH HYPEROSMOLARITY WITHOUT COMA, WITHOUT LONG-TERM CURRENT USE OF INSULIN (HCC): Primary | ICD-10-CM

## 2019-12-11 DIAGNOSIS — N52.2 DRUG-INDUCED ERECTILE DYSFUNCTION: ICD-10-CM

## 2019-12-11 DIAGNOSIS — M25.512 ACUTE PAIN OF LEFT SHOULDER: ICD-10-CM

## 2019-12-11 DIAGNOSIS — M70.61 TROCHANTERIC BURSITIS OF BOTH HIPS: ICD-10-CM

## 2019-12-11 DIAGNOSIS — R93.1 AGATSTON CORONARY ARTERY CALCIUM SCORE BETWEEN 100 AND 199: ICD-10-CM

## 2019-12-11 DIAGNOSIS — R76.8 ANA POSITIVE: ICD-10-CM

## 2019-12-11 PROBLEM — N48.6 PEYRONIE'S DISEASE: Status: RESOLVED | Noted: 2017-12-27 | Resolved: 2019-12-11

## 2019-12-11 PROCEDURE — 99215 OFFICE O/P EST HI 40 MIN: CPT | Performed by: FAMILY MEDICINE

## 2019-12-11 PROCEDURE — 3078F DIAST BP <80 MM HG: CPT | Performed by: FAMILY MEDICINE

## 2019-12-11 PROCEDURE — 1036F TOBACCO NON-USER: CPT | Performed by: FAMILY MEDICINE

## 2019-12-11 PROCEDURE — 3008F BODY MASS INDEX DOCD: CPT | Performed by: FAMILY MEDICINE

## 2019-12-11 PROCEDURE — 3074F SYST BP LT 130 MM HG: CPT | Performed by: FAMILY MEDICINE

## 2019-12-11 RX ORDER — SILDENAFIL 50 MG/1
50 TABLET, FILM COATED ORAL DAILY PRN
Qty: 10 TABLET | Refills: 0 | Status: SHIPPED | OUTPATIENT
Start: 2019-12-11 | End: 2020-11-04

## 2019-12-11 RX ORDER — MELOXICAM 15 MG/1
15 TABLET ORAL DAILY
Qty: 90 TABLET | Refills: 1 | Status: SHIPPED | OUTPATIENT
Start: 2019-12-11 | End: 2020-06-12

## 2019-12-11 NOTE — PROGRESS NOTES
Subjective:      Patient ID: Henrik Astudillo is a 51 y o  male  Patient presents for 6 month follow-up of chronic conditions including diabetes mellitus type 2, hypertension, hyperlipidemia  Patient does bring in labs from [facility] that were performed on October 7th  Normal CBC, CMP with the exception of fasting glucose of 182, normal creatinine at 0 76  Total cholesterol 173, triglycerides 347, HDL 35, LDL 90  High sensitivity C-reactive protein of 4 4  Mildly iron deficient with serum iron of 40, TIBC of 531  Uric acid of 4 9  Hemoglobin A1c of 7 3%  PSA of 0 9, ferritin 30, TSH 1 94, free T4 1 10  Urinalysis was essentially normal with the exception of 500 of glucose  Last hemoglobin A1c 7 5%  Patient has gained 5 lb since his last office visit  Patient did have extensive testing through [facility] in October including exercise stress test, cardiac calcium scoring, bone density testing, whole-body CT scan  All of his testing was centrally normal or unchanged  Cardiac calcium score is still mildly elevated but has not increased  Also has not decreased  Little change  Stress testing was negative  Patient generally feels well  He does complain of pain in his left shoulder which is very similar to the pain in his right shoulder when he had developed frozen shoulder back in 2017  Does not recall any inciting injuries or trauma  He does have some restricted range of motion of the left shoulder with internal and external rotation  Patient states that it is painful  He did have to have a treatment for frozen shoulder on the right side under sedation back in 2017  Starting have slight pain in the right shoulder  He also experiences pain in both hips especially upon 1st getting out of bed or if seated for a long drive    Unfortunately he is commuting approximately 1 hour to work each way and when he is getting out of the car he has some pain discomfort before he is able to get moving  In the past he was given meloxicam which did help and he would like a refill  Several years ago the patient did have elevated ALESHIA but no clinical findings after being seen by Rheumatology    -patient states that the sildenafil that he was prescribed is not quite as effective for erectile dysfunction  Patient is on multiple agents  He did not have urine for microalbumin performed  Patient declines flu vaccination  One of his coworkers is dealing with Guillain-Parlin syndrome following flu vaccination  He has received flu vaccination in the past without any issue  Declines Adacel booster as well  He is trying to remain as physically active  Past Medical History:   Diagnosis Date    Anxiety     only when flying    Chronic frontal sinusitis     last assessed 03/02/2016    Diabetes mellitus ([address] )     Fracture of great toe     last assessed 11/06/2014    GERD (gastroesophageal reflux disease)     Hyperlipidemia     Hypertension     Palpitations     last assessed 11/06/2012       Family History   Problem Relation Age of Onset    Diabetes Mother     Liver cancer Mother     Hypertension Mother        Past Surgical History:   Procedure Laterality Date    HERNIA REPAIR Bilateral     LA MANIPULATN SHLDR JT W ANESTHESIA Right 4/3/2017    Procedure: SHOULDER MANIPULATION UNDER ANESTHESIA ;  Surgeon: Ruthie Bumpers, MD;  [facility];  Service: Orthopedics        reports that he has never smoked  He has never used smokeless tobacco  He reports that he drinks alcohol  He reports that he does not use drugs        Current Outpatient Medications:     ALPRAZolam (XANAX) 0 25 mg tablet, Take 1 tablet (0 25 mg total) by mouth every 8 (eight) hours as needed for anxiety, Disp: 30 tablet, Rfl: 0    aspirin 81 mg chewable tablet, Adult Aspirin Low Strength 81 MG CHEW 1 PO Q D  Quantity: 0;  Refills: 0    [employer], Provider M D ;  Started 11-Oct-2007 Active, Disp: , Rfl:    azelastine (ASTELIN) 0 1 % nasal spray, INHALE ONE SPRAY INTO EACH NOSTRIL TWO TIMES A DAY AS DIRECTED - GENERIC FOR ASTELIN, Disp: 30 mL, Rfl: 0    CHOLECALCIFEROL PO, Take 1,000 Units by mouth, Disp: , Rfl:     diltiazem (CARDIZEM) 30 mg tablet, Take 1 tablet (30 mg total) by mouth daily, Disp: 90 tablet, Rfl: 1    esomeprazole (NexIUM) 40 MG capsule, TAKE ONE CAPSULE BY MOUTH EVERY DAY, Disp: 90 capsule, Rfl: 1    FARXIGA 10 MG TABS, TAKE ONE TABLET BY MOUTH EVERY DAY, Disp: 30 tablet, Rfl: 5    fexofenadine (ALLEGRA) 180 MG tablet, Take 1 tablet (180 mg total) by mouth daily, Disp: 30 tablet, Rfl: 5    glipiZIDE (GLUCOTROL XL) 5 mg 24 hr tablet, TAKE ONE TABLET BY MOUTH EVERY DAY, Disp: 30 tablet, Rfl: 3    glucose blood (FREESTYLE INSULINX TEST) test strip, 1 Squirt by In Vitro route 2 (two) times a day, Disp: , Rfl:     JANUMET XR  MG TB24, TAKE TWO TABLETS BY MOUTH EVERY DAY, Disp: 180 tablet, Rfl: 0    meloxicam (MOBIC) 15 mg tablet, Take 1 tablet (15 mg total) by mouth daily, Disp: 90 tablet, Rfl: 1    metaxalone (SKELAXIN) 800 mg tablet, Take 1 tablet (800 mg total) by mouth 3 (three) times a day as needed for muscle spasms, Disp: 60 tablet, Rfl: 1    montelukast (SINGULAIR) 10 mg tablet, Take 1 tablet (10 mg total) by mouth daily at bedtime, Disp: 90 tablet, Rfl: 0    olopatadine HCl (PATADAY) 0 2 % opth drops, Administer 1 drop to both eyes daily, Disp: 5 mL, Rfl: 3    Omega-3 Fatty Acids (FISH OIL) 1200 MG CAPS, Take 1 capsule by mouth daily, Disp: , Rfl:     pioglitazone (ACTOS) 45 mg tablet, TAKE ONE TABLET BY MOUTH EVERY DAY, Disp: 30 tablet, Rfl: 3    pioglitazone (ACTOS) 45 mg tablet, TAKE ONE TABLET BY MOUTH EVERY DAY, Disp: 30 tablet, Rfl: 3    QNASL 80 MCG/ACT AERS, INHALE ONE SPRAY IN EACH NOSTRIL TWO TIMES A DAY, Disp: 10 6 g, Rfl: 1    quinapril (ACCUPRIL) 5 mg tablet, TAKE ONE TABLET BY MOUTH EVERY DAY, Disp: 90 tablet, Rfl: 1    rosuvastatin (CRESTOR) 10 MG tablet, TAKE ONE TABLET BY MOUTH EVERY DAY, Disp: 90 tablet, Rfl: 1    sildenafil (VIAGRA) 50 MG tablet, Take 1 tablet (50 mg total) by mouth daily as needed for erectile dysfunction, Disp: 10 tablet, Rfl: 0    The following portions of the patient's history were reviewed and updated as appropriate: allergies, current medications, past family history, past medical history, past social history, past surgical history and problem list     Review of Systems   Constitutional: Negative  HENT: Negative  Eyes: Negative  Negative for visual disturbance  Respiratory: Negative  Cardiovascular: Negative  Gastrointestinal: Negative  Endocrine: Negative  Genitourinary: Negative  Musculoskeletal: Positive for arthralgias (Left shoulder, bilateral hips)  Skin: Negative  Allergic/Immunologic: Negative  Neurological: Positive for weakness ( left shoulder)  Negative for numbness  Hematological: Negative  Psychiatric/Behavioral: Negative  All other systems reviewed and are negative  Objective:    /70   Pulse 90   Resp 16   Ht 5' 6" (1 676 m)   Wt 78 5 kg (173 lb)   SpO2 97%   BMI 27 92 kg/m²      Physical Exam   Constitutional: He is oriented to person, place, and time  He appears well-developed and well-nourished  HENT:   Head: Normocephalic  Eyes: Pupils are equal, round, and reactive to light  Conjunctivae and EOM are normal    Neck: Normal range of motion  Neck supple  Cardiovascular: Normal rate, regular rhythm and normal heart sounds  Pulses are no weak pulses  No murmur heard  Pulses:       Dorsalis pedis pulses are 2+ on the right side, and 2+ on the left side  Posterior tibial pulses are 2+ on the right side, and 2+ on the left side  Pulmonary/Chest: Effort normal and breath sounds normal    Abdominal: Soft   Bowel sounds are normal    Musculoskeletal:        Left shoulder: He exhibits decreased range of motion (Significantly restricted range of motion of the left shoulder), tenderness (At the subscapularis insertion), crepitus, pain and decreased strength  He exhibits no bony tenderness, no swelling, no effusion and no deformity  Right hip: He exhibits tenderness  He exhibits normal range of motion, normal strength and no crepitus  Left hip: He exhibits tenderness  He exhibits normal range of motion, normal strength and no crepitus  Feet:   Right Foot:   Skin Integrity: Negative for ulcer, skin breakdown, erythema, warmth, callus or dry skin  Left Foot:   Skin Integrity: Negative for ulcer, skin breakdown, erythema, warmth, callus or dry skin  Neurological: He is alert and oriented to person, place, and time  Psychiatric: He has a normal mood and affect  His behavior is normal  Judgment and thought content normal    Nursing note and vitals reviewed  Patient's shoes and socks removed  Right Foot/Ankle   Right Foot Inspection  Skin Exam: skin normal and skin intact no dry skin, no warmth, no callus, no erythema, no maceration, no abnormal color, no pre-ulcer, no ulcer and no callus                          Toe Exam: ROM and strength within normal limits  Sensory   Vibration: intact  Proprioception: intact   Monofilament testing: intact  Vascular  Capillary refills: < 3 seconds  The right DP pulse is 2+  The right PT pulse is 2+  Left Foot/Ankle  Left Foot Inspection  Skin Exam: skin normal and skin intactno dry skin, no warmth, no erythema, no maceration, normal color, no pre-ulcer, no ulcer and no callus                         Toe Exam: ROM and strength within normal limits                   Sensory   Vibration: intact  Proprioception: intact  Monofilament: intact  Vascular  Capillary refills: < 3 seconds  The left DP pulse is 2+  The left PT pulse is 2+  Assign Risk Category:  No deformity present; No loss of protective sensation;  No weak pulses       Risk: 0    No results found for this or any previous visit (from the past 1008 hour(s))  Assessment/Plan:    Type 2 diabetes mellitus with hyperosmolarity without coma, without long-term current use of insulin ([facility])    Lab Results   Component Value Date    HGBA1C 7 3 10/07/2019     Hemoglobin A1c continues to trend down words  Patient will remain on Janumet, Actos, glipizide and  Farxiga  Repeat diabetic parameters in 4 months    Essential hypertension  Well controlled on Accupril 5 mg once daily and Cardizem 30 mg once daily  Continue same  Refill not necessary at this time    Agatston coronary artery calcium score between 100 and 199  Continue to control risk factors including diabetes, hypertension, hyperlipidemia  Patient remains on antihypertensive medications, Crestor  He just recently had negative stress testing  Repeat CT coronary calcium score is unchanged    Trochanteric bursitis of both hips  Previously the patient did respond to meloxicam   Will place him back on to meloxicam    Drug-induced erectile dysfunction  Will increase dose of sildenafil to 50 mg on a p r n  Basis    Hyperlipidemia  Well controlled on Crestor 10 mg once daily  Continue same  Recheck lipid profile in 4 months  Goal LDL less than 70    Vitamin D deficiency  Continue on over-the-counter vitamin-D supplementation  Check vitamin-D level with next set of labs    ALESHIA positive  No specific cause was identified for elevated ALESHIA in the past   With the patient having diffuse arthritic symptoms once again will order ALESHIA, sed rate with next set of labs  Hopefully this is mostly just bursitis and mild arthritis    Acute pain of left shoulder  Patient does have significantly restricted range of motion of the left shoulder  He has had prior history frozen shoulder on the right side  Does have mild crepitation in the left shoulder  Patient does have history old sporting injuries but nothing that he can identify to cause pain in his left shoulder    With severely restricted range of motion I would like to perform MRI of the left shoulder  X-ray would not be worthwhile  May need referral back to orthopedic surgeon for treatment  He does have stretching exercises at home  Will be placed on meloxicam          Problem List Items Addressed This Visit        Endocrine    Type 2 diabetes mellitus with hyperosmolarity without coma, without long-term current use of insulin ([facility] [address] ) - Primary       Lab Results   Component Value Date    HGBA1C 7 3 10/07/2019     Hemoglobin A1c continues to trend down words  Patient will remain on Janumet, Actos, glipizide and  Farxiga  Repeat diabetic parameters in 4 months         Relevant Orders    Diabetic foot exam    Comprehensive metabolic panel    Hemoglobin A1C    Microalbumin / creatinine urine ratio       Cardiovascular and Mediastinum    Agatston coronary artery calcium score between 100 and 199     Continue to control risk factors including diabetes, hypertension, hyperlipidemia  Patient remains on antihypertensive medications, Crestor  He just recently had negative stress testing  Repeat CT coronary calcium score is unchanged         Relevant Medications    sildenafil (VIAGRA) 50 MG tablet    Essential hypertension (Chronic)     Well controlled on Accupril 5 mg once daily and Cardizem 30 mg once daily  Continue same  Refill not necessary at this time         Relevant Orders    CBC and differential    TSH, 3rd generation with Free T4 reflex       Musculoskeletal and Integument    Trochanteric bursitis of both hips     Previously the patient did respond to meloxicam   Will place him back on to meloxicam         Relevant Medications    meloxicam (MOBIC) 15 mg tablet       Genitourinary    Drug-induced erectile dysfunction     Will increase dose of sildenafil to 50 mg on a p r n  Basis         Relevant Medications    sildenafil (VIAGRA) 50 MG tablet       Other    Acute pain of left shoulder     Patient does have significantly restricted range of motion of the left shoulder  He has had prior history frozen shoulder on the right side  Does have mild crepitation in the left shoulder  Patient does have history old sporting injuries but nothing that he can identify to cause pain in his left shoulder  With severely restricted range of motion I would like to perform MRI of the left shoulder  X-ray would not be worthwhile  May need referral back to orthopedic surgeon for treatment  He does have stretching exercises at home  Will be placed on meloxicam         Relevant Orders    MRI shoulder left wo contrast    ALESHIA positive     No specific cause was identified for elevated ALESHIA in the past   With the patient having diffuse arthritic symptoms once again will order ALESHIA, sed rate with next set of labs  Hopefully this is mostly just bursitis and mild arthritis         Relevant Orders    ALESHIA Screen w/ Reflex to Titer/Pattern    Sedimentation rate, automated    MRI shoulder left wo contrast    Hyperlipidemia (Chronic)     Well controlled on Crestor 10 mg once daily  Continue same  Recheck lipid profile in 4 months  Goal LDL less than 70         Relevant Orders    Lipid panel    Vitamin D deficiency     Continue on over-the-counter vitamin-D supplementation  Check vitamin-D level with next set of labs               I have spent 43 minutes with Patient  today in which greater than 50% of this time was spent in counseling/coordination of care regarding Diagnostic results, Prognosis, Risks and benefits of tx options, Intructions for management, Patient and family education, Importance of tx compliance, Risk factor reductions and Impressions

## 2019-12-24 ENCOUNTER — TELEPHONE (OUTPATIENT)
Dept: FAMILY MEDICINE CLINIC | Facility: CLINIC | Age: 49
End: 2019-12-24

## 2019-12-26 DIAGNOSIS — M25.512 ACUTE PAIN OF LEFT SHOULDER: Primary | ICD-10-CM

## 2020-01-05 DIAGNOSIS — E11.65 TYPE 2 DIABETES MELLITUS WITH HYPERGLYCEMIA, WITHOUT LONG-TERM CURRENT USE OF INSULIN (HCC): Chronic | ICD-10-CM

## 2020-01-06 RX ORDER — DAPAGLIFLOZIN 10 MG/1
TABLET, FILM COATED ORAL
Qty: 30 TABLET | Refills: 5 | Status: SHIPPED | OUTPATIENT
Start: 2020-01-06 | End: 2020-07-05

## 2020-01-29 ENCOUNTER — OFFICE VISIT (OUTPATIENT)
Dept: OBGYN CLINIC | Facility: CLINIC | Age: 50
End: 2020-01-29
Payer: COMMERCIAL

## 2020-01-29 VITALS
HEART RATE: 92 BPM | WEIGHT: 170 LBS | SYSTOLIC BLOOD PRESSURE: 124 MMHG | DIASTOLIC BLOOD PRESSURE: 84 MMHG | BODY MASS INDEX: 27.32 KG/M2 | HEIGHT: 66 IN

## 2020-01-29 DIAGNOSIS — M75.101 ROTATOR CUFF SYNDROME OF BOTH SHOULDERS: Primary | ICD-10-CM

## 2020-01-29 DIAGNOSIS — M75.102 ROTATOR CUFF SYNDROME OF BOTH SHOULDERS: Primary | ICD-10-CM

## 2020-01-29 PROCEDURE — 99203 OFFICE O/P NEW LOW 30 MIN: CPT | Performed by: ORTHOPAEDIC SURGERY

## 2020-01-29 RX ORDER — MELOXICAM 15 MG/1
15 TABLET ORAL DAILY
Qty: 30 TABLET | Refills: 0 | Status: SHIPPED | OUTPATIENT
Start: 2020-01-29 | End: 2020-04-17

## 2020-01-29 NOTE — PROGRESS NOTES
Patient Name:  Goldie Monk  MRN:  [id number]    Assessment & Plan     Bilateral shoulder rotator cuff tendinitis/bursitis  1  Referral to physical therapy  2  Prescription for meloxicam   3  Activities as tolerated with modification to avoid pain  4  Follow-up in six weeks  Consider MRI at that time as indicated  Chief Complaint     Shoulder pain, right shoulder pain    History of the Present Illness     Goldie Monk is a 51 y o  male seen in consultation by orthopedics requested by Dr Eric Coffey for evaluation of patient's bilateral shoulders  Patient notes worsening left shoulder pain over the past six months  He denies any injury or trauma  He states recently the pain has become more constant and severe  He states the pain is localized primarily to the lateral aspect of the shoulder  Pain is worse with reaching out words, behind back in over his head  He notes weakness secondary to pain  He denies any instability  No numbness or tingling  He also notes continued right shoulder discomfort  He was treated in the past for adhesive capsulitis  He notes difficulty with swelling falls  He denies any weakness or instability  No numbness or tingling  Physical Exam     /84   Pulse 92   Ht 5' 6" (1 676 m)   Wt 77 1 kg (170 lb)   BMI 27 44 kg/m²     Left shoulder:  No gross deformity  No tenderness to palpation  Passive range of motion includes 150° of forward flexion, 90° of external rotation-abduction, 50° of internal rotation-abduction  Positive Neer impingement test   Positive Lau impingement test   Minimal discomfort with empty can test   Negative speed's test   Positive [place]'s test   Positive cross-body adduction test   Sensation intact axillary, median, ulnar and radial nerves  2+ radial pulse  Right shoulder:  No gross deformity  No tenderness to palpation    Passive range of motion includes 160° of forward flexion, 80° of external rotation-abduction, 60° of internal rotation-abduction  Positive Neer impingement test   Positive Lau impingement  Negative empty can and speed's test   Negative [place]'s test   Mildly positive cross-body adduction test   Sensation intact axillary, median, ulnar and radial nerves  2+ radial pulse  Eyes: No scleral icterus  Neck: Supple  Lungs: Normal respiratory effort  Cardiovascular: Capillary refill is less than 2 seconds  Skin: Intact without erythema  Neurologic: Sensation intact to light touch  Psychiatric: Mood and affect are appropriate        Past Medical History:   Diagnosis Date    Anxiety     only when flying    Chronic frontal sinusitis     last assessed 03/02/2016    Diabetes mellitus ([facility] [address] )     Fracture of great toe     last assessed 11/06/2014    GERD (gastroesophageal reflux disease)     Hyperlipidemia     Hypertension     Palpitations     last assessed 11/06/2012       Past Surgical History:   Procedure Laterality Date    HERNIA REPAIR Bilateral     OR MANIPULATN SARA JT W ANESTHESIA Right 4/3/2017    Procedure: SHOULDER MANIPULATION UNDER ANESTHESIA ;  Surgeon: Ruthie Bumpers, MD;  [facility];  Service: Orthopedics       No Known Allergies    Current Outpatient Medications on File Prior to Visit   Medication Sig Dispense Refill    ALPRAZolam (XANAX) 0 25 mg tablet Take 1 tablet (0 25 mg total) by mouth every 8 (eight) hours as needed for anxiety 30 tablet 0    aspirin 81 mg chewable tablet Adult Aspirin Low Strength 81 MG CHEW  1 PO Q D   Quantity: 0;  Refills: 0       [employer]scripts, Provider M D ;  Started 11-Oct-2007  Active      azelastine (ASTELIN) 0 1 % nasal spray INHALE ONE SPRAY INTO EACH NOSTRIL TWO TIMES A DAY AS DIRECTED - GENERIC FOR ASTELIN 30 mL 0    CHOLECALCIFEROL PO Take 1,000 Units by mouth      diltiazem (CARDIZEM) 30 mg tablet Take 1 tablet (30 mg total) by mouth daily 90 tablet 1    esomeprazole (NexIUM) 40 MG capsule TAKE ONE CAPSULE BY MOUTH EVERY DAY 90 capsule 1    FARXIGA 10 MG TABS TAKE ONE TABLET BY MOUTH EVERY DAY 30 tablet 5    fexofenadine (ALLEGRA) 180 MG tablet Take 1 tablet (180 mg total) by mouth daily 30 tablet 5    glipiZIDE (GLUCOTROL XL) 5 mg 24 hr tablet TAKE ONE TABLET BY MOUTH EVERY DAY 30 tablet 3    glucose blood (FREESTYLE INSULINX TEST) test strip 1 Squirt by In Vitro route 2 (two) times a day      JANUMET XR  MG TB24 TAKE TWO TABLETS BY MOUTH EVERY  tablet 0    meloxicam (MOBIC) 15 mg tablet Take 1 tablet (15 mg total) by mouth daily 90 tablet 1    metaxalone (SKELAXIN) 800 mg tablet Take 1 tablet (800 mg total) by mouth 3 (three) times a day as needed for muscle spasms 60 tablet 1    montelukast (SINGULAIR) 10 mg tablet Take 1 tablet (10 mg total) by mouth daily at bedtime 90 tablet 0    olopatadine HCl (PATADAY) 0 2 % opth drops Administer 1 drop to both eyes daily 5 mL 3    Omega-3 Fatty Acids (FISH OIL) 1200 MG CAPS Take 1 capsule by mouth daily      pioglitazone (ACTOS) 45 mg tablet TAKE ONE TABLET BY MOUTH EVERY DAY 30 tablet 3    pioglitazone (ACTOS) 45 mg tablet TAKE ONE TABLET BY MOUTH EVERY DAY 30 tablet 3    QNASL 80 MCG/ACT AERS INHALE ONE SPRAY IN EACH NOSTRIL TWO TIMES A DAY 10 6 g 1    quinapril (ACCUPRIL) 5 mg tablet TAKE ONE TABLET BY MOUTH EVERY DAY 90 tablet 1    rosuvastatin (CRESTOR) 10 MG tablet TAKE ONE TABLET BY MOUTH EVERY DAY 90 tablet 1    sildenafil (VIAGRA) 50 MG tablet Take 1 tablet (50 mg total) by mouth daily as needed for erectile dysfunction 10 tablet 0     No current facility-administered medications on file prior to visit  Social History     Tobacco Use    Smoking status: Never Smoker    Smokeless tobacco: Never Used   Substance Use Topics    Alcohol use: Yes     Comment: socially    Drug use: No       Family History   Problem Relation Age of Onset    Diabetes Mother     Liver cancer Mother     Hypertension Mother        Review of Systems     As stated in the HPI   All other systems were reviewed and are negative        Scribe Attestation    I,:   Alex Peña PA-C am acting as a scribe while in the presence of the attending physician :        I,:   Ave Sanchez MD personally performed the services described in this documentation    as scribed in my presence :

## 2020-02-09 DIAGNOSIS — E11.9 TYPE 2 DIABETES MELLITUS WITHOUT COMPLICATION, WITHOUT LONG-TERM CURRENT USE OF INSULIN (HCC): ICD-10-CM

## 2020-02-09 DIAGNOSIS — E11.00 TYPE 2 DIABETES MELLITUS WITH HYPEROSMOLARITY WITHOUT COMA, WITHOUT LONG-TERM CURRENT USE OF INSULIN (HCC): ICD-10-CM

## 2020-02-10 RX ORDER — SITAGLIPTIN AND METFORMIN HYDROCHLORIDE 1000; 50 MG/1; MG/1
TABLET, FILM COATED, EXTENDED RELEASE ORAL
Qty: 180 TABLET | Refills: 1 | Status: SHIPPED | OUTPATIENT
Start: 2020-02-10 | End: 2020-08-03

## 2020-02-10 RX ORDER — GLIPIZIDE 5 MG/1
TABLET, FILM COATED, EXTENDED RELEASE ORAL
Qty: 90 TABLET | Refills: 1 | Status: SHIPPED | OUTPATIENT
Start: 2020-02-10 | End: 2020-08-03

## 2020-02-14 DIAGNOSIS — E78.5 HYPERLIPIDEMIA, UNSPECIFIED HYPERLIPIDEMIA TYPE: ICD-10-CM

## 2020-02-14 RX ORDER — ROSUVASTATIN CALCIUM 10 MG/1
TABLET, COATED ORAL
Qty: 90 TABLET | Refills: 1 | Status: SHIPPED | OUTPATIENT
Start: 2020-02-14 | End: 2020-08-11

## 2020-02-19 ENCOUNTER — EVALUATION (OUTPATIENT)
Dept: PHYSICAL THERAPY | Facility: CLINIC | Age: 50
End: 2020-02-19
Payer: COMMERCIAL

## 2020-02-19 DIAGNOSIS — M75.102 ROTATOR CUFF SYNDROME OF BOTH SHOULDERS: Primary | ICD-10-CM

## 2020-02-19 DIAGNOSIS — M75.101 ROTATOR CUFF SYNDROME OF BOTH SHOULDERS: Primary | ICD-10-CM

## 2020-02-19 PROCEDURE — 97162 PT EVAL MOD COMPLEX 30 MIN: CPT | Performed by: PHYSICAL THERAPIST

## 2020-02-19 NOTE — PROGRESS NOTES
PT Evaluation     Today's date: 2020  Patient name: Fam Prado  : 1970  MRN: [id number]  Referring provider: Victor Manuel Lara PA-C  Dx:   Encounter Diagnosis     ICD-10-CM    1  Rotator cuff syndrome of both shoulders M75 101 Ambulatory referral to Physical Therapy    M75 102                   Assessment  Assessment details: Patient presents with signs and symptoms consistent with referring diagnosis  He has shoulder hypomobility bilaterally  L side is causing impingement, R side is limiting his ability to throw  Positive prognostic indicators include: Motivated to improve   Negative prognostic indicators include: R shoulder- long standing symptoms, DM II, hi co-insurance (35%)  He presents with: pain, decreased: ROM, strength and  functional capacity  He requires skilled PT to address these deficits and restore maximal functional capacity  Thank you for this pleasant referral        Impairments: abnormal or restricted ROM, activity intolerance, impaired physical strength, lacks appropriate home exercise program and pain with function  Understanding of Dx/Px/POC: good   Prognosis: good    Goals  ST-6 weeks  1  Patient to be independent with HEP  2   Decrease pain at least 2 subjective levels  LT-12 weeks  1    Patient to voice comfort with self management of condition  2   75% or > decreased pain  3   75% or > decreased functional deficits  4   Normalize AROM of all deficit planes  5   Normalize strength  6   Functional Status Score: 70  7  Patient to voice understanding of activities/positions to avoid        Plan  Patient would benefit from: skilled PT  Referral necessary: No  Planned modality interventions: cryotherapy  Planned therapy interventions: IADL retraining, joint mobilization, manual therapy, motor coordination training, neuromuscular re-education, patient education, postural training, self care, strengthening, stretching, therapeutic activities, therapeutic exercise, home exercise program, flexibility, ADL training, balance and body mechanics training  Frequency: 1x week  Duration in weeks: 10  Treatment plan discussed with: patient        Subjective Evaluation    History of Present Illness  Mechanism of injury: Chief Complaint: L > R Shoulder pain    History: Pt has a history of R shoulder pain as well as a frozen shoulder  He had therapy and a MORIS with improvement but was unable to return to throwing or playing softball  R shoulder has been unchanged recently  He notes insidious onset of L shoulder pain beginning about 6 months ago  He has not had any treatment other than medication which has made no change thus far  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  L shouler has been unchanged since onset  PMH: DM II,      Aggravating factors:   L shoulder: sidelying, across body, behind back, overhead reaching  R Shoulder:  Throwing    Relieving factors: avoiding painful activities    Functional Deficits: Disturbed sleep, reaching activities, limits lifting    Patient Goals: less pain/motion avoid injections    Quality of life: good    Pain  At best pain ratin  At worst pain ratin  Location: L shoulder          Objective     Active Range of Motion   Left Shoulder   Flexion: 140 degrees with pain  Abduction: 130 degrees with pain  External rotation 45°: 20 degrees with pain  Internal rotation BTB: L1 with pain    Right Shoulder   Flexion: WFL  Abduction: WFL  External rotation 90°: 80 degreeswith pain  Internal rotation BTB: WFL    Passive Range of Motion   Left Shoulder   Flexion: 160 degrees   Adduction: 160 degrees   External rotation 45°: 70 degrees   Internal rotation 45°: 50 degrees     Right Shoulder   External rotation 90°: 85 degrees with pain    Strength/Myotome Testing     Left Shoulder     Planes of Motion   Flexion: 4+   Abduction: 3+   External rotation at 0°: 4+   Internal rotation at 45°: 5     General Comments:      Shoulder Comments   L Shoulder: [facility] Joint 2/6 hypomobility  (+) Sulcus on L for tightness  (+) Impingement testing L Shoulder  (-) Remainder special testing L shoulder      Flowsheet Rows      Most Recent Value   PT/OT G-Codes   Current Score  53   Projected Score  70   FOTO information reviewed  Yes             Precautions: DM II, High co-insurance      Manual  2/19            Multiplanar GH Mobs 5            PROM                                                        Exercise Diary  2/19            HEP 5                         Update HEP every visit, high co-insurance                          Pulleys: F, A             IR Strap Str             Doorway ER Str             Sleeper Str  HEP NV            ER 90/90 Str with TB HEP NV            Supine Flexion AROM             SL Abd AROM             SL ER AROM                                                                                                                         Modalities              CP prn

## 2020-02-26 ENCOUNTER — OFFICE VISIT (OUTPATIENT)
Dept: PHYSICAL THERAPY | Facility: CLINIC | Age: 50
End: 2020-02-26
Payer: COMMERCIAL

## 2020-02-26 DIAGNOSIS — M75.101 ROTATOR CUFF SYNDROME OF BOTH SHOULDERS: Primary | ICD-10-CM

## 2020-02-26 DIAGNOSIS — M75.102 ROTATOR CUFF SYNDROME OF BOTH SHOULDERS: Primary | ICD-10-CM

## 2020-02-26 PROCEDURE — 97112 NEUROMUSCULAR REEDUCATION: CPT

## 2020-02-26 PROCEDURE — 97110 THERAPEUTIC EXERCISES: CPT

## 2020-02-26 PROCEDURE — 97140 MANUAL THERAPY 1/> REGIONS: CPT

## 2020-02-26 NOTE — PROGRESS NOTES
Daily Note     Today's date: 2020  Patient name: Pastor Henderson  : 1970  MRN: [id number]  Referring provider: Wing Komal PA-C  Dx:   Encounter Diagnosis     ICD-10-CM    1  Rotator cuff syndrome of both shoulders M75 101     M75 102        Start Time: 930  Stop Time: 1020  Total time in clinic (min): 50 minutes    Subjective: Pt reports that his L shoulder hurts more than his right today  HEP imitated with some soreness  Objective: See treatment diary below      Assessment: Tolerated treatment well  L shoulder painful with IR PROM and s/l AROM ABD  Patient demonstrated fatigue post treatment      Plan: Continue per plan of care        Precautions: DM II, High co-insurance      Manual             Multiplanar GH Mobs 5 RH           PROM  B 10'                                                      Exercise Diary             HEP 5                         Update HEP every visit, high co-insurance  5'                        Pulleys: F, A  3'/3'           IR Strap Str  10x:10           Doorway ER Str  :20x3            Sleeper Str  HEP NV            ER 90/90 Str with TB HEP NV            Supine Flexion AROM  2x10 ea           SL Abd AROM  2x10 ea           SL ER AROM  2x10   ea                                                                                                                       Modalities              CP prn

## 2020-03-04 ENCOUNTER — OFFICE VISIT (OUTPATIENT)
Dept: PHYSICAL THERAPY | Facility: CLINIC | Age: 50
End: 2020-03-04
Payer: COMMERCIAL

## 2020-03-04 DIAGNOSIS — M75.102 ROTATOR CUFF SYNDROME OF BOTH SHOULDERS: Primary | ICD-10-CM

## 2020-03-04 DIAGNOSIS — M75.101 ROTATOR CUFF SYNDROME OF BOTH SHOULDERS: Primary | ICD-10-CM

## 2020-03-04 PROCEDURE — 97110 THERAPEUTIC EXERCISES: CPT

## 2020-03-04 PROCEDURE — 97140 MANUAL THERAPY 1/> REGIONS: CPT

## 2020-03-04 NOTE — PROGRESS NOTES
Daily Note     Today's date: 3/4/2020  Patient name: Katrina Sapp  : 1970  MRN: [id number]  Referring provider: Burgess Shiv PA-C  Dx:   Encounter Diagnosis     ICD-10-CM    1  Rotator cuff syndrome of both shoulders M75 101     M75 102        Start Time: 1030  Stop Time: 1118  Total time in clinic (min): 48 minutes    Subjective: Pt reports soreness from last session, but notes improved ROM in his L shoulder  Pain with throwing in R shoulder       Objective: See treatment diary below  HEP ER end range stretch  Assessment: Tolerated treatment well  Better tolerance to AROM interventions this session  Remains painful with L IR  Patient demonstrated fatigue post treatment      Plan: Continue per plan of care          Precautions: DM II, High co-insurance      Manual            Multiplanar GH Mobs 5 RH RH          PROM  B 10' B 10'                                                     Exercise Diary            HEP 5                         Update HEP every visit, high co-insurance  5' 2'                        Pulleys: F, A  3'/3' 3'/3'          IR Strap Str  10x:10 10x :10           Doorway ER Str  :20x3  :20x3          Sleeper Str  HEP NV            ER 90/90 Str with TB HEP NV            Supine Flexion AROM  2x10 ea 3x10 ea          SL Abd AROM  2x10 ea 3x10 ea          SL ER AROM  2x10   ea 3x10 ea                                                                                                                      Modalities              CP prn

## 2020-03-10 DIAGNOSIS — K21.9 GASTROESOPHAGEAL REFLUX DISEASE, ESOPHAGITIS PRESENCE NOT SPECIFIED: ICD-10-CM

## 2020-03-10 RX ORDER — ESOMEPRAZOLE MAGNESIUM 40 MG/1
CAPSULE, DELAYED RELEASE ORAL
Qty: 90 CAPSULE | Refills: 1 | Status: SHIPPED | OUTPATIENT
Start: 2020-03-10 | End: 2020-03-11

## 2020-03-11 ENCOUNTER — OFFICE VISIT (OUTPATIENT)
Dept: OBGYN CLINIC | Facility: CLINIC | Age: 50
End: 2020-03-11
Payer: COMMERCIAL

## 2020-03-11 ENCOUNTER — OFFICE VISIT (OUTPATIENT)
Dept: PHYSICAL THERAPY | Facility: CLINIC | Age: 50
End: 2020-03-11
Payer: COMMERCIAL

## 2020-03-11 VITALS
DIASTOLIC BLOOD PRESSURE: 81 MMHG | HEART RATE: 97 BPM | WEIGHT: 170 LBS | BODY MASS INDEX: 27.32 KG/M2 | SYSTOLIC BLOOD PRESSURE: 127 MMHG | HEIGHT: 66 IN

## 2020-03-11 DIAGNOSIS — M75.02 ADHESIVE CAPSULITIS OF LEFT SHOULDER: Primary | ICD-10-CM

## 2020-03-11 DIAGNOSIS — M75.101 ROTATOR CUFF SYNDROME OF BOTH SHOULDERS: Primary | ICD-10-CM

## 2020-03-11 DIAGNOSIS — K21.9 GASTROESOPHAGEAL REFLUX DISEASE, ESOPHAGITIS PRESENCE NOT SPECIFIED: ICD-10-CM

## 2020-03-11 DIAGNOSIS — M75.102 ROTATOR CUFF SYNDROME OF BOTH SHOULDERS: Primary | ICD-10-CM

## 2020-03-11 PROCEDURE — 3074F SYST BP LT 130 MM HG: CPT | Performed by: ORTHOPAEDIC SURGERY

## 2020-03-11 PROCEDURE — 1036F TOBACCO NON-USER: CPT | Performed by: ORTHOPAEDIC SURGERY

## 2020-03-11 PROCEDURE — 99213 OFFICE O/P EST LOW 20 MIN: CPT | Performed by: ORTHOPAEDIC SURGERY

## 2020-03-11 PROCEDURE — 3008F BODY MASS INDEX DOCD: CPT | Performed by: ORTHOPAEDIC SURGERY

## 2020-03-11 PROCEDURE — 97140 MANUAL THERAPY 1/> REGIONS: CPT

## 2020-03-11 PROCEDURE — 3079F DIAST BP 80-89 MM HG: CPT | Performed by: ORTHOPAEDIC SURGERY

## 2020-03-11 PROCEDURE — 97110 THERAPEUTIC EXERCISES: CPT

## 2020-03-11 RX ORDER — ESOMEPRAZOLE MAGNESIUM 40 MG/1
CAPSULE, DELAYED RELEASE ORAL
Qty: 90 CAPSULE | Refills: 1 | Status: SHIPPED | OUTPATIENT
Start: 2020-03-11 | End: 2020-09-03

## 2020-03-11 NOTE — PROGRESS NOTES
Daily Note     Today's date: 3/11/2020  Patient name: John Cordova  : 1970  MRN: [id number]  Referring provider: Dax Temple PA-C  Dx:   Encounter Diagnosis     ICD-10-CM    1  Rotator cuff syndrome of both shoulders M75 101     M75 102        Start Time: 1000  Stop Time: 1040  Total time in clinic (min): 40 minutes    Subjective: Pt reports that his MD appt went well  Pt noted that he did a lot of [place] lifting at work and his L shoulder is sore  Objective: See treatment diary below  Pt will do wall slides for HEP FLEX/ABD      Assessment: Tolerated treatment well  L shoulder PROM improving in all planes  Patient demonstrated fatigue post treatment      Plan: Continue per plan of care          Precautions: DM II, High co-insurance      Manual           Multiplanar GH Mobs 5 RH RH RH         PROM  B 10' B 10' L 10'                                                     Exercise Diary           HEP 5                         Update HEP every visit, high co-insurance  5' 2'                        Pulleys: F, A  3'/3' 3'/3' 3'/3'         IR Strap Str  10x:10 10x :10  10x10:         Doorway ER Str  :20x3  :20x3 :20 x3          Sleeper Str  HEP NV            ER 90/90 Str with TB HEP NV            Supine Flexion AROM  2x10 ea 3x10 ea 3x10 ea         SL Abd AROM  2x10 ea 3x10 ea 3x10 ea         SL ER AROM  2x10   ea 3x10 ea 3x10 ea          Finger ladder flex/abd    10x   lvl 27                                                                                                        Modalities              CP prn

## 2020-03-11 NOTE — PROGRESS NOTES
Patient Name:  Fawad Orozco  MRN:  [id number]    Assessment & Plan     Improving left shoulder adhesive capsulitis  1  Continue physical therapy and home exercise program   2  Continue meloxicam   3  Gradually return to normal activities as tolerated  4  Follow-up in six weeks  Briefly discussed MRI versus manipulation under anesthesia at that time as indicated  Subjective     45-year-old male returns to the office today for follow-up regarding his bilateral shoulders  Today he has no complaints with regards to his right shoulder  He still notes some soreness and stiffness in the left shoulder  He does note overall improvement with physical therapy  He denies any weakness or instability  No numbness or tingling  No fevers or chills  He has been taking meloxicam with relief  General ROS:  Negative for fever or chills  Neurological ROS:  Negative for numbness or tingling  Objective     /81   Pulse 97   Ht 5' 6" (1 676 m)   Wt 77 1 kg (170 lb)   BMI 27 44 kg/m²     Left shoulder:  No gross deformity  No tenderness to palpation  Passive range of motion includes forward flexion 150/160,  External rotation-abduction 70/90, internal rotation-abduction 40/60  Positive Neer impingement test   Negative empty can test   Negative speed's test   Negative cross-body adduction test   Sensation intact axillary, median, ulnar and radial nerves  2+ radial pulse  Appropriate mood and affect        Social History     Tobacco Use    Smoking status: Never Smoker    Smokeless tobacco: Never Used   Substance Use Topics    Alcohol use: Yes     Comment: socially    Drug use: No       Scribe Attestation    I,:   Lura Kehr, PA-C am acting as a scribe while in the presence of the attending physician :        I,:   Kurt Stout MD personally performed the services described in this documentation    as scribed in my presence :

## 2020-03-18 ENCOUNTER — APPOINTMENT (OUTPATIENT)
Dept: PHYSICAL THERAPY | Facility: CLINIC | Age: 50
End: 2020-03-18
Payer: COMMERCIAL

## 2020-03-29 DIAGNOSIS — E87.6 HYPOKALEMIA: Primary | ICD-10-CM

## 2020-03-29 RX ORDER — ZINC GLUCONATE 100 MG
TABLET ORAL
Qty: 100 TABLET | Refills: 1 | Status: SHIPPED | OUTPATIENT
Start: 2020-03-29 | End: 2020-11-05 | Stop reason: SDUPTHER

## 2020-03-31 ENCOUNTER — TELEMEDICINE (OUTPATIENT)
Dept: FAMILY MEDICINE CLINIC | Facility: CLINIC | Age: 50
End: 2020-03-31
Payer: COMMERCIAL

## 2020-03-31 DIAGNOSIS — J20.9 ACUTE BRONCHITIS, UNSPECIFIED ORGANISM: Primary | ICD-10-CM

## 2020-03-31 DIAGNOSIS — Z20.828 EXPOSURE TO SARS-ASSOCIATED CORONAVIRUS: Primary | ICD-10-CM

## 2020-03-31 DIAGNOSIS — Z20.828 EXPOSURE TO SARS-ASSOCIATED CORONAVIRUS: ICD-10-CM

## 2020-03-31 PROCEDURE — 87635 SARS-COV-2 COVID-19 AMP PRB: CPT

## 2020-03-31 PROCEDURE — 99213 OFFICE O/P EST LOW 20 MIN: CPT | Performed by: FAMILY MEDICINE

## 2020-03-31 RX ORDER — AZITHROMYCIN 250 MG/1
TABLET, FILM COATED ORAL
Qty: 6 TABLET | Refills: 0 | Status: SHIPPED | OUTPATIENT
Start: 2020-03-31 | End: 2020-04-04

## 2020-03-31 NOTE — PROGRESS NOTES
COVID-19 Virtual Visit     This virtual check-in was done via [employer] and patient was informed that this is not a secure, HIPAA-complaint platform  he agrees to proceed     Encounter provider Florence Zarate DO    Provider located at [address] [place] [id number]    Recent Visits  No visits were found meeting these conditions  Showing recent visits within past 7 days and meeting all other requirements     Today's Visits  Date Type Provider Dept   03/31/20 Telemedicine Florence Zarate DO [facility]   Showing today's visits and meeting all other requirements     Future Appointments  No visits were found meeting these conditions  Showing future appointments within next 150 days and meeting all other requirements        Patient agrees to participate in a virtual check in via telephone or video visit instead of presenting to the office to address urgent/immediate medical needs  Patient is aware this is a billable service  After connecting through [email or web address], the patient was identified by name and date of birth  Monika Jay was informed that this was a telemedicine visit and that the exam was being conducted confidentially over secure lines  My office door was closed  No one else was in the room  Monika Jay acknowledged consent and understanding of privacy and security of the telemedicine visit  I informed the patient that I have reviewed his record in Epic and presented the opportunity for him to ask any questions regarding the visit today  The patient agreed to participate  Monika Jay is a 51 y o  male who is concerned about COVID-19  He reports cough and Chills, sore throat, dry sporadic cough, myalgias  No fever  He has not traveled outside the [place]  within the last 14 days    He has not had contact with a person who is under investigation for or who is positive for COVID-19 within the last 14 days   He has not been hospitalized recently for fever and/or lower respiratory symptoms    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 general public frequently    Past Medical History:   Diagnosis Date    Anxiety     only when flying    Chronic frontal sinusitis     last assessed 03/02/2016    Diabetes mellitus ([address] )     Fracture of great toe     last assessed 11/06/2014    GERD (gastroesophageal reflux disease)     Hyperlipidemia     Hypertension     Palpitations     last assessed 11/06/2012       Past Surgical History:   Procedure Laterality Date    HERNIA REPAIR Bilateral     NJ MANIPULATN SHLDR JT W ANESTHESIA Right 4/3/2017    Procedure: SHOULDER MANIPULATION UNDER ANESTHESIA ;  Surgeon: Kurt Stout MD;  Location: [facility];  Service: Orthopedics       Current Outpatient Medications   Medication Sig Dispense Refill    ALPRAZolam (XANAX) 0 25 mg tablet Take 1 tablet (0 25 mg total) by mouth every 8 (eight) hours as needed for anxiety 30 tablet 0    aspirin 81 mg chewable tablet Adult Aspirin Low Strength 81 MG CHEW  1 PO Q D   Quantity: 0;  Refills: 0       [employer]scripts, Provider M D ;  Started 11-Oct-2007  Active      azelastine (ASTELIN) 0 1 % nasal spray INHALE ONE SPRAY INTO EACH NOSTRIL TWO TIMES A DAY AS DIRECTED - GENERIC FOR ASTELIN 30 mL 0    CHOLECALCIFEROL PO Take 1,000 Units by mouth      diltiazem (CARDIZEM) 30 mg tablet Take 1 tablet (30 mg total) by mouth daily 90 tablet 1    esomeprazole (NexIUM) 40 MG capsule TAKE ONE CAPSULE BY MOUTH EVERY DAY 90 capsule 1    FARXIGA 10 MG TABS TAKE ONE TABLET BY MOUTH EVERY DAY 30 tablet 5    fexofenadine (ALLEGRA) 180 MG tablet Take 1 tablet (180 mg total) by mouth daily 30 tablet 5    glipiZIDE (GLUCOTROL XL) 5 mg 24 hr tablet TAKE ONE TABLET BY MOUTH EVERY DAY 90 tablet 1    glucose blood (FREESTYLE INSULINX TEST) test strip 1 Squirt by In Vitro route 2 (two) times a day      JANUMET XR  MG TB24 TAKE TWO TABLETS BY MOUTH EVERY DAY 180 tablet 1    K 100 100 MCG TABS TAKE ONE TABLET BY MOUTH EVERY  tablet 1    meloxicam (MOBIC) 15 mg tablet Take 1 tablet (15 mg total) by mouth daily 90 tablet 1    meloxicam (MOBIC) 15 mg tablet Take 1 tablet (15 mg total) by mouth daily 30 tablet 0    metaxalone (SKELAXIN) 800 mg tablet Take 1 tablet (800 mg total) by mouth 3 (three) times a day as needed for muscle spasms 60 tablet 1    montelukast (SINGULAIR) 10 mg tablet Take 1 tablet (10 mg total) by mouth daily at bedtime 90 tablet 0    olopatadine HCl (PATADAY) 0 2 % opth drops Administer 1 drop to both eyes daily 5 mL 3    Omega-3 Fatty Acids (FISH OIL) 1200 MG CAPS Take 1 capsule by mouth daily      pioglitazone (ACTOS) 45 mg tablet TAKE ONE TABLET BY MOUTH EVERY DAY 30 tablet 3    pioglitazone (ACTOS) 45 mg tablet TAKE ONE TABLET BY MOUTH EVERY DAY 30 tablet 3    QNASL 80 MCG/ACT AERS INHALE ONE SPRAY IN EACH NOSTRIL TWO TIMES A DAY 10 6 g 1    quinapril (ACCUPRIL) 5 mg tablet TAKE ONE TABLET BY MOUTH EVERY DAY 90 tablet 1    rosuvastatin (CRESTOR) 10 MG tablet TAKE ONE TABLET BY MOUTH EVERY DAY 90 tablet 1    sildenafil (VIAGRA) 50 MG tablet Take 1 tablet (50 mg total) by mouth daily as needed for erectile dysfunction 10 tablet 0     No current facility-administered medications for this visit  No Known Allergies    Video Exam    Tanya Beavers appears healthy, alert, no distress, cooperative  Disposition:      I referred Tanya Beavers to one of our centralized sites for a COVID-19 swab  I spent 20 minutes with the patient during this virtual check-in visit

## 2020-04-01 ENCOUNTER — TELEMEDICINE (OUTPATIENT)
Dept: FAMILY MEDICINE CLINIC | Facility: CLINIC | Age: 50
End: 2020-04-01
Payer: COMMERCIAL

## 2020-04-01 DIAGNOSIS — U07.1 COVID-19 VIRUS INFECTION: Primary | ICD-10-CM

## 2020-04-01 LAB — SARS-COV-2 RNA SPEC QL NAA+PROBE: DETECTED

## 2020-04-01 PROCEDURE — 99213 OFFICE O/P EST LOW 20 MIN: CPT | Performed by: FAMILY MEDICINE

## 2020-04-02 ENCOUNTER — TELEMEDICINE (OUTPATIENT)
Dept: FAMILY MEDICINE CLINIC | Facility: CLINIC | Age: 50
End: 2020-04-02
Payer: COMMERCIAL

## 2020-04-02 DIAGNOSIS — U07.1 COVID-19 VIRUS INFECTION: Primary | ICD-10-CM

## 2020-04-02 PROCEDURE — 99212 OFFICE O/P EST SF 10 MIN: CPT | Performed by: FAMILY MEDICINE

## 2020-04-03 ENCOUNTER — TELEMEDICINE (OUTPATIENT)
Dept: FAMILY MEDICINE CLINIC | Facility: CLINIC | Age: 50
End: 2020-04-03
Payer: COMMERCIAL

## 2020-04-03 DIAGNOSIS — U07.1 COVID-19 VIRUS INFECTION: Primary | ICD-10-CM

## 2020-04-03 PROCEDURE — 99212 OFFICE O/P EST SF 10 MIN: CPT | Performed by: FAMILY MEDICINE

## 2020-04-04 ENCOUNTER — TELEMEDICINE (OUTPATIENT)
Dept: FAMILY MEDICINE CLINIC | Facility: CLINIC | Age: 50
End: 2020-04-04
Payer: COMMERCIAL

## 2020-04-04 DIAGNOSIS — U07.1 COVID-19 VIRUS INFECTION: Primary | ICD-10-CM

## 2020-04-04 PROCEDURE — 99213 OFFICE O/P EST LOW 20 MIN: CPT | Performed by: FAMILY MEDICINE

## 2020-04-05 ENCOUNTER — TELEMEDICINE (OUTPATIENT)
Dept: FAMILY MEDICINE CLINIC | Facility: CLINIC | Age: 50
End: 2020-04-05
Payer: COMMERCIAL

## 2020-04-05 VITALS — OXYGEN SATURATION: 97 % | TEMPERATURE: 98 F

## 2020-04-05 DIAGNOSIS — U07.1 COVID-19 VIRUS INFECTION: Primary | ICD-10-CM

## 2020-04-05 PROCEDURE — 99212 OFFICE O/P EST SF 10 MIN: CPT | Performed by: FAMILY MEDICINE

## 2020-04-06 ENCOUNTER — TELEMEDICINE (OUTPATIENT)
Dept: FAMILY MEDICINE CLINIC | Facility: CLINIC | Age: 50
End: 2020-04-06
Payer: COMMERCIAL

## 2020-04-06 DIAGNOSIS — U07.1 COVID-19 VIRUS INFECTION: Primary | ICD-10-CM

## 2020-04-06 PROCEDURE — 99212 OFFICE O/P EST SF 10 MIN: CPT | Performed by: FAMILY MEDICINE

## 2020-04-07 ENCOUNTER — TELEMEDICINE (OUTPATIENT)
Dept: FAMILY MEDICINE CLINIC | Facility: CLINIC | Age: 50
End: 2020-04-07
Payer: COMMERCIAL

## 2020-04-07 VITALS — TEMPERATURE: 99 F

## 2020-04-07 DIAGNOSIS — U07.1 COVID-19 VIRUS INFECTION: Primary | ICD-10-CM

## 2020-04-07 PROCEDURE — 99213 OFFICE O/P EST LOW 20 MIN: CPT | Performed by: FAMILY MEDICINE

## 2020-04-08 ENCOUNTER — TELEMEDICINE (OUTPATIENT)
Dept: FAMILY MEDICINE CLINIC | Facility: CLINIC | Age: 50
End: 2020-04-08
Payer: COMMERCIAL

## 2020-04-08 DIAGNOSIS — U07.1 COVID-19 VIRUS INFECTION: Primary | ICD-10-CM

## 2020-04-08 PROCEDURE — 99213 OFFICE O/P EST LOW 20 MIN: CPT | Performed by: FAMILY MEDICINE

## 2020-04-08 RX ORDER — FLUTICASONE FUROATE AND VILANTEROL 100; 25 UG/1; UG/1
1 POWDER RESPIRATORY (INHALATION) DAILY
Qty: 1 INHALER | Refills: 0 | Status: SHIPPED | OUTPATIENT
Start: 2020-04-08 | End: 2020-06-24

## 2020-04-08 RX ORDER — BENZONATATE 100 MG/1
100 CAPSULE ORAL 3 TIMES DAILY PRN
Qty: 20 CAPSULE | Refills: 0 | Status: SHIPPED | OUTPATIENT
Start: 2020-04-08 | End: 2020-06-24

## 2020-04-10 ENCOUNTER — TELEMEDICINE (OUTPATIENT)
Dept: FAMILY MEDICINE CLINIC | Facility: CLINIC | Age: 50
End: 2020-04-10
Payer: COMMERCIAL

## 2020-04-10 DIAGNOSIS — U07.1 COVID-19 VIRUS INFECTION: Primary | ICD-10-CM

## 2020-04-10 PROCEDURE — 99213 OFFICE O/P EST LOW 20 MIN: CPT | Performed by: FAMILY MEDICINE

## 2020-04-11 ENCOUNTER — TELEMEDICINE (OUTPATIENT)
Dept: FAMILY MEDICINE CLINIC | Facility: CLINIC | Age: 50
End: 2020-04-11
Payer: COMMERCIAL

## 2020-04-11 DIAGNOSIS — U07.1 COVID-19 VIRUS INFECTION: Primary | ICD-10-CM

## 2020-04-11 PROCEDURE — 99212 OFFICE O/P EST SF 10 MIN: CPT | Performed by: FAMILY MEDICINE

## 2020-04-12 ENCOUNTER — TELEMEDICINE (OUTPATIENT)
Dept: FAMILY MEDICINE CLINIC | Facility: CLINIC | Age: 50
End: 2020-04-12
Payer: COMMERCIAL

## 2020-04-12 DIAGNOSIS — U07.1 COVID-19 VIRUS INFECTION: Primary | ICD-10-CM

## 2020-04-12 PROCEDURE — 99212 OFFICE O/P EST SF 10 MIN: CPT | Performed by: FAMILY MEDICINE

## 2020-04-17 ENCOUNTER — TELEMEDICINE (OUTPATIENT)
Dept: FAMILY MEDICINE CLINIC | Facility: CLINIC | Age: 50
End: 2020-04-17
Payer: COMMERCIAL

## 2020-04-17 DIAGNOSIS — U07.1 COVID-19 VIRUS INFECTION: Primary | ICD-10-CM

## 2020-04-17 PROCEDURE — 99213 OFFICE O/P EST LOW 20 MIN: CPT | Performed by: FAMILY MEDICINE

## 2020-04-22 ENCOUNTER — TELEPHONE (OUTPATIENT)
Dept: OBGYN CLINIC | Facility: CLINIC | Age: 50
End: 2020-04-22

## 2020-04-22 ENCOUNTER — APPOINTMENT (OUTPATIENT)
Dept: PHYSICAL THERAPY | Facility: CLINIC | Age: 50
End: 2020-04-22
Payer: COMMERCIAL

## 2020-04-27 ENCOUNTER — PATIENT OUTREACH (OUTPATIENT)
Dept: FAMILY MEDICINE CLINIC | Facility: CLINIC | Age: 50
End: 2020-04-27

## 2020-04-29 ENCOUNTER — EVALUATION (OUTPATIENT)
Dept: PHYSICAL THERAPY | Facility: CLINIC | Age: 50
End: 2020-04-29
Payer: COMMERCIAL

## 2020-04-29 DIAGNOSIS — M75.102 ROTATOR CUFF SYNDROME OF LEFT SHOULDER: Primary | ICD-10-CM

## 2020-04-29 PROCEDURE — 97140 MANUAL THERAPY 1/> REGIONS: CPT | Performed by: PHYSICAL THERAPIST

## 2020-04-29 PROCEDURE — 97110 THERAPEUTIC EXERCISES: CPT | Performed by: PHYSICAL THERAPIST

## 2020-05-06 ENCOUNTER — OFFICE VISIT (OUTPATIENT)
Dept: PHYSICAL THERAPY | Facility: CLINIC | Age: 50
End: 2020-05-06
Payer: COMMERCIAL

## 2020-05-06 ENCOUNTER — TELEPHONE (OUTPATIENT)
Dept: PULMONOLOGY | Facility: HOSPITAL | Age: 50
End: 2020-05-06

## 2020-05-06 DIAGNOSIS — M75.102 ROTATOR CUFF SYNDROME OF LEFT SHOULDER: Primary | ICD-10-CM

## 2020-05-06 PROCEDURE — 97140 MANUAL THERAPY 1/> REGIONS: CPT | Performed by: PHYSICAL THERAPIST

## 2020-05-06 PROCEDURE — 97110 THERAPEUTIC EXERCISES: CPT | Performed by: PHYSICAL THERAPIST

## 2020-05-13 ENCOUNTER — OFFICE VISIT (OUTPATIENT)
Dept: PHYSICAL THERAPY | Facility: CLINIC | Age: 50
End: 2020-05-13
Payer: COMMERCIAL

## 2020-05-13 DIAGNOSIS — M75.102 ROTATOR CUFF SYNDROME OF LEFT SHOULDER: Primary | ICD-10-CM

## 2020-05-13 PROCEDURE — 97110 THERAPEUTIC EXERCISES: CPT | Performed by: PHYSICAL THERAPIST

## 2020-05-13 PROCEDURE — 97140 MANUAL THERAPY 1/> REGIONS: CPT | Performed by: PHYSICAL THERAPIST

## 2020-05-20 ENCOUNTER — OFFICE VISIT (OUTPATIENT)
Dept: PHYSICAL THERAPY | Facility: CLINIC | Age: 50
End: 2020-05-20
Payer: COMMERCIAL

## 2020-05-20 ENCOUNTER — OFFICE VISIT (OUTPATIENT)
Dept: OBGYN CLINIC | Facility: CLINIC | Age: 50
End: 2020-05-20
Payer: COMMERCIAL

## 2020-05-20 VITALS
HEART RATE: 89 BPM | HEIGHT: 66 IN | DIASTOLIC BLOOD PRESSURE: 65 MMHG | BODY MASS INDEX: 27.44 KG/M2 | SYSTOLIC BLOOD PRESSURE: 122 MMHG

## 2020-05-20 DIAGNOSIS — M75.102 ROTATOR CUFF SYNDROME OF LEFT SHOULDER: Primary | ICD-10-CM

## 2020-05-20 DIAGNOSIS — M75.02 ADHESIVE CAPSULITIS OF LEFT SHOULDER: Primary | ICD-10-CM

## 2020-05-20 DIAGNOSIS — M24.812 INTERNAL DERANGEMENT OF LEFT SHOULDER: ICD-10-CM

## 2020-05-20 PROCEDURE — 97110 THERAPEUTIC EXERCISES: CPT | Performed by: PHYSICAL THERAPIST

## 2020-05-20 PROCEDURE — 3078F DIAST BP <80 MM HG: CPT | Performed by: ORTHOPAEDIC SURGERY

## 2020-05-20 PROCEDURE — 99213 OFFICE O/P EST LOW 20 MIN: CPT | Performed by: ORTHOPAEDIC SURGERY

## 2020-05-20 PROCEDURE — 97140 MANUAL THERAPY 1/> REGIONS: CPT | Performed by: PHYSICAL THERAPIST

## 2020-05-20 PROCEDURE — 3074F SYST BP LT 130 MM HG: CPT | Performed by: ORTHOPAEDIC SURGERY

## 2020-05-20 PROCEDURE — 1036F TOBACCO NON-USER: CPT | Performed by: ORTHOPAEDIC SURGERY

## 2020-05-22 ENCOUNTER — TELEPHONE (OUTPATIENT)
Dept: FAMILY MEDICINE CLINIC | Facility: CLINIC | Age: 50
End: 2020-05-22

## 2020-05-27 ENCOUNTER — EVALUATION (OUTPATIENT)
Dept: PHYSICAL THERAPY | Facility: CLINIC | Age: 50
End: 2020-05-27
Payer: COMMERCIAL

## 2020-06-05 DIAGNOSIS — I10 ESSENTIAL HYPERTENSION: ICD-10-CM

## 2020-06-05 PROCEDURE — 4010F ACE/ARB THERAPY RXD/TAKEN: CPT | Performed by: ORTHOPAEDIC SURGERY

## 2020-06-05 RX ORDER — QUINAPRIL 5 1/1
TABLET ORAL
Qty: 90 TABLET | Refills: 1 | Status: SHIPPED | OUTPATIENT
Start: 2020-06-05 | End: 2020-12-02

## 2020-06-12 DIAGNOSIS — M70.62 TROCHANTERIC BURSITIS OF BOTH HIPS: ICD-10-CM

## 2020-06-12 DIAGNOSIS — E11.9 TYPE 2 DIABETES MELLITUS WITHOUT COMPLICATION, WITHOUT LONG-TERM CURRENT USE OF INSULIN (HCC): Chronic | ICD-10-CM

## 2020-06-12 DIAGNOSIS — M70.61 TROCHANTERIC BURSITIS OF BOTH HIPS: ICD-10-CM

## 2020-06-12 RX ORDER — MELOXICAM 15 MG/1
TABLET ORAL
Qty: 90 TABLET | Refills: 1 | Status: SHIPPED | OUTPATIENT
Start: 2020-06-12 | End: 2020-09-02

## 2020-06-12 RX ORDER — PIOGLITAZONEHYDROCHLORIDE 45 MG/1
TABLET ORAL
Qty: 30 TABLET | Refills: 3 | Status: SHIPPED | OUTPATIENT
Start: 2020-06-12 | End: 2020-10-08

## 2020-06-16 ENCOUNTER — HOSPITAL ENCOUNTER (OUTPATIENT)
Dept: MRI IMAGING | Facility: HOSPITAL | Age: 50
Discharge: HOME | End: 2020-06-16
Attending: ORTHOPAEDIC SURGERY
Payer: COMMERCIAL

## 2020-06-16 ENCOUNTER — APPOINTMENT (OUTPATIENT)
Dept: LAB | Facility: CLINIC | Age: 50
End: 2020-06-16
Payer: COMMERCIAL

## 2020-06-16 DIAGNOSIS — M24.812 INTERNAL DERANGEMENT OF LEFT SHOULDER: ICD-10-CM

## 2020-06-16 DIAGNOSIS — R76.8 ANA POSITIVE: ICD-10-CM

## 2020-06-16 DIAGNOSIS — E11.00 TYPE 2 DIABETES MELLITUS WITH HYPEROSMOLARITY WITHOUT COMA, WITHOUT LONG-TERM CURRENT USE OF INSULIN (HCC): ICD-10-CM

## 2020-06-16 DIAGNOSIS — E78.2 MIXED HYPERLIPIDEMIA: Chronic | ICD-10-CM

## 2020-06-16 DIAGNOSIS — I10 ESSENTIAL HYPERTENSION: Chronic | ICD-10-CM

## 2020-06-16 LAB
ALBUMIN SERPL BCP-MCNC: 4 G/DL (ref 3.5–5)
ALP SERPL-CCNC: 73 U/L (ref 46–116)
ALT SERPL W P-5'-P-CCNC: 20 U/L (ref 12–78)
ANION GAP SERPL CALCULATED.3IONS-SCNC: 8 MMOL/L (ref 4–13)
AST SERPL W P-5'-P-CCNC: 15 U/L (ref 5–45)
BASOPHILS # BLD AUTO: 0.04 THOUSANDS/ΜL (ref 0–0.1)
BASOPHILS NFR BLD AUTO: 1 % (ref 0–1)
BILIRUB SERPL-MCNC: 0.3 MG/DL (ref 0.2–1)
BUN SERPL-MCNC: 17 MG/DL (ref 5–25)
CALCIUM SERPL-MCNC: 8.9 MG/DL (ref 8.3–10.1)
CHLORIDE SERPL-SCNC: 102 MMOL/L (ref 100–108)
CHOLEST SERPL-MCNC: 141 MG/DL (ref 50–200)
CO2 SERPL-SCNC: 28 MMOL/L (ref 21–32)
CREAT SERPL-MCNC: 0.88 MG/DL (ref 0.6–1.3)
CREAT UR-MCNC: 91.9 MG/DL
EOSINOPHIL # BLD AUTO: 0.26 THOUSAND/ΜL (ref 0–0.61)
EOSINOPHIL NFR BLD AUTO: 5 % (ref 0–6)
ERYTHROCYTE [DISTWIDTH] IN BLOOD BY AUTOMATED COUNT: 14.8 % (ref 11.6–15.1)
ERYTHROCYTE [SEDIMENTATION RATE] IN BLOOD: 2 MM/HOUR (ref 0–10)
EST. AVERAGE GLUCOSE BLD GHB EST-MCNC: 151 MG/DL
GFR SERPL CREATININE-BSD FRML MDRD: 102 ML/MIN/1.73SQ M
GLUCOSE P FAST SERPL-MCNC: 172 MG/DL (ref 65–99)
HBA1C MFR BLD: 6.9 %
HCT VFR BLD AUTO: 45.8 % (ref 36.5–49.3)
HDLC SERPL-MCNC: 37 MG/DL
HGB BLD-MCNC: 14.5 G/DL (ref 12–17)
IMM GRANULOCYTES # BLD AUTO: 0.03 THOUSAND/UL (ref 0–0.2)
IMM GRANULOCYTES NFR BLD AUTO: 1 % (ref 0–2)
LDLC SERPL CALC-MCNC: 67 MG/DL (ref 0–100)
LYMPHOCYTES # BLD AUTO: 1.59 THOUSANDS/ΜL (ref 0.6–4.47)
LYMPHOCYTES NFR BLD AUTO: 29 % (ref 14–44)
MCH RBC QN AUTO: 25.9 PG (ref 26.8–34.3)
MCHC RBC AUTO-ENTMCNC: 31.7 G/DL (ref 31.4–37.4)
MCV RBC AUTO: 82 FL (ref 82–98)
MICROALBUMIN UR-MCNC: 9.1 MG/L (ref 0–20)
MICROALBUMIN/CREAT 24H UR: 10 MG/G CREATININE (ref 0–30)
MONOCYTES # BLD AUTO: 0.47 THOUSAND/ΜL (ref 0.17–1.22)
MONOCYTES NFR BLD AUTO: 8 % (ref 4–12)
NEUTROPHILS # BLD AUTO: 3.18 THOUSANDS/ΜL (ref 1.85–7.62)
NEUTS SEG NFR BLD AUTO: 56 % (ref 43–75)
NONHDLC SERPL-MCNC: 104 MG/DL
NRBC BLD AUTO-RTO: 0 /100 WBCS
PLATELET # BLD AUTO: 268 THOUSANDS/UL (ref 149–390)
PMV BLD AUTO: 10.2 FL (ref 8.9–12.7)
POTASSIUM SERPL-SCNC: 4.1 MMOL/L (ref 3.5–5.3)
PROT SERPL-MCNC: 7.1 G/DL (ref 6.4–8.2)
RBC # BLD AUTO: 5.6 MILLION/UL (ref 3.88–5.62)
SODIUM SERPL-SCNC: 138 MMOL/L (ref 136–145)
TRIGL SERPL-MCNC: 186 MG/DL
TSH SERPL DL<=0.05 MIU/L-ACNC: 2.56 UIU/ML (ref 0.36–3.74)
WBC # BLD AUTO: 5.57 THOUSAND/UL (ref 4.31–10.16)

## 2020-06-16 PROCEDURE — 80061 LIPID PANEL: CPT

## 2020-06-16 PROCEDURE — 86038 ANTINUCLEAR ANTIBODIES: CPT

## 2020-06-16 PROCEDURE — 3061F NEG MICROALBUMINURIA REV: CPT | Performed by: ORTHOPAEDIC SURGERY

## 2020-06-16 PROCEDURE — 82043 UR ALBUMIN QUANTITATIVE: CPT

## 2020-06-16 PROCEDURE — 83036 HEMOGLOBIN GLYCOSYLATED A1C: CPT

## 2020-06-16 PROCEDURE — 82570 ASSAY OF URINE CREATININE: CPT

## 2020-06-16 PROCEDURE — 73221 MRI JOINT UPR EXTREM W/O DYE: CPT

## 2020-06-16 PROCEDURE — 36415 COLL VENOUS BLD VENIPUNCTURE: CPT

## 2020-06-16 PROCEDURE — 85025 COMPLETE CBC W/AUTO DIFF WBC: CPT

## 2020-06-16 PROCEDURE — 3044F HG A1C LEVEL LT 7.0%: CPT | Performed by: ORTHOPAEDIC SURGERY

## 2020-06-16 PROCEDURE — 85652 RBC SED RATE AUTOMATED: CPT

## 2020-06-16 PROCEDURE — 80053 COMPREHEN METABOLIC PANEL: CPT

## 2020-06-16 PROCEDURE — 86039 ANTINUCLEAR ANTIBODIES (ANA): CPT

## 2020-06-16 PROCEDURE — 84443 ASSAY THYROID STIM HORMONE: CPT

## 2020-06-17 LAB
ANA HOMOGEN SER QL IF: NORMAL
ANA HOMOGEN TITR SER: NORMAL {TITER}
RYE IGE QN: POSITIVE

## 2020-06-24 ENCOUNTER — OFFICE VISIT (OUTPATIENT)
Dept: FAMILY MEDICINE CLINIC | Facility: CLINIC | Age: 50
End: 2020-06-24
Payer: COMMERCIAL

## 2020-06-24 VITALS
SYSTOLIC BLOOD PRESSURE: 128 MMHG | OXYGEN SATURATION: 98 % | HEIGHT: 66 IN | WEIGHT: 167 LBS | TEMPERATURE: 96.8 F | BODY MASS INDEX: 26.84 KG/M2 | DIASTOLIC BLOOD PRESSURE: 78 MMHG | HEART RATE: 76 BPM

## 2020-06-24 DIAGNOSIS — E11.00 TYPE 2 DIABETES MELLITUS WITH HYPEROSMOLARITY WITHOUT COMA, WITHOUT LONG-TERM CURRENT USE OF INSULIN (HCC): Primary | ICD-10-CM

## 2020-06-24 DIAGNOSIS — I10 ESSENTIAL HYPERTENSION: Chronic | ICD-10-CM

## 2020-06-24 DIAGNOSIS — M75.102 ROTATOR CUFF SYNDROME OF LEFT SHOULDER: ICD-10-CM

## 2020-06-24 DIAGNOSIS — E78.2 MIXED HYPERLIPIDEMIA: Chronic | ICD-10-CM

## 2020-06-24 PROCEDURE — 3044F HG A1C LEVEL LT 7.0%: CPT | Performed by: FAMILY MEDICINE

## 2020-06-24 PROCEDURE — 3078F DIAST BP <80 MM HG: CPT | Performed by: FAMILY MEDICINE

## 2020-06-24 PROCEDURE — 3008F BODY MASS INDEX DOCD: CPT | Performed by: FAMILY MEDICINE

## 2020-06-24 PROCEDURE — 3074F SYST BP LT 130 MM HG: CPT | Performed by: FAMILY MEDICINE

## 2020-06-24 PROCEDURE — 1036F TOBACCO NON-USER: CPT | Performed by: FAMILY MEDICINE

## 2020-06-24 PROCEDURE — 99214 OFFICE O/P EST MOD 30 MIN: CPT | Performed by: FAMILY MEDICINE

## 2020-07-01 ENCOUNTER — OFFICE VISIT (OUTPATIENT)
Dept: OBGYN CLINIC | Facility: CLINIC | Age: 50
End: 2020-07-01
Payer: COMMERCIAL

## 2020-07-01 VITALS
HEART RATE: 89 BPM | SYSTOLIC BLOOD PRESSURE: 118 MMHG | DIASTOLIC BLOOD PRESSURE: 79 MMHG | BODY MASS INDEX: 26.84 KG/M2 | WEIGHT: 167 LBS | HEIGHT: 66 IN

## 2020-07-01 DIAGNOSIS — M75.02 ADHESIVE CAPSULITIS OF LEFT SHOULDER: Primary | ICD-10-CM

## 2020-07-01 PROCEDURE — 99213 OFFICE O/P EST LOW 20 MIN: CPT | Performed by: ORTHOPAEDIC SURGERY

## 2020-07-01 PROCEDURE — 3078F DIAST BP <80 MM HG: CPT | Performed by: ORTHOPAEDIC SURGERY

## 2020-07-01 PROCEDURE — 1036F TOBACCO NON-USER: CPT | Performed by: ORTHOPAEDIC SURGERY

## 2020-07-01 PROCEDURE — 3008F BODY MASS INDEX DOCD: CPT | Performed by: ORTHOPAEDIC SURGERY

## 2020-07-01 PROCEDURE — 3044F HG A1C LEVEL LT 7.0%: CPT | Performed by: ORTHOPAEDIC SURGERY

## 2020-07-01 PROCEDURE — 3074F SYST BP LT 130 MM HG: CPT | Performed by: ORTHOPAEDIC SURGERY

## 2020-07-01 NOTE — PROGRESS NOTES
Patient Name:  Chelsea Padilla  MRN:  [id number]    Assessment & Plan     Left shoulder adhesive capsulitis  1  New referral placed for physical therapy  2  Activities as tolerated  3  Anti-inflammatories as needed  4  Follow-up in 6-8 weeks for repeat evaluation  Briefly discussed corticosteroid injection if pain persists    History of the Present Illness     56-year-old male returns to the office today for follow-up regarding his left shoulder  He recently underwent an MRI and is here to review the results  He still notes persistent pain in the left shoulder with some stiffness  He put PT on hold until the MRI was reviewed today  He notes generalized pain worse with movement  He notes weakness secondary to pain  He denies instability  No numbness or tingling  No fevers or chills  General ROS:  Negative for fever or chills  Neurological ROS:  Negative for numbness or tingling  Physical Exam     /79 (BP Location: Left arm, Patient Position: Sitting, Cuff Size: Adult)   Pulse 89   Ht 5' 6" (1 676 m)   Wt 75 8 kg (167 lb)   BMI 26 95 kg/m²     Left shoulder:  No gross deformity  No tenderness to palpation  PROM includes /160, ER-abd 50/70, IR-abd 10/40  Negative Lau impingement test   Negative empty can test   Negative speed's test   Negative cross-body adduction test   Sensation intact axillary, median, ulnar and radial nerves  2+ radial pulse  Appropriate mood and affect  Data Review     I have personally reviewed pertinent films in PACS, and my interpretation follows  MRI left shoulder 6/16/20: Thickening inferior glenohumeral ligament and adjacent soft tissue edema consistent with adhesive capsulitis  Tendinosis of the supraspinatus  No full-thickness tear      Social History     Tobacco Use    Smoking status: Never Smoker    Smokeless tobacco: Never Used   Substance Use Topics    Alcohol use: Yes     Comment: socially    Drug use: No       Scribe Attestation I,:   Wenceslao Rodriguez PA-C am acting as a scribe while in the presence of the attending physician :        I,:   Dereck Yu MD personally performed the services described in this documentation    as scribed in my presence :

## 2020-07-05 DIAGNOSIS — E11.65 TYPE 2 DIABETES MELLITUS WITH HYPERGLYCEMIA, WITHOUT LONG-TERM CURRENT USE OF INSULIN (HCC): Chronic | ICD-10-CM

## 2020-07-05 RX ORDER — DAPAGLIFLOZIN 10 MG/1
TABLET, FILM COATED ORAL
Qty: 30 TABLET | Refills: 5 | Status: SHIPPED | OUTPATIENT
Start: 2020-07-05 | End: 2020-12-30

## 2020-07-29 ENCOUNTER — EVALUATION (OUTPATIENT)
Dept: PHYSICAL THERAPY | Facility: CLINIC | Age: 50
End: 2020-07-29
Payer: COMMERCIAL

## 2020-07-29 DIAGNOSIS — M75.02 ADHESIVE CAPSULITIS OF LEFT SHOULDER: Primary | ICD-10-CM

## 2020-07-29 PROCEDURE — 97161 PT EVAL LOW COMPLEX 20 MIN: CPT | Performed by: PHYSICAL THERAPIST

## 2020-07-29 PROCEDURE — 97140 MANUAL THERAPY 1/> REGIONS: CPT | Performed by: PHYSICAL THERAPIST

## 2020-07-29 NOTE — PROGRESS NOTES
PT ReEvaluation    Today's date: 2020  Patient name: Royetta Koyanagi  : 1970  MRN: [id number]  Referring provider: Jose Miguel Macdonald PA-C  Dx:   Encounter Diagnosis     ICD-10-CM    1  Rotator cuff syndrome of both shoulders M75 101 Ambulatory referral to Physical Therapy    M75 102                   Assessment  Assessment details: Patient had a break in care due to having further testing  He has been compliant with HEP during this break, stretching arm to overhead end range using  30 min each day  He continues to have ROM restrictions/tightness especially of IR and end range overhead positions  He is super motivated and required skilled PT to normalize ROM and functional use of UE     Impairments: abnormal or restricted ROM, activity intolerance, impaired physical strength, pain with function  Understanding of Dx/Px/POC: good   Prognosis: good    Goals  ST-6 weeks  1  Patient to be independent with HEP  2  Decrease pain at least 2 subjective levels  LT-12 weeks  1    Patient to voice comfort with self management of condition  2   75% or > decreased pain  3   75% or > decreased functional deficits  4   Normalize AROM of all deficit planes  5  Normalize strength-   7  Patient to voice understanding of activities/positions to avoid  -      Plan  Patient would benefit from: skilled PT  Referral necessary: No  Planned modality interventions: cryotherapy  Planned therapy interventions: IADL retraining, joint mobilization, manual therapy, motor coordination training, neuromuscular re-education, patient education, postural training, self care, strengthening, stretching, therapeutic activities, therapeutic exercise, home exercise program, flexibility, ADL training, balance and body mechanics training  Frequency: 1-2x week  Duration in weeks: 10  Treatment plan discussed with: patient        Subjective Evaluation    History of Present Illness  Patient referred with dx of adhesive capsulitis  He was receiving therapy at this facility but had to take a break due to bhavesh COVID, then again while having further testing on his shoulder  He notes some soreness and pain but less than previously and he has remained compliant with home stretching including stretching into overhead planes 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  He notes pain also remain with quick/unexpected movements      Pain  At best pain ratin  At worst pain ratin  Location: L shoulder      Objective     Active Range of Motion   Left Shoulder   Flexion: 150 degrees with pain  Abduction: 150 degrees with pain  External rotation 45°: 75 degrees with pain  Internal rotation BTB: L5 with pain    Right Shoulder   Flexion: WFL  Abduction: WFL  External rotation 90: WFL  Internal rotation BTB: WFL    Passive Range of Motion   Left Shoulder   Flexion: 175 degrees   Adduction: 155 degrees   External rotation 45°: 80 degrees   Internal rotation 45°: 50 degrees     Right Shoulder   External rotation 90°: WFL    Strength/Myotome Testing     Left Shoulder 5/5 all planes    General Comments:      Shoulder Comments   L Shoulder: [facility] Joint 2/ hypomobility  (+) Sulcus on L for tightness  (+) Impingement testing L Shoulder  (-) Remainder special testing L shoulder      Flowsheet Rows      Most Recent Value   PT/OT G-Codes   Current Score  53   Projected Score  70   FOTO information reviewed  Yes             Precautions: DM II, High co-insurance       Manuals             PROM 5            Multiplanar GH Mobs: Emphasize Post 5            IASTM RTC tendons 5                         Neuro Re-Ed                                                                                                        Ther Ex             Pulleys             Finger Ladder             ER Corner Str             IR Strap Str             Supine Flexion             SL ER             SL Abd             Sust ER 90/90 str supine              Sleeper Str HEP 5            Ther Activity                                       Gait Training                                       Modalities

## 2020-08-03 DIAGNOSIS — E11.00 TYPE 2 DIABETES MELLITUS WITH HYPEROSMOLARITY WITHOUT COMA, WITHOUT LONG-TERM CURRENT USE OF INSULIN (HCC): ICD-10-CM

## 2020-08-03 DIAGNOSIS — E11.9 TYPE 2 DIABETES MELLITUS WITHOUT COMPLICATION, WITHOUT LONG-TERM CURRENT USE OF INSULIN (HCC): ICD-10-CM

## 2020-08-03 RX ORDER — GLIPIZIDE 5 MG/1
TABLET, FILM COATED, EXTENDED RELEASE ORAL
Qty: 90 TABLET | Refills: 1 | Status: SHIPPED | OUTPATIENT
Start: 2020-08-03 | End: 2021-01-26

## 2020-08-03 RX ORDER — SITAGLIPTIN AND METFORMIN HYDROCHLORIDE 1000; 50 MG/1; MG/1
TABLET, FILM COATED, EXTENDED RELEASE ORAL
Qty: 180 TABLET | Refills: 1 | Status: SHIPPED | OUTPATIENT
Start: 2020-08-03 | End: 2021-01-31

## 2020-08-06 ENCOUNTER — OFFICE VISIT (OUTPATIENT)
Dept: PHYSICAL THERAPY | Facility: CLINIC | Age: 50
End: 2020-08-06
Payer: COMMERCIAL

## 2020-08-06 DIAGNOSIS — M75.02 ADHESIVE CAPSULITIS OF LEFT SHOULDER: Primary | ICD-10-CM

## 2020-08-06 PROCEDURE — 97140 MANUAL THERAPY 1/> REGIONS: CPT | Performed by: PHYSICAL THERAPIST

## 2020-08-06 PROCEDURE — 97110 THERAPEUTIC EXERCISES: CPT | Performed by: PHYSICAL THERAPIST

## 2020-08-06 NOTE — PROGRESS NOTES
Daily Note     Today's date: 2020  Patient name: Xi Queen  : 1970  MRN: [id number]  Referring provider: Shanda Manriquez PA-C  Dx:   Encounter Diagnosis     ICD-10-CM    1  Adhesive capsulitis of left shoulder  M75 02                   Subjective: Pt compliant with HEP, most successful in shower with hot water beating on shoulder  A bit sore this morning  Objective: See treatment diary below      Assessment: Tolerated treatment well  Patient would benefit from continued PT      Plan: Continue per plan of care        Precautions: DM II, High co-insurance    Manuals         PROM 7' 7 7 7 6        Multiplanar GH Mobs: Emphasize Post 8' 8 8 8 8        IASTM RTC     8                     Neuro Re-Ed                                                                                                        Ther Ex             Pulleys; F, A 3'/3' 3'/3' 3'/3' 3'/3' 3'/3'        Finger ladder F, A 10 ea 10 10 10         Corner ER Str : : :         IR Strap Str :10/10 :10/10 :10/10 :10/10         Supine Flexion 20   20         SL ER 20   20- pain         SL Abd 20   20         Sust ER 90/90 str supine  YTB 3' Y 3' Y 3' Y 3'         Sleeper Str  HEP reivew                         HEP review     2'        Ther Activity                                       Gait Training                                       Modalities

## 2020-08-11 DIAGNOSIS — E78.5 HYPERLIPIDEMIA, UNSPECIFIED HYPERLIPIDEMIA TYPE: ICD-10-CM

## 2020-08-11 RX ORDER — ROSUVASTATIN CALCIUM 10 MG/1
TABLET, COATED ORAL
Qty: 90 TABLET | Refills: 1 | Status: SHIPPED | OUTPATIENT
Start: 2020-08-11 | End: 2020-08-12

## 2020-08-12 ENCOUNTER — OFFICE VISIT (OUTPATIENT)
Dept: PHYSICAL THERAPY | Facility: CLINIC | Age: 50
End: 2020-08-12
Payer: COMMERCIAL

## 2020-08-12 DIAGNOSIS — M75.02 ADHESIVE CAPSULITIS OF LEFT SHOULDER: Primary | ICD-10-CM

## 2020-08-12 DIAGNOSIS — E78.5 HYPERLIPIDEMIA, UNSPECIFIED HYPERLIPIDEMIA TYPE: ICD-10-CM

## 2020-08-12 PROCEDURE — 97110 THERAPEUTIC EXERCISES: CPT | Performed by: PHYSICAL THERAPIST

## 2020-08-12 PROCEDURE — 97140 MANUAL THERAPY 1/> REGIONS: CPT | Performed by: PHYSICAL THERAPIST

## 2020-08-12 RX ORDER — ROSUVASTATIN CALCIUM 10 MG/1
TABLET, COATED ORAL
Qty: 90 TABLET | Refills: 1 | Status: SHIPPED | OUTPATIENT
Start: 2020-08-12 | End: 2020-08-13

## 2020-08-12 NOTE — PROGRESS NOTES
Daily Note     Today's date: 2020  Patient name: Mike Kendall  : 1970  MRN: [id number]  Referring provider: Stephanie Bee PA-C  Dx:   Encounter Diagnosis     ICD-10-CM    1  Adhesive capsulitis of left shoulder  M75 02                   Subjective: Remains maximally compliant with HEP  Objective: See treatment diary below      Assessment: Tolerated treatment well  Patient had improved PROM today- near full ER      Plan: Continue per plan of care        Precautions: DM II, High co-insurance    Manuals  8/6 8/11       PROM 7' 7 7 7 6 6       Multiplanar GH Mobs: Emphasize Post 8' 8 8 8 8 8       IASTM RTC     8 8                    Neuro Re-Ed                                                                                                        Ther Ex             Pulleys; F, A 3'/3' 3'/3' 3'/3' 3'/3' 3'/3' 3'/3'       Finger ladder F, A 10 ea 10 10 10         Corner ER Str : : :         IR Strap Str :10/10 :10/10 :10/10 :10/10         Supine Flexion 20   20         SL ER 20   20- pain         SL Abd 20   20         Sust ER 90/90 str supine  YTB 3' Y 3' Y 3' Y 3'         Sleeper Str  HEP reivew                         HEP review     2' 4'       Ther Activity                                       Gait Training                                       Modalities

## 2020-08-13 DIAGNOSIS — E78.5 HYPERLIPIDEMIA, UNSPECIFIED HYPERLIPIDEMIA TYPE: ICD-10-CM

## 2020-08-13 RX ORDER — ROSUVASTATIN CALCIUM 10 MG/1
TABLET, COATED ORAL
Qty: 90 TABLET | Refills: 1 | Status: SHIPPED | OUTPATIENT
Start: 2020-08-13 | End: 2021-11-22

## 2020-08-14 DIAGNOSIS — E78.5 HYPERLIPIDEMIA, UNSPECIFIED HYPERLIPIDEMIA TYPE: ICD-10-CM

## 2020-08-14 RX ORDER — ROSUVASTATIN CALCIUM 10 MG/1
TABLET, COATED ORAL
Qty: 90 TABLET | Refills: 1 | OUTPATIENT
Start: 2020-08-14

## 2020-08-14 NOTE — TELEPHONE ENCOUNTER
Please call the pharmacy  I have refilled his Crestor prescription 3 times in the last 3 days    Not certain if something is not going through

## 2020-08-18 ENCOUNTER — OFFICE VISIT (OUTPATIENT)
Dept: PHYSICAL THERAPY | Facility: CLINIC | Age: 50
End: 2020-08-18
Payer: COMMERCIAL

## 2020-08-18 DIAGNOSIS — M75.02 ADHESIVE CAPSULITIS OF LEFT SHOULDER: Primary | ICD-10-CM

## 2020-08-18 PROCEDURE — 97110 THERAPEUTIC EXERCISES: CPT | Performed by: PHYSICAL THERAPIST

## 2020-08-18 PROCEDURE — 97140 MANUAL THERAPY 1/> REGIONS: CPT | Performed by: PHYSICAL THERAPIST

## 2020-08-18 NOTE — PROGRESS NOTES
Daily Note     Today's date: 2020  Patient name: Vidhya Parish  : 1970  MRN: [id number]  Referring provider: Beni Velazquez PA-C  Dx:   Encounter Diagnosis     ICD-10-CM    1  Adhesive capsulitis of left shoulder  M75 02                   Subjective: Shoulder hurting more today- feels he overused it packing his son for college      Objective: See treatment diary below      Assessment: Tolerated treatment well  Patient demonstrating improved ROM      Plan: Continue per plan of care        Precautions: DM II, High co-insurance    Manuals  86 8/      PROM 7' 7 7 7 6 6 6      Multiplanar GH Mobs: Emphasize Post 8' 8 8 8 8 8 8      IASTM RTC     8 8 8                   Neuro Re-Ed                                                                                                        Ther Ex             Pulleys; F, A 3'/3' 3'/3' 3'/3' 3'/3' 3'/3' 3'/3' 3'/3'      Finger ladder F, A 10 ea 10 10 10         Corner ER Str : : :         IR Strap Str :10/10 :10/10 :10/10 :10/10         Supine Flexion 20   20         SL ER 20   20- pain         SL Abd 20   20         Sust ER 90/90 str supine  YTB 3' Y 3' Y 3' Y 3'         Sleeper Str  HEP reivew                         HEP review     2' 4' 4'      Ther Activity                                       Gait Training                                       Modalities

## 2020-08-19 ENCOUNTER — APPOINTMENT (OUTPATIENT)
Dept: PHYSICAL THERAPY | Facility: CLINIC | Age: 50
End: 2020-08-19
Payer: COMMERCIAL

## 2020-08-26 ENCOUNTER — APPOINTMENT (OUTPATIENT)
Dept: PHYSICAL THERAPY | Facility: CLINIC | Age: 50
End: 2020-08-26
Payer: COMMERCIAL

## 2020-09-02 ENCOUNTER — OFFICE VISIT (OUTPATIENT)
Dept: OBGYN CLINIC | Facility: CLINIC | Age: 50
End: 2020-09-02
Payer: COMMERCIAL

## 2020-09-02 ENCOUNTER — EVALUATION (OUTPATIENT)
Dept: PHYSICAL THERAPY | Facility: CLINIC | Age: 50
End: 2020-09-02
Payer: COMMERCIAL

## 2020-09-02 VITALS
WEIGHT: 166 LBS | DIASTOLIC BLOOD PRESSURE: 75 MMHG | HEIGHT: 66 IN | BODY MASS INDEX: 26.68 KG/M2 | SYSTOLIC BLOOD PRESSURE: 116 MMHG

## 2020-09-02 DIAGNOSIS — M70.61 TROCHANTERIC BURSITIS OF BOTH HIPS: Primary | ICD-10-CM

## 2020-09-02 DIAGNOSIS — M75.02 ADHESIVE CAPSULITIS OF LEFT SHOULDER: Primary | ICD-10-CM

## 2020-09-02 DIAGNOSIS — M70.62 TROCHANTERIC BURSITIS OF BOTH HIPS: Primary | ICD-10-CM

## 2020-09-02 PROCEDURE — 3078F DIAST BP <80 MM HG: CPT | Performed by: ORTHOPAEDIC SURGERY

## 2020-09-02 PROCEDURE — 99213 OFFICE O/P EST LOW 20 MIN: CPT | Performed by: ORTHOPAEDIC SURGERY

## 2020-09-02 PROCEDURE — 3074F SYST BP LT 130 MM HG: CPT | Performed by: ORTHOPAEDIC SURGERY

## 2020-09-02 PROCEDURE — 97140 MANUAL THERAPY 1/> REGIONS: CPT | Performed by: PHYSICAL THERAPIST

## 2020-09-02 PROCEDURE — 1036F TOBACCO NON-USER: CPT | Performed by: ORTHOPAEDIC SURGERY

## 2020-09-02 PROCEDURE — 97110 THERAPEUTIC EXERCISES: CPT | Performed by: PHYSICAL THERAPIST

## 2020-09-02 RX ORDER — METHYLPREDNISOLONE 4 MG/1
TABLET ORAL
Qty: 21 TABLET | Refills: 0 | Status: SHIPPED | OUTPATIENT
Start: 2020-09-02 | End: 2021-01-28

## 2020-09-02 NOTE — PROGRESS NOTES
PT ReEvaluation    Today's date: 2020  Patient name: Alexander Schmitz  : 1970  MRN: [id number]  Referring provider: Annamarie Garcia PA-C  Dx:   Encounter Diagnosis     ICD-10-CM    1  Rotator cuff syndrome of both shoulders M75 101 Ambulatory referral to Physical Therapy    M75 102                   Assessment  Assessment details: Patient has been compliant with HEP emphasizing stretching  He continues to have ROM restrictions/tightness especially of IR and end range overhead positions but has made good progress since beginning therapy  While his IR ROM is tight, he is pleased with the amount of motion he has and does not desire to emphasize improving this  He is very motivated and required skilled PT to normalize ROM and functional use of UE  Will likely be able to transition to HEP in another month if steady progress continues  Thank you for this pleasant referral        Impairments: abnormal or restricted ROM, activity intolerance, impaired physical strength, pain with function  Understanding of Dx/Px/POC: good   Prognosis: good    Goals  ST-6 weeks  1  Patient to be independent with HEP- Met  2  Decrease pain at least 2 subjective levels  - Met    LT-12 weeks  1    Patient to voice comfort with self management of condition - Met  2   75% or > decreased pain  - Partially Met  3   75% or > decreased functional deficits  - Met  4  Normalize AROM of all deficit planes- Not Met  5  Normalize strength- Met  7  Patient to voice understanding of activities/positions to avoid  -      Plan  Patient would benefit from: skilled PT  Referral necessary: No  Planned modality interventions: cryotherapy  Planned therapy interventions: IADL retraining, joint mobilization, manual therapy, motor coordination training, neuromuscular re-education, patient education, postural training, self care, strengthening, stretching, therapeutic activities, therapeutic exercise, home exercise program, flexibility, ADL training, balance and body mechanics training  Frequency: 1-2x week  Duration in weeks: 10  Treatment plan discussed with: patient        Subjective Evaluation    History of Present Illness  Patient notes pain is only experienced typically with quick/sudden motions and reaching behind his back  While functional IR remains significantly limited, he is pleased with the motion he has and does not wish to emphasize further improving IR ROM         Pain  At best pain ratin  At worst pain ratin  Location: L shoulder      Objective     Active Range of Motion   Left Shoulder   Flexion: 157 degrees with pain  Abduction: 152 degrees with pain  External rotation 45°: 75 degrees with pain  Internal rotation BTB: L5 with pain    Right Shoulder   Flexion: WFL  Abduction: WFL  External rotation 90: WFL  Internal rotation BTB: WFL    Passive Range of Motion   Left Shoulder   Flexion: 175 degrees   Adduction: 175 degrees   External rotation 45°: 85 degrees   Internal rotation 45°: 50 degrees     Right Shoulder   External rotation 90°: WFL    Strength/Myotome Testing     Left Shoulder 5/5 all planes    General Comments:      Shoulder Comments   L Shoulder: GH Joint  hypomobility  (-) Sulcus on L for tightness  (-) Impingement testing L Shoulder  (-) Remainder special testing L shoulder      Flowsheet Rows      Most Recent Value   PT/OT G-Codes   Current Score  53   Projected Score  70   FOTO information reviewed  Yes             Precautions: DM II, High co-insurance          Manuals  9     PROM 7' 7 7 7 6 6 6 10     Multiplanar GH Mobs: Emphasize Post 8' 8 8 8 8 8 8 5     IASTM RTC     8 8 8 5                  Neuro Re-Ed                                                                                                        Ther Ex             Pulleys; F, A 3'/3' 3'/3' 3'/3' 3'/3' 3'/3' 3'/3' 3'/3' 3'/3'     Finger ladder F, A 10 ea 10 10 10         Corner ER Str : : : IR Strap Str :10/10 :10/10 :10/10 :10/10         Supine Flexion 20   20         SL ER 20   20- pain         SL Abd 20   20         Sust ER 90/90 str supine  YTB 3' Y 3' Y 3' Y 3'         Sleeper Str  HEP reivew                         HEP review     2' 4' 4'      Ther Activity                                       Gait Training                                       Modalities

## 2020-09-02 NOTE — PROGRESS NOTES
Patient Name:  Siobhan Waterman Backhemant  MRN:  [id number]    Assessment & Plan     Left shoulder adhesive capsulitis, resolving  1  Continue physical therapy, HEP as appropriate  2  Prescription for Medrol Dosepak  3  Continue meloxicam   4  Activities as tolerated, no restrictions  5  Follow-up as needed    History of the Present Illness     36-year-old male returns to the office today for follow-up regarding his left shoulder adhesive capsulitis  He was last seen on 7/1/20  At that time he was advised to continue physical therapy  He returns to the office today noting overall significant improvement with regards to his range of motion  He still notes pain only with quick movements  He states the pain is localized to the lateral aspect of the shoulder  He denies any significant weakness or instability  He is taking nothing for pain currently  General ROS:  Negative for fever or chills  Neurological ROS:  Negative for numbness or tingling  Physical Exam     /75   Ht 5' 6" (1 676 m)   Wt 75 3 kg (166 lb)   BMI 26 79 kg/m²     Left shoulder:  No gross deformity  No tenderness to palpation  PROM is /160, ER-abd 70/80, IR-abd 20/30  Negative Lau impingement test   Negative empty can test   Negative speed's test   Negative cross-body adduction test   Sensation intact axillary, median, ulnar and radial nerves  2+ radial pulse  Appropriate mood and affect  Data Review     I have personally reviewed pertinent films in PACS, and my interpretation follows  MRI left shoulder 6/16/20: Thickening inferior glenohumeral ligament adjacent soft tissue edema consistent with adhesive capsulitis        Social History     Tobacco Use    Smoking status: Never Smoker    Smokeless tobacco: Never Used   Substance Use Topics    Alcohol use: Yes     Comment: socially    Drug use: No       Scribe Attestation    I,:   Anay Mancia PA-C am acting as a scribe while in the presence of the attending physician :        I,:   Sarina Oleary MD personally performed the services described in this documentation    as scribed in my presence :

## 2020-09-03 DIAGNOSIS — K21.9 GASTROESOPHAGEAL REFLUX DISEASE, ESOPHAGITIS PRESENCE NOT SPECIFIED: ICD-10-CM

## 2020-09-03 RX ORDER — ESOMEPRAZOLE MAGNESIUM 40 MG/1
CAPSULE, DELAYED RELEASE ORAL
Qty: 90 CAPSULE | Refills: 1 | Status: SHIPPED | OUTPATIENT
Start: 2020-09-03 | End: 2021-02-28

## 2020-09-09 ENCOUNTER — OFFICE VISIT (OUTPATIENT)
Dept: PHYSICAL THERAPY | Facility: CLINIC | Age: 50
End: 2020-09-09
Payer: COMMERCIAL

## 2020-09-09 DIAGNOSIS — M75.02 ADHESIVE CAPSULITIS OF LEFT SHOULDER: Primary | ICD-10-CM

## 2020-09-09 PROCEDURE — 97110 THERAPEUTIC EXERCISES: CPT | Performed by: PHYSICAL THERAPIST

## 2020-09-09 NOTE — PROGRESS NOTES
Daily Note     Today's date: 2020  Patient name: Elizabeth Yuen  : 1970  MRN: [id number]  Referring provider: Tan Quinn PA-C  Dx:   Encounter Diagnosis     ICD-10-CM    1  Adhesive capsulitis of left shoulder  M75 02                   Subjective:  Remains compliant with HEP      Objective: See treatment diary below  Reviewed HEP during MT today  Assessment: Tolerated treatment well  Patient would benefit from continued PT      Plan: Continue per plan of care        Precautions: DM II, High co-insurance    Manuals  86  9     PROM 7' 7 7 7 6 6 6 6     Multiplanar GH Mobs: Emphasize Post 8' 8 8 8 8 8 8 8     IASTM RTC     8 8 8 8                  Neuro Re-Ed                                                                                                        Ther Ex             Pulleys; F, A 3'/3' 3'/3' 3'/3' 3'/3' 3'/3' 3'/3' 3'/3' 3'/3'     Finger ladder F, A 10 ea 10 10 10         Corner ER Str : : :         IR Strap Str :10/10 :10/10 :10/10 :10/10         Supine Flexion 20   20         SL ER 20   20- pain         SL Abd 20   20         Sust ER 90/90 str supine  YTB 3' Y 3' Y 3' Y 3'         Sleeper Str  HEP reivew                         HEP review     2' 4' 4' (with MT)     Ther Activity                                       Gait Training                                       Modalities

## 2020-10-08 DIAGNOSIS — E11.9 TYPE 2 DIABETES MELLITUS WITHOUT COMPLICATION, WITHOUT LONG-TERM CURRENT USE OF INSULIN (HCC): Chronic | ICD-10-CM

## 2020-10-08 RX ORDER — PIOGLITAZONEHYDROCHLORIDE 45 MG/1
TABLET ORAL
Qty: 30 TABLET | Refills: 3 | Status: SHIPPED | OUTPATIENT
Start: 2020-10-08 | End: 2021-01-25

## 2020-11-04 DIAGNOSIS — N52.2 DRUG-INDUCED ERECTILE DYSFUNCTION: ICD-10-CM

## 2020-11-04 RX ORDER — SILDENAFIL 50 MG/1
TABLET, FILM COATED ORAL
Qty: 10 TABLET | Refills: 3 | Status: SHIPPED | OUTPATIENT
Start: 2020-11-04 | End: 2021-01-28 | Stop reason: SDUPTHER

## 2020-11-05 DIAGNOSIS — E87.6 HYPOKALEMIA: ICD-10-CM

## 2020-11-05 RX ORDER — ZINC GLUCONATE 100 MG
1 TABLET ORAL DAILY
Qty: 100 TABLET | Refills: 1 | Status: SHIPPED | OUTPATIENT
Start: 2020-11-05

## 2020-11-09 ENCOUNTER — OFFICE VISIT (OUTPATIENT)
Dept: FAMILY MEDICINE CLINIC | Facility: CLINIC | Age: 50
End: 2020-11-09
Payer: COMMERCIAL

## 2020-11-09 VITALS
OXYGEN SATURATION: 98 % | BODY MASS INDEX: 26.52 KG/M2 | DIASTOLIC BLOOD PRESSURE: 74 MMHG | HEIGHT: 66 IN | WEIGHT: 165 LBS | RESPIRATION RATE: 16 BRPM | HEART RATE: 102 BPM | SYSTOLIC BLOOD PRESSURE: 122 MMHG | TEMPERATURE: 97.4 F

## 2020-11-09 DIAGNOSIS — J30.1 SEASONAL ALLERGIC RHINITIS DUE TO POLLEN: ICD-10-CM

## 2020-11-09 DIAGNOSIS — R09.81 NASAL CONGESTION: Primary | ICD-10-CM

## 2020-11-09 DIAGNOSIS — J30.9 ALLERGIC RHINITIS: ICD-10-CM

## 2020-11-09 PROBLEM — U07.1 COVID-19 VIRUS INFECTION: Status: RESOLVED | Noted: 2020-04-01 | Resolved: 2020-11-09

## 2020-11-09 PROCEDURE — 1036F TOBACCO NON-USER: CPT | Performed by: FAMILY MEDICINE

## 2020-11-09 PROCEDURE — 99214 OFFICE O/P EST MOD 30 MIN: CPT | Performed by: FAMILY MEDICINE

## 2020-11-09 PROCEDURE — 3008F BODY MASS INDEX DOCD: CPT | Performed by: FAMILY MEDICINE

## 2020-11-09 PROCEDURE — 3074F SYST BP LT 130 MM HG: CPT | Performed by: FAMILY MEDICINE

## 2020-11-09 PROCEDURE — 3078F DIAST BP <80 MM HG: CPT | Performed by: FAMILY MEDICINE

## 2020-11-09 RX ORDER — MONTELUKAST SODIUM 10 MG/1
10 TABLET ORAL
Qty: 90 TABLET | Refills: 0 | Status: SHIPPED | OUTPATIENT
Start: 2020-11-09 | End: 2022-04-27 | Stop reason: SDUPTHER

## 2020-11-09 RX ORDER — ALBUTEROL SULFATE 90 UG/1
2 AEROSOL, METERED RESPIRATORY (INHALATION) EVERY 6 HOURS PRN
Qty: 1 INHALER | Refills: 1 | Status: SHIPPED | OUTPATIENT
Start: 2020-11-09 | End: 2021-10-18 | Stop reason: ALTCHOICE

## 2020-11-09 RX ORDER — MELOXICAM 15 MG/1
TABLET ORAL
COMMUNITY
Start: 2020-09-06 | End: 2020-12-02

## 2020-11-10 ENCOUNTER — APPOINTMENT (OUTPATIENT)
Dept: LAB | Facility: HOSPITAL | Age: 50
End: 2020-11-10
Payer: COMMERCIAL

## 2020-11-10 DIAGNOSIS — J30.1 SEASONAL ALLERGIC RHINITIS DUE TO POLLEN: Primary | ICD-10-CM

## 2020-11-10 PROCEDURE — U0003 INFECTIOUS AGENT DETECTION BY NUCLEIC ACID (DNA OR RNA); SEVERE ACUTE RESPIRATORY SYNDROME CORONAVIRUS 2 (SARS-COV-2) (CORONAVIRUS DISEASE [COVID-19]), AMPLIFIED PROBE TECHNIQUE, MAKING USE OF HIGH THROUGHPUT TECHNOLOGIES AS DESCRIBED BY CMS-2020-01-R: HCPCS | Performed by: FAMILY MEDICINE

## 2020-11-11 LAB — SARS-COV-2 RNA SPEC QL NAA+PROBE: NOT DETECTED

## 2020-11-24 DIAGNOSIS — J30.9 ALLERGIC RHINITIS, UNSPECIFIED SEASONALITY, UNSPECIFIED TRIGGER: ICD-10-CM

## 2020-11-25 RX ORDER — BECLOMETHASONE DIPROPIONATE 80 UG/1
AEROSOL, METERED NASAL
Qty: 1 INHALER | Refills: 3 | Status: SHIPPED | OUTPATIENT
Start: 2020-11-25 | End: 2021-10-18 | Stop reason: ALTCHOICE

## 2020-12-02 DIAGNOSIS — M75.102 ROTATOR CUFF SYNDROME OF LEFT SHOULDER: Primary | ICD-10-CM

## 2020-12-02 DIAGNOSIS — I10 ESSENTIAL HYPERTENSION: ICD-10-CM

## 2020-12-02 RX ORDER — QUINAPRIL 5 1/1
TABLET ORAL
Qty: 90 TABLET | Refills: 1 | Status: SHIPPED | OUTPATIENT
Start: 2020-12-02 | End: 2020-12-03

## 2020-12-02 RX ORDER — MELOXICAM 15 MG/1
TABLET ORAL
Qty: 90 TABLET | Refills: 1 | Status: SHIPPED | OUTPATIENT
Start: 2020-12-02 | End: 2020-12-03

## 2020-12-03 DIAGNOSIS — M75.102 ROTATOR CUFF SYNDROME OF LEFT SHOULDER: ICD-10-CM

## 2020-12-03 DIAGNOSIS — I10 ESSENTIAL HYPERTENSION: ICD-10-CM

## 2020-12-03 PROCEDURE — 4010F ACE/ARB THERAPY RXD/TAKEN: CPT | Performed by: FAMILY MEDICINE

## 2020-12-03 RX ORDER — QUINAPRIL 5 1/1
TABLET ORAL
Qty: 90 TABLET | Refills: 1 | Status: SHIPPED | OUTPATIENT
Start: 2020-12-03 | End: 2021-06-04

## 2020-12-03 RX ORDER — MELOXICAM 15 MG/1
TABLET ORAL
Qty: 90 TABLET | Refills: 1 | Status: SHIPPED | OUTPATIENT
Start: 2020-12-03 | End: 2021-06-04

## 2020-12-30 DIAGNOSIS — E11.65 TYPE 2 DIABETES MELLITUS WITH HYPERGLYCEMIA, WITHOUT LONG-TERM CURRENT USE OF INSULIN (HCC): Chronic | ICD-10-CM

## 2020-12-30 RX ORDER — DAPAGLIFLOZIN 10 MG/1
TABLET, FILM COATED ORAL
Qty: 30 TABLET | Refills: 5 | Status: SHIPPED | OUTPATIENT
Start: 2020-12-30 | End: 2021-06-24

## 2020-12-31 ENCOUNTER — VBI (OUTPATIENT)
Dept: ADMINISTRATIVE | Facility: OTHER | Age: 50
End: 2020-12-31

## 2021-01-19 ENCOUNTER — TELEPHONE (OUTPATIENT)
Dept: FAMILY MEDICINE CLINIC | Facility: CLINIC | Age: 51
End: 2021-01-19

## 2021-01-19 NOTE — TELEPHONE ENCOUNTER
Per pharmacy discount card   I contacted patient to see if he has another one and or is aware the card has

## 2021-01-19 NOTE — TELEPHONE ENCOUNTER
Please call pharmacy  He has been using [employer] discount card from manufacture which brings it down to 15 dollars    That is still available through December 31, 2021

## 2021-01-20 ENCOUNTER — APPOINTMENT (OUTPATIENT)
Dept: LAB | Facility: CLINIC | Age: 51
End: 2021-01-20
Payer: COMMERCIAL

## 2021-01-20 DIAGNOSIS — E78.2 MIXED HYPERLIPIDEMIA: Chronic | ICD-10-CM

## 2021-01-20 DIAGNOSIS — E11.00 TYPE 2 DIABETES MELLITUS WITH HYPEROSMOLARITY WITHOUT COMA, WITHOUT LONG-TERM CURRENT USE OF INSULIN (HCC): ICD-10-CM

## 2021-01-20 DIAGNOSIS — I10 ESSENTIAL HYPERTENSION: Chronic | ICD-10-CM

## 2021-01-20 LAB
ALBUMIN SERPL BCP-MCNC: 4.2 G/DL (ref 3.5–5)
ALP SERPL-CCNC: 66 U/L (ref 46–116)
ALT SERPL W P-5'-P-CCNC: 24 U/L (ref 12–78)
ANION GAP SERPL CALCULATED.3IONS-SCNC: 10 MMOL/L (ref 4–13)
AST SERPL W P-5'-P-CCNC: 7 U/L (ref 5–45)
BASOPHILS # BLD AUTO: 0.06 THOUSANDS/ΜL (ref 0–0.1)
BASOPHILS NFR BLD AUTO: 1 % (ref 0–1)
BILIRUB SERPL-MCNC: 0.27 MG/DL (ref 0.2–1)
BUN SERPL-MCNC: 19 MG/DL (ref 5–25)
CALCIUM SERPL-MCNC: 8.8 MG/DL (ref 8.3–10.1)
CHLORIDE SERPL-SCNC: 104 MMOL/L (ref 100–108)
CHOLEST SERPL-MCNC: 152 MG/DL (ref 50–200)
CO2 SERPL-SCNC: 24 MMOL/L (ref 21–32)
CREAT SERPL-MCNC: 0.8 MG/DL (ref 0.6–1.3)
CREAT UR-MCNC: 48.9 MG/DL
EOSINOPHIL # BLD AUTO: 0.15 THOUSAND/ΜL (ref 0–0.61)
EOSINOPHIL NFR BLD AUTO: 3 % (ref 0–6)
ERYTHROCYTE [DISTWIDTH] IN BLOOD BY AUTOMATED COUNT: 13.4 % (ref 11.6–15.1)
EST. AVERAGE GLUCOSE BLD GHB EST-MCNC: 171 MG/DL
GFR SERPL CREATININE-BSD FRML MDRD: 105 ML/MIN/1.73SQ M
GLUCOSE P FAST SERPL-MCNC: 200 MG/DL (ref 65–99)
HBA1C MFR BLD: 7.6 %
HCT VFR BLD AUTO: 45.6 % (ref 36.5–49.3)
HDLC SERPL-MCNC: 33 MG/DL
HGB BLD-MCNC: 14.5 G/DL (ref 12–17)
IMM GRANULOCYTES # BLD AUTO: 0.04 THOUSAND/UL (ref 0–0.2)
IMM GRANULOCYTES NFR BLD AUTO: 1 % (ref 0–2)
LDLC SERPL CALC-MCNC: 79 MG/DL (ref 0–100)
LYMPHOCYTES # BLD AUTO: 1.76 THOUSANDS/ΜL (ref 0.6–4.47)
LYMPHOCYTES NFR BLD AUTO: 31 % (ref 14–44)
MCH RBC QN AUTO: 25.9 PG (ref 26.8–34.3)
MCHC RBC AUTO-ENTMCNC: 31.8 G/DL (ref 31.4–37.4)
MCV RBC AUTO: 81 FL (ref 82–98)
MICROALBUMIN UR-MCNC: 16.7 MG/L (ref 0–20)
MICROALBUMIN/CREAT 24H UR: 34 MG/G CREATININE (ref 0–30)
MONOCYTES # BLD AUTO: 0.43 THOUSAND/ΜL (ref 0.17–1.22)
MONOCYTES NFR BLD AUTO: 8 % (ref 4–12)
NEUTROPHILS # BLD AUTO: 3.32 THOUSANDS/ΜL (ref 1.85–7.62)
NEUTS SEG NFR BLD AUTO: 56 % (ref 43–75)
NONHDLC SERPL-MCNC: 119 MG/DL
NRBC BLD AUTO-RTO: 0 /100 WBCS
PLATELET # BLD AUTO: 300 THOUSANDS/UL (ref 149–390)
PMV BLD AUTO: 10.1 FL (ref 8.9–12.7)
POTASSIUM SERPL-SCNC: 4.2 MMOL/L (ref 3.5–5.3)
PROT SERPL-MCNC: 7.4 G/DL (ref 6.4–8.2)
RBC # BLD AUTO: 5.6 MILLION/UL (ref 3.88–5.62)
SODIUM SERPL-SCNC: 138 MMOL/L (ref 136–145)
TRIGL SERPL-MCNC: 198 MG/DL
TSH SERPL DL<=0.05 MIU/L-ACNC: 1.68 UIU/ML (ref 0.36–3.74)
WBC # BLD AUTO: 5.76 THOUSAND/UL (ref 4.31–10.16)

## 2021-01-20 PROCEDURE — 3060F POS MICROALBUMINURIA REV: CPT | Performed by: FAMILY MEDICINE

## 2021-01-20 PROCEDURE — 84443 ASSAY THYROID STIM HORMONE: CPT

## 2021-01-20 PROCEDURE — 83036 HEMOGLOBIN GLYCOSYLATED A1C: CPT

## 2021-01-20 PROCEDURE — 82570 ASSAY OF URINE CREATININE: CPT

## 2021-01-20 PROCEDURE — 80061 LIPID PANEL: CPT

## 2021-01-20 PROCEDURE — 85025 COMPLETE CBC W/AUTO DIFF WBC: CPT

## 2021-01-20 PROCEDURE — 3051F HG A1C>EQUAL 7.0%<8.0%: CPT | Performed by: FAMILY MEDICINE

## 2021-01-20 PROCEDURE — 80053 COMPREHEN METABOLIC PANEL: CPT

## 2021-01-20 PROCEDURE — 82043 UR ALBUMIN QUANTITATIVE: CPT

## 2021-01-20 PROCEDURE — 36415 COLL VENOUS BLD VENIPUNCTURE: CPT

## 2021-01-25 DIAGNOSIS — E11.9 TYPE 2 DIABETES MELLITUS WITHOUT COMPLICATION, WITHOUT LONG-TERM CURRENT USE OF INSULIN (HCC): Chronic | ICD-10-CM

## 2021-01-25 RX ORDER — PIOGLITAZONEHYDROCHLORIDE 45 MG/1
TABLET ORAL
Qty: 90 TABLET | Refills: 3 | Status: SHIPPED | OUTPATIENT
Start: 2021-01-25 | End: 2021-01-26

## 2021-01-26 DIAGNOSIS — E11.00 TYPE 2 DIABETES MELLITUS WITH HYPEROSMOLARITY WITHOUT COMA, WITHOUT LONG-TERM CURRENT USE OF INSULIN (HCC): ICD-10-CM

## 2021-01-26 DIAGNOSIS — E11.9 TYPE 2 DIABETES MELLITUS WITHOUT COMPLICATION, WITHOUT LONG-TERM CURRENT USE OF INSULIN (HCC): Chronic | ICD-10-CM

## 2021-01-26 RX ORDER — PIOGLITAZONEHYDROCHLORIDE 45 MG/1
TABLET ORAL
Qty: 30 TABLET | Refills: 3 | Status: SHIPPED | OUTPATIENT
Start: 2021-01-26 | End: 2022-01-17

## 2021-01-26 RX ORDER — GLIPIZIDE 5 MG/1
TABLET, FILM COATED, EXTENDED RELEASE ORAL
Qty: 90 TABLET | Refills: 1 | Status: SHIPPED | OUTPATIENT
Start: 2021-01-26 | End: 2021-07-20

## 2021-01-28 ENCOUNTER — OFFICE VISIT (OUTPATIENT)
Dept: FAMILY MEDICINE CLINIC | Facility: CLINIC | Age: 51
End: 2021-01-28
Payer: COMMERCIAL

## 2021-01-28 VITALS
DIASTOLIC BLOOD PRESSURE: 78 MMHG | HEART RATE: 80 BPM | RESPIRATION RATE: 16 BRPM | SYSTOLIC BLOOD PRESSURE: 126 MMHG | OXYGEN SATURATION: 98 % | HEIGHT: 66 IN | BODY MASS INDEX: 28.45 KG/M2 | WEIGHT: 177 LBS

## 2021-01-28 DIAGNOSIS — E55.9 VITAMIN D DEFICIENCY: ICD-10-CM

## 2021-01-28 DIAGNOSIS — E78.2 MIXED HYPERLIPIDEMIA: ICD-10-CM

## 2021-01-28 DIAGNOSIS — N52.2 DRUG-INDUCED ERECTILE DYSFUNCTION: ICD-10-CM

## 2021-01-28 DIAGNOSIS — E11.00 TYPE 2 DIABETES MELLITUS WITH HYPEROSMOLARITY WITHOUT COMA, WITHOUT LONG-TERM CURRENT USE OF INSULIN (HCC): Primary | ICD-10-CM

## 2021-01-28 DIAGNOSIS — J30.2 SEASONAL ALLERGIC RHINITIS, UNSPECIFIED TRIGGER: ICD-10-CM

## 2021-01-28 DIAGNOSIS — R93.1 AGATSTON CORONARY ARTERY CALCIUM SCORE BETWEEN 100 AND 199: ICD-10-CM

## 2021-01-28 DIAGNOSIS — I10 ESSENTIAL HYPERTENSION: Chronic | ICD-10-CM

## 2021-01-28 PROCEDURE — 3078F DIAST BP <80 MM HG: CPT | Performed by: FAMILY MEDICINE

## 2021-01-28 PROCEDURE — 3008F BODY MASS INDEX DOCD: CPT | Performed by: FAMILY MEDICINE

## 2021-01-28 PROCEDURE — 99215 OFFICE O/P EST HI 40 MIN: CPT | Performed by: FAMILY MEDICINE

## 2021-01-28 PROCEDURE — 3074F SYST BP LT 130 MM HG: CPT | Performed by: FAMILY MEDICINE

## 2021-01-28 PROCEDURE — 3725F SCREEN DEPRESSION PERFORMED: CPT | Performed by: FAMILY MEDICINE

## 2021-01-28 RX ORDER — AZELASTINE HYDROCHLORIDE, FLUTICASONE PROPIONATE 137; 50 UG/1; UG/1
1 SPRAY, METERED NASAL DAILY
Qty: 1 BOTTLE | Refills: 5 | Status: SHIPPED | OUTPATIENT
Start: 2021-01-28 | End: 2021-10-18 | Stop reason: SDUPTHER

## 2021-01-28 RX ORDER — SILDENAFIL 100 MG/1
100 TABLET, FILM COATED ORAL AS NEEDED
Qty: 12 TABLET | Refills: 1 | Status: SHIPPED | OUTPATIENT
Start: 2021-01-28 | End: 2022-05-17

## 2021-01-28 NOTE — ASSESSMENT & PLAN NOTE
Hypertension remains well controlled on Accupril 5 mg once daily and Cardizem 30 mg once daily  Continue same

## 2021-01-28 NOTE — ASSESSMENT & PLAN NOTE
Hyper lipidemia remains adequately controlled on Crestor 10 mg once daily  Continue same    Repeat lipid profile will be performed in April with [facility] screening program

## 2021-01-28 NOTE — ASSESSMENT & PLAN NOTE
Lab Results   Component Value Date    HGBA1C 7 6 (H) 01/20/2021     Increase in hemoglobin A1c which is not unusual given some dietary indiscretion and decrease in the amount of physical activity during corona virus pandemic and the winter  Patient remains on Janumet, Actos, glipizide and [place]    Patient will have repeat diabetic parameters done with executive health screening in April

## 2021-01-28 NOTE — ASSESSMENT & PLAN NOTE
Control risk factors including hypertension, hyperlipidemia, diabetes mellitus  Patient remains on Crestor and Accupril  Goal LDL cholesterol less than 70   Patient will be having repeat CT coronary calcium score performed at [facility] in April

## 2021-01-28 NOTE — ASSESSMENT & PLAN NOTE
Unfortunately QNASL is no longer being covered by his insurance  Will send prescription for Dymista  New script sent to pharmacy    Hopefully this will work well for the patient

## 2021-01-28 NOTE — PROGRESS NOTES
Subjective:      Patient ID: Gabriella Zavala is a 53 y o  male  44-year-old male with past medical history of diabetes mellitus type 2, hypertension, hyperlipidemia presents for follow-up in regards to chronic conditions  Overall the patient has been feeling well  He has been very busy at work  He did have COVID-19 infection in the spring  He did receive his 1st dose of COVID-19 vaccine and he did have a robust immune response to the vaccination  Due to get his 2nd dose on February 2nd  Patient has gained weight during COVID-19 pandemic  Has not been as physically active  Normally they play basketball outside the store which she has not done  They were going for family walks during the fall but now that it is winter he has not been as physically active  The family has started going skiing once again he is working on trying to lose some of the weight that he gained  Normal TSH at 1 682, normal CBC, CMP with the exception of impaired fasting glucose at 200, hemoglobin A1c increased from 6 9-7 6%, total cholesterol 152, triglycerides 198, HDL 33, LDL 79, urine is negative for microalbumin  Patient does complain of decreased strength of his erections  He does have some benefit with 50 mg of Viagra but still not as strong as he would like his erections to be  Still has libido  Wife still has desire for sexual intercourse in they would like to have sexual intercourse more frequently    No significant side effects to Viagra      Past Medical History:   Diagnosis Date    Anxiety     only when flying    Chronic frontal sinusitis     last assessed 03/02/2016    COVID-19 virus infection 4/1/2020    Diabetes mellitus ([facility] [address] )     Fracture of great toe     last assessed 11/06/2014    GERD (gastroesophageal reflux disease)     Hyperlipidemia     Hypertension     Palpitations     last assessed 11/06/2012       Family History   Problem Relation Age of Onset    Diabetes Mother     Liver cancer Mother  Hypertension Mother        Past Surgical History:   Procedure Laterality Date    HERNIA REPAIR Bilateral     NJ INDERJIT SHLDR JT W ANESTHESIA Right 4/3/2017    Procedure: SHOULDER MANIPULATION UNDER ANESTHESIA ;  Surgeon: Luh Guzmán MD;  Location: [facility];  Service: Orthopedics        reports that he has never smoked  He has never used smokeless tobacco  He reports current alcohol use  He reports that he does not use drugs        Current Outpatient Medications:     albuterol (ProAir HFA) 90 mcg/act inhaler, Inhale 2 puffs every 6 (six) hours as needed for wheezing, Disp: 1 Inhaler, Rfl: 1    aspirin 81 mg chewable tablet, Adult Aspirin Low Strength 81 MG CHEW 1 PO Q D  Quantity: 0;  Refills: 0    [employer], Provider M D ;  Started 11-Oct-2007 Active, Disp: , Rfl:     azelastine (ASTELIN) 0 1 % nasal spray, INHALE ONE SPRAY INTO EACH NOSTRIL TWO TIMES A DAY AS DIRECTED - GENERIC FOR ASTELIN, Disp: 30 mL, Rfl: 0    diltiazem (CARDIZEM) 30 mg tablet, Take 1 tablet (30 mg total) by mouth daily, Disp: 90 tablet, Rfl: 1    esomeprazole (NexIUM) 40 MG capsule, TAKE ONE CAPSULE BY MOUTH EVERY DAY, Disp: 90 capsule, Rfl: 1    Farxiga 10 MG TABS, TAKE ONE TABLET BY MOUTH EVERY DAY, Disp: 30 tablet, Rfl: 5    fexofenadine (ALLEGRA) 180 MG tablet, Take 1 tablet (180 mg total) by mouth daily, Disp: 30 tablet, Rfl: 5    Janumet XR  MG TB24, TAKE TWO TABLETS BY MOUTH EVERY DAY, Disp: 180 tablet, Rfl: 1    meloxicam (MOBIC) 15 mg tablet, TAKE ONE TABLET BY MOUTH EVERY DAY, Disp: 90 tablet, Rfl: 1    montelukast (SINGULAIR) 10 mg tablet, Take 1 tablet (10 mg total) by mouth daily at bedtime, Disp: 90 tablet, Rfl: 0    olopatadine HCl (PATADAY) 0 2 % opth drops, Administer 1 drop to both eyes daily, Disp: 5 mL, Rfl: 3    Omega-3 Fatty Acids (FISH OIL) 1200 MG CAPS, Take 1 capsule by mouth daily, Disp: , Rfl:     Phytonadione (K 100) 100 MCG TABS, Take 1 tablet (100 mcg total) by mouth daily, Disp: 100 tablet, Rfl: 1    pioglitazone (ACTOS) 45 mg tablet, TAKE ONE TABLET BY MOUTH EVERY DAY, Disp: 30 tablet, Rfl: 3    Qnasl 80 MCG/ACT AERS, INHALE ONE SPRAY IN EACH NOSTRIL TWO TIMES A DAY, Disp: 1 Inhaler, Rfl: 3    quinapril (ACCUPRIL) 5 mg tablet, TAKE ONE TABLET BY MOUTH EVERY DAY, Disp: 90 tablet, Rfl: 1    rosuvastatin (CRESTOR) 10 MG tablet, TAKE ONE TABLET BY MOUTH EVERY DAY, Disp: 90 tablet, Rfl: 1    sildenafil (VIAGRA) 100 mg tablet, Take 1 tablet (100 mg total) by mouth as needed for erectile dysfunction, Disp: 12 tablet, Rfl: 1    ALPRAZolam (XANAX) 0 25 mg tablet, Take 1 tablet (0 25 mg total) by mouth every 8 (eight) hours as needed for anxiety (Patient not taking: Reported on 1/28/2021), Disp: 30 tablet, Rfl: 0    Azelastine-Fluticasone (Dymista) 137-50 MCG/ACT SUSP, 1 spray into each nostril daily, Disp: 1 Bottle, Rfl: 5    CHOLECALCIFEROL PO, Take 1,000 Units by mouth, Disp: , Rfl:     glipiZIDE (GLUCOTROL XL) 5 mg 24 hr tablet, TAKE ONE TABLET BY MOUTH EVERY DAY, Disp: 90 tablet, Rfl: 1    glucose blood (FREESTYLE INSULINX TEST) test strip, 1 Squirt by In Vitro route 2 (two) times a day, Disp: , Rfl:     metaxalone (SKELAXIN) 800 mg tablet, Take 1 tablet (800 mg total) by mouth 3 (three) times a day as needed for muscle spasms (Patient not taking: Reported on 1/28/2021), Disp: 60 tablet, Rfl: 1    The following portions of the patient's history were reviewed and updated as appropriate: allergies, current medications, past family history, past medical history, past social history, past surgical history and problem list     Review of Systems   Constitutional: Positive for unexpected weight change (Weight gain)  HENT: Negative  Eyes: Negative  Respiratory: Negative  Cardiovascular: Negative  Gastrointestinal: Negative  Endocrine: Negative  Genitourinary: Negative  Erectile dysfunction   Musculoskeletal: Negative  Skin: Negative      Allergic/Immunologic: Negative  Neurological: Negative  Hematological: Negative  Psychiatric/Behavioral: Negative  All other systems reviewed and are negative  Objective:    /78   Pulse 80   Resp 16   Ht 5' 6" (1 676 m)   Wt 80 3 kg (177 lb)   SpO2 98%   BMI 28 57 kg/m²      Physical Exam  Vitals signs and nursing note reviewed  Constitutional:       General: He is not in acute distress  Appearance: Normal appearance  He is well-developed and normal weight  He is not ill-appearing  HENT:      Head: Normocephalic and atraumatic  Right Ear: Tympanic membrane, ear canal and external ear normal       Left Ear: Tympanic membrane, ear canal and external ear normal    Eyes:      Extraocular Movements: Extraocular movements intact  Conjunctiva/sclera: Conjunctivae normal       Pupils: Pupils are equal, round, and reactive to light  Neck:      Musculoskeletal: Normal range of motion and neck supple  Cardiovascular:      Rate and Rhythm: Normal rate and regular rhythm  Pulses: Normal pulses  no weak pulses          Dorsalis pedis pulses are 2+ on the right side and 2+ on the left side  Posterior tibial pulses are 2+ on the right side and 2+ on the left side  Heart sounds: Normal heart sounds  No murmur  Pulmonary:      Effort: Pulmonary effort is normal       Breath sounds: Normal breath sounds  Abdominal:      General: Abdomen is flat  Bowel sounds are normal       Palpations: Abdomen is soft  Musculoskeletal: Normal range of motion  Feet:      Right foot:      Skin integrity: No ulcer, skin breakdown, erythema, warmth, callus or dry skin  Left foot:      Skin integrity: No ulcer, skin breakdown, erythema, warmth, callus or dry skin  Skin:     General: Skin is warm and dry  Neurological:      General: No focal deficit present  Mental Status: He is alert and oriented to person, place, and time     Psychiatric:         Mood and Affect: Mood normal  Behavior: Behavior normal          Thought Content: Thought content normal          Judgment: Judgment normal          Patient's shoes and socks removed  Right Foot/Ankle   Right Foot Inspection  Skin Exam: skin normal and skin intact no dry skin, no warmth, no callus, no erythema, no maceration, no abnormal color, no pre-ulcer, no ulcer and no callus                          Toe Exam: ROM and strength within normal limits  Sensory   Vibration: intact  Proprioception: intact   Monofilament testing: intact  Vascular  Capillary refills: < 3 seconds  The right DP pulse is 2+  The right PT pulse is 2+  Left Foot/Ankle  Left Foot Inspection  Skin Exam: skin normal and skin intactno dry skin, no warmth, no erythema, no maceration, normal color, no pre-ulcer, no ulcer and no callus                         Toe Exam: ROM and strength within normal limits                   Sensory   Vibration: intact  Proprioception: intact  Monofilament: intact  Vascular  Capillary refills: < 3 seconds  The left DP pulse is 2+  The left PT pulse is 2+  Assign Risk Category:  No deformity present; No loss of protective sensation;  No weak pulses       Risk: 0      Recent Results (from the past 1008 hour(s))   CBC and differential    Collection Time: 01/20/21  8:39 AM   Result Value Ref Range    WBC 5 76 4 31 - 10 16 Thousand/uL    RBC 5 60 3 88 - 5 62 Million/uL    Hemoglobin 14 5 12 0 - 17 0 g/dL    Hematocrit 45 6 36 5 - 49 3 %    MCV 81 (L) 82 - 98 fL    MCH 25 9 (L) 26 8 - 34 3 pg    MCHC 31 8 31 4 - 37 4 g/dL    RDW 13 4 11 6 - 15 1 %    MPV 10 1 8 9 - 12 7 fL    Platelets 161 423 - 913 Thousands/uL    nRBC 0 /100 WBCs    Neutrophils Relative 56 43 - 75 %    Immat GRANS % 1 0 - 2 %    Lymphocytes Relative 31 14 - 44 %    Monocytes Relative 8 4 - 12 %    Eosinophils Relative 3 0 - 6 %    Basophils Relative 1 0 - 1 %    Neutrophils Absolute 3 32 1 85 - 7 62 Thousands/µL    Immature Grans Absolute 0 04 0 00 - 0 20 Thousand/uL Lymphocytes Absolute 1 76 0 60 - 4 47 Thousands/µL    Monocytes Absolute 0 43 0 17 - 1 22 Thousand/µL    Eosinophils Absolute 0 15 0 00 - 0 61 Thousand/µL    Basophils Absolute 0 06 0 00 - 0 10 Thousands/µL   Comprehensive metabolic panel    Collection Time: 01/20/21  8:39 AM   Result Value Ref Range    Sodium 138 136 - 145 mmol/L    Potassium 4 2 3 5 - 5 3 mmol/L    Chloride 104 100 - 108 mmol/L    CO2 24 21 - 32 mmol/L    ANION GAP 10 4 - 13 mmol/L    BUN 19 5 - 25 mg/dL    Creatinine 0 80 0 60 - 1 30 mg/dL    Glucose, Fasting 200 (H) 65 - 99 mg/dL    Calcium 8 8 8 3 - 10 1 mg/dL    AST 7 5 - 45 U/L    ALT 24 12 - 78 U/L    Alkaline Phosphatase 66 46 - 116 U/L    Total Protein 7 4 6 4 - 8 2 g/dL    Albumin 4 2 3 5 - 5 0 g/dL    Total Bilirubin 0 27 0 20 - 1 00 mg/dL    eGFR 105 ml/min/1 73sq m   Hemoglobin A1C    Collection Time: 01/20/21  8:39 AM   Result Value Ref Range    Hemoglobin A1C 7 6 (H) Normal 3 8-5 6%; PreDiabetic 5 7-6 4%; Diabetic >=6 5%; Glycemic control for adults with diabetes <7 0% %     mg/dl   Lipid panel    Collection Time: 01/20/21  8:39 AM   Result Value Ref Range    Cholesterol 152 50 - 200 mg/dL    Triglycerides 198 (H) <=150 mg/dL    HDL, Direct 33 (L) >=40 mg/dL    LDL Calculated 79 0 - 100 mg/dL    Non-HDL-Chol (CHOL-HDL) 119 mg/dl   Microalbumin / creatinine urine ratio    Collection Time: 01/20/21  8:39 AM   Result Value Ref Range    Creatinine, Ur 48 9 mg/dL    Microalbum  ,U,Random 16 7 0 0 - 20 0 mg/L    Microalb Creat Ratio 34 (H) 0 - 30 mg/g creatinine   TSH, 3rd generation with Free T4 reflex    Collection Time: 01/20/21  8:39 AM   Result Value Ref Range    TSH 3RD GENERATON 1 682 0 358 - 3 740 uIU/mL       Assessment/Plan:    Type 2 diabetes mellitus with hyperosmolarity without coma, without long-term current use of insulin (HCC)    Lab Results   Component Value Date    HGBA1C 7 6 (H) 01/20/2021     Increase in hemoglobin A1c which is not unusual given some dietary indiscretion and decrease in the amount of physical activity during corona virus pandemic and the winter  Patient remains on Janumet, Actos, glipizide and [place]  Patient will have repeat diabetic parameters done with executive health screening in April    Allergic rhinitis, seasonal  Unfortunately QNASL is no longer being covered by his insurance  Will send prescription for Dymista  New script sent to pharmacy  Hopefully this will work well for the patient    Agatston coronary artery calcium score between 100 and 199  Control risk factors including hypertension, hyperlipidemia, diabetes mellitus  Patient remains on Crestor and Accupril  Goal LDL cholesterol less than 70  Patient will be having repeat CT coronary calcium score performed at executive health screening in April    Essential hypertension  Hypertension remains well controlled on Accupril 5 mg once daily and Cardizem 30 mg once daily  Continue same  Drug-induced erectile dysfunction  Will increase dose of sildenafil to 100 mg on a p r n  Basis  Mixed hyperlipidemia  Hyper lipidemia remains adequately controlled on Crestor 10 mg once daily  Continue same  Repeat lipid profile will be performed in April with [facility] screening program    Vitamin D deficiency  Continue on over-the-counter vitamin-D supplementation  Problem List Items Addressed This Visit        Endocrine    Type 2 diabetes mellitus with hyperosmolarity without coma, without long-term current use of insulin ([facility] [address] ) - Primary       Lab Results   Component Value Date    HGBA1C 7 6 (H) 01/20/2021     Increase in hemoglobin A1c which is not unusual given some dietary indiscretion and decrease in the amount of physical activity during corona virus pandemic and the winter  Patient remains on Janumet, Actos, glipizide and [place]    Patient will have repeat diabetic parameters done with executive health screening in April            Respiratory    Allergic rhinitis, seasonal     Unfortunately QNASL is no longer being covered by his insurance  Will send prescription for Dymista  New script sent to pharmacy  Hopefully this will work well for the patient         Relevant Medications    Azelastine-Fluticasone (Dymista) 137-50 MCG/ACT SUSP       Cardiovascular and Mediastinum    Agatston coronary artery calcium score between 100 and 199     Control risk factors including hypertension, hyperlipidemia, diabetes mellitus  Patient remains on Crestor and Accupril  Goal LDL cholesterol less than 70  Patient will be having repeat CT coronary calcium score performed at [employer] health screening in April         Relevant Medications    sildenafil (VIAGRA) 100 mg tablet    Essential hypertension (Chronic)     Hypertension remains well controlled on Accupril 5 mg once daily and Cardizem 30 mg once daily  Continue same  Other    Drug-induced erectile dysfunction     Will increase dose of sildenafil to 100 mg on a p r n  Basis  Relevant Medications    sildenafil (VIAGRA) 100 mg tablet    Mixed hyperlipidemia     Hyper lipidemia remains adequately controlled on Crestor 10 mg once daily  Continue same  Repeat lipid profile will be performed in April with [employer] screening program         Vitamin D deficiency     Continue on over-the-counter vitamin-D supplementation

## 2021-01-30 DIAGNOSIS — E11.9 TYPE 2 DIABETES MELLITUS WITHOUT COMPLICATION, WITHOUT LONG-TERM CURRENT USE OF INSULIN (HCC): ICD-10-CM

## 2021-01-31 RX ORDER — SITAGLIPTIN AND METFORMIN HYDROCHLORIDE 1000; 50 MG/1; MG/1
TABLET, FILM COATED, EXTENDED RELEASE ORAL
Qty: 180 TABLET | Refills: 1 | Status: SHIPPED | OUTPATIENT
Start: 2021-01-31 | End: 2021-08-05

## 2021-02-28 DIAGNOSIS — K21.9 GASTROESOPHAGEAL REFLUX DISEASE: ICD-10-CM

## 2021-02-28 RX ORDER — ESOMEPRAZOLE MAGNESIUM 40 MG/1
CAPSULE, DELAYED RELEASE ORAL
Qty: 90 CAPSULE | Refills: 1 | Status: SHIPPED | OUTPATIENT
Start: 2021-02-28 | End: 2021-08-31

## 2021-03-08 DIAGNOSIS — Z11.9 ENCOUNTER FOR SCREENING FOR INFECTIOUS AND PARASITIC DISEASES, UNSPECIFIED: Primary | ICD-10-CM

## 2021-03-12 DIAGNOSIS — Z11.9 ENCOUNTER FOR SCREENING FOR INFECTIOUS AND PARASITIC DISEASES, UNSPECIFIED: ICD-10-CM

## 2021-03-12 LAB — SARS-COV-2 RNA RESP QL NAA+PROBE: NEGATIVE

## 2021-03-12 PROCEDURE — U0005 INFEC AGEN DETEC AMPLI PROBE: HCPCS | Performed by: FAMILY MEDICINE

## 2021-03-12 PROCEDURE — U0003 INFECTIOUS AGENT DETECTION BY NUCLEIC ACID (DNA OR RNA); SEVERE ACUTE RESPIRATORY SYNDROME CORONAVIRUS 2 (SARS-COV-2) (CORONAVIRUS DISEASE [COVID-19]), AMPLIFIED PROBE TECHNIQUE, MAKING USE OF HIGH THROUGHPUT TECHNOLOGIES AS DESCRIBED BY CMS-2020-01-R: HCPCS | Performed by: FAMILY MEDICINE

## 2021-03-31 LAB
EXT SARS-COV-2: NEGATIVE
HBA1C MFR BLD HPLC: 7.9 %

## 2021-04-30 NOTE — PROGRESS NOTES
Hemoglobin A1c at 7 9%  Cholesterol is well controlled    Will review at follow-up appointment in July

## 2021-06-02 ENCOUNTER — OFFICE VISIT (OUTPATIENT)
Dept: FAMILY MEDICINE CLINIC | Facility: CLINIC | Age: 51
End: 2021-06-02
Payer: COMMERCIAL

## 2021-06-02 VITALS
BODY MASS INDEX: 27.64 KG/M2 | OXYGEN SATURATION: 97 % | HEART RATE: 95 BPM | RESPIRATION RATE: 18 BRPM | SYSTOLIC BLOOD PRESSURE: 110 MMHG | WEIGHT: 172 LBS | HEIGHT: 66 IN | DIASTOLIC BLOOD PRESSURE: 72 MMHG

## 2021-06-02 DIAGNOSIS — Z11.9 ENCOUNTER FOR SCREENING FOR INFECTIOUS AND PARASITIC DISEASES, UNSPECIFIED: Primary | ICD-10-CM

## 2021-06-02 DIAGNOSIS — Z86.59 HISTORY OF ANXIETY: ICD-10-CM

## 2021-06-02 DIAGNOSIS — M79.676 PAIN OF GREAT TOE, UNSPECIFIED LATERALITY: Primary | ICD-10-CM

## 2021-06-02 PROCEDURE — 3008F BODY MASS INDEX DOCD: CPT | Performed by: NURSE PRACTITIONER

## 2021-06-02 PROCEDURE — 1036F TOBACCO NON-USER: CPT | Performed by: NURSE PRACTITIONER

## 2021-06-02 PROCEDURE — 3078F DIAST BP <80 MM HG: CPT | Performed by: NURSE PRACTITIONER

## 2021-06-02 PROCEDURE — 99213 OFFICE O/P EST LOW 20 MIN: CPT | Performed by: NURSE PRACTITIONER

## 2021-06-02 PROCEDURE — 3074F SYST BP LT 130 MM HG: CPT | Performed by: NURSE PRACTITIONER

## 2021-06-02 RX ORDER — ALPRAZOLAM 0.25 MG/1
0.25 TABLET ORAL EVERY 8 HOURS PRN
Qty: 30 TABLET | Refills: 0 | Status: SHIPPED | OUTPATIENT
Start: 2021-06-02

## 2021-06-02 NOTE — PROGRESS NOTES
Assessment/Plan:      Diagnoses and all orders for this visit:    Pain of great toe, unspecified laterality      Patient reports that he woke up Tuesday and his right great toe was tender and alittle red  Patient reports that his left great toe was also alittle tender  Patient reports that his great toes seem better today  Patient reports that he wanted them checked because he is a diabetic  No tenderness noted on palpation of the great toes  No redness or swelling noted  Reassurance provided  Patient instructed to follow-up if symptoms recur  Subjective:     Patient ID: Tessa Santos is a 53 y o  male  Patient reports that he woke up Tuesday morning and his right great toe was tender and alittle red  Denies any fever  Denies any injury  Patient denies putting anything on it OTC  Patient reports that his great toe seems better today  Patient reports that he is a diabetic and wanted his toe checked  Patient reports that his left great toe was also alittle tender  Denies any injury  Denies any redness or swelling  Patient reports that he just wanted his feet checked before he went away on vacation  Review of Systems   Constitutional: Negative for chills and fever  HENT: Negative for congestion, ear pain and sore throat  Respiratory: Negative for cough, chest tightness and shortness of breath  Cardiovascular: Negative for chest pain  Gastrointestinal: Negative for abdominal pain, diarrhea, nausea and vomiting  Musculoskeletal:        As noted in HPI  Skin: Negative for rash  Neurological: Negative for dizziness, syncope, light-headedness and headaches  Objective:     Physical Exam  Vitals signs reviewed  Constitutional:       General: He is not in acute distress  Appearance: He is not ill-appearing or diaphoretic  Cardiovascular:      Rate and Rhythm: Normal rate and regular rhythm  Pulses: Normal pulses  Heart sounds: Normal heart sounds     Pulmonary: Effort: Pulmonary effort is normal  No respiratory distress  Breath sounds: Normal breath sounds  No wheezing  Musculoskeletal:      Comments: Gait wnl  No tenderness noted on palpation of both great toes  No redness or swelling noted  Skin:     Findings: No rash  Neurological:      Mental Status: He is alert and oriented to person, place, and time     Psychiatric:         Mood and Affect: Mood normal

## 2021-06-04 DIAGNOSIS — M75.102 ROTATOR CUFF SYNDROME OF LEFT SHOULDER: ICD-10-CM

## 2021-06-04 DIAGNOSIS — I10 ESSENTIAL HYPERTENSION: ICD-10-CM

## 2021-06-04 PROCEDURE — 4010F ACE/ARB THERAPY RXD/TAKEN: CPT | Performed by: NURSE PRACTITIONER

## 2021-06-04 RX ORDER — MELOXICAM 15 MG/1
TABLET ORAL
Qty: 90 TABLET | Refills: 1 | Status: SHIPPED | OUTPATIENT
Start: 2021-06-04 | End: 2021-12-06

## 2021-06-04 RX ORDER — QUINAPRIL 5 1/1
TABLET ORAL
Qty: 90 TABLET | Refills: 1 | Status: SHIPPED | OUTPATIENT
Start: 2021-06-04 | End: 2021-12-06

## 2021-06-09 DIAGNOSIS — Z11.9 ENCOUNTER FOR SCREENING FOR INFECTIOUS AND PARASITIC DISEASES, UNSPECIFIED: ICD-10-CM

## 2021-06-09 LAB — SARS-COV-2 RNA RESP QL NAA+PROBE: NEGATIVE

## 2021-06-09 PROCEDURE — U0003 INFECTIOUS AGENT DETECTION BY NUCLEIC ACID (DNA OR RNA); SEVERE ACUTE RESPIRATORY SYNDROME CORONAVIRUS 2 (SARS-COV-2) (CORONAVIRUS DISEASE [COVID-19]), AMPLIFIED PROBE TECHNIQUE, MAKING USE OF HIGH THROUGHPUT TECHNOLOGIES AS DESCRIBED BY CMS-2020-01-R: HCPCS | Performed by: FAMILY MEDICINE

## 2021-06-09 PROCEDURE — U0005 INFEC AGEN DETEC AMPLI PROBE: HCPCS | Performed by: FAMILY MEDICINE

## 2021-06-24 DIAGNOSIS — E11.65 TYPE 2 DIABETES MELLITUS WITH HYPERGLYCEMIA, WITHOUT LONG-TERM CURRENT USE OF INSULIN (HCC): Chronic | ICD-10-CM

## 2021-06-24 RX ORDER — DAPAGLIFLOZIN 10 MG/1
TABLET, FILM COATED ORAL
Qty: 30 TABLET | Refills: 5 | Status: SHIPPED | OUTPATIENT
Start: 2021-06-24 | End: 2021-12-17

## 2021-07-23 ENCOUNTER — OFFICE VISIT (OUTPATIENT)
Dept: FAMILY MEDICINE CLINIC | Facility: CLINIC | Age: 51
End: 2021-07-23
Payer: COMMERCIAL

## 2021-07-23 ENCOUNTER — HOSPITAL ENCOUNTER (OUTPATIENT)
Dept: ULTRASOUND IMAGING | Facility: HOSPITAL | Age: 51
Discharge: HOME | End: 2021-07-23
Payer: COMMERCIAL

## 2021-07-23 VITALS
TEMPERATURE: 97.6 F | BODY MASS INDEX: 27.77 KG/M2 | RESPIRATION RATE: 18 BRPM | WEIGHT: 172.8 LBS | OXYGEN SATURATION: 97 % | HEART RATE: 100 BPM | SYSTOLIC BLOOD PRESSURE: 116 MMHG | HEIGHT: 66 IN | DIASTOLIC BLOOD PRESSURE: 72 MMHG

## 2021-07-23 DIAGNOSIS — E55.9 VITAMIN D DEFICIENCY: ICD-10-CM

## 2021-07-23 DIAGNOSIS — E78.2 MIXED HYPERLIPIDEMIA: ICD-10-CM

## 2021-07-23 DIAGNOSIS — E11.00 TYPE 2 DIABETES MELLITUS WITH HYPEROSMOLARITY WITHOUT COMA, WITHOUT LONG-TERM CURRENT USE OF INSULIN (HCC): ICD-10-CM

## 2021-07-23 DIAGNOSIS — R10.13 ABDOMINAL PAIN, EPIGASTRIC: ICD-10-CM

## 2021-07-23 DIAGNOSIS — I10 ESSENTIAL HYPERTENSION: Chronic | ICD-10-CM

## 2021-07-23 DIAGNOSIS — R10.13 ABDOMINAL PAIN, EPIGASTRIC: Primary | ICD-10-CM

## 2021-07-23 PROCEDURE — 99214 OFFICE O/P EST MOD 30 MIN: CPT | Performed by: FAMILY MEDICINE

## 2021-07-23 PROCEDURE — 3074F SYST BP LT 130 MM HG: CPT | Performed by: FAMILY MEDICINE

## 2021-07-23 PROCEDURE — 76705 ECHO EXAM OF ABDOMEN: CPT

## 2021-07-23 PROCEDURE — 1036F TOBACCO NON-USER: CPT | Performed by: FAMILY MEDICINE

## 2021-07-23 PROCEDURE — 3008F BODY MASS INDEX DOCD: CPT | Performed by: FAMILY MEDICINE

## 2021-07-23 PROCEDURE — 3078F DIAST BP <80 MM HG: CPT | Performed by: FAMILY MEDICINE

## 2021-07-23 NOTE — ASSESSMENT & PLAN NOTE
Patient does have some midepigastric abdominal tenderness as well as some tenderness towards the right upper quadrant  I did explain to the patient that usually the pain from peptic ulcer disease will improve with eating or taking antacids    -I did place order for right upper quadrant ultrasound to evaluate gallbladder  Patient will have this done this evening and I will contact with results     -should his abdominal pain worsen then the patient should proceed to nearest ER for further evaluation and treatment    At this point his pain is more of an annoyance than anything markedly acute

## 2021-07-23 NOTE — PROGRESS NOTES
Subjective:      Patient ID: Jeanette Griffin is a 53 y o  male  58-year-old male presents to the office complaining of a 3 day history of abdominal discomfort  Patient describes a throbbing pain at the midepigastric region that seems to travel downwards  Patient does have history of GERD and is on daily Nexium  Patient does eat spicy food, drinks alcohol on the weekends and is not the best person for following a GERD diet  No nausea or vomiting  No change in bowel habits  Patient has not really noticed if the discomfort is exacerbated by food  It does seem to travel towards his back  Patient did have trying ease food on Wednesday when this had started  Patient describes the pain as a throbbing rated as a 4/10 at its worst   He is not try taking any antacids for the discomfort  Patient is due to have follow-up labs in regards to his chronic conditions completed      Past Medical History:   Diagnosis Date    Anxiety     only when flying    Chronic frontal sinusitis     last assessed 03/02/2016    COVID-19 virus infection 4/1/2020    Diabetes mellitus ([address] )     Fracture of great toe     last assessed 11/06/2014    GERD (gastroesophageal reflux disease)     Hyperlipidemia     Hypertension     Palpitations     last assessed 11/06/2012       Family History   Problem Relation Age of Onset    Diabetes Mother     Liver cancer Mother     Hypertension Mother        Past Surgical History:   Procedure Laterality Date    HERNIA REPAIR Bilateral     RI MANIPULATN SHLDR JT W ANESTHESIA Right 4/3/2017    Procedure: SHOULDER MANIPULATION UNDER ANESTHESIA ;  Surgeon: Juno Dupree MD;  [facility];  Service: Orthopedics        reports that he has never smoked  He has never used smokeless tobacco  He reports current alcohol use  He reports that he does not use drugs        Current Outpatient Medications:     ALPRAZolam (XANAX) 0 25 mg tablet, Take 1 tablet (0 25 mg total) by mouth every 8 (eight) hours as needed for anxiety, Disp: 30 tablet, Rfl: 0    aspirin 81 mg chewable tablet, Adult Aspirin Low Strength 81 MG CHEW 1 PO Q D  Quantity: 0;  Refills: 0    [employer], Provider M D ;  Started 11-Oct-2007 Active, Disp: , Rfl:     Azelastine-Fluticasone (Dymista) 137-50 MCG/ACT SUSP, 1 spray into each nostril daily, Disp: 1 Bottle, Rfl: 5    CHOLECALCIFEROL PO, Take 1,000 Units by mouth, Disp: , Rfl:     diltiazem (CARDIZEM) 30 mg tablet, Take 1 tablet (30 mg total) by mouth daily, Disp: 90 tablet, Rfl: 1    esomeprazole (NexIUM) 40 MG capsule, TAKE ONE CAPSULE BY MOUTH EVERY DAY, Disp: 90 capsule, Rfl: 1    Farxiga 10 MG TABS, TAKE ONE TABLET BY MOUTH EVERY DAY, Disp: 30 tablet, Rfl: 5    fexofenadine (ALLEGRA) 180 MG tablet, Take 1 tablet (180 mg total) by mouth daily, Disp: 30 tablet, Rfl: 5    glipiZIDE (GLUCOTROL XL) 5 mg 24 hr tablet, TAKE ONE TABLET BY MOUTH EVERY DAY, Disp: 90 tablet, Rfl: 0    glucose blood (FREESTYLE INSULINX TEST) test strip, 1 Squirt by In Vitro route 2 (two) times a day, Disp: , Rfl:     Janumet XR  MG TB24, TAKE TWO TABLETS BY MOUTH EVERY DAY, Disp: 180 tablet, Rfl: 1    meloxicam (MOBIC) 15 mg tablet, TAKE ONE TABLET BY MOUTH EVERY DAY, Disp: 90 tablet, Rfl: 1    montelukast (SINGULAIR) 10 mg tablet, Take 1 tablet (10 mg total) by mouth daily at bedtime, Disp: 90 tablet, Rfl: 0    Omega-3 Fatty Acids (FISH OIL) 1200 MG CAPS, Take 1 capsule by mouth daily, Disp: , Rfl:     Phytonadione (K 100) 100 MCG TABS, Take 1 tablet (100 mcg total) by mouth daily, Disp: 100 tablet, Rfl: 1    pioglitazone (ACTOS) 45 mg tablet, TAKE ONE TABLET BY MOUTH EVERY DAY, Disp: 30 tablet, Rfl: 3    quinapril (ACCUPRIL) 5 mg tablet, TAKE ONE TABLET BY MOUTH EVERY DAY, Disp: 90 tablet, Rfl: 1    rosuvastatin (CRESTOR) 10 MG tablet, TAKE ONE TABLET BY MOUTH EVERY DAY, Disp: 90 tablet, Rfl: 1    sildenafil (VIAGRA) 100 mg tablet, Take 1 tablet (100 mg total) by mouth as needed for erectile dysfunction, Disp: 12 tablet, Rfl: 1    albuterol (ProAir HFA) 90 mcg/act inhaler, Inhale 2 puffs every 6 (six) hours as needed for wheezing, Disp: 1 Inhaler, Rfl: 1    azelastine (ASTELIN) 0 1 % nasal spray, INHALE ONE SPRAY INTO EACH NOSTRIL TWO TIMES A DAY AS DIRECTED - GENERIC FOR ASTELIN, Disp: 30 mL, Rfl: 0    metaxalone (SKELAXIN) 800 mg tablet, Take 1 tablet (800 mg total) by mouth 3 (three) times a day as needed for muscle spasms (Patient not taking: Reported on 7/23/2021), Disp: 60 tablet, Rfl: 1    olopatadine HCl (PATADAY) 0 2 % opth drops, Administer 1 drop to both eyes daily, Disp: 5 mL, Rfl: 3    Qnasl 80 MCG/ACT AERS, INHALE ONE SPRAY IN EACH NOSTRIL TWO TIMES A DAY (Patient not taking: Reported on 7/23/2021), Disp: 1 Inhaler, Rfl: 3    The following portions of the patient's history were reviewed and updated as appropriate: allergies, current medications, past family history, past medical history, past social history, past surgical history and problem list     Review of Systems   Constitutional: Negative  Negative for chills and fever  HENT: Negative  Eyes: Negative  Respiratory: Negative  Cardiovascular: Negative  Gastrointestinal: Positive for abdominal pain  Negative for abdominal distention, anal bleeding, constipation, diarrhea, nausea and vomiting  Endocrine: Negative  Genitourinary: Negative  Musculoskeletal: Positive for back pain  Skin: Negative  Allergic/Immunologic: Negative  Neurological: Negative  Hematological: Negative  Psychiatric/Behavioral: Negative  All other systems reviewed and are negative  Objective:    /72   Pulse 100   Temp 97 6 °F (36 4 °C)   Resp 18   Ht 5' 6" (1 676 m)   Wt 78 4 kg (172 lb 12 8 oz)   SpO2 97%   BMI 27 89 kg/m²      Physical Exam  Vitals and nursing note reviewed  Constitutional:       General: He is not in acute distress  Appearance: He is well-developed and normal weight  He is not ill-appearing or diaphoretic  HENT:      Head: Normocephalic and atraumatic  Right Ear: External ear normal       Left Ear: External ear normal       Nose: Nose normal    Eyes:      Pupils: Pupils are equal, round, and reactive to light  Cardiovascular:      Rate and Rhythm: Normal rate and regular rhythm  Heart sounds: Normal heart sounds  Abdominal:      General: Bowel sounds are normal  There is no distension or abdominal bruit  Palpations: Abdomen is soft  Abdomen is not rigid  There is no mass  Tenderness: There is abdominal tenderness in the right upper quadrant and epigastric area  There is guarding  There is no rebound  Negative signs include Hawkins's sign and McBurney's sign  Hernia: No hernia is present  Musculoskeletal:      Cervical back: Normal range of motion and neck supple  Neurological:      Mental Status: He is alert  No results found for this or any previous visit (from the past 1008 hour(s))  Assessment/Plan:    Type 2 diabetes mellitus with hyperosmolarity without coma, without long-term current use of insulin ([facility] )    Lab Results   Component Value Date    HGBA1C 7 9 03/31/2021     Patient is due for repeat diabetic lab testing  Patient remains on Janumet, Actos, glipizide and [address] hypertension  Hypertension remains well controlled on Cardizem 30 mg once daily and Accupril 5 mg once daily    Mixed hyperlipidemia  Patient remains on Crestor 10 mg once daily  He is due for repeat lipid profile  Order provided    Abdominal pain, epigastric  Patient does have some midepigastric abdominal tenderness as well as some tenderness towards the right upper quadrant  I did explain to the patient that usually the pain from peptic ulcer disease will improve with eating or taking antacids    -I did place order for right upper quadrant ultrasound to evaluate gallbladder    Patient will have this done this evening and I will contact with results     -should his abdominal pain worsen then the patient should proceed to nearest ER for further evaluation and treatment  At this point his pain is more of an annoyance than anything markedly acute          Problem List Items Addressed This Visit        Endocrine    Type 2 diabetes mellitus with hyperosmolarity without coma, without long-term current use of insulin [facility])       Lab Results   Component Value Date    HGBA1C 7 9 03/31/2021     Patient is due for repeat diabetic lab testing  Patient remains on Janumet, Actos, glipizide and Farxiga         Relevant Orders    Hemoglobin A1C    Microalbumin / creatinine urine ratio       Cardiovascular and Mediastinum    Essential hypertension (Chronic)     Hypertension remains well controlled on Cardizem 30 mg once daily and Accupril 5 mg once daily         Relevant Orders    TSH, 3rd generation with Free T4 reflex       Other    Abdominal pain, epigastric - Primary     Patient does have some midepigastric abdominal tenderness as well as some tenderness towards the right upper quadrant  I did explain to the patient that usually the pain from peptic ulcer disease will improve with eating or taking antacids    -I did place order for right upper quadrant ultrasound to evaluate gallbladder  Patient will have this done this evening and I will contact with results     -should his abdominal pain worsen then the patient should proceed to nearest ER for further evaluation and treatment  At this point his pain is more of an annoyance than anything markedly acute         Relevant Orders    CBC and differential    Amylase    Lipase    US right upper quadrant    Mixed hyperlipidemia     Patient remains on Crestor 10 mg once daily  He is due for repeat lipid profile    Order provided         Relevant Orders    Comprehensive metabolic panel    Lipid panel    Vitamin D deficiency

## 2021-07-28 ENCOUNTER — APPOINTMENT (OUTPATIENT)
Dept: LAB | Facility: CLINIC | Age: 51
End: 2021-07-28
Payer: COMMERCIAL

## 2021-07-28 DIAGNOSIS — E78.2 MIXED HYPERLIPIDEMIA: ICD-10-CM

## 2021-07-28 DIAGNOSIS — R10.13 ABDOMINAL PAIN, EPIGASTRIC: ICD-10-CM

## 2021-07-28 DIAGNOSIS — I10 ESSENTIAL HYPERTENSION: Chronic | ICD-10-CM

## 2021-07-28 DIAGNOSIS — E11.00 TYPE 2 DIABETES MELLITUS WITH HYPEROSMOLARITY WITHOUT COMA, WITHOUT LONG-TERM CURRENT USE OF INSULIN (HCC): ICD-10-CM

## 2021-07-28 LAB
ALBUMIN SERPL BCP-MCNC: 4.1 G/DL (ref 3.5–5)
ALP SERPL-CCNC: 71 U/L (ref 46–116)
ALT SERPL W P-5'-P-CCNC: 19 U/L (ref 12–78)
AMYLASE SERPL-CCNC: 43 IU/L (ref 25–115)
ANION GAP SERPL CALCULATED.3IONS-SCNC: 11 MMOL/L (ref 4–13)
AST SERPL W P-5'-P-CCNC: 11 U/L (ref 5–45)
BASOPHILS # BLD AUTO: 0.05 THOUSANDS/ΜL (ref 0–0.1)
BASOPHILS NFR BLD AUTO: 1 % (ref 0–1)
BILIRUB SERPL-MCNC: 0.31 MG/DL (ref 0.2–1)
BUN SERPL-MCNC: 23 MG/DL (ref 5–25)
CALCIUM SERPL-MCNC: 9.1 MG/DL (ref 8.3–10.1)
CHLORIDE SERPL-SCNC: 102 MMOL/L (ref 100–108)
CHOLEST SERPL-MCNC: 212 MG/DL (ref 50–200)
CO2 SERPL-SCNC: 26 MMOL/L (ref 21–32)
CREAT SERPL-MCNC: 0.87 MG/DL (ref 0.6–1.3)
CREAT UR-MCNC: 55.5 MG/DL
EOSINOPHIL # BLD AUTO: 0.2 THOUSAND/ΜL (ref 0–0.61)
EOSINOPHIL NFR BLD AUTO: 3 % (ref 0–6)
ERYTHROCYTE [DISTWIDTH] IN BLOOD BY AUTOMATED COUNT: 13.7 % (ref 11.6–15.1)
EST. AVERAGE GLUCOSE BLD GHB EST-MCNC: 148 MG/DL
GFR SERPL CREATININE-BSD FRML MDRD: 101 ML/MIN/1.73SQ M
GLUCOSE P FAST SERPL-MCNC: 140 MG/DL (ref 65–99)
HBA1C MFR BLD: 6.8 %
HCT VFR BLD AUTO: 46.9 % (ref 36.5–49.3)
HDLC SERPL-MCNC: 35 MG/DL
HGB BLD-MCNC: 15 G/DL (ref 12–17)
IMM GRANULOCYTES # BLD AUTO: 0.04 THOUSAND/UL (ref 0–0.2)
IMM GRANULOCYTES NFR BLD AUTO: 1 % (ref 0–2)
LDLC SERPL CALC-MCNC: 144 MG/DL (ref 0–100)
LIPASE SERPL-CCNC: 158 U/L (ref 73–393)
LYMPHOCYTES # BLD AUTO: 1.7 THOUSANDS/ΜL (ref 0.6–4.47)
LYMPHOCYTES NFR BLD AUTO: 27 % (ref 14–44)
MCH RBC QN AUTO: 26.5 PG (ref 26.8–34.3)
MCHC RBC AUTO-ENTMCNC: 32 G/DL (ref 31.4–37.4)
MCV RBC AUTO: 83 FL (ref 82–98)
MICROALBUMIN UR-MCNC: 7.5 MG/L (ref 0–20)
MICROALBUMIN/CREAT 24H UR: 14 MG/G CREATININE (ref 0–30)
MONOCYTES # BLD AUTO: 0.48 THOUSAND/ΜL (ref 0.17–1.22)
MONOCYTES NFR BLD AUTO: 8 % (ref 4–12)
NEUTROPHILS # BLD AUTO: 3.8 THOUSANDS/ΜL (ref 1.85–7.62)
NEUTS SEG NFR BLD AUTO: 60 % (ref 43–75)
NONHDLC SERPL-MCNC: 177 MG/DL
NRBC BLD AUTO-RTO: 0 /100 WBCS
PLATELET # BLD AUTO: 281 THOUSANDS/UL (ref 149–390)
PMV BLD AUTO: 10.2 FL (ref 8.9–12.7)
POTASSIUM SERPL-SCNC: 4.4 MMOL/L (ref 3.5–5.3)
PROT SERPL-MCNC: 7.5 G/DL (ref 6.4–8.2)
RBC # BLD AUTO: 5.66 MILLION/UL (ref 3.88–5.62)
SODIUM SERPL-SCNC: 139 MMOL/L (ref 136–145)
TRIGL SERPL-MCNC: 167 MG/DL
TSH SERPL DL<=0.05 MIU/L-ACNC: 1.95 UIU/ML (ref 0.36–3.74)
WBC # BLD AUTO: 6.27 THOUSAND/UL (ref 4.31–10.16)

## 2021-07-28 PROCEDURE — 3044F HG A1C LEVEL LT 7.0%: CPT | Performed by: FAMILY MEDICINE

## 2021-07-28 PROCEDURE — 80053 COMPREHEN METABOLIC PANEL: CPT

## 2021-07-28 PROCEDURE — 80061 LIPID PANEL: CPT

## 2021-07-28 PROCEDURE — 82570 ASSAY OF URINE CREATININE: CPT

## 2021-07-28 PROCEDURE — 82150 ASSAY OF AMYLASE: CPT

## 2021-07-28 PROCEDURE — 83036 HEMOGLOBIN GLYCOSYLATED A1C: CPT

## 2021-07-28 PROCEDURE — 85025 COMPLETE CBC W/AUTO DIFF WBC: CPT

## 2021-07-28 PROCEDURE — 3061F NEG MICROALBUMINURIA REV: CPT | Performed by: FAMILY MEDICINE

## 2021-07-28 PROCEDURE — 84443 ASSAY THYROID STIM HORMONE: CPT

## 2021-07-28 PROCEDURE — 36415 COLL VENOUS BLD VENIPUNCTURE: CPT

## 2021-07-28 PROCEDURE — 82043 UR ALBUMIN QUANTITATIVE: CPT

## 2021-07-28 PROCEDURE — 83690 ASSAY OF LIPASE: CPT

## 2021-08-02 ENCOUNTER — TELEPHONE (OUTPATIENT)
Dept: FAMILY MEDICINE CLINIC | Facility: CLINIC | Age: 51
End: 2021-08-02

## 2021-08-02 NOTE — TELEPHONE ENCOUNTER
----- Message from Heide Ortega DO sent at 8/1/2021 11:23 PM EDT -----  Please call patient to schedule follow-up appointment to review lab results  He had switched out his appointment for his son  Simply needs his four-month follow-up    Labs are tremendously improved

## 2021-08-05 DIAGNOSIS — E11.9 TYPE 2 DIABETES MELLITUS WITHOUT COMPLICATION, WITHOUT LONG-TERM CURRENT USE OF INSULIN (HCC): ICD-10-CM

## 2021-08-05 RX ORDER — SITAGLIPTIN AND METFORMIN HYDROCHLORIDE 1000; 50 MG/1; MG/1
TABLET, FILM COATED, EXTENDED RELEASE ORAL
Qty: 180 TABLET | Refills: 1 | Status: SHIPPED | OUTPATIENT
Start: 2021-08-05 | End: 2022-02-01

## 2021-08-31 DIAGNOSIS — K21.9 GASTROESOPHAGEAL REFLUX DISEASE: ICD-10-CM

## 2021-08-31 RX ORDER — ESOMEPRAZOLE MAGNESIUM 40 MG/1
CAPSULE, DELAYED RELEASE ORAL
Qty: 90 CAPSULE | Refills: 1 | Status: SHIPPED | OUTPATIENT
Start: 2021-08-31 | End: 2022-03-04 | Stop reason: SDUPTHER

## 2021-09-01 ENCOUNTER — OFFICE VISIT (OUTPATIENT)
Dept: FAMILY MEDICINE CLINIC | Facility: CLINIC | Age: 51
End: 2021-09-01
Payer: COMMERCIAL

## 2021-09-01 VITALS
DIASTOLIC BLOOD PRESSURE: 76 MMHG | HEART RATE: 80 BPM | OXYGEN SATURATION: 98 % | BODY MASS INDEX: 27.8 KG/M2 | WEIGHT: 173 LBS | SYSTOLIC BLOOD PRESSURE: 118 MMHG | HEIGHT: 66 IN

## 2021-09-01 DIAGNOSIS — Z12.5 PROSTATE CANCER SCREENING: ICD-10-CM

## 2021-09-01 DIAGNOSIS — E55.9 VITAMIN D DEFICIENCY: ICD-10-CM

## 2021-09-01 DIAGNOSIS — E11.00 TYPE 2 DIABETES MELLITUS WITH HYPEROSMOLARITY WITHOUT COMA, WITHOUT LONG-TERM CURRENT USE OF INSULIN (HCC): Primary | ICD-10-CM

## 2021-09-01 DIAGNOSIS — Z12.11 COLON CANCER SCREENING: ICD-10-CM

## 2021-09-01 DIAGNOSIS — E78.2 MIXED HYPERLIPIDEMIA: ICD-10-CM

## 2021-09-01 DIAGNOSIS — I10 ESSENTIAL HYPERTENSION: Chronic | ICD-10-CM

## 2021-09-01 DIAGNOSIS — R93.1 AGATSTON CORONARY ARTERY CALCIUM SCORE BETWEEN 100 AND 199: ICD-10-CM

## 2021-09-01 PROBLEM — R10.13 ABDOMINAL PAIN, EPIGASTRIC: Status: RESOLVED | Noted: 2021-07-23 | Resolved: 2021-09-01

## 2021-09-01 PROBLEM — M79.676 GREAT TOE PAIN: Status: RESOLVED | Noted: 2021-06-02 | Resolved: 2021-09-01

## 2021-09-01 PROCEDURE — 3008F BODY MASS INDEX DOCD: CPT | Performed by: FAMILY MEDICINE

## 2021-09-01 PROCEDURE — 99396 PREV VISIT EST AGE 40-64: CPT | Performed by: FAMILY MEDICINE

## 2021-09-01 PROCEDURE — 99214 OFFICE O/P EST MOD 30 MIN: CPT | Performed by: FAMILY MEDICINE

## 2021-09-01 NOTE — PROGRESS NOTES
Subjective:      Patient ID: Roshan Mckeon is a 49 y o  male  52-year-old male presents for annual physical examination and follow-up of chronic conditions including diabetes mellitus type 2 that is not insulin requiring, hypertension, hyperlipidemia, prior history of COVID-19 infection  Overall the patient is feeling well  Best that he has felt in some time  Labs reviewed which showed normal lipase at 1:58, normal CBC, CMP with the exception of impaired fasting glucose of 140, hemoglobin A1c at 6 8 percent which is a significant improvement  Total cholesterol 212, triglycerides 167, HDL 35, , urine is negative for microalbumin  Normal TSH at 1 946  No particular complaints or concerns  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      Past Medical History:   Diagnosis Date    Anxiety     only when flying    Chronic frontal sinusitis     last assessed 03/02/2016    COVID-19 virus infection 4/1/2020    Diabetes mellitus ([address] )     Fracture of great toe     last assessed 11/06/2014    GERD (gastroesophageal reflux disease)     Hyperlipidemia     Hypertension     Palpitations     last assessed 11/06/2012       Family History   Problem Relation Age of Onset    Diabetes Mother     Liver cancer Mother     Hypertension Mother        Past Surgical History:   Procedure Laterality Date    HERNIA REPAIR Bilateral     OR MANIPULATN SHLDR JT W ANESTHESIA Right 4/3/2017    Procedure: SHOULDER MANIPULATION UNDER ANESTHESIA ;  Surgeon: Robbin Mo MD;  [facility];  Service: Orthopedics        reports that he has never smoked  He has never used smokeless tobacco  He reports current alcohol use  He reports that he does not use drugs        Current Outpatient Medications:     albuterol (ProAir HFA) 90 mcg/act inhaler, Inhale 2 puffs every 6 (six) hours as needed for wheezing, Disp: 1 Inhaler, Rfl: 1    ALPRAZolam (XANAX) 0 25 mg tablet, Take 1 tablet (0 25 mg total) by mouth every 8 (eight) hours as needed for anxiety, Disp: 30 tablet, Rfl: 0    aspirin 81 mg chewable tablet, Adult Aspirin Low Strength 81 MG CHEW 1 PO Q D  Quantity: 0;  Refills: 0    [employer], Provider M D ;  Started 11-Oct-2007 Active, Disp: , Rfl:     Azelastine-Fluticasone (Dymista) 137-50 MCG/ACT SUSP, 1 spray into each nostril daily, Disp: 1 Bottle, Rfl: 5    diltiazem (CARDIZEM) 30 mg tablet, Take 1 tablet (30 mg total) by mouth daily, Disp: 90 tablet, Rfl: 1    esomeprazole (NexIUM) 40 MG capsule, TAKE ONE CAPSULE BY MOUTH EVERY DAY, Disp: 90 capsule, Rfl: 1    Farxiga 10 MG TABS, TAKE ONE TABLET BY MOUTH EVERY DAY, Disp: 30 tablet, Rfl: 5    fexofenadine (ALLEGRA) 180 MG tablet, Take 1 tablet (180 mg total) by mouth daily, Disp: 30 tablet, Rfl: 5    glipiZIDE (GLUCOTROL XL) 5 mg 24 hr tablet, TAKE ONE TABLET BY MOUTH EVERY DAY, Disp: 90 tablet, Rfl: 0    glucose blood (FREESTYLE INSULINX TEST) test strip, 1 Squirt by In Vitro route 2 (two) times a day, Disp: , Rfl:     Janumet XR  MG TB24, TAKE TWO TABLETS BY MOUTH EVERY DAY, Disp: 180 tablet, Rfl: 1    meloxicam (MOBIC) 15 mg tablet, TAKE ONE TABLET BY MOUTH EVERY DAY, Disp: 90 tablet, Rfl: 1    montelukast (SINGULAIR) 10 mg tablet, Take 1 tablet (10 mg total) by mouth daily at bedtime, Disp: 90 tablet, Rfl: 0    Omega-3 Fatty Acids (FISH OIL) 1200 MG CAPS, Take 1 capsule by mouth daily, Disp: , Rfl:     Phytonadione (K 100) 100 MCG TABS, Take 1 tablet (100 mcg total) by mouth daily, Disp: 100 tablet, Rfl: 1    pioglitazone (ACTOS) 45 mg tablet, TAKE ONE TABLET BY MOUTH EVERY DAY, Disp: 30 tablet, Rfl: 3    quinapril (ACCUPRIL) 5 mg tablet, TAKE ONE TABLET BY MOUTH EVERY DAY, Disp: 90 tablet, Rfl: 1    rosuvastatin (CRESTOR) 10 MG tablet, TAKE ONE TABLET BY MOUTH EVERY DAY, Disp: 90 tablet, Rfl: 1    sildenafil (VIAGRA) 100 mg tablet, Take 1 tablet (100 mg total) by mouth as needed for erectile dysfunction, Disp: 12 tablet, Rfl: 1    azelastine (ASTELIN) 0 1 % nasal spray, INHALE ONE SPRAY INTO EACH NOSTRIL TWO TIMES A DAY AS DIRECTED - GENERIC FOR ASTELIN, Disp: 30 mL, Rfl: 0    CHOLECALCIFEROL PO, Take 1,000 Units by mouth, Disp: , Rfl:     metaxalone (SKELAXIN) 800 mg tablet, Take 1 tablet (800 mg total) by mouth 3 (three) times a day as needed for muscle spasms (Patient not taking: Reported on 7/23/2021), Disp: 60 tablet, Rfl: 1    olopatadine HCl (PATADAY) 0 2 % opth drops, Administer 1 drop to both eyes daily (Patient not taking: Reported on 9/1/2021), Disp: 5 mL, Rfl: 3    Qnasl 80 MCG/ACT AERS, INHALE ONE SPRAY IN EACH NOSTRIL TWO TIMES A DAY (Patient not taking: Reported on 7/23/2021), Disp: 1 Inhaler, Rfl: 3    The following portions of the patient's history were reviewed and updated as appropriate: allergies, current medications, past family history, past medical history, past social history, past surgical history and problem list     Review of Systems   Constitutional: Negative  HENT: Negative  Eyes: Negative  Respiratory: Negative  Cardiovascular: Negative  Gastrointestinal: Negative  Endocrine: Negative  Genitourinary: Negative  Musculoskeletal: Negative  Skin: Negative  Allergic/Immunologic: Negative  Neurological: Negative  Hematological: Negative  Psychiatric/Behavioral: Negative  All other systems reviewed and are negative  Objective:    /76   Pulse 80   Ht 5' 6" (1 676 m)   Wt 78 5 kg (173 lb)   SpO2 98%   BMI 27 92 kg/m²      Physical Exam  Vitals and nursing note reviewed  Constitutional:       General: He is not in acute distress  Appearance: Normal appearance  He is well-developed and normal weight  He is not ill-appearing  HENT:      Head: Normocephalic and atraumatic        Right Ear: Tympanic membrane, ear canal and external ear normal       Left Ear: Tympanic membrane, ear canal and external ear normal  Nose: Nose normal       Mouth/Throat:      Mouth: Mucous membranes are moist    Eyes:      Extraocular Movements: Extraocular movements intact  Conjunctiva/sclera: Conjunctivae normal       Pupils: Pupils are equal, round, and reactive to light  Cardiovascular:      Rate and Rhythm: Normal rate and regular rhythm  Pulses: Normal pulses  Heart sounds: Normal heart sounds  No murmur heard  Pulmonary:      Effort: Pulmonary effort is normal       Breath sounds: Normal breath sounds  Abdominal:      General: Abdomen is flat  Bowel sounds are normal       Palpations: Abdomen is soft  Musculoskeletal:         General: Normal range of motion  Cervical back: Normal range of motion and neck supple  Skin:     General: Skin is warm and dry  Neurological:      General: No focal deficit present  Mental Status: He is alert and oriented to person, place, and time  Psychiatric:         Mood and Affect: Mood normal          Behavior: Behavior normal          Thought Content:  Thought content normal          Judgment: Judgment normal            Recent Results (from the past 1008 hour(s))   CBC and differential    Collection Time: 07/28/21  8:41 AM   Result Value Ref Range    WBC 6 27 4 31 - 10 16 Thousand/uL    RBC 5 66 (H) 3 88 - 5 62 Million/uL    Hemoglobin 15 0 12 0 - 17 0 g/dL    Hematocrit 46 9 36 5 - 49 3 %    MCV 83 82 - 98 fL    MCH 26 5 (L) 26 8 - 34 3 pg    MCHC 32 0 31 4 - 37 4 g/dL    RDW 13 7 11 6 - 15 1 %    MPV 10 2 8 9 - 12 7 fL    Platelets 738 092 - 012 Thousands/uL    nRBC 0 /100 WBCs    Neutrophils Relative 60 43 - 75 %    Immat GRANS % 1 0 - 2 %    Lymphocytes Relative 27 14 - 44 %    Monocytes Relative 8 4 - 12 %    Eosinophils Relative 3 0 - 6 %    Basophils Relative 1 0 - 1 %    Neutrophils Absolute 3 80 1 85 - 7 62 Thousands/µL    Immature Grans Absolute 0 04 0 00 - 0 20 Thousand/uL    Lymphocytes Absolute 1 70 0 60 - 4 47 Thousands/µL    Monocytes Absolute 0 48 0 17 - 1 22 Thousand/µL    Eosinophils Absolute 0 20 0 00 - 0 61 Thousand/µL    Basophils Absolute 0 05 0 00 - 0 10 Thousands/µL   Comprehensive metabolic panel    Collection Time: 07/28/21  8:41 AM   Result Value Ref Range    Sodium 139 136 - 145 mmol/L    Potassium 4 4 3 5 - 5 3 mmol/L    Chloride 102 100 - 108 mmol/L    CO2 26 21 - 32 mmol/L    ANION GAP 11 4 - 13 mmol/L    BUN 23 5 - 25 mg/dL    Creatinine 0 87 0 60 - 1 30 mg/dL    Glucose, Fasting 140 (H) 65 - 99 mg/dL    Calcium 9 1 8 3 - 10 1 mg/dL    AST 11 5 - 45 U/L    ALT 19 12 - 78 U/L    Alkaline Phosphatase 71 46 - 116 U/L    Total Protein 7 5 6 4 - 8 2 g/dL    Albumin 4 1 3 5 - 5 0 g/dL    Total Bilirubin 0 31 0 20 - 1 00 mg/dL    eGFR 101 ml/min/1 73sq m   Hemoglobin A1C    Collection Time: 07/28/21  8:41 AM   Result Value Ref Range    Hemoglobin A1C 6 8 (H) Normal 3 8-5 6%; PreDiabetic 5 7-6 4%; Diabetic >=6 5%; Glycemic control for adults with diabetes <7 0% %     mg/dl   Lipid panel    Collection Time: 07/28/21  8:41 AM   Result Value Ref Range    Cholesterol 212 (H) 50 - 200 mg/dL    Triglycerides 167 (H) <=150 mg/dL    HDL, Direct 35 (L) >=40 mg/dL    LDL Calculated 144 (H) 0 - 100 mg/dL    Non-HDL-Chol (CHOL-HDL) 177 mg/dl   TSH, 3rd generation with Free T4 reflex    Collection Time: 07/28/21  8:41 AM   Result Value Ref Range    TSH 3RD GENERATON 1 946 0 358 - 3 740 uIU/mL   Microalbumin / creatinine urine ratio    Collection Time: 07/28/21  8:41 AM   Result Value Ref Range    Creatinine, Ur 55 5 mg/dL    Microalbum  ,U,Random 7 5 0 0 - 20 0 mg/L    Microalb Creat Ratio 14 0 - 30 mg/g creatinine   Amylase    Collection Time: 07/28/21  8:41 AM   Result Value Ref Range    Amylase 43 25 - 115 IU/L   Lipase    Collection Time: 07/28/21  8:41 AM   Result Value Ref Range    Lipase 158 73 - 393 u/L       Assessment/Plan:    Type 2 diabetes mellitus with hyperosmolarity without coma, without long-term current use of insulin (HCC)    Lab Results Component Value Date    HGBA1C 6 8 (H) 07/28/2021     Significant improvement in diabetic parameters  Patient is to remain on Janumet, Actos, glipizide and Farxiga  Repeat diabetic parameters in 4 months    Essential hypertension  Hypertension remains very well controlled on Accupril and Cardizem  Continue same  Agatston coronary artery calcium score between 100 and 199  Continue to control risk factors including diabetes, hypertension, hyperlipidemia  Remains on Crestor and Accupril  Goal LDL less than 70  Patient does have CT coronary calcium scoring [facility]    Mixed hyperlipidemia  Cholesterol significantly worse  Has not been this band in quite some time  Patient does admit to some dietary indiscretion  Eating a lot more red meat due to summer weather and barbecue  Continue on Crestor 10 milligrams once daily  Repeat lipid profile in 4 months    Vitamin D deficiency  Remains on vitamin-D supplementation    Colon cancer screening  Discussed options for colon cancer screening  Cologuard testing ordered    Prostate cancer screening  Screening PSA with next set of labs          Problem List Items Addressed This Visit        Endocrine    Type 2 diabetes mellitus with hyperosmolarity without coma, without long-term current use of insulin ([facility] ) - Primary       Lab Results   Component Value Date    HGBA1C 6 8 (H) 07/28/2021     Significant improvement in diabetic parameters  Patient is to remain on Janumet, Actos, glipizide and Farxiga  Repeat diabetic parameters in 4 months         Relevant Orders    Comprehensive metabolic panel    Hemoglobin A1C    Microalbumin / creatinine urine ratio       Cardiovascular and Mediastinum    Agatston coronary artery calcium score between 100 and 199     Continue to control risk factors including diabetes, hypertension, hyperlipidemia  Remains on Crestor and Accupril  Goal LDL less than 70   Patient does have CT coronary calcium scoring performed at [facility] screening         Relevant Orders    CBC and differential    TSH, 3rd generation with Free T4 reflex    Essential hypertension (Chronic)     Hypertension remains very well controlled on Accupril and Cardizem  Continue same  Relevant Orders    CBC and differential    TSH, 3rd generation with Free T4 reflex       Other    Colon cancer screening     Discussed options for colon cancer screening  Cologuard testing ordered         Relevant Orders    Cologuard    Mixed hyperlipidemia     Cholesterol significantly worse  Has not been this band in quite some time  Patient does admit to some dietary indiscretion  Eating a lot more red meat due to summer weather and barbecue  Continue on Crestor 10 milligrams once daily  Repeat lipid profile in 4 months         Relevant Orders    Lipid panel    Prostate cancer screening     Screening PSA with next set of labs         Relevant Orders    PSA, Total Screen    Vitamin D deficiency     Remains on vitamin-D supplementation               BMI Counseling: Body mass index is 27 92 kg/m²  The BMI is above normal  Nutrition recommendations include moderation in carbohydrate intake, increasing intake of lean protein, reducing intake of saturated fat and trans fat and reducing intake of cholesterol  I have spent 41 minutes with Patient  today in which greater than 50% of this time was spent in counseling/coordination of care regarding Diagnostic results, Prognosis, Risks and benefits of tx options, Intructions for management, Patient and family education, Importance of tx compliance, Risk factor reductions and Impressions

## 2021-09-02 NOTE — ASSESSMENT & PLAN NOTE
Continue to control risk factors including diabetes, hypertension, hyperlipidemia  Remains on Crestor and Accupril  Goal LDL less than 70   Patient does have CT coronary calcium scoring performed at [facility]

## 2021-09-02 NOTE — ASSESSMENT & PLAN NOTE
Lab Results   Component Value Date    HGBA1C 6 8 (H) 07/28/2021     Significant improvement in diabetic parameters  Patient is to remain on Janumet, Actos, glipizide and Farxiga    Repeat diabetic parameters in 4 months

## 2021-09-02 NOTE — ASSESSMENT & PLAN NOTE
Cholesterol significantly worse  Has not been this band in quite some time  Patient does admit to some dietary indiscretion  Eating a lot more red meat due to summer weather and barbecue  Continue on Crestor 10 milligrams once daily    Repeat lipid profile in 4 months

## 2021-09-23 ENCOUNTER — TELEPHONE (OUTPATIENT)
Dept: FAMILY MEDICINE CLINIC | Facility: CLINIC | Age: 51
End: 2021-09-23

## 2021-09-23 NOTE — TELEPHONE ENCOUNTER
Patient called and stated he was seen for stomach issues before and that they are getting worse    He doesn't know if you want to see him again or refer him to a specialist   Please advise

## 2021-09-24 PROBLEM — K21.9 GASTROESOPHAGEAL REFLUX DISEASE WITHOUT ESOPHAGITIS: Status: ACTIVE | Noted: 2021-09-24

## 2021-09-24 PROBLEM — R10.11 RUQ ABDOMINAL PAIN: Status: ACTIVE | Noted: 2021-09-24

## 2021-09-24 NOTE — TELEPHONE ENCOUNTER
If still with intermittent midepigastric, right upper quadrant pain then I would recommend seeing Gastroenterology  I placed an order for Dr Jordon Navarrete who he did see back in 2010    In the meantime he could come back into the office to see me to see if I have any other suggestions

## 2021-09-29 ENCOUNTER — CONSULT (OUTPATIENT)
Dept: GASTROENTEROLOGY | Facility: AMBULARY SURGERY CENTER | Age: 51
End: 2021-09-29
Payer: COMMERCIAL

## 2021-09-29 VITALS
SYSTOLIC BLOOD PRESSURE: 120 MMHG | BODY MASS INDEX: 27.64 KG/M2 | HEIGHT: 66 IN | DIASTOLIC BLOOD PRESSURE: 72 MMHG | WEIGHT: 172 LBS

## 2021-09-29 DIAGNOSIS — K21.9 GASTROESOPHAGEAL REFLUX DISEASE WITHOUT ESOPHAGITIS: Primary | ICD-10-CM

## 2021-09-29 DIAGNOSIS — Z12.11 SCREEN FOR COLON CANCER: ICD-10-CM

## 2021-09-29 PROCEDURE — 99204 OFFICE O/P NEW MOD 45 MIN: CPT | Performed by: INTERNAL MEDICINE

## 2021-09-29 PROCEDURE — 1036F TOBACCO NON-USER: CPT | Performed by: INTERNAL MEDICINE

## 2021-09-29 PROCEDURE — 3008F BODY MASS INDEX DOCD: CPT | Performed by: INTERNAL MEDICINE

## 2021-09-29 PROCEDURE — 3078F DIAST BP <80 MM HG: CPT | Performed by: INTERNAL MEDICINE

## 2021-09-29 PROCEDURE — 3074F SYST BP LT 130 MM HG: CPT | Performed by: INTERNAL MEDICINE

## 2021-09-29 RX ORDER — SODIUM PICOSULFATE, MAGNESIUM OXIDE, AND ANHYDROUS CITRIC ACID 10; 3.5; 12 MG/160ML; G/160ML; G/160ML
1 LIQUID ORAL SEE ADMIN INSTRUCTIONS
Qty: 320 ML | Refills: 0 | Status: SHIPPED | OUTPATIENT
Start: 2021-09-29 | End: 2021-10-18 | Stop reason: ALTCHOICE

## 2021-09-29 NOTE — ASSESSMENT & PLAN NOTE
Patient is at average risk for colon cancer screening  Rule out  Colorectal lesions including polyps or malignancy  -  Schedule for colonoscopy    - high-fiber diet    - patient was explained about the risks and benefits of the procedures      -  Explained about the bowel prep and he expressed understanding

## 2021-09-29 NOTE — PROGRESS NOTES
Consultation - [facility]  Robbie Walker 1970 male         Chief Complaint:    GERD    HPI:   61-year-old male with history of chronic GERD was referred for further evaluation  Patient gives history of chronic acid reflux for which she had been on Nexium for more than 10 years  Complaining about occasional burning in the chest with certain foods  Good appetite, no recent weight loss  Regular bowel movements and denies any blood or mucus in the stool  No abdominal pain, nausea or vomiting  He had colonoscopy about 20 years ago  REVIEW OF SYSTEMS: Review of Systems   Constitutional: Negative for activity change, appetite change, chills, diaphoresis, fatigue, fever and unexpected weight change  HENT: Negative for ear discharge, ear pain, facial swelling, hearing loss, nosebleeds, sore throat, tinnitus and voice change  Eyes: Negative for pain, discharge, redness, itching and visual disturbance  Respiratory: Negative for apnea, cough, chest tightness, shortness of breath and wheezing  Cardiovascular: Negative for chest pain and palpitations  Gastrointestinal:        As noted in HPI   Endocrine: Negative for cold intolerance, heat intolerance and polyuria  Genitourinary: Negative for difficulty urinating, dysuria, flank pain, hematuria and urgency  Musculoskeletal: Negative for arthralgias, back pain, gait problem, joint swelling and myalgias  Skin: Negative for rash and wound  Neurological: Negative for dizziness, tremors, seizures, speech difficulty, light-headedness, numbness and headaches  Hematological: Negative for adenopathy  Does not bruise/bleed easily  Psychiatric/Behavioral: Negative for agitation, behavioral problems and confusion  The patient is not nervous/anxious           Past Medical History:   Diagnosis Date    Anxiety     only when flying    Chronic frontal sinusitis     last assessed 03/02/2016    COVID-19 virus infection 4/1/2020    Diabetes mellitus ([address]     Fracture of great toe     last assessed 11/06/2014    GERD (gastroesophageal reflux disease)     Hyperlipidemia     Hypertension     Palpitations     last assessed 11/06/2012      Past Surgical History:   Procedure Laterality Date    HERNIA REPAIR Bilateral     NC MANIPULATAZ LESLIELDR JT W ANESTHESIA Right 4/3/2017    Procedure: SHOULDER MANIPULATION UNDER ANESTHESIA ;  Surgeon: Curry Bobby MD;  [facility];  Service: Orthopedics    UPPER GASTROINTESTINAL ENDOSCOPY       Social History     Socioeconomic History    Marital status: /Civil Union     Spouse name: Not on file    Number of children: Not on file    Years of education: Not on file    Highest education level: Not on file   Occupational History    Occupation: [employer] Manager   Tobacco Use    Smoking status: Never Smoker    Smokeless tobacco: Never Used   Vaping Use    Vaping Use: Never used   Substance and Sexual Activity    Alcohol use: Yes     Comment: socially    Drug use: No    Sexual activity: Not on file   Other Topics Concern    Not on file   Social History Narrative    Caffeine use     Social Determinants of Health     Financial Resource Strain:     Difficulty of Paying Living Expenses:    Food Insecurity:     Worried About Running Out of Food in the Last Year:     [address] in the Last Year:    Transportation Needs:     Lack of Transportation (Medical):      Lack of Transportation (Non-Medical):    Physical Activity:     Days of Exercise per Week:     Minutes of Exercise per Session:    Stress:     Feeling of Stress :    Social Connections:     Frequency of Communication with Friends and Family:     Frequency of Social Gatherings with Friends and Family:     Attends Anabaptism Services:     Active Member of Clubs or Organizations:     Attends Club or Organization Meetings:     Marital Status:    Intimate Partner Violence:     Fear of Current or Ex-Partner:     Emotionally Abused:     Physically Abused:     Sexually Abused:      Family History   Problem Relation Age of Onset    Diabetes Mother     Liver cancer Mother     Hypertension Mother     Colon cancer Father      Trulicity [dulaglutide]  Current Outpatient Medications   Medication Sig Dispense Refill    ALPRAZolam (XANAX) 0 25 mg tablet Take 1 tablet (0 25 mg total) by mouth every 8 (eight) hours as needed for anxiety 30 tablet 0    aspirin 81 mg chewable tablet Adult Aspirin Low Strength 81 MG CHEW  1 PO Q D   Quantity: 0;  Refills: 0       [employer], Provider M D ;  Started 11-Oct-2007  Active      azelastine (ASTELIN) 0 1 % nasal spray INHALE ONE SPRAY INTO EACH NOSTRIL TWO TIMES A DAY AS DIRECTED - GENERIC FOR ASTELIN 30 mL 0    Azelastine-Fluticasone (Dymista) 137-50 MCG/ACT SUSP 1 spray into each nostril daily 1 Bottle 5    CHOLECALCIFEROL PO Take 1,000 Units by mouth      diltiazem (CARDIZEM) 30 mg tablet Take 1 tablet (30 mg total) by mouth daily 90 tablet 1    esomeprazole (NexIUM) 40 MG capsule TAKE ONE CAPSULE BY MOUTH EVERY DAY 90 capsule 1    Farxiga 10 MG TABS TAKE ONE TABLET BY MOUTH EVERY DAY 30 tablet 5    fexofenadine (ALLEGRA) 180 MG tablet Take 1 tablet (180 mg total) by mouth daily 30 tablet 5    glipiZIDE (GLUCOTROL XL) 5 mg 24 hr tablet TAKE ONE TABLET BY MOUTH EVERY DAY 90 tablet 0    Janumet XR  MG TB24 TAKE TWO TABLETS BY MOUTH EVERY  tablet 1    meloxicam (MOBIC) 15 mg tablet TAKE ONE TABLET BY MOUTH EVERY DAY 90 tablet 1    montelukast (SINGULAIR) 10 mg tablet Take 1 tablet (10 mg total) by mouth daily at bedtime 90 tablet 0    Omega-3 Fatty Acids (FISH OIL) 1200 MG CAPS Take 1 capsule by mouth daily      Phytonadione (K 100) 100 MCG TABS Take 1 tablet (100 mcg total) by mouth daily 100 tablet 1    pioglitazone (ACTOS) 45 mg tablet TAKE ONE TABLET BY MOUTH EVERY DAY 30 tablet 3    quinapril (ACCUPRIL) 5 mg tablet TAKE ONE TABLET BY MOUTH EVERY DAY 90 tablet 1    rosuvastatin (CRESTOR) 10 MG tablet TAKE ONE TABLET BY MOUTH EVERY DAY 90 tablet 1    sildenafil (VIAGRA) 100 mg tablet Take 1 tablet (100 mg total) by mouth as needed for erectile dysfunction 12 tablet 1    albuterol (ProAir HFA) 90 mcg/act inhaler Inhale 2 puffs every 6 (six) hours as needed for wheezing (Patient not taking: Reported on 9/29/2021) 1 Inhaler 1    glucose blood (FREESTYLE INSULINX TEST) test strip 1 Squirt by In Vitro route 2 (two) times a day (Patient not taking: Reported on 9/29/2021)      metaxalone (SKELAXIN) 800 mg tablet Take 1 tablet (800 mg total) by mouth 3 (three) times a day as needed for muscle spasms (Patient not taking: Reported on 7/23/2021) 60 tablet 1    olopatadine HCl (PATADAY) 0 2 % opth drops Administer 1 drop to both eyes daily (Patient not taking: Reported on 9/1/2021) 5 mL 3    Qnasl 80 MCG/ACT AERS INHALE ONE SPRAY IN EACH NOSTRIL TWO TIMES A DAY (Patient not taking: Reported on 7/23/2021) 1 Inhaler 3    Sod Picosulfate-Mag Ox-Cit Acd (Clenpiq) 10-3 5-12 MG-GM -GM/160ML SOLN Take 1 Package by mouth see administration instructions 320 mL 0     No current facility-administered medications for this visit  Blood pressure 120/72, height 5' 6" (1 676 m), weight 78 kg (172 lb)  PHYSICAL EXAM: Physical Exam  Constitutional:       Appearance: He is well-developed  HENT:      Head: Normocephalic and atraumatic  Eyes:      General: No scleral icterus  Right eye: No discharge  Left eye: No discharge  Conjunctiva/sclera: Conjunctivae normal       Pupils: Pupils are equal, round, and reactive to light  Neck:      Thyroid: No thyromegaly  Vascular: No JVD  Trachea: No tracheal deviation  Cardiovascular:      Rate and Rhythm: Normal rate and regular rhythm  Heart sounds: Normal heart sounds  No murmur heard  No friction rub  No gallop  Pulmonary:      Effort: Pulmonary effort is normal  No respiratory distress  Breath sounds: Normal breath sounds  No wheezing or rales  Chest:      Chest wall: No tenderness  Abdominal:      General: Bowel sounds are normal  There is no distension  Palpations: Abdomen is soft  There is no mass  Tenderness: There is no abdominal tenderness  There is no guarding or rebound  Hernia: No hernia is present  Musculoskeletal:      Cervical back: Neck supple  Lymphadenopathy:      Cervical: No cervical adenopathy  Skin:     General: Skin is warm and dry  Findings: No erythema or rash  Neurological:      Mental Status: He is alert and oriented to person, place, and time  Psychiatric:         Behavior: Behavior normal          Thought Content: Thought content normal           Lab Results   Component Value Date    WBC 6 27 07/28/2021    HGB 15 0 07/28/2021    HCT 46 9 07/28/2021    MCV 83 07/28/2021     07/28/2021     Lab Results   Component Value Date    CALCIUM 9 1 07/28/2021     09/20/2017    K 4 4 07/28/2021    CO2 26 07/28/2021     07/28/2021    BUN 23 07/28/2021    CREATININE 0 87 07/28/2021     Lab Results   Component Value Date    ALT 19 07/28/2021    AST 11 07/28/2021    ALKPHOS 71 07/28/2021    BILITOT 0 5 09/20/2017     Lab Results   Component Value Date    INR 0 78 (L) 01/07/2018    PROTIME 11 1 (L) 01/07/2018       US right upper quadrant    Result Date: 7/23/2021  Impression: Unremarkable study  Workstation performed: [id number]       ASSESSMENT & PLAN:    Gastroesophageal reflux disease without esophagitis    History of chronic acid reflux, probable from hiatal hernia  Rule out Lopez's esophagus     -  Advised to avoid fatty foods, fried foods, chocolates,  Spicy foods  Advised to avoid eating late at night at least for 3 hours before going to bed  -  Schedule for upper endoscopy    - discussed about the long-term side effects from Nexium    Will try to change to Pepcid depending on the EGD findings        Screen for colon cancer   Patient is at average risk for colon cancer screening  Rule out  Colorectal lesions including polyps or malignancy  -  Schedule for colonoscopy    - high-fiber diet    - patient was explained about the risks and benefits of the procedures      -  Explained about the bowel prep and he expressed understanding

## 2021-09-29 NOTE — ASSESSMENT & PLAN NOTE
History of chronic acid reflux, probable from hiatal hernia  Rule out Lopez's esophagus     -  Advised to avoid fatty foods, fried foods, chocolates,  Spicy foods  Advised to avoid eating late at night at least for 3 hours before going to bed  -  Schedule for upper endoscopy    - discussed about the long-term side effects from Nexium    Will try to change to Pepcid depending on the EGD findings

## 2021-10-04 ENCOUNTER — ANESTHESIA EVENT (OUTPATIENT)
Dept: ANESTHESIOLOGY | Facility: HOSPITAL | Age: 51
End: 2021-10-04

## 2021-10-04 ENCOUNTER — ANESTHESIA (OUTPATIENT)
Dept: ANESTHESIOLOGY | Facility: HOSPITAL | Age: 51
End: 2021-10-04

## 2021-10-15 ENCOUNTER — TELEPHONE (OUTPATIENT)
Dept: GASTROENTEROLOGY | Facility: AMBULARY SURGERY CENTER | Age: 51
End: 2021-10-15

## 2021-10-18 ENCOUNTER — ANESTHESIA (OUTPATIENT)
Dept: GASTROENTEROLOGY | Facility: AMBULARY SURGERY CENTER | Age: 51
End: 2021-10-18

## 2021-10-18 ENCOUNTER — ANESTHESIA EVENT (OUTPATIENT)
Dept: GASTROENTEROLOGY | Facility: AMBULARY SURGERY CENTER | Age: 51
End: 2021-10-18

## 2021-10-18 ENCOUNTER — HOSPITAL ENCOUNTER (OUTPATIENT)
Dept: GASTROENTEROLOGY | Facility: AMBULARY SURGERY CENTER | Age: 51
Setting detail: OUTPATIENT SURGERY
Discharge: HOME | End: 2021-10-18
Attending: INTERNAL MEDICINE | Admitting: INTERNAL MEDICINE
Payer: COMMERCIAL

## 2021-10-18 VITALS
SYSTOLIC BLOOD PRESSURE: 118 MMHG | HEIGHT: 66 IN | DIASTOLIC BLOOD PRESSURE: 73 MMHG | RESPIRATION RATE: 16 BRPM | HEART RATE: 78 BPM | WEIGHT: 170 LBS | BODY MASS INDEX: 27.32 KG/M2 | OXYGEN SATURATION: 97 % | TEMPERATURE: 97.3 F

## 2021-10-18 DIAGNOSIS — Z12.11 SCREEN FOR COLON CANCER: ICD-10-CM

## 2021-10-18 DIAGNOSIS — K21.9 GASTROESOPHAGEAL REFLUX DISEASE WITHOUT ESOPHAGITIS: ICD-10-CM

## 2021-10-18 LAB — GLUCOSE SERPL-MCNC: 209 MG/DL (ref 65–140)

## 2021-10-18 PROCEDURE — 88305 TISSUE EXAM BY PATHOLOGIST: CPT | Performed by: PATHOLOGY

## 2021-10-18 PROCEDURE — 43239 EGD BIOPSY SINGLE/MULTIPLE: CPT | Performed by: INTERNAL MEDICINE

## 2021-10-18 PROCEDURE — 82948 REAGENT STRIP/BLOOD GLUCOSE: CPT

## 2021-10-18 PROCEDURE — G0121 COLON CA SCRN NOT HI RSK IND: HCPCS | Performed by: INTERNAL MEDICINE

## 2021-10-18 RX ORDER — PROPOFOL 10 MG/ML
INJECTION, EMULSION INTRAVENOUS CONTINUOUS PRN
Status: DISCONTINUED | OUTPATIENT
Start: 2021-10-18 | End: 2021-10-18

## 2021-10-18 RX ORDER — SODIUM CHLORIDE, SODIUM LACTATE, POTASSIUM CHLORIDE, CALCIUM CHLORIDE 600; 310; 30; 20 MG/100ML; MG/100ML; MG/100ML; MG/100ML
INJECTION, SOLUTION INTRAVENOUS CONTINUOUS PRN
Status: DISCONTINUED | OUTPATIENT
Start: 2021-10-18 | End: 2021-10-18

## 2021-10-18 RX ORDER — LIDOCAINE HYDROCHLORIDE 20 MG/ML
INJECTION, SOLUTION EPIDURAL; INFILTRATION; INTRACAUDAL; PERINEURAL AS NEEDED
Status: DISCONTINUED | OUTPATIENT
Start: 2021-10-18 | End: 2021-10-18

## 2021-10-18 RX ORDER — PROPOFOL 10 MG/ML
INJECTION, EMULSION INTRAVENOUS AS NEEDED
Status: DISCONTINUED | OUTPATIENT
Start: 2021-10-18 | End: 2021-10-18

## 2021-10-18 RX ADMIN — PROPOFOL 120 MCG/KG/MIN: 10 INJECTION, EMULSION INTRAVENOUS at 09:35

## 2021-10-18 RX ADMIN — PROPOFOL 130 MG: 10 INJECTION, EMULSION INTRAVENOUS at 09:35

## 2021-10-18 RX ADMIN — SODIUM CHLORIDE, SODIUM LACTATE, POTASSIUM CHLORIDE, AND CALCIUM CHLORIDE: .6; .31; .03; .02 INJECTION, SOLUTION INTRAVENOUS at 09:18

## 2021-10-18 RX ADMIN — LIDOCAINE HYDROCHLORIDE 100 MG: 20 INJECTION, SOLUTION EPIDURAL; INFILTRATION; INTRACAUDAL at 09:35

## 2021-10-18 RX ADMIN — Medication 40 MG: at 09:41

## 2021-12-06 DIAGNOSIS — M75.102 ROTATOR CUFF SYNDROME OF LEFT SHOULDER: ICD-10-CM

## 2021-12-06 DIAGNOSIS — I10 ESSENTIAL HYPERTENSION: ICD-10-CM

## 2021-12-06 RX ORDER — QUINAPRIL 5 1/1
TABLET ORAL
Qty: 90 TABLET | Refills: 1 | Status: SHIPPED | OUTPATIENT
Start: 2021-12-06 | End: 2022-05-31

## 2021-12-06 RX ORDER — MELOXICAM 15 MG/1
TABLET ORAL
Qty: 90 TABLET | Refills: 1 | Status: SHIPPED | OUTPATIENT
Start: 2021-12-06 | End: 2022-02-15 | Stop reason: SDUPTHER

## 2021-12-17 DIAGNOSIS — E11.65 TYPE 2 DIABETES MELLITUS WITH HYPERGLYCEMIA, WITHOUT LONG-TERM CURRENT USE OF INSULIN (HCC): Chronic | ICD-10-CM

## 2021-12-17 RX ORDER — DAPAGLIFLOZIN 10 MG/1
TABLET, FILM COATED ORAL
Qty: 30 TABLET | Refills: 5 | Status: SHIPPED | OUTPATIENT
Start: 2021-12-17 | End: 2022-07-01

## 2022-01-05 ENCOUNTER — OFFICE VISIT (OUTPATIENT)
Dept: FAMILY MEDICINE CLINIC | Facility: CLINIC | Age: 52
End: 2022-01-05
Payer: COMMERCIAL

## 2022-01-05 VITALS
SYSTOLIC BLOOD PRESSURE: 124 MMHG | HEART RATE: 94 BPM | OXYGEN SATURATION: 98 % | DIASTOLIC BLOOD PRESSURE: 78 MMHG | RESPIRATION RATE: 16 BRPM | BODY MASS INDEX: 27.48 KG/M2 | WEIGHT: 171 LBS | HEIGHT: 66 IN

## 2022-01-05 DIAGNOSIS — E55.9 VITAMIN D DEFICIENCY: ICD-10-CM

## 2022-01-05 DIAGNOSIS — E11.00 TYPE 2 DIABETES MELLITUS WITH HYPEROSMOLARITY WITHOUT COMA, WITHOUT LONG-TERM CURRENT USE OF INSULIN (HCC): Primary | ICD-10-CM

## 2022-01-05 DIAGNOSIS — E78.2 MIXED HYPERLIPIDEMIA: ICD-10-CM

## 2022-01-05 DIAGNOSIS — R93.1 AGATSTON CORONARY ARTERY CALCIUM SCORE BETWEEN 100 AND 199: ICD-10-CM

## 2022-01-05 PROBLEM — R10.11 RUQ ABDOMINAL PAIN: Status: RESOLVED | Noted: 2021-09-24 | Resolved: 2022-01-05

## 2022-01-05 PROCEDURE — 99215 OFFICE O/P EST HI 40 MIN: CPT | Performed by: FAMILY MEDICINE

## 2022-01-05 PROCEDURE — 3725F SCREEN DEPRESSION PERFORMED: CPT | Performed by: FAMILY MEDICINE

## 2022-01-05 NOTE — ASSESSMENT & PLAN NOTE
Patient remains on Crestor  Check repeat lipid profile which is due    Goal LDL less than 70 given history of diabetes

## 2022-01-05 NOTE — PROGRESS NOTES
Subjective:      Patient ID: Jaquita Felty is a 49 y o  male  61-year-old male presents to the office for four-month follow-up in regards to chronic conditions including diabetes mellitus type 2, hypertension, hyperlipidemia  Patient generally has been feeling very well  No change in weight  He did not complete labs prior to this office visit  Patient does have discoloration and onycholysis of the left great toenail      Past Medical History:   Diagnosis Date    Anxiety     only when flying    Chronic frontal sinusitis     last assessed 03/02/2016    COVID-19 virus infection 4/1/2020    Diabetes mellitus ([address]     Fracture of great toe     last assessed 11/06/2014    GERD (gastroesophageal reflux disease)     Hyperlipidemia     Hypertension     Palpitations     last assessed 11/06/2012    RUQ abdominal pain 9/24/2021       Family History   Problem Relation Age of Onset    Diabetes Mother     Liver cancer Mother     Hypertension Mother     Colon cancer Father 76       Past Surgical History:   Procedure Laterality Date    COLONOSCOPY      HERNIA REPAIR Bilateral     NC MANIPULATN SHLDR JT W ANESTHESIA Right 4/3/2017    Procedure: SHOULDER MANIPULATION UNDER ANESTHESIA ;  Surgeon: Erin Lai MD;  [facility];  Service: Orthopedics    UPPER GASTROINTESTINAL ENDOSCOPY          reports that he has been smoking cigars  He has never used smokeless tobacco  He reports current alcohol use  He reports that he does not use drugs        Current Outpatient Medications:     ALPRAZolam (XANAX) 0 25 mg tablet, Take 1 tablet (0 25 mg total) by mouth every 8 (eight) hours as needed for anxiety, Disp: 30 tablet, Rfl: 0    aspirin 81 mg chewable tablet, Adult Aspirin Low Strength 81 MG CHEW 1 PO Q D  Quantity: 0;  Refills: 0    [employer]scripts, Provider M D ;  Started 11-Oct-2007 Active, Disp: , Rfl:     azelastine (ASTELIN) 0 1 % nasal spray, INHALE ONE SPRAY INTO EACH NOSTRIL TWO TIMES A DAY AS DIRECTED - GENERIC FOR ASTELIN, Disp: 30 mL, Rfl: 0    CHOLECALCIFEROL PO, Take 1,000 Units by mouth, Disp: , Rfl:     diltiazem (CARDIZEM) 30 mg tablet, Take 1 tablet (30 mg total) by mouth daily, Disp: 90 tablet, Rfl: 1    esomeprazole (NexIUM) 40 MG capsule, TAKE ONE CAPSULE BY MOUTH EVERY DAY, Disp: 90 capsule, Rfl: 1    Farxiga 10 MG TABS, TAKE ONE TABLET BY MOUTH EVERY DAY, Disp: 30 tablet, Rfl: 5    fexofenadine (ALLEGRA) 180 MG tablet, Take 1 tablet (180 mg total) by mouth daily, Disp: 30 tablet, Rfl: 5    glipiZIDE (GLUCOTROL XL) 5 mg 24 hr tablet, TAKE ONE TABLET BY MOUTH EVERY DAY, Disp: 90 tablet, Rfl: 0    Janumet XR  MG TB24, TAKE TWO TABLETS BY MOUTH EVERY DAY, Disp: 180 tablet, Rfl: 1    meloxicam (MOBIC) 15 mg tablet, TAKE ONE TABLET BY MOUTH EVERY DAY, Disp: 90 tablet, Rfl: 1    montelukast (SINGULAIR) 10 mg tablet, Take 1 tablet (10 mg total) by mouth daily at bedtime, Disp: 90 tablet, Rfl: 0    Omega-3 Fatty Acids (FISH OIL) 1200 MG CAPS, Take 1 capsule by mouth daily, Disp: , Rfl:     Phytonadione (K 100) 100 MCG TABS, Take 1 tablet (100 mcg total) by mouth daily, Disp: 100 tablet, Rfl: 1    pioglitazone (ACTOS) 45 mg tablet, TAKE ONE TABLET BY MOUTH EVERY DAY, Disp: 30 tablet, Rfl: 3    quinapril (ACCUPRIL) 5 mg tablet, TAKE ONE TABLET BY MOUTH EVERY DAY, Disp: 90 tablet, Rfl: 1    rosuvastatin (CRESTOR) 10 MG tablet, TAKE ONE TABLET BY MOUTH EVERY DAY, Disp: 90 tablet, Rfl: 0    sildenafil (VIAGRA) 100 mg tablet, Take 1 tablet (100 mg total) by mouth as needed for erectile dysfunction, Disp: 12 tablet, Rfl: 1    glucose blood (FREESTYLE INSULINX TEST) test strip, 1 Squirt by In Vitro route 2 (two) times a day (Patient not taking: Reported on 9/29/2021), Disp: , Rfl:     The following portions of the patient's history were reviewed and updated as appropriate: allergies, current medications, past family history, past medical history, past social history, past surgical history and problem list     Review of Systems   Constitutional: Negative  HENT: Negative  Eyes: Negative  Respiratory: Negative  Cardiovascular: Negative  Gastrointestinal: Negative  Endocrine: Negative  Genitourinary: Negative  Musculoskeletal: Negative  Skin: Negative  Allergic/Immunologic: Negative  Neurological: Negative  Hematological: Negative  Psychiatric/Behavioral: Negative  All other systems reviewed and are negative  Objective:    /78   Pulse 94   Resp 16   Ht 5' 6" (1 676 m)   Wt 77 6 kg (171 lb)   SpO2 98%   BMI 27 60 kg/m²      Physical Exam  Vitals and nursing note reviewed  Constitutional:       General: He is not in acute distress  Appearance: Normal appearance  He is well-developed and normal weight  He is not ill-appearing  HENT:      Head: Normocephalic and atraumatic  Right Ear: Tympanic membrane, ear canal and external ear normal       Left Ear: Tympanic membrane, ear canal and external ear normal    Eyes:      Extraocular Movements: Extraocular movements intact  Conjunctiva/sclera: Conjunctivae normal       Pupils: Pupils are equal, round, and reactive to light  Cardiovascular:      Rate and Rhythm: Normal rate and regular rhythm  Pulses: Normal pulses  no weak pulses          Dorsalis pedis pulses are 2+ on the right side and 2+ on the left side  Posterior tibial pulses are 2+ on the right side and 2+ on the left side  Heart sounds: Normal heart sounds  No murmur heard  Pulmonary:      Effort: Pulmonary effort is normal       Breath sounds: Normal breath sounds  Abdominal:      General: Abdomen is flat  Bowel sounds are normal       Palpations: Abdomen is soft  Musculoskeletal:         General: Normal range of motion  Cervical back: Normal range of motion and neck supple  Feet:      Right foot:      Skin integrity: No ulcer, skin breakdown, erythema, warmth, callus or dry skin  Left foot:      Skin integrity: No ulcer, skin breakdown, erythema, warmth, callus or dry skin  Skin:     General: Skin is warm and dry  Comments: Onycholysis of the left great toenail from the medial aspect to the mid nail  Nail is attached at the lateral aspect   Neurological:      General: No focal deficit present  Mental Status: He is alert and oriented to person, place, and time  Psychiatric:         Mood and Affect: Mood normal          Behavior: Behavior normal          Thought Content: Thought content normal          Judgment: Judgment normal        Patient's shoes and socks removed  Right Foot/Ankle   Right Foot Inspection  Skin Exam: skin normal and skin intact  No dry skin, no warmth, no callus, no erythema, no maceration, no abnormal color, no pre-ulcer, no ulcer and no callus  Toe Exam: ROM and strength within normal limits  Sensory   Vibration: intact  Proprioception: intact  Monofilament testing: intact    Vascular  Capillary refills: < 3 seconds  The right DP pulse is 2+  The right PT pulse is 2+  Left Foot/Ankle  Left Foot Inspection  Skin Exam: skin normal and skin intact  No dry skin, no warmth, no erythema, no maceration, normal color, no pre-ulcer, no ulcer and no callus  Toe Exam: ROM and strength within normal limits  Sensory   Vibration: intact  Proprioception: intact  Monofilament testing: intact    Vascular  Capillary refills: < 3 seconds  The left DP pulse is 2+  The left PT pulse is 2+  Assign Risk Category  No deformity present  No loss of protective sensation  No weak pulses  Risk: 0      No results found for this or any previous visit (from the past 1008 hour(s))      Assessment/Plan:    Type 2 diabetes mellitus with hyperosmolarity without coma, without long-term current use of insulin ([facility])    Lab Results   Component Value Date    HGBA1C 6 8 (H) 07/28/2021     Patient is due for repeat hemoglobin A1c and diabetic parameters which she will do next week  Patient remains on Janumet, glipizide, Actos and [place]  Will determine follow-up after labs are completed    Agatston coronary artery calcium score between 100 and 199  Patient will continue to control risk factors including hypertension, hyperlipidemia, diabetes mellitus type 2  He remains on Accupril and Crestor  Patient does have CT coronary calcium scoring performed through his executive health screening which she will do this spring    Mixed hyperlipidemia  Patient remains on Crestor  Check repeat lipid profile which is due  Goal LDL less than 70 given history of diabetes    Vitamin D deficiency  Patient remains on vitamin-D supplementation  Repeat vitamin-D level is due          Problem List Items Addressed This Visit        Endocrine    Type 2 diabetes mellitus with hyperosmolarity without coma, without long-term current use of insulin ([facility] - Primary       Lab Results   Component Value Date    HGBA1C 6 8 (H) 07/28/2021     Patient is due for repeat hemoglobin A1c and diabetic parameters which she will do next week  Patient remains on Janumet, glipizide, Actos and [place]  Will determine follow-up after labs are completed            Cardiovascular and Mediastinum    Agatston coronary artery calcium score between 100 and 199     Patient will continue to control risk factors including hypertension, hyperlipidemia, diabetes mellitus type 2  He remains on Accupril and Crestor  Patient does have CT coronary calcium scoring performed through his executive health screening which she will do this spring            Other    Mixed hyperlipidemia     Patient remains on Crestor  Check repeat lipid profile which is due  Goal LDL less than 70 given history of diabetes         Vitamin D deficiency     Patient remains on vitamin-D supplementation    Repeat vitamin-D level is due

## 2022-01-05 NOTE — ASSESSMENT & PLAN NOTE
Lab Results   Component Value Date    HGBA1C 6 8 (H) 07/28/2021     Patient is due for repeat hemoglobin A1c and diabetic parameters which she will do next week  Patient remains on Janumet, glipizide, Actos and [place]    Will determine follow-up after labs are completed

## 2022-01-16 DIAGNOSIS — E11.9 TYPE 2 DIABETES MELLITUS WITHOUT COMPLICATION, WITHOUT LONG-TERM CURRENT USE OF INSULIN (HCC): Chronic | ICD-10-CM

## 2022-01-17 ENCOUNTER — HOSPITAL ENCOUNTER (EMERGENCY)
Facility: HOSPITAL | Age: 52
Discharge: HOME | End: 2022-01-18
Attending: EMERGENCY MEDICINE
Payer: COMMERCIAL

## 2022-01-17 ENCOUNTER — APPOINTMENT (EMERGENCY)
Dept: RADIOLOGY | Facility: HOSPITAL | Age: 52
End: 2022-01-17
Payer: COMMERCIAL

## 2022-01-17 DIAGNOSIS — R10.13 ACUTE EPIGASTRIC PAIN: Primary | ICD-10-CM

## 2022-01-17 LAB
ALBUMIN SERPL BCP-MCNC: 4.6 G/DL (ref 3.5–5)
ALP SERPL-CCNC: 90 U/L (ref 46–116)
ALT SERPL W P-5'-P-CCNC: 27 U/L (ref 12–78)
ANION GAP SERPL CALCULATED.3IONS-SCNC: 14 MMOL/L (ref 4–13)
AST SERPL W P-5'-P-CCNC: 12 U/L (ref 5–45)
BASOPHILS # BLD AUTO: 0.06 THOUSANDS/ΜL (ref 0–0.1)
BASOPHILS NFR BLD AUTO: 1 % (ref 0–1)
BILIRUB SERPL-MCNC: 0.44 MG/DL (ref 0.2–1)
BUN SERPL-MCNC: 19 MG/DL (ref 5–25)
CALCIUM SERPL-MCNC: 9.4 MG/DL (ref 8.3–10.1)
CARDIAC TROPONIN I PNL SERPL HS: <2 NG/L
CHLORIDE SERPL-SCNC: 96 MMOL/L (ref 100–108)
CO2 SERPL-SCNC: 25 MMOL/L (ref 21–32)
CREAT SERPL-MCNC: 1.03 MG/DL (ref 0.6–1.3)
EOSINOPHIL # BLD AUTO: 0.19 THOUSAND/ΜL (ref 0–0.61)
EOSINOPHIL NFR BLD AUTO: 2 % (ref 0–6)
ERYTHROCYTE [DISTWIDTH] IN BLOOD BY AUTOMATED COUNT: 13.1 % (ref 11.6–15.1)
GFR SERPL CREATININE-BSD FRML MDRD: 84 ML/MIN/1.73SQ M
GLUCOSE SERPL-MCNC: 302 MG/DL (ref 65–140)
HCT VFR BLD AUTO: 46.4 % (ref 36.5–49.3)
HGB BLD-MCNC: 15.1 G/DL (ref 12–17)
IMM GRANULOCYTES # BLD AUTO: 0.03 THOUSAND/UL (ref 0–0.2)
IMM GRANULOCYTES NFR BLD AUTO: 0 % (ref 0–2)
LIPASE SERPL-CCNC: 74 U/L (ref 73–393)
LYMPHOCYTES # BLD AUTO: 2.26 THOUSANDS/ΜL (ref 0.6–4.47)
LYMPHOCYTES NFR BLD AUTO: 26 % (ref 14–44)
MCH RBC QN AUTO: 25.9 PG (ref 26.8–34.3)
MCHC RBC AUTO-ENTMCNC: 32.5 G/DL (ref 31.4–37.4)
MCV RBC AUTO: 80 FL (ref 82–98)
MONOCYTES # BLD AUTO: 0.66 THOUSAND/ΜL (ref 0.17–1.22)
MONOCYTES NFR BLD AUTO: 8 % (ref 4–12)
NEUTROPHILS # BLD AUTO: 5.63 THOUSANDS/ΜL (ref 1.85–7.62)
NEUTS SEG NFR BLD AUTO: 63 % (ref 43–75)
NRBC BLD AUTO-RTO: 0 /100 WBCS
PLATELET # BLD AUTO: 272 THOUSANDS/UL (ref 149–390)
PMV BLD AUTO: 10.2 FL (ref 8.9–12.7)
POTASSIUM SERPL-SCNC: 3.8 MMOL/L (ref 3.5–5.3)
PROT SERPL-MCNC: 7.7 G/DL (ref 6.4–8.2)
RBC # BLD AUTO: 5.82 MILLION/UL (ref 3.88–5.62)
SODIUM SERPL-SCNC: 135 MMOL/L (ref 136–145)
WBC # BLD AUTO: 8.83 THOUSAND/UL (ref 4.31–10.16)

## 2022-01-17 PROCEDURE — 80053 COMPREHEN METABOLIC PANEL: CPT | Performed by: EMERGENCY MEDICINE

## 2022-01-17 PROCEDURE — 93005 ELECTROCARDIOGRAM TRACING: CPT

## 2022-01-17 PROCEDURE — 85025 COMPLETE CBC W/AUTO DIFF WBC: CPT | Performed by: EMERGENCY MEDICINE

## 2022-01-17 PROCEDURE — 71045 X-RAY EXAM CHEST 1 VIEW: CPT

## 2022-01-17 PROCEDURE — 99285 EMERGENCY DEPT VISIT HI MDM: CPT

## 2022-01-17 PROCEDURE — 84484 ASSAY OF TROPONIN QUANT: CPT | Performed by: EMERGENCY MEDICINE

## 2022-01-17 PROCEDURE — 99284 EMERGENCY DEPT VISIT MOD MDM: CPT | Performed by: EMERGENCY MEDICINE

## 2022-01-17 PROCEDURE — 83690 ASSAY OF LIPASE: CPT | Performed by: EMERGENCY MEDICINE

## 2022-01-17 PROCEDURE — 36415 COLL VENOUS BLD VENIPUNCTURE: CPT

## 2022-01-17 RX ORDER — PIOGLITAZONEHYDROCHLORIDE 45 MG/1
TABLET ORAL
Qty: 90 TABLET | Refills: 3 | Status: SHIPPED | OUTPATIENT
Start: 2022-01-17

## 2022-01-17 RX ORDER — MAGNESIUM HYDROXIDE/ALUMINUM HYDROXICE/SIMETHICONE 120; 1200; 1200 MG/30ML; MG/30ML; MG/30ML
30 SUSPENSION ORAL ONCE
Status: COMPLETED | OUTPATIENT
Start: 2022-01-17 | End: 2022-01-17

## 2022-01-17 RX ADMIN — ALUMINA, MAGNESIA, AND SIMETHICONE ORAL SUSPENSION REGULAR STRENGTH 30 ML: 1200; 1200; 120 SUSPENSION ORAL at 23:09

## 2022-01-18 VITALS
HEART RATE: 84 BPM | RESPIRATION RATE: 18 BRPM | TEMPERATURE: 98.5 F | SYSTOLIC BLOOD PRESSURE: 140 MMHG | DIASTOLIC BLOOD PRESSURE: 85 MMHG | OXYGEN SATURATION: 96 %

## 2022-01-18 LAB
ATRIAL RATE: 86 BPM
P AXIS: 55 DEGREES
PR INTERVAL: 204 MS
QRS AXIS: 33 DEGREES
QRSD INTERVAL: 82 MS
QT INTERVAL: 358 MS
QTC INTERVAL: 436 MS
T WAVE AXIS: 36 DEGREES
VENTRICULAR RATE: 89 BPM

## 2022-01-18 PROCEDURE — 93010 ELECTROCARDIOGRAM REPORT: CPT | Performed by: INTERNAL MEDICINE

## 2022-01-20 ENCOUNTER — OFFICE VISIT (OUTPATIENT)
Dept: FAMILY MEDICINE CLINIC | Facility: CLINIC | Age: 52
End: 2022-01-20
Payer: COMMERCIAL

## 2022-01-20 ENCOUNTER — APPOINTMENT (OUTPATIENT)
Dept: LAB | Facility: CLINIC | Age: 52
End: 2022-01-20
Payer: COMMERCIAL

## 2022-01-20 VITALS
WEIGHT: 171 LBS | RESPIRATION RATE: 16 BRPM | HEIGHT: 66 IN | HEART RATE: 80 BPM | DIASTOLIC BLOOD PRESSURE: 72 MMHG | SYSTOLIC BLOOD PRESSURE: 138 MMHG | BODY MASS INDEX: 27.48 KG/M2 | OXYGEN SATURATION: 97 %

## 2022-01-20 DIAGNOSIS — K21.9 GASTROESOPHAGEAL REFLUX DISEASE WITHOUT ESOPHAGITIS: ICD-10-CM

## 2022-01-20 DIAGNOSIS — R82.4 KETONURIA: ICD-10-CM

## 2022-01-20 DIAGNOSIS — E78.2 MIXED HYPERLIPIDEMIA: ICD-10-CM

## 2022-01-20 DIAGNOSIS — R93.1 AGATSTON CORONARY ARTERY CALCIUM SCORE BETWEEN 100 AND 199: ICD-10-CM

## 2022-01-20 DIAGNOSIS — Z12.5 PROSTATE CANCER SCREENING: ICD-10-CM

## 2022-01-20 DIAGNOSIS — E11.00 TYPE 2 DIABETES MELLITUS WITH HYPEROSMOLARITY WITHOUT COMA, WITHOUT LONG-TERM CURRENT USE OF INSULIN (HCC): ICD-10-CM

## 2022-01-20 DIAGNOSIS — I10 ESSENTIAL HYPERTENSION: Chronic | ICD-10-CM

## 2022-01-20 DIAGNOSIS — R10.84 GENERALIZED ABDOMINAL PAIN: Primary | ICD-10-CM

## 2022-01-20 LAB
ALBUMIN SERPL BCP-MCNC: 4.3 G/DL (ref 3.5–5)
ALP SERPL-CCNC: 84 U/L (ref 46–116)
ALT SERPL W P-5'-P-CCNC: 24 U/L (ref 12–78)
ANION GAP SERPL CALCULATED.3IONS-SCNC: 10 MMOL/L (ref 4–13)
AST SERPL W P-5'-P-CCNC: 11 U/L (ref 5–45)
BASOPHILS # BLD AUTO: 0.05 THOUSANDS/ΜL (ref 0–0.1)
BASOPHILS NFR BLD AUTO: 1 % (ref 0–1)
BILIRUB SERPL-MCNC: 0.45 MG/DL (ref 0.2–1)
BUN SERPL-MCNC: 17 MG/DL (ref 5–25)
CALCIUM SERPL-MCNC: 9.3 MG/DL (ref 8.3–10.1)
CHLORIDE SERPL-SCNC: 100 MMOL/L (ref 100–108)
CHOLEST SERPL-MCNC: 176 MG/DL
CO2 SERPL-SCNC: 27 MMOL/L (ref 21–32)
CREAT SERPL-MCNC: 0.9 MG/DL (ref 0.6–1.3)
EOSINOPHIL # BLD AUTO: 0.18 THOUSAND/ΜL (ref 0–0.61)
EOSINOPHIL NFR BLD AUTO: 3 % (ref 0–6)
ERYTHROCYTE [DISTWIDTH] IN BLOOD BY AUTOMATED COUNT: 13.1 % (ref 11.6–15.1)
EST. AVERAGE GLUCOSE BLD GHB EST-MCNC: 214 MG/DL
GFR SERPL CREATININE-BSD FRML MDRD: 99 ML/MIN/1.73SQ M
GLUCOSE P FAST SERPL-MCNC: 320 MG/DL (ref 65–99)
HBA1C MFR BLD: 9.1 %
HCT VFR BLD AUTO: 47.4 % (ref 36.5–49.3)
HDLC SERPL-MCNC: 40 MG/DL
HGB BLD-MCNC: 15.3 G/DL (ref 12–17)
IMM GRANULOCYTES # BLD AUTO: 0.03 THOUSAND/UL (ref 0–0.2)
IMM GRANULOCYTES NFR BLD AUTO: 1 % (ref 0–2)
LDLC SERPL CALC-MCNC: 102 MG/DL (ref 0–100)
LYMPHOCYTES # BLD AUTO: 2.16 THOUSANDS/ΜL (ref 0.6–4.47)
LYMPHOCYTES NFR BLD AUTO: 33 % (ref 14–44)
MCH RBC QN AUTO: 26.7 PG (ref 26.8–34.3)
MCHC RBC AUTO-ENTMCNC: 32.3 G/DL (ref 31.4–37.4)
MCV RBC AUTO: 83 FL (ref 82–98)
MONOCYTES # BLD AUTO: 0.52 THOUSAND/ΜL (ref 0.17–1.22)
MONOCYTES NFR BLD AUTO: 8 % (ref 4–12)
NEUTROPHILS # BLD AUTO: 3.69 THOUSANDS/ΜL (ref 1.85–7.62)
NEUTS SEG NFR BLD AUTO: 54 % (ref 43–75)
NONHDLC SERPL-MCNC: 136 MG/DL
NRBC BLD AUTO-RTO: 0 /100 WBCS
PLATELET # BLD AUTO: 278 THOUSANDS/UL (ref 149–390)
PMV BLD AUTO: 10.2 FL (ref 8.9–12.7)
POTASSIUM SERPL-SCNC: 4 MMOL/L (ref 3.5–5.3)
PROT SERPL-MCNC: 7.6 G/DL (ref 6.4–8.2)
PSA SERPL-MCNC: 0.9 NG/ML (ref 0–4)
RBC # BLD AUTO: 5.73 MILLION/UL (ref 3.88–5.62)
SL AMB  POCT GLUCOSE, UA: >1000
SL AMB LEUKOCYTE ESTERASE,UA: ABNORMAL
SL AMB POCT BILIRUBIN,UA: ABNORMAL
SL AMB POCT BLOOD,UA: ABNORMAL
SL AMB POCT CLARITY,UA: ABNORMAL
SL AMB POCT COLOR,UA: YELLOW
SL AMB POCT KETONES,UA: 15
SL AMB POCT NITRITE,UA: ABNORMAL
SL AMB POCT PH,UA: 5
SL AMB POCT SPECIFIC GRAVITY,UA: 1.02
SL AMB POCT URINE PROTEIN: ABNORMAL
SL AMB POCT UROBILINOGEN: ABNORMAL
SODIUM SERPL-SCNC: 137 MMOL/L (ref 136–145)
TRIGL SERPL-MCNC: 170 MG/DL
TSH SERPL DL<=0.05 MIU/L-ACNC: 2.22 UIU/ML (ref 0.36–3.74)
WBC # BLD AUTO: 6.63 THOUSAND/UL (ref 4.31–10.16)

## 2022-01-20 PROCEDURE — 83036 HEMOGLOBIN GLYCOSYLATED A1C: CPT

## 2022-01-20 PROCEDURE — 80053 COMPREHEN METABOLIC PANEL: CPT

## 2022-01-20 PROCEDURE — 3008F BODY MASS INDEX DOCD: CPT | Performed by: FAMILY MEDICINE

## 2022-01-20 PROCEDURE — 85025 COMPLETE CBC W/AUTO DIFF WBC: CPT

## 2022-01-20 PROCEDURE — 3078F DIAST BP <80 MM HG: CPT | Performed by: FAMILY MEDICINE

## 2022-01-20 PROCEDURE — 80061 LIPID PANEL: CPT

## 2022-01-20 PROCEDURE — G0103 PSA SCREENING: HCPCS

## 2022-01-20 PROCEDURE — 3046F HEMOGLOBIN A1C LEVEL >9.0%: CPT | Performed by: FAMILY MEDICINE

## 2022-01-20 PROCEDURE — 84443 ASSAY THYROID STIM HORMONE: CPT

## 2022-01-20 PROCEDURE — 3075F SYST BP GE 130 - 139MM HG: CPT | Performed by: FAMILY MEDICINE

## 2022-01-20 PROCEDURE — 99215 OFFICE O/P EST HI 40 MIN: CPT | Performed by: FAMILY MEDICINE

## 2022-01-20 PROCEDURE — 3061F NEG MICROALBUMINURIA REV: CPT | Performed by: FAMILY MEDICINE

## 2022-01-20 PROCEDURE — 36415 COLL VENOUS BLD VENIPUNCTURE: CPT

## 2022-01-20 PROCEDURE — 81003 URINALYSIS AUTO W/O SCOPE: CPT | Performed by: FAMILY MEDICINE

## 2022-01-20 RX ORDER — SUCRALFATE 1 G/1
1 TABLET ORAL 2 TIMES DAILY
Qty: 60 TABLET | Refills: 1 | Status: SHIPPED | OUTPATIENT
Start: 2022-01-20

## 2022-01-20 RX ORDER — SUCRALFATE ORAL 1 G/10ML
1 SUSPENSION ORAL 2 TIMES DAILY
Qty: 420 ML | Refills: 1 | Status: SHIPPED | OUTPATIENT
Start: 2022-01-20 | End: 2022-01-20

## 2022-01-20 NOTE — ASSESSMENT & PLAN NOTE
Patient does have generalized abdominal pain  At this point I believe it may be related to his uncontrolled diabetes  Patient also has a small amount of ketones in his urine  I do not believe that he has an acute abdomen    Placing patient on Carafate but also advised to make dietary adjustments to help bring down his glucose     -contact the office if there is no improvement his abdominal pain

## 2022-01-20 NOTE — ASSESSMENT & PLAN NOTE
Need to have better control of diabetes  At this time there is not a need for insulin    Dietary changes in continue on Actos, Janumet, glipizide and [place] [employer]

## 2022-01-20 NOTE — ASSESSMENT & PLAN NOTE
Lab Results   Component Value Date    HGBA1C 9 1 (H) 01/20/2022     Significant worsening hemoglobin A1c and fasting glucose likely due to dietary indiscretion  Patient is making adjustments to his diet  Blood glucose done here in the office was 219    I believe that his abdominal discomfort, blurring of vision and headache are likely related to uncontrolled diabetes

## 2022-01-20 NOTE — ASSESSMENT & PLAN NOTE
Patient does have hiatal hernia  Remains on Nexium 40 mg once daily    Prescription provided for Carafate for his abdominal discomfort as coding agent seem to help him while he was in the ER

## 2022-01-20 NOTE — ASSESSMENT & PLAN NOTE
Cholesterol is slightly worse  Likely due to dietary indiscretion once again  Goal LDL less than 70 with his history of diabetes    Remains on Crestor 10 mg once daily

## 2022-01-20 NOTE — PROGRESS NOTES
Subjective:      Patient ID: Nette Lozanow is a 49 y o  male  52-year-old male with past medical history of diabetes mellitus type 2, hypertension, hyperlipidemia presents to review his labs and also follows up for recent visit to ER for abdominal pain  Patient was at the emergency room for 4 hours after developing acute pain of the upper abdomen on Monday night  Patient states that he had gotten back from work drank some ice tea and went upstairs and developed sharp upper abdominal pain both the left and right side  Pain persisted for well over 1 hour so he went to the emergency room  Patient had EKG, troponin as well as standard labs performed  No imaging was performed  He was given Mylanta which did alleviate some of the pain  Liver function studies were normal however his blood glucose was 310 with an anion gap of 14  Patient eventually left the ER as he had to be up for work  Still has abdominal pain though it has lessened  Patient did have blood work done today as well for most of his other conditions which showed that his hemoglobin A1c increased from 6 8-9 1% with a fasting glucose of 320  Admittedly the patient has not been as diligent with following diabetic diet recently    Patient does also have headache as well as some blurring of his vision      Past Medical History:   Diagnosis Date    Anxiety     only when flying    Chronic frontal sinusitis     last assessed 03/02/2016    COVID-19 virus infection 4/1/2020    Diabetes mellitus ([address] )     Fracture of great toe     last assessed 11/06/2014    GERD (gastroesophageal reflux disease)     Hyperlipidemia     Hypertension     Palpitations     last assessed 11/06/2012    RUQ abdominal pain 9/24/2021       Family History   Problem Relation Age of Onset    Diabetes Mother     Liver cancer Mother     Hypertension Mother     Colon cancer Father 76       Past Surgical History:   Procedure Laterality Date    COLONOSCOPY      HERNIA REPAIR Bilateral     WI MANIPULATN SHLDR JT W ANESTHESIA Right 4/3/2017    Procedure: SHOULDER MANIPULATION UNDER ANESTHESIA ;  Surgeon: Mookie Flores MD;  Location: [facility];  Service: Orthopedics    UPPER GASTROINTESTINAL ENDOSCOPY          reports that he has been smoking cigars  He has never used smokeless tobacco  He reports current alcohol use  He reports that he does not use drugs        Current Outpatient Medications:     azelastine (ASTELIN) 0 1 % nasal spray, INHALE ONE SPRAY INTO EACH NOSTRIL TWO TIMES A DAY AS DIRECTED - GENERIC FOR ASTELIN, Disp: 30 mL, Rfl: 0    CHOLECALCIFEROL PO, Take 1,000 Units by mouth, Disp: , Rfl:     diltiazem (CARDIZEM) 30 mg tablet, Take 1 tablet (30 mg total) by mouth daily, Disp: 90 tablet, Rfl: 1    esomeprazole (NexIUM) 40 MG capsule, TAKE ONE CAPSULE BY MOUTH EVERY DAY, Disp: 90 capsule, Rfl: 1    Farxiga 10 MG TABS, TAKE ONE TABLET BY MOUTH EVERY DAY, Disp: 30 tablet, Rfl: 5    fexofenadine (ALLEGRA) 180 MG tablet, Take 1 tablet (180 mg total) by mouth daily, Disp: 30 tablet, Rfl: 5    glipiZIDE (GLUCOTROL XL) 5 mg 24 hr tablet, TAKE ONE TABLET BY MOUTH EVERY DAY, Disp: 90 tablet, Rfl: 0    Janumet XR  MG TB24, TAKE TWO TABLETS BY MOUTH EVERY DAY, Disp: 180 tablet, Rfl: 1    meloxicam (MOBIC) 15 mg tablet, TAKE ONE TABLET BY MOUTH EVERY DAY, Disp: 90 tablet, Rfl: 1    Omega-3 Fatty Acids (FISH OIL) 1200 MG CAPS, Take 1 capsule by mouth daily, Disp: , Rfl:     Phytonadione (K 100) 100 MCG TABS, Take 1 tablet (100 mcg total) by mouth daily, Disp: 100 tablet, Rfl: 1    pioglitazone (ACTOS) 45 mg tablet, TAKE ONE TABLET BY MOUTH EVERY DAY, Disp: 90 tablet, Rfl: 3    quinapril (ACCUPRIL) 5 mg tablet, TAKE ONE TABLET BY MOUTH EVERY DAY, Disp: 90 tablet, Rfl: 1    rosuvastatin (CRESTOR) 10 MG tablet, TAKE ONE TABLET BY MOUTH EVERY DAY, Disp: 90 tablet, Rfl: 0    sildenafil (VIAGRA) 100 mg tablet, Take 1 tablet (100 mg total) by mouth as needed for erectile dysfunction, Disp: 12 tablet, Rfl: 1    ALPRAZolam (XANAX) 0 25 mg tablet, Take 1 tablet (0 25 mg total) by mouth every 8 (eight) hours as needed for anxiety, Disp: 30 tablet, Rfl: 0    aspirin 81 mg chewable tablet, Adult Aspirin Low Strength 81 MG CHEW 1 PO Q D  Quantity: 0;  Refills: 0    [employer], Provider M D ;  Started 11-Oct-2007 Active, Disp: , Rfl:     glucose blood (FREESTYLE INSULINX TEST) test strip, Test 1 Squirt 2 (two) times a day  , Disp: , Rfl:     montelukast (SINGULAIR) 10 mg tablet, Take 1 tablet (10 mg total) by mouth daily at bedtime (Patient not taking: Reported on 1/17/2022 ), Disp: 90 tablet, Rfl: 0    sucralfate (CARAFATE) 1 g tablet, Take 1 tablet (1 g total) by mouth 2 (two) times a day, Disp: 60 tablet, Rfl: 1    The following portions of the patient's history were reviewed and updated as appropriate: allergies, current medications, past family history, past medical history, past social history, past surgical history and problem list     Review of Systems   Constitutional: Positive for fatigue  Negative for activity change and appetite change  HENT: Negative  Eyes: Positive for visual disturbance  Respiratory: Negative  Cardiovascular: Negative  Gastrointestinal: Positive for abdominal pain  Negative for blood in stool, constipation, diarrhea, nausea and vomiting  Endocrine: Negative  Genitourinary: Negative  Musculoskeletal: Negative  Skin: Negative  Allergic/Immunologic: Negative  Neurological: Positive for headaches  Hematological: Negative  Psychiatric/Behavioral: Negative  All other systems reviewed and are negative  Objective:    /72   Pulse 80   Resp 16   Ht 5' 6" (1 676 m)   Wt 77 6 kg (171 lb)   SpO2 97%   BMI 27 60 kg/m²      Physical Exam  Vitals and nursing note reviewed  Constitutional:       Appearance: Normal appearance  He is normal weight  HENT:      Head: Normocephalic and atraumatic  Cardiovascular:      Rate and Rhythm: Normal rate and regular rhythm  Pulses: Normal pulses  Heart sounds: Normal heart sounds  Pulmonary:      Effort: Pulmonary effort is normal       Breath sounds: Normal breath sounds  Abdominal:      General: Abdomen is flat  Bowel sounds are normal       Palpations: Abdomen is soft  There is no mass  Tenderness: There is abdominal tenderness (Midepigastric)  There is no rebound  Hernia: No hernia is present  Neurological:      General: No focal deficit present  Mental Status: He is alert and oriented to person, place, and time  Mental status is at baseline  Psychiatric:         Mood and Affect: Mood normal          Behavior: Behavior normal          Thought Content:  Thought content normal          Judgment: Judgment normal            Recent Results (from the past 1008 hour(s))   CBC and differential    Collection Time: 01/17/22  7:26 PM   Result Value Ref Range    WBC 8 83 4 31 - 10 16 Thousand/uL    RBC 5 82 (H) 3 88 - 5 62 Million/uL    Hemoglobin 15 1 12 0 - 17 0 g/dL    Hematocrit 46 4 36 5 - 49 3 %    MCV 80 (L) 82 - 98 fL    MCH 25 9 (L) 26 8 - 34 3 pg    MCHC 32 5 31 4 - 37 4 g/dL    RDW 13 1 11 6 - 15 1 %    MPV 10 2 8 9 - 12 7 fL    Platelets 618 851 - 293 Thousands/uL    nRBC 0 /100 WBCs    Neutrophils Relative 63 43 - 75 %    Immat GRANS % 0 0 - 2 %    Lymphocytes Relative 26 14 - 44 %    Monocytes Relative 8 4 - 12 %    Eosinophils Relative 2 0 - 6 %    Basophils Relative 1 0 - 1 %    Neutrophils Absolute 5 63 1 85 - 7 62 Thousands/µL    Immature Grans Absolute 0 03 0 00 - 0 20 Thousand/uL    Lymphocytes Absolute 2 26 0 60 - 4 47 Thousands/µL    Monocytes Absolute 0 66 0 17 - 1 22 Thousand/µL    Eosinophils Absolute 0 19 0 00 - 0 61 Thousand/µL    Basophils Absolute 0 06 0 00 - 0 10 Thousands/µL   Comprehensive metabolic panel    Collection Time: 01/17/22  7:26 PM   Result Value Ref Range    Sodium 135 (L) 136 - 145 mmol/L Potassium 3 8 3 5 - 5 3 mmol/L    Chloride 96 (L) 100 - 108 mmol/L    CO2 25 21 - 32 mmol/L    ANION GAP 14 (H) 4 - 13 mmol/L    BUN 19 5 - 25 mg/dL    Creatinine 1 03 0 60 - 1 30 mg/dL    Glucose 302 (H) 65 - 140 mg/dL    Calcium 9 4 8 3 - 10 1 mg/dL    AST 12 5 - 45 U/L    ALT 27 12 - 78 U/L    Alkaline Phosphatase 90 46 - 116 U/L    Total Protein 7 7 6 4 - 8 2 g/dL    Albumin 4 6 3 5 - 5 0 g/dL    Total Bilirubin 0 44 0 20 - 1 00 mg/dL    eGFR 84 ml/min/1 73sq m   HS Troponin 0hr (reflex protocol)    Collection Time: 01/17/22  7:26 PM   Result Value Ref Range    hs TnI 0hr <2 "Refer to ACS Flowchart"- see link ng/L   Lipase    Collection Time: 01/17/22  7:26 PM   Result Value Ref Range    Lipase 74 73 - 393 u/L   ECG 12 lead    Collection Time: 01/17/22  7:27 PM   Result Value Ref Range    Ventricular Rate 89 BPM    Atrial Rate 86 BPM    MN Interval 204 ms    QRSD Interval 82 ms    QT Interval 358 ms    QTC Interval 436 ms    P Axis 55 degrees    QRS Axis 33 degrees    T Wave Axis 36 degrees   PSA, Total Screen    Collection Time: 01/20/22  8:22 AM   Result Value Ref Range    PSA 0 9 0 0 - 4 0 ng/mL   CBC and differential    Collection Time: 01/20/22  8:22 AM   Result Value Ref Range    WBC 6 63 4 31 - 10 16 Thousand/uL    RBC 5 73 (H) 3 88 - 5 62 Million/uL    Hemoglobin 15 3 12 0 - 17 0 g/dL    Hematocrit 47 4 36 5 - 49 3 %    MCV 83 82 - 98 fL    MCH 26 7 (L) 26 8 - 34 3 pg    MCHC 32 3 31 4 - 37 4 g/dL    RDW 13 1 11 6 - 15 1 %    MPV 10 2 8 9 - 12 7 fL    Platelets 614 456 - 523 Thousands/uL    nRBC 0 /100 WBCs    Neutrophils Relative 54 43 - 75 %    Immat GRANS % 1 0 - 2 %    Lymphocytes Relative 33 14 - 44 %    Monocytes Relative 8 4 - 12 %    Eosinophils Relative 3 0 - 6 %    Basophils Relative 1 0 - 1 %    Neutrophils Absolute 3 69 1 85 - 7 62 Thousands/µL    Immature Grans Absolute 0 03 0 00 - 0 20 Thousand/uL    Lymphocytes Absolute 2 16 0 60 - 4 47 Thousands/µL    Monocytes Absolute 0 52 0 17 - 1 22 Thousand/µL    Eosinophils Absolute 0 18 0 00 - 0 61 Thousand/µL    Basophils Absolute 0 05 0 00 - 0 10 Thousands/µL   Comprehensive metabolic panel    Collection Time: 01/20/22  8:22 AM   Result Value Ref Range    Sodium 137 136 - 145 mmol/L    Potassium 4 0 3 5 - 5 3 mmol/L    Chloride 100 100 - 108 mmol/L    CO2 27 21 - 32 mmol/L    ANION GAP 10 4 - 13 mmol/L    BUN 17 5 - 25 mg/dL    Creatinine 0 90 0 60 - 1 30 mg/dL    Glucose, Fasting 320 (H) 65 - 99 mg/dL    Calcium 9 3 8 3 - 10 1 mg/dL    AST 11 5 - 45 U/L    ALT 24 12 - 78 U/L    Alkaline Phosphatase 84 46 - 116 U/L    Total Protein 7 6 6 4 - 8 2 g/dL    Albumin 4 3 3 5 - 5 0 g/dL    Total Bilirubin 0 45 0 20 - 1 00 mg/dL    eGFR 99 ml/min/1 73sq m   Lipid panel    Collection Time: 01/20/22  8:22 AM   Result Value Ref Range    Cholesterol 176 See Comment mg/dL    Triglycerides 170 (H) See Comment mg/dL    HDL, Direct 40 >=40 mg/dL    LDL Calculated 102 (H) 0 - 100 mg/dL    Non-HDL-Chol (CHOL-HDL) 136 mg/dl   Hemoglobin A1C    Collection Time: 01/20/22  8:22 AM   Result Value Ref Range    Hemoglobin A1C 9 1 (H) Normal 3 8-5 6%; PreDiabetic 5 7-6 4%;  Diabetic >=6 5%; Glycemic control for adults with diabetes <7 0% %     mg/dl   TSH, 3rd generation with Free T4 reflex    Collection Time: 01/20/22  8:22 AM   Result Value Ref Range    TSH 3RD GENERATON 2 216 0 358 - 3 740 uIU/mL   POCT urine dip    Collection Time: 01/20/22  3:11 PM   Result Value Ref Range    LEUKOCYTE ESTERASE,UA neg     NITRITE,UA neg     SL AMB POCT UROBILINOGEN norm     POCT URINE PROTEIN neg      PH,UA 5     BLOOD,UA neg     SPECIFIC GRAVITY,UA 1 020     KETONES,UA 15     BILIRUBIN,UA neg     GLUCOSE, UA >1,000      COLOR,UA yellow     CLARITY,UA cloudy        Assessment/Plan:    Type 2 diabetes mellitus with hyperosmolarity without coma, without long-term current use of insulin (HCC)    Lab Results   Component Value Date    HGBA1C 9 1 (H) 01/20/2022     Significant worsening hemoglobin A1c and fasting glucose likely due to dietary indiscretion  Patient is making adjustments to his diet  Blood glucose done here in the office was 219  I believe that his abdominal discomfort, blurring of vision and headache are likely related to uncontrolled diabetes    Gastroesophageal reflux disease without esophagitis  Patient does have hiatal hernia  Remains on Nexium 40 mg once daily  Prescription provided for Carafate for his abdominal discomfort as coding agent seem to help him while he was in the ER    Essential hypertension  Hypertension remains very well controlled on Accupril and Cardizem  Continue same  Mixed hyperlipidemia  Cholesterol is slightly worse  Likely due to dietary indiscretion once again  Goal LDL less than 70 with his history of diabetes  Remains on Crestor 10 mg once daily    Generalized abdominal pain  Patient does have generalized abdominal pain  At this point I believe it may be related to his uncontrolled diabetes  Patient also has a small amount of ketones in his urine  I do not believe that he has an acute abdomen  Placing patient on Carafate but also advised to make dietary adjustments to help bring down his glucose     -contact the office if there is no improvement his abdominal pain     Ketonuria  Need to have better control of diabetes  At this time there is not a need for insulin  Dietary changes in continue on Actos, Janumet, glipizide and Farxiga          Problem List Items Addressed This Visit        Digestive    Gastroesophageal reflux disease without esophagitis     Patient does have hiatal hernia  Remains on Nexium 40 mg once daily    Prescription provided for Carafate for his abdominal discomfort as coding agent seem to help him while he was in the ER         Relevant Medications    sucralfate (CARAFATE) 1 g tablet       Endocrine    Type 2 diabetes mellitus with hyperosmolarity without coma, without long-term current use of insulin ([address] ) Lab Results   Component Value Date    HGBA1C 9 1 (H) 01/20/2022     Significant worsening hemoglobin A1c and fasting glucose likely due to dietary indiscretion  Patient is making adjustments to his diet  Blood glucose done here in the office was 219  I believe that his abdominal discomfort, blurring of vision and headache are likely related to uncontrolled diabetes         Relevant Orders    CBC and differential    Comprehensive metabolic panel    Hemoglobin A1C    Microalbumin / creatinine urine ratio       Cardiovascular and Mediastinum    Essential hypertension (Chronic)     Hypertension remains very well controlled on Accupril and Cardizem  Continue same  Relevant Orders    TSH, 3rd generation with Free T4 reflex       Other    Generalized abdominal pain - Primary     Patient does have generalized abdominal pain  At this point I believe it may be related to his uncontrolled diabetes  Patient also has a small amount of ketones in his urine  I do not believe that he has an acute abdomen  Placing patient on Carafate but also advised to make dietary adjustments to help bring down his glucose     -contact the office if there is no improvement his abdominal pain          Relevant Medications    sucralfate (CARAFATE) 1 g tablet    Other Relevant Orders    POCT urine dip (Completed)    Ketonuria     Need to have better control of diabetes  At this time there is not a need for insulin  Dietary changes in continue on Actos, Janumet, glipizide and Farxiga         Mixed hyperlipidemia     Cholesterol is slightly worse  Likely due to dietary indiscretion once again  Goal LDL less than 70 with his history of diabetes    Remains on Crestor 10 mg once daily         Relevant Orders    Lipid panel          I have spent 44 minutes with Patient  today in which greater than 50% of this time was spent in counseling/coordination of care regarding Diagnostic results, Prognosis, Risks and benefits of tx options, Intructions for management, Patient and family education, Importance of tx compliance, Risk factor reductions and Impressions

## 2022-01-23 NOTE — ED PROVIDER NOTES
History  Chief Complaint   Patient presents with    Chest Pain     pt c/o epigastric/chest pain that starts in epigastric area and  radiates up into R breast states pain started suddenly around 1800  denies increase or decrease in pain with interventions  62-year-old male, history of hypertension, hyperlipidemia, diabetes, presents just prior to arrival in the emergency department with pain that he describes as epigastric pain, patient reports he recently was diagnosed with a hiatal hernia, the pain occurred after he was eating tonight, the patient denies any cough, fever, shortness of breath, nausea, vomiting, headache, dizziness, abdominal pain, or other complaints  Patient was concerned that his epigastric pain may be related to his heart  Patient has a family history of a grandmother who had an MI earlier than 72years old  Prior to Admission Medications   Prescriptions Last Dose Informant Patient Reported? Taking?    ALPRAZolam (XANAX) 0 25 mg tablet Unknown at Unknown time Self No No   Sig: Take 1 tablet (0 25 mg total) by mouth every 8 (eight) hours as needed for anxiety   CHOLECALCIFEROL PO  Self Yes Yes   Sig: Take 1,000 Units by mouth   Farxiga 10 MG TABS   No Yes   Sig: TAKE ONE TABLET BY MOUTH EVERY DAY   Janumet XR  MG TB24  Self No Yes   Sig: TAKE TWO TABLETS BY MOUTH EVERY DAY   Omega-3 Fatty Acids (FISH OIL) 1200 MG CAPS  Self Yes Yes   Sig: Take 1 capsule by mouth daily   Phytonadione (K 100) 100 MCG TABS  Self No Yes   Sig: Take 1 tablet (100 mcg total) by mouth daily   aspirin 81 mg chewable tablet  Self Yes Yes   Sig: Adult Aspirin Low Strength 81 MG CHEW  1 PO Q D   Quantity: 0;  Refills: 0       [employer], Provider M D ;  Started 11-Oct-2007  Active   azelastine (ASTELIN) 0 1 % nasal spray  Self No Yes   Sig: INHALE ONE SPRAY INTO EACH NOSTRIL TWO TIMES A DAY AS DIRECTED - GENERIC FOR ASTELIN   diltiazem (CARDIZEM) 30 mg tablet  Self No Yes   Sig: Take 1 tablet (30 mg total) by mouth daily   esomeprazole (NexIUM) 40 MG capsule  Self No Yes   Sig: TAKE ONE CAPSULE BY MOUTH EVERY DAY   fexofenadine (ALLEGRA) 180 MG tablet  Self No Yes   Sig: Take 1 tablet (180 mg total) by mouth daily   glipiZIDE (GLUCOTROL XL) 5 mg 24 hr tablet  Self No Yes   Sig: TAKE ONE TABLET BY MOUTH EVERY DAY   glucose blood (FREESTYLE INSULINX TEST) test strip Not Taking at Unknown time Self Yes No   Sig: Test 1 Squirt 2 (two) times a day     meloxicam (MOBIC) 15 mg tablet   No Yes   Sig: TAKE ONE TABLET BY MOUTH EVERY DAY   montelukast (SINGULAIR) 10 mg tablet Not Taking at Unknown time Self No No   Sig: Take 1 tablet (10 mg total) by mouth daily at bedtime   Patient not taking: Reported on 1/17/2022    pioglitazone (ACTOS) 45 mg tablet   No Yes   Sig: TAKE ONE TABLET BY MOUTH EVERY DAY   quinapril (ACCUPRIL) 5 mg tablet   No Yes   Sig: TAKE ONE TABLET BY MOUTH EVERY DAY   rosuvastatin (CRESTOR) 10 MG tablet   No Yes   Sig: TAKE ONE TABLET BY MOUTH EVERY DAY   sildenafil (VIAGRA) 100 mg tablet  Self No Yes   Sig: Take 1 tablet (100 mg total) by mouth as needed for erectile dysfunction      Facility-Administered Medications: None       Past Medical History:   Diagnosis Date    Anxiety     only when flying    Chronic frontal sinusitis     last assessed 03/02/2016    COVID-19 virus infection 4/1/2020    Diabetes mellitus ([facility] [address] )     Fracture of great toe     last assessed 11/06/2014    GERD (gastroesophageal reflux disease)     Hyperlipidemia     Hypertension     Palpitations     last assessed 11/06/2012    RUQ abdominal pain 9/24/2021       Past Surgical History:   Procedure Laterality Date    COLONOSCOPY      HERNIA REPAIR Bilateral     DC MANIPULAАНДРЕЙN SHLDR JT W ANESTHESIA Right 4/3/2017    Procedure: SHOULDER MANIPULATION UNDER ANESTHESIA ;  Surgeon: Alline Prader, MD;  Location: [facility];  Service: Orthopedics    UPPER GASTROINTESTINAL ENDOSCOPY         Family History   Problem Relation Age of Onset    Diabetes Mother     Liver cancer Mother     Hypertension Mother     Colon cancer Father 76     I have reviewed and agree with the history as documented  E-Cigarette/Vaping    E-Cigarette Use Never User      E-Cigarette/Vaping Substances     Social History     Tobacco Use    Smoking status: Current Some Day Smoker     Types: Cigars    Smokeless tobacco: Never Used   Vaping Use    Vaping Use: Never used   Substance Use Topics    Alcohol use: Yes     Comment: socially    Drug use: No       Review of Systems   Constitutional: Negative for fever  HENT: Negative for congestion  Eyes: Negative for visual disturbance  Respiratory: Negative for shortness of breath  Cardiovascular: Positive for chest pain  Gastrointestinal: Positive for abdominal pain  Negative for diarrhea and vomiting  Endocrine: Negative for polyuria  Genitourinary: Negative for hematuria  Musculoskeletal: Negative for myalgias  Neurological: Negative for headaches  Physical Exam  Physical Exam  Constitutional:       Appearance: Normal appearance  HENT:      Head: Normocephalic and atraumatic  Right Ear: External ear normal       Left Ear: External ear normal       Mouth/Throat:      Mouth: Mucous membranes are moist       Pharynx: Oropharynx is clear  Eyes:      Conjunctiva/sclera: Conjunctivae normal       Pupils: Pupils are equal, round, and reactive to light  Cardiovascular:      Rate and Rhythm: Normal rate and regular rhythm  Heart sounds: Normal heart sounds  Pulmonary:      Breath sounds: Normal breath sounds  Abdominal:      General: Abdomen is flat  There is no distension  Palpations: Abdomen is soft  Tenderness: There is abdominal tenderness in the epigastric area  Musculoskeletal:         General: No deformity  Normal range of motion  Cervical back: Normal range of motion  Skin:     General: Skin is warm and dry     Neurological:      General: No focal deficit present  Mental Status: He is alert and oriented to person, place, and time     Psychiatric:         Mood and Affect: Mood normal          Behavior: Behavior normal          Vital Signs  ED Triage Vitals   Temperature Pulse Respirations Blood Pressure SpO2   01/17/22 1920 01/17/22 1920 01/17/22 1920 01/17/22 1920 01/17/22 1920   98 5 °F (36 9 °C) 90 18 147/84 98 %      Temp Source Heart Rate Source Patient Position - Orthostatic VS BP Location FiO2 (%)   01/17/22 1920 01/17/22 1920 01/17/22 1920 01/17/22 1920 --   Oral Monitor Sitting Right arm       Pain Score       01/17/22 2113       No Pain           Vitals:    01/17/22 2113 01/17/22 2200 01/17/22 2300 01/18/22 0000   BP: 138/85 130/80 139/89 140/85   Pulse: 82 80 80 84   Patient Position - Orthostatic VS: Lying Lying Lying Lying         Visual Acuity      ED Medications  Medications   aluminum-magnesium hydroxide-simethicone (MYLANTA) oral suspension 30 mL (30 mL Oral Given 1/17/22 2309)       Diagnostic Studies  Results Reviewed     Procedure Component Value Units Date/Time    Lipase [[id number]]  (Normal) Collected: 01/17/22 1926    Lab Status: Final result Specimen: Blood from Arm, Right Updated: 01/17/22 2117     Lipase 74 u/L     HS Troponin 0hr (reflex protocol) [[id number]]  (Normal) Collected: 01/17/22 1926    Lab Status: Final result Specimen: Blood from Arm, Right Updated: 01/17/22 2008     hs TnI 0hr <2 ng/L     Comprehensive metabolic panel [[id number]]  (Abnormal) Collected: 01/17/22 1926    Lab Status: Final result Specimen: Blood from Arm, Right Updated: 01/17/22 1959     Sodium 135 mmol/L      Potassium 3 8 mmol/L      Chloride 96 mmol/L      CO2 25 mmol/L      ANION GAP 14 mmol/L      BUN 19 mg/dL      Creatinine 1 03 mg/dL      Glucose 302 mg/dL      Calcium 9 4 mg/dL      AST 12 U/L      ALT 27 U/L      Alkaline Phosphatase 90 U/L      Total Protein 7 7 g/dL      Albumin 4 6 g/dL      Total Bilirubin 0 44 mg/dL      eGFR 84 ml/min/1 73sq m Narrative:      National Kidney Disease Foundation guidelines for Chronic Kidney Disease (CKD):     Stage 1 with normal or high GFR (GFR > 90 mL/min/1 73 square meters)    Stage 2 Mild CKD (GFR = 60-89 mL/min/1 73 square meters)    Stage 3A Moderate CKD (GFR = 45-59 mL/min/1 73 square meters)    Stage 3B Moderate CKD (GFR = 30-44 mL/min/1 73 square meters)    Stage 4 Severe CKD (GFR = 15-29 mL/min/1 73 square meters)    Stage 5 End Stage CKD (GFR <15 mL/min/1 73 square meters)  Note: GFR calculation is accurate only with a steady state creatinine    CBC and differential [[id number]]  (Abnormal) Collected: 01/17/22 1926    Lab Status: Final result Specimen: Blood from Arm, Right Updated: 01/17/22 1937     WBC 8 83 Thousand/uL      RBC 5 82 Million/uL      Hemoglobin 15 1 g/dL      Hematocrit 46 4 %      MCV 80 fL      MCH 25 9 pg      MCHC 32 5 g/dL      RDW 13 1 %      MPV 10 2 fL      Platelets 388 Thousands/uL      nRBC 0 /100 WBCs      Neutrophils Relative 63 %      Immat GRANS % 0 %      Lymphocytes Relative 26 %      Monocytes Relative 8 %      Eosinophils Relative 2 %      Basophils Relative 1 %      Neutrophils Absolute 5 63 Thousands/µL      Immature Grans Absolute 0 03 Thousand/uL      Lymphocytes Absolute 2 26 Thousands/µL      Monocytes Absolute 0 66 Thousand/µL      Eosinophils Absolute 0 19 Thousand/µL      Basophils Absolute 0 06 Thousands/µL                  XR chest 1 view portable   Final Result by Adams Chowdhury MD (01/18 6889)      No acute cardiopulmonary disease                    Workstation performed: [id number]                    Procedures  Procedures         ED Course             HEART Risk Score      Most Recent Value   Heart Score Risk Calculator    History 1 Filed at: 01/18/2022 0002   ECG 0 Filed at: 01/18/2022 0002   Age 1 Filed at: 01/18/2022 0002   Risk Factors 2 Filed at: 01/18/2022 0002   Troponin 0 Filed at: 01/18/2022 0002   HEART Score 4 Filed at: 01/18/2022 0002 SBIRT 20yo+      Most Recent Value   SBIRT (22 yo +)    In order to provide better care to our patients, we are screening all of our patients for alcohol and drug use  Would it be okay to ask you these screening questions? Yes Filed at: 01/17/2022 2110   Initial Alcohol Screen: US AUDIT-C     1  How often do you have a drink containing alcohol? 1 Filed at: 01/17/2022 2110   2  How many drinks containing alcohol do you have on a typical day you are drinking? 0 Filed at: 01/17/2022 2110   3a  Male UNDER 65: How often do you have five or more drinks on one occasion? 3 Filed at: 01/17/2022 2110   Audit-C Score 4 Filed at: 01/17/2022 2110   ESTEPHANIA: How many times in the past year have you    Used an illegal drug or used a prescription medication for non-medical reasons? Never Filed at: 01/17/2022 2110                    MDM  Number of Diagnoses or Management Options  Acute epigastric pain  Diagnosis management comments: Patient is requesting discharge home, patient does not want a wait for 2nd troponin, patient would prefer to follow-up with cardiology as an outpatient  Due to patient's low risk heart score, chest pain that started more than 6 hours prior, patient is safe for discharge home, will be encouraged to follow up with Cardiology and primary care, return if any symptoms worsen  Patient is comfortable with discharge plan and follow-up instructions  Disposition  Final diagnoses:   Acute epigastric pain     Time reflects when diagnosis was documented in both MDM as applicable and the Disposition within this note     Time User Action Codes Description Comment    1/18/2022 12:24 AM Lor Peacock Add [R10 13] Acute epigastric pain       ED Disposition     ED Disposition Condition Date/Time Comment    Discharge Stable Tue Jan 18, 2022 12:23 AM Lupe Rae discharge to home/self care              Follow-up Information     Follow up With Specialties Details Why Contact Info Additional Information    Anastasia Bond DO Family Medicine Schedule an appointment as soon as possible for a visit in 3 days For follow-up [address] [place] [phone]       [address] Emergency Department Emergency Medicine Go to  As needed [address] [facility] [address] [facility] Emergency Department, [address], [place], [place], [address] Cardiology Schedule an appointment as soon as possible for a visit in 3 days For follow-up [address] [facility] [phone] [address] Cardiology [facility], [address], [place], [place], [address]          Discharge Medication List as of 1/18/2022 12:31 AM      CONTINUE these medications which have NOT CHANGED    Details   aspirin 81 mg chewable tablet Adult Aspirin Low Strength 81 MG CHEW  1 PO Q D   Quantity: 0;  Refills: 0       [employer], Provider M D ;  Started 11-Oct-2007  Active, Historical Med      azelastine (ASTELIN) 0 1 % nasal spray INHALE ONE SPRAY INTO EACH NOSTRIL TWO TIMES A DAY AS DIRECTED - GENERIC FOR ASTELIN, Normal      CHOLECALCIFEROL PO Take 1,000 Units by mouth, Historical Med      diltiazem (CARDIZEM) 30 mg tablet Take 1 tablet (30 mg total) by mouth daily, Starting Mon 3/5/2018, Normal      esomeprazole (NexIUM) 40 MG capsule TAKE ONE CAPSULE BY MOUTH EVERY DAY, Normal      Farxiga 10 MG TABS TAKE ONE TABLET BY MOUTH EVERY DAY, Normal      fexofenadine (ALLEGRA) 180 MG tablet Take 1 tablet (180 mg total) by mouth daily, Starting Wed 11/14/2018, Normal      glipiZIDE (GLUCOTROL XL) 5 mg 24 hr tablet TAKE ONE TABLET BY MOUTH EVERY DAY, Normal      Janumet XR  MG TB24 TAKE TWO TABLETS BY MOUTH EVERY DAY, Normal      meloxicam (MOBIC) 15 mg tablet TAKE ONE TABLET BY MOUTH EVERY DAY, Normal      Omega-3 Fatty Acids (FISH OIL) 1200 MG CAPS Take 1 capsule by mouth daily, Historical Med      Phytonadione (K 100) 100 MCG TABS Take 1 tablet (100 mcg total) by mouth daily, Starting Thu 11/5/2020, Normal      pioglitazone (ACTOS) 45 mg tablet TAKE ONE TABLET BY MOUTH EVERY DAY, Normal      quinapril (ACCUPRIL) 5 mg tablet TAKE ONE TABLET BY MOUTH EVERY DAY, Normal      rosuvastatin (CRESTOR) 10 MG tablet TAKE ONE TABLET BY MOUTH EVERY DAY, Normal      sildenafil (VIAGRA) 100 mg tablet Take 1 tablet (100 mg total) by mouth as needed for erectile dysfunction, Starting Thu 1/28/2021, Normal      ALPRAZolam (XANAX) 0 25 mg tablet Take 1 tablet (0 25 mg total) by mouth every 8 (eight) hours as needed for anxiety, Starting Wed 6/2/2021, Normal      glucose blood (FREESTYLE INSULINX TEST) test strip 1 Squirt by In Vitro route 2 (two) times a day, Starting Wed 1/8/2014, Historical Med      montelukast (SINGULAIR) 10 mg tablet Take 1 tablet (10 mg total) by mouth daily at bedtime, Starting Mon 11/9/2020, Normal             No discharge procedures on file      PDMP Review     None          ED Provider  Electronically Signed by           Christine Shanks MD  01/23/22 0780

## 2022-02-01 DIAGNOSIS — E11.9 TYPE 2 DIABETES MELLITUS WITHOUT COMPLICATION, WITHOUT LONG-TERM CURRENT USE OF INSULIN (HCC): ICD-10-CM

## 2022-02-01 RX ORDER — SITAGLIPTIN AND METFORMIN HYDROCHLORIDE 1000; 50 MG/1; MG/1
TABLET, FILM COATED, EXTENDED RELEASE ORAL
Qty: 180 TABLET | Refills: 1 | Status: SHIPPED | OUTPATIENT
Start: 2022-02-01 | End: 2022-08-01

## 2022-02-15 ENCOUNTER — DOCUMENTATION (OUTPATIENT)
Dept: FAMILY MEDICINE CLINIC | Facility: CLINIC | Age: 52
End: 2022-02-15

## 2022-02-15 DIAGNOSIS — M75.102 ROTATOR CUFF SYNDROME OF LEFT SHOULDER: Primary | ICD-10-CM

## 2022-02-15 RX ORDER — MELOXICAM 15 MG/1
15 TABLET ORAL DAILY
Qty: 90 TABLET | Refills: 1 | Status: SHIPPED | OUTPATIENT
Start: 2022-02-15

## 2022-03-04 DIAGNOSIS — E78.5 HYPERLIPIDEMIA, UNSPECIFIED HYPERLIPIDEMIA TYPE: ICD-10-CM

## 2022-03-04 DIAGNOSIS — K21.9 GASTROESOPHAGEAL REFLUX DISEASE: ICD-10-CM

## 2022-03-04 RX ORDER — ESOMEPRAZOLE MAGNESIUM 40 MG/1
40 CAPSULE, DELAYED RELEASE ORAL DAILY
Qty: 90 CAPSULE | Refills: 1 | Status: SHIPPED | OUTPATIENT
Start: 2022-03-04 | End: 2022-03-07

## 2022-03-04 RX ORDER — ROSUVASTATIN CALCIUM 10 MG/1
10 TABLET, COATED ORAL DAILY
Qty: 90 TABLET | Refills: 0 | Status: SHIPPED | OUTPATIENT
Start: 2022-03-04

## 2022-03-06 DIAGNOSIS — K21.9 GASTROESOPHAGEAL REFLUX DISEASE: ICD-10-CM

## 2022-03-07 RX ORDER — ESOMEPRAZOLE MAGNESIUM 40 MG/1
CAPSULE, DELAYED RELEASE ORAL
Qty: 90 CAPSULE | Refills: 1 | Status: SHIPPED | OUTPATIENT
Start: 2022-03-07

## 2022-04-21 ENCOUNTER — APPOINTMENT (OUTPATIENT)
Dept: LAB | Facility: CLINIC | Age: 52
End: 2022-04-21
Payer: COMMERCIAL

## 2022-04-21 DIAGNOSIS — E11.00 TYPE 2 DIABETES MELLITUS WITH HYPEROSMOLARITY WITHOUT COMA, WITHOUT LONG-TERM CURRENT USE OF INSULIN (HCC): ICD-10-CM

## 2022-04-21 DIAGNOSIS — E78.2 MIXED HYPERLIPIDEMIA: ICD-10-CM

## 2022-04-21 DIAGNOSIS — I10 ESSENTIAL HYPERTENSION: Chronic | ICD-10-CM

## 2022-04-21 LAB
ALBUMIN SERPL BCP-MCNC: 4.2 G/DL (ref 3.5–5)
ALP SERPL-CCNC: 72 U/L (ref 46–116)
ALT SERPL W P-5'-P-CCNC: 29 U/L (ref 12–78)
ANION GAP SERPL CALCULATED.3IONS-SCNC: 11 MMOL/L (ref 4–13)
AST SERPL W P-5'-P-CCNC: 15 U/L (ref 5–45)
BASOPHILS # BLD AUTO: 0.03 THOUSANDS/ΜL (ref 0–0.1)
BASOPHILS NFR BLD AUTO: 0 % (ref 0–1)
BILIRUB SERPL-MCNC: 1.25 MG/DL (ref 0.2–1)
BUN SERPL-MCNC: 17 MG/DL (ref 5–25)
CALCIUM SERPL-MCNC: 9 MG/DL (ref 8.3–10.1)
CHLORIDE SERPL-SCNC: 99 MMOL/L (ref 100–108)
CHOLEST SERPL-MCNC: 148 MG/DL
CO2 SERPL-SCNC: 26 MMOL/L (ref 21–32)
CREAT SERPL-MCNC: 0.97 MG/DL (ref 0.6–1.3)
CREAT UR-MCNC: 69.2 MG/DL
EOSINOPHIL # BLD AUTO: 0.19 THOUSAND/ΜL (ref 0–0.61)
EOSINOPHIL NFR BLD AUTO: 2 % (ref 0–6)
ERYTHROCYTE [DISTWIDTH] IN BLOOD BY AUTOMATED COUNT: 13.6 % (ref 11.6–15.1)
EST. AVERAGE GLUCOSE BLD GHB EST-MCNC: 223 MG/DL
GFR SERPL CREATININE-BSD FRML MDRD: 90 ML/MIN/1.73SQ M
GLUCOSE P FAST SERPL-MCNC: 221 MG/DL (ref 65–99)
HBA1C MFR BLD: 9.4 %
HCT VFR BLD AUTO: 47.5 % (ref 36.5–49.3)
HDLC SERPL-MCNC: 40 MG/DL
HGB BLD-MCNC: 15.4 G/DL (ref 12–17)
IMM GRANULOCYTES # BLD AUTO: 0.03 THOUSAND/UL (ref 0–0.2)
IMM GRANULOCYTES NFR BLD AUTO: 0 % (ref 0–2)
LDLC SERPL CALC-MCNC: 85 MG/DL (ref 0–100)
LYMPHOCYTES # BLD AUTO: 2.04 THOUSANDS/ΜL (ref 0.6–4.47)
LYMPHOCYTES NFR BLD AUTO: 25 % (ref 14–44)
MCH RBC QN AUTO: 26.1 PG (ref 26.8–34.3)
MCHC RBC AUTO-ENTMCNC: 32.4 G/DL (ref 31.4–37.4)
MCV RBC AUTO: 81 FL (ref 82–98)
MICROALBUMIN UR-MCNC: 8.7 MG/L (ref 0–20)
MICROALBUMIN/CREAT 24H UR: 13 MG/G CREATININE (ref 0–30)
MONOCYTES # BLD AUTO: 0.64 THOUSAND/ΜL (ref 0.17–1.22)
MONOCYTES NFR BLD AUTO: 8 % (ref 4–12)
NEUTROPHILS # BLD AUTO: 5.16 THOUSANDS/ΜL (ref 1.85–7.62)
NEUTS SEG NFR BLD AUTO: 65 % (ref 43–75)
NONHDLC SERPL-MCNC: 108 MG/DL
NRBC BLD AUTO-RTO: 0 /100 WBCS
PLATELET # BLD AUTO: 243 THOUSANDS/UL (ref 149–390)
PMV BLD AUTO: 10.4 FL (ref 8.9–12.7)
POTASSIUM SERPL-SCNC: 3.9 MMOL/L (ref 3.5–5.3)
PROT SERPL-MCNC: 7.5 G/DL (ref 6.4–8.2)
RBC # BLD AUTO: 5.9 MILLION/UL (ref 3.88–5.62)
SODIUM SERPL-SCNC: 136 MMOL/L (ref 136–145)
TRIGL SERPL-MCNC: 115 MG/DL
TSH SERPL DL<=0.05 MIU/L-ACNC: 1.05 UIU/ML (ref 0.45–4.5)
WBC # BLD AUTO: 8.09 THOUSAND/UL (ref 4.31–10.16)

## 2022-04-21 PROCEDURE — 82570 ASSAY OF URINE CREATININE: CPT

## 2022-04-21 PROCEDURE — 84443 ASSAY THYROID STIM HORMONE: CPT

## 2022-04-21 PROCEDURE — 80061 LIPID PANEL: CPT

## 2022-04-21 PROCEDURE — 3061F NEG MICROALBUMINURIA REV: CPT | Performed by: FAMILY MEDICINE

## 2022-04-21 PROCEDURE — 80053 COMPREHEN METABOLIC PANEL: CPT

## 2022-04-21 PROCEDURE — 83036 HEMOGLOBIN GLYCOSYLATED A1C: CPT

## 2022-04-21 PROCEDURE — 85025 COMPLETE CBC W/AUTO DIFF WBC: CPT

## 2022-04-21 PROCEDURE — 3046F HEMOGLOBIN A1C LEVEL >9.0%: CPT | Performed by: FAMILY MEDICINE

## 2022-04-21 PROCEDURE — 36415 COLL VENOUS BLD VENIPUNCTURE: CPT

## 2022-04-21 PROCEDURE — 82043 UR ALBUMIN QUANTITATIVE: CPT

## 2022-04-27 ENCOUNTER — OFFICE VISIT (OUTPATIENT)
Dept: FAMILY MEDICINE CLINIC | Facility: CLINIC | Age: 52
End: 2022-04-27
Payer: COMMERCIAL

## 2022-04-27 VITALS
OXYGEN SATURATION: 98 % | BODY MASS INDEX: 26.84 KG/M2 | DIASTOLIC BLOOD PRESSURE: 72 MMHG | SYSTOLIC BLOOD PRESSURE: 112 MMHG | HEART RATE: 74 BPM | WEIGHT: 167 LBS | RESPIRATION RATE: 16 BRPM | HEIGHT: 66 IN

## 2022-04-27 DIAGNOSIS — F40.243 FEAR OF FLYING: ICD-10-CM

## 2022-04-27 DIAGNOSIS — I10 ESSENTIAL HYPERTENSION: Chronic | ICD-10-CM

## 2022-04-27 DIAGNOSIS — J30.1 SEASONAL ALLERGIC RHINITIS DUE TO POLLEN: ICD-10-CM

## 2022-04-27 DIAGNOSIS — E78.2 MIXED HYPERLIPIDEMIA: ICD-10-CM

## 2022-04-27 DIAGNOSIS — E11.00 TYPE 2 DIABETES MELLITUS WITH HYPEROSMOLARITY WITHOUT COMA, WITHOUT LONG-TERM CURRENT USE OF INSULIN (HCC): Primary | ICD-10-CM

## 2022-04-27 DIAGNOSIS — E55.9 VITAMIN D DEFICIENCY: ICD-10-CM

## 2022-04-27 DIAGNOSIS — E11.9 TYPE 2 DIABETES MELLITUS WITHOUT COMPLICATION, WITHOUT LONG-TERM CURRENT USE OF INSULIN (HCC): Chronic | ICD-10-CM

## 2022-04-27 DIAGNOSIS — J30.9 ALLERGIC RHINITIS: ICD-10-CM

## 2022-04-27 PROBLEM — M75.102 ROTATOR CUFF SYNDROME OF LEFT SHOULDER: Status: RESOLVED | Noted: 2019-12-11 | Resolved: 2022-04-27

## 2022-04-27 PROBLEM — R82.4 KETONURIA: Status: RESOLVED | Noted: 2018-07-06 | Resolved: 2022-04-27

## 2022-04-27 PROCEDURE — 3074F SYST BP LT 130 MM HG: CPT | Performed by: FAMILY MEDICINE

## 2022-04-27 PROCEDURE — 3078F DIAST BP <80 MM HG: CPT | Performed by: FAMILY MEDICINE

## 2022-04-27 PROCEDURE — 99215 OFFICE O/P EST HI 40 MIN: CPT | Performed by: FAMILY MEDICINE

## 2022-04-27 PROCEDURE — 3008F BODY MASS INDEX DOCD: CPT | Performed by: FAMILY MEDICINE

## 2022-04-27 PROCEDURE — 3725F SCREEN DEPRESSION PERFORMED: CPT | Performed by: FAMILY MEDICINE

## 2022-04-27 RX ORDER — GLIPIZIDE 10 MG/1
10 TABLET, FILM COATED, EXTENDED RELEASE ORAL DAILY
Qty: 90 TABLET | Refills: 1 | Status: SHIPPED | OUTPATIENT
Start: 2022-04-27

## 2022-04-27 RX ORDER — MONTELUKAST SODIUM 10 MG/1
10 TABLET ORAL
Qty: 90 TABLET | Refills: 0 | Status: SHIPPED | OUTPATIENT
Start: 2022-04-27 | End: 2022-08-10

## 2022-04-27 NOTE — PROGRESS NOTES
Subjective:      Patient ID: Tod Lan is a 49 y o  male  52-year-old male with past medical history of diabetes mellitus type 2, hypertension, hyperlipidemia, COVID-19 infection presents for four-month follow-up of chronic conditions  Overall patient states that he has been feeling well  He has been battling seasonal allergies  When he is out mowing the lawn or doing yd work then his allergies act up  He is using Astelin nasal spray and taking fexofenadine but it does not seem to be doing as well for him  Previously he was on montelukast but not recently  Has been watching dietary intake of carbohydrates  He cut out alcohol for the last 3 months  Hemoglobin A1c is still elevated at 9 4%  TSH normal 1 053, normal CBC, CMP with the exception of impaired fasting glucose of 221, cholesterol remains well controlled and has actually improved since 3 months ago    Patient is scheduled for executive health examination through [address] [place] [address] send her in May at which time he normally has EKG, stress testing and whole-body CT scan      Past Medical History:   Diagnosis Date    Anxiety     only when flying    Chronic frontal sinusitis     last assessed 03/02/2016    COVID-19 virus infection 4/1/2020    Diabetes mellitus ([address] )     Fracture of great toe     last assessed 11/06/2014    GERD (gastroesophageal reflux disease)     Hyperlipidemia     Hypertension     Palpitations     last assessed 11/06/2012    RUQ abdominal pain 9/24/2021       Family History   Problem Relation Age of Onset    Diabetes Mother     Liver cancer Mother     Hypertension Mother     Colon cancer Father 76       Past Surgical History:   Procedure Laterality Date    COLONOSCOPY      HERNIA REPAIR Bilateral     MT MANIPULATN SHLDR JT W ANESTHESIA Right 4/3/2017    Procedure: SHOULDER MANIPULATION UNDER ANESTHESIA ;  Surgeon: Kylie Hamilton MD;  Location: [facility];  Service: [place] [address] ENDOSCOPY          reports that he has been smoking cigars  He has never used smokeless tobacco  He reports current alcohol use  He reports that he does not use drugs        Current Outpatient Medications:     ALPRAZolam (XANAX) 0 25 mg tablet, Take 1 tablet (0 25 mg total) by mouth every 8 (eight) hours as needed for anxiety, Disp: 30 tablet, Rfl: 0    aspirin 81 mg chewable tablet, Adult Aspirin Low Strength 81 MG CHEW 1 PO Q D  Quantity: 0;  Refills: 0    [employer], Provider M D ;  Started 11-Oct-2007 Active, Disp: , Rfl:     azelastine (ASTELIN) 0 1 % nasal spray, INHALE ONE SPRAY INTO EACH NOSTRIL TWO TIMES A DAY AS DIRECTED - GENERIC FOR ASTELIN, Disp: 30 mL, Rfl: 0    CHOLECALCIFEROL PO, Take 1,000 Units by mouth, Disp: , Rfl:     diltiazem (CARDIZEM) 30 mg tablet, Take 1 tablet (30 mg total) by mouth daily, Disp: 90 tablet, Rfl: 1    esomeprazole (NexIUM) 40 MG capsule, TAKE ONE CAPSULE BY MOUTH EVERY DAY, Disp: 90 capsule, Rfl: 1    Farxiga 10 MG TABS, TAKE ONE TABLET BY MOUTH EVERY DAY, Disp: 30 tablet, Rfl: 5    fexofenadine (ALLEGRA) 180 MG tablet, Take 1 tablet (180 mg total) by mouth daily, Disp: 30 tablet, Rfl: 5    Janumet XR  MG TB24, TAKE TWO TABLETS BY MOUTH EVERY DAY, Disp: 180 tablet, Rfl: 1    meloxicam (MOBIC) 15 mg tablet, Take 1 tablet (15 mg total) by mouth daily, Disp: 90 tablet, Rfl: 1    Omega-3 Fatty Acids (FISH OIL) 1200 MG CAPS, Take 1 capsule by mouth daily, Disp: , Rfl:     Phytonadione (K 100) 100 MCG TABS, Take 1 tablet (100 mcg total) by mouth daily, Disp: 100 tablet, Rfl: 1    pioglitazone (ACTOS) 45 mg tablet, TAKE ONE TABLET BY MOUTH EVERY DAY, Disp: 90 tablet, Rfl: 3    quinapril (ACCUPRIL) 5 mg tablet, TAKE ONE TABLET BY MOUTH EVERY DAY, Disp: 90 tablet, Rfl: 1    rosuvastatin (CRESTOR) 10 MG tablet, Take 1 tablet (10 mg total) by mouth daily, Disp: 90 tablet, Rfl: 0    sildenafil (VIAGRA) 100 mg tablet, Take 1 tablet (100 mg total) by mouth as needed for erectile dysfunction, Disp: 12 tablet, Rfl: 1    sucralfate (CARAFATE) 1 g tablet, Take 1 tablet (1 g total) by mouth 2 (two) times a day, Disp: 60 tablet, Rfl: 1    glipiZIDE (GLUCOTROL XL) 10 mg 24 hr tablet, Take 1 tablet (10 mg total) by mouth daily, Disp: 90 tablet, Rfl: 1    glucose blood (FREESTYLE INSULINX TEST) test strip, Test 1 Squirt 2 (two) times a day  , Disp: , Rfl:     montelukast (SINGULAIR) 10 mg tablet, Take 1 tablet (10 mg total) by mouth daily at bedtime, Disp: 90 tablet, Rfl: 0    The following portions of the patient's history were reviewed and updated as appropriate: allergies, current medications, past family history, past medical history, past social history, past surgical history and problem list     Review of Systems   Constitutional: Negative  Negative for chills and fatigue  HENT: Positive for congestion  Negative for ear discharge, ear pain, facial swelling, postnasal drip, rhinorrhea, sore throat and trouble swallowing  Eyes: Positive for itching  Negative for discharge, redness and visual disturbance  Respiratory: Negative for cough, shortness of breath and wheezing  Cardiovascular: Negative  Negative for chest pain  Gastrointestinal: Negative  Endocrine: Negative  Genitourinary: Negative  Musculoskeletal: Negative  Skin: Negative  Allergic/Immunologic: Negative  Neurological: Negative  Hematological: Negative  Psychiatric/Behavioral: Negative  All other systems reviewed and are negative  Objective:    /72   Pulse 74   Resp 16   Ht 5' 6" (1 676 m)   Wt 75 8 kg (167 lb)   SpO2 98%   BMI 26 95 kg/m²      Physical Exam  Vitals and nursing note reviewed  Constitutional:       General: He is not in acute distress  Appearance: Normal appearance  He is well-developed and normal weight  He is not ill-appearing  HENT:      Head: Normocephalic and atraumatic        Right Ear: Tympanic membrane, ear canal and external ear normal       Left Ear: Tympanic membrane, ear canal and external ear normal       Nose: Nose normal       Mouth/Throat:      Mouth: Mucous membranes are moist    Eyes:      Extraocular Movements: Extraocular movements intact  Conjunctiva/sclera: Conjunctivae normal       Pupils: Pupils are equal, round, and reactive to light  Cardiovascular:      Rate and Rhythm: Normal rate and regular rhythm  Pulses: Normal pulses  Heart sounds: Normal heart sounds  No murmur heard  Pulmonary:      Effort: Pulmonary effort is normal       Breath sounds: Normal breath sounds  No wheezing  Abdominal:      General: Abdomen is flat  Bowel sounds are normal       Palpations: Abdomen is soft  Musculoskeletal:         General: Normal range of motion  Cervical back: Normal range of motion and neck supple  Skin:     General: Skin is warm and dry  Neurological:      General: No focal deficit present  Mental Status: He is alert and oriented to person, place, and time  Psychiatric:         Mood and Affect: Mood normal          Behavior: Behavior normal          Thought Content: Thought content normal          Judgment: Judgment normal            Recent Results (from the past 1008 hour(s))   Microalbumin / creatinine urine ratio    Collection Time: 04/21/22  8:44 AM   Result Value Ref Range    Creatinine, Ur 69 2 mg/dL    Microalbum  ,U,Random 8 7 0 0 - 20 0 mg/L    Microalb Creat Ratio 13 0 - 30 mg/g creatinine   CBC and differential    Collection Time: 04/21/22  8:44 AM   Result Value Ref Range    WBC 8 09 4 31 - 10 16 Thousand/uL    RBC 5 90 (H) 3 88 - 5 62 Million/uL    Hemoglobin 15 4 12 0 - 17 0 g/dL    Hematocrit 47 5 36 5 - 49 3 %    MCV 81 (L) 82 - 98 fL    MCH 26 1 (L) 26 8 - 34 3 pg    MCHC 32 4 31 4 - 37 4 g/dL    RDW 13 6 11 6 - 15 1 %    MPV 10 4 8 9 - 12 7 fL    Platelets 018 184 - 853 Thousands/uL    nRBC 0 /100 WBCs    Neutrophils Relative 65 43 - 75 %    Immat GRANS % 0 0 - 2 % Lymphocytes Relative 25 14 - 44 %    Monocytes Relative 8 4 - 12 %    Eosinophils Relative 2 0 - 6 %    Basophils Relative 0 0 - 1 %    Neutrophils Absolute 5 16 1 85 - 7 62 Thousands/µL    Immature Grans Absolute 0 03 0 00 - 0 20 Thousand/uL    Lymphocytes Absolute 2 04 0 60 - 4 47 Thousands/µL    Monocytes Absolute 0 64 0 17 - 1 22 Thousand/µL    Eosinophils Absolute 0 19 0 00 - 0 61 Thousand/µL    Basophils Absolute 0 03 0 00 - 0 10 Thousands/µL   Comprehensive metabolic panel    Collection Time: 04/21/22  8:44 AM   Result Value Ref Range    Sodium 136 136 - 145 mmol/L    Potassium 3 9 3 5 - 5 3 mmol/L    Chloride 99 (L) 100 - 108 mmol/L    CO2 26 21 - 32 mmol/L    ANION GAP 11 4 - 13 mmol/L    BUN 17 5 - 25 mg/dL    Creatinine 0 97 0 60 - 1 30 mg/dL    Glucose, Fasting 221 (H) 65 - 99 mg/dL    Calcium 9 0 8 3 - 10 1 mg/dL    AST 15 5 - 45 U/L    ALT 29 12 - 78 U/L    Alkaline Phosphatase 72 46 - 116 U/L    Total Protein 7 5 6 4 - 8 2 g/dL    Albumin 4 2 3 5 - 5 0 g/dL    Total Bilirubin 1 25 (H) 0 20 - 1 00 mg/dL    eGFR 90 ml/min/1 73sq m   Hemoglobin A1C    Collection Time: 04/21/22  8:44 AM   Result Value Ref Range    Hemoglobin A1C 9 4 (H) Normal 3 8-5 6%; PreDiabetic 5 7-6 4%; Diabetic >=6 5%; Glycemic control for adults with diabetes <7 0% %     mg/dl   Lipid panel    Collection Time: 04/21/22  8:44 AM   Result Value Ref Range    Cholesterol 148 See Comment mg/dL    Triglycerides 115 See Comment mg/dL    HDL, Direct 40 >=40 mg/dL    LDL Calculated 85 0 - 100 mg/dL    Non-HDL-Chol (CHOL-HDL) 108 mg/dl   TSH, 3rd generation with Free T4 reflex    Collection Time: 04/21/22  8:44 AM   Result Value Ref Range    TSH 3RD GENERATON 1 053 0 450 - 4 500 uIU/mL       Assessment/Plan:    Type 2 diabetes mellitus with hyperosmolarity without coma, without long-term current use of insulin (HCC)    Lab Results   Component Value Date    HGBA1C 9 4 (H) 04/21/2022     Hemoglobin A1c is still elevated    Patient will continue on Janumet XR, Francescagisell Morrison and will increase his dose of Glucotrol XL to 10 mg once daily  In the past the patient had significant GI intolerance to Trulicity  Lexis [place] may be a consideration in the future if his blood sugar does not improve  He has lost weight and has been watching dietary intake of carbohydrates  Repeat diabetic parameters in 6 months  No evidence of microalbumin in urine    Allergic rhinitis, seasonal  Patient is using Astelin nasal spray and Allegra  In the past he was on Singulair  Placed back on to Singulair  If there is no improvement with this combination for his ocular symptoms he will contact the office and I will send a prescription for Patanol eyedrops    Essential hypertension  Blood pressure remains very well controlled on Cardizem and quinapril  Continue same  Mixed hyperlipidemia  Cholesterol has improved significantly  Goal LDL less than 70  Patient remains on Crestor 10 mg once daily  He is watching dietary intake of fats and cholesterol  Repeat lipid panel in 4 months    Vitamin D deficiency  Normalized vitamin-D level on supplementation  Repeat vitamin-D level in 4 months    Fear of flying  Patient does use p r n  Alprazolam 1 flying  He is scheduled to fly in the month of July to [email or web address]  Patient still has p r n  Alprazolam   If he does need refills he will let the office now          Problem List Items Addressed This Visit        Endocrine    Type 2 diabetes mellitus with hyperosmolarity without coma, without long-term current use of insulin (HCC) - Primary       Lab Results   Component Value Date    HGBA1C 9 4 (H) 04/21/2022     Hemoglobin A1c is still elevated  Patient will continue on Janumet XR, Francesca Morrison and will increase his dose of Glucotrol XL to 10 mg once daily  In the past the patient had significant GI intolerance to Trulicity  Lexis [place] may be a consideration in the future if his blood sugar does not improve    He has lost weight and has been watching dietary intake of carbohydrates  Repeat diabetic parameters in 6 months  No evidence of microalbumin in urine         Relevant Medications    glipiZIDE (GLUCOTROL XL) 10 mg 24 hr tablet    Other Relevant Orders    Comprehensive metabolic panel    Hemoglobin A1C    Microalbumin / creatinine urine ratio       Respiratory    Allergic rhinitis, seasonal     Patient is using Astelin nasal spray and Allegra  In the past he was on Singulair  Placed back on to Singulair  If there is no improvement with this combination for his ocular symptoms he will contact the office and I will send a prescription for Patanol eyedrops            Cardiovascular and Mediastinum    Essential hypertension (Chronic)     Blood pressure remains very well controlled on Cardizem and quinapril  Continue same  Relevant Orders    CBC and differential    TSH, 3rd generation with Free T4 reflex       Other    Fear of flying     Patient does use p r n  Alprazolam 1 flying  He is scheduled to fly in the month of July to [facility]  Patient still has p r n  Alprazolam   If he does need refills he will let the office now         Mixed hyperlipidemia     Cholesterol has improved significantly  Goal LDL less than 70  Patient remains on Crestor 10 mg once daily  He is watching dietary intake of fats and cholesterol  Repeat lipid panel in 4 months         Relevant Orders    Lipid panel    Vitamin D deficiency     Normalized vitamin-D level on supplementation    Repeat vitamin-D level in 4 months         Relevant Orders    Vitamin D 1,25 dihydroxy      Other Visit Diagnoses     Allergic rhinitis        Relevant Medications    montelukast (SINGULAIR) 10 mg tablet    Type 2 diabetes mellitus without complication, without long-term current use of insulin (HCC)  (Chronic)       Relevant Medications    glipiZIDE (GLUCOTROL XL) 10 mg 24 hr tablet    Other Relevant Orders    CBC and differential

## 2022-04-27 NOTE — ASSESSMENT & PLAN NOTE
Lab Results   Component Value Date    HGBA1C 9 4 (H) 04/21/2022     Hemoglobin A1c is still elevated  Patient will continue on Janumet XR, Maxwellette Erika and will increase his dose of Glucotrol XL to 10 mg once daily  In the past the patient had significant GI intolerance to Trulicity  Luis [place]ass[place] may be a consideration in the future if his blood sugar does not improve  He has lost weight and has been watching dietary intake of carbohydrates  Repeat diabetic parameters in 6 months    No evidence of microalbumin in urine

## 2022-04-27 NOTE — ASSESSMENT & PLAN NOTE
Patient does use p r n  Alprazolam 1 flying  He is scheduled to fly in the month of July to [facility]  Patient still has p r n   Alprazolam   If he does need refills he will let the office now

## 2022-04-27 NOTE — ASSESSMENT & PLAN NOTE
Patient is using Astelin nasal spray and Allegra  In the past he was on Singulair  Placed back on to Singulair    If there is no improvement with this combination for his ocular symptoms he will contact the office and I will send a prescription for Patanol eyedrops

## 2022-04-27 NOTE — ASSESSMENT & PLAN NOTE
Cholesterol has improved significantly  Goal LDL less than 70  Patient remains on Crestor 10 mg once daily  He is watching dietary intake of fats and cholesterol    Repeat lipid panel in 4 months

## 2022-05-16 DIAGNOSIS — N52.2 DRUG-INDUCED ERECTILE DYSFUNCTION: ICD-10-CM

## 2022-05-17 RX ORDER — SILDENAFIL 100 MG/1
TABLET, FILM COATED ORAL
Qty: 12 TABLET | Refills: 1 | Status: SHIPPED | OUTPATIENT
Start: 2022-05-17

## 2022-05-28 DIAGNOSIS — I10 ESSENTIAL HYPERTENSION: ICD-10-CM

## 2022-05-31 PROCEDURE — 4010F ACE/ARB THERAPY RXD/TAKEN: CPT | Performed by: FAMILY MEDICINE

## 2022-05-31 RX ORDER — QUINAPRIL 5 1/1
TABLET ORAL
Qty: 90 TABLET | Refills: 1 | Status: SHIPPED | OUTPATIENT
Start: 2022-05-31

## 2022-06-22 LAB — HBA1C MFR BLD HPLC: 7.8 %

## 2022-06-24 ENCOUNTER — DOCUMENTATION (OUTPATIENT)
Dept: FAMILY MEDICINE CLINIC | Facility: CLINIC | Age: 52
End: 2022-06-24

## 2022-06-24 DIAGNOSIS — E78.2 MIXED HYPERLIPIDEMIA: Primary | ICD-10-CM

## 2022-06-24 RX ORDER — ICOSAPENT ETHYL 1000 MG/1
2 CAPSULE ORAL 2 TIMES DAILY
Qty: 360 CAPSULE | Refills: 3 | Status: SHIPPED | OUTPATIENT
Start: 2022-06-24 | End: 2022-06-24 | Stop reason: SDUPTHER

## 2022-06-24 RX ORDER — ICOSAPENT ETHYL 1000 MG/1
2 CAPSULE ORAL 2 TIMES DAILY
Qty: 360 CAPSULE | Refills: 3 | Status: SHIPPED | OUTPATIENT
Start: 2022-06-24

## 2022-07-01 DIAGNOSIS — E11.65 TYPE 2 DIABETES MELLITUS WITH HYPERGLYCEMIA, WITHOUT LONG-TERM CURRENT USE OF INSULIN (HCC): Chronic | ICD-10-CM

## 2022-07-01 RX ORDER — DAPAGLIFLOZIN 10 MG/1
TABLET, FILM COATED ORAL
Qty: 30 TABLET | Refills: 5 | Status: SHIPPED | OUTPATIENT
Start: 2022-07-01

## 2022-08-01 DIAGNOSIS — E11.9 TYPE 2 DIABETES MELLITUS WITHOUT COMPLICATION, WITHOUT LONG-TERM CURRENT USE OF INSULIN (HCC): ICD-10-CM

## 2022-08-01 RX ORDER — SITAGLIPTIN AND METFORMIN HYDROCHLORIDE 1000; 50 MG/1; MG/1
TABLET, FILM COATED, EXTENDED RELEASE ORAL
Qty: 180 TABLET | Refills: 1 | Status: SHIPPED | OUTPATIENT
Start: 2022-08-01

## 2022-08-03 ENCOUNTER — DOCUMENTATION (OUTPATIENT)
Dept: FAMILY MEDICINE CLINIC | Facility: CLINIC | Age: 52
End: 2022-08-03

## 2022-08-10 DIAGNOSIS — J30.9 ALLERGIC RHINITIS: ICD-10-CM

## 2022-08-10 RX ORDER — MONTELUKAST SODIUM 10 MG/1
TABLET ORAL
Qty: 90 TABLET | Refills: 0 | Status: SHIPPED | OUTPATIENT
Start: 2022-08-10

## 2022-08-11 DIAGNOSIS — M75.102 ROTATOR CUFF SYNDROME OF LEFT SHOULDER: ICD-10-CM

## 2022-08-11 RX ORDER — MELOXICAM 15 MG/1
TABLET ORAL
Qty: 90 TABLET | Refills: 1 | Status: SHIPPED | OUTPATIENT
Start: 2022-08-11 | End: 2022-08-12

## 2022-08-12 DIAGNOSIS — M75.102 ROTATOR CUFF SYNDROME OF LEFT SHOULDER: ICD-10-CM

## 2022-08-12 RX ORDER — MELOXICAM 15 MG/1
TABLET ORAL
Qty: 90 TABLET | Refills: 1 | Status: SHIPPED | OUTPATIENT
Start: 2022-08-12

## 2022-08-30 DIAGNOSIS — K21.9 GASTROESOPHAGEAL REFLUX DISEASE: ICD-10-CM

## 2022-08-30 RX ORDER — ESOMEPRAZOLE MAGNESIUM 40 MG/1
CAPSULE, DELAYED RELEASE ORAL
Qty: 90 CAPSULE | Refills: 1 | Status: SHIPPED | OUTPATIENT
Start: 2022-08-30

## 2022-09-07 ENCOUNTER — RA CDI HCC (OUTPATIENT)
Dept: OTHER | Facility: HOSPITAL | Age: 52
End: 2022-09-07

## 2022-09-07 NOTE — PROGRESS NOTES
[facility]  coding opportunities          Chart Reviewed number of suggestions sent to Provider: 1   [id number]  A1c >9 x2    Patients Insurance        Commercial Insurance: [employer]

## 2022-09-12 ENCOUNTER — APPOINTMENT (OUTPATIENT)
Dept: LAB | Facility: CLINIC | Age: 52
End: 2022-09-12
Payer: COMMERCIAL

## 2022-09-12 DIAGNOSIS — E55.9 VITAMIN D DEFICIENCY: ICD-10-CM

## 2022-09-12 DIAGNOSIS — E78.2 MIXED HYPERLIPIDEMIA: ICD-10-CM

## 2022-09-12 DIAGNOSIS — E11.00 TYPE 2 DIABETES MELLITUS WITH HYPEROSMOLARITY WITHOUT COMA, WITHOUT LONG-TERM CURRENT USE OF INSULIN (HCC): ICD-10-CM

## 2022-09-12 DIAGNOSIS — I10 ESSENTIAL HYPERTENSION: Chronic | ICD-10-CM

## 2022-09-12 DIAGNOSIS — E11.9 TYPE 2 DIABETES MELLITUS WITHOUT COMPLICATION, WITHOUT LONG-TERM CURRENT USE OF INSULIN (HCC): Chronic | ICD-10-CM

## 2022-09-12 LAB
ALBUMIN SERPL BCP-MCNC: 4.5 G/DL (ref 3.5–5)
ALP SERPL-CCNC: 54 U/L (ref 34–104)
ALT SERPL W P-5'-P-CCNC: 17 U/L (ref 7–52)
ANION GAP SERPL CALCULATED.3IONS-SCNC: 7 MMOL/L (ref 4–13)
AST SERPL W P-5'-P-CCNC: 13 U/L (ref 13–39)
BASOPHILS # BLD AUTO: 0.06 THOUSANDS/ΜL (ref 0–0.1)
BASOPHILS NFR BLD AUTO: 1 % (ref 0–1)
BILIRUB SERPL-MCNC: 0.53 MG/DL (ref 0.2–1)
BUN SERPL-MCNC: 17 MG/DL (ref 5–25)
CALCIUM SERPL-MCNC: 9.4 MG/DL (ref 8.4–10.2)
CHLORIDE SERPL-SCNC: 101 MMOL/L (ref 96–108)
CHOLEST SERPL-MCNC: 219 MG/DL
CO2 SERPL-SCNC: 27 MMOL/L (ref 21–32)
CREAT SERPL-MCNC: 0.87 MG/DL (ref 0.6–1.3)
CREAT UR-MCNC: 41.8 MG/DL
EOSINOPHIL # BLD AUTO: 0.21 THOUSAND/ΜL (ref 0–0.61)
EOSINOPHIL NFR BLD AUTO: 3 % (ref 0–6)
ERYTHROCYTE [DISTWIDTH] IN BLOOD BY AUTOMATED COUNT: 13.3 % (ref 11.6–15.1)
EST. AVERAGE GLUCOSE BLD GHB EST-MCNC: 174 MG/DL
GFR SERPL CREATININE-BSD FRML MDRD: 99 ML/MIN/1.73SQ M
GLUCOSE P FAST SERPL-MCNC: 215 MG/DL (ref 65–99)
HBA1C MFR BLD: 7.7 %
HCT VFR BLD AUTO: 45.2 % (ref 36.5–49.3)
HDLC SERPL-MCNC: 32 MG/DL
HGB BLD-MCNC: 15 G/DL (ref 12–17)
IMM GRANULOCYTES # BLD AUTO: 0.05 THOUSAND/UL (ref 0–0.2)
IMM GRANULOCYTES NFR BLD AUTO: 1 % (ref 0–2)
LDLC SERPL CALC-MCNC: 136 MG/DL (ref 0–100)
LYMPHOCYTES # BLD AUTO: 2.1 THOUSANDS/ΜL (ref 0.6–4.47)
LYMPHOCYTES NFR BLD AUTO: 34 % (ref 14–44)
MCH RBC QN AUTO: 26.6 PG (ref 26.8–34.3)
MCHC RBC AUTO-ENTMCNC: 33.2 G/DL (ref 31.4–37.4)
MCV RBC AUTO: 80 FL (ref 82–98)
MICROALBUMIN UR-MCNC: <5 MG/L (ref 0–20)
MICROALBUMIN/CREAT 24H UR: <12 MG/G CREATININE (ref 0–30)
MONOCYTES # BLD AUTO: 0.42 THOUSAND/ΜL (ref 0.17–1.22)
MONOCYTES NFR BLD AUTO: 7 % (ref 4–12)
NEUTROPHILS # BLD AUTO: 3.34 THOUSANDS/ΜL (ref 1.85–7.62)
NEUTS SEG NFR BLD AUTO: 54 % (ref 43–75)
NONHDLC SERPL-MCNC: 187 MG/DL
NRBC BLD AUTO-RTO: 0 /100 WBCS
PLATELET # BLD AUTO: 239 THOUSANDS/UL (ref 149–390)
PMV BLD AUTO: 10 FL (ref 8.9–12.7)
POTASSIUM SERPL-SCNC: 4.2 MMOL/L (ref 3.5–5.3)
PROT SERPL-MCNC: 7.1 G/DL (ref 6.4–8.4)
RBC # BLD AUTO: 5.63 MILLION/UL (ref 3.88–5.62)
SODIUM SERPL-SCNC: 135 MMOL/L (ref 135–147)
TRIGL SERPL-MCNC: 256 MG/DL
TSH SERPL DL<=0.05 MIU/L-ACNC: 2.75 UIU/ML (ref 0.45–4.5)
WBC # BLD AUTO: 6.18 THOUSAND/UL (ref 4.31–10.16)

## 2022-09-12 PROCEDURE — 82652 VIT D 1 25-DIHYDROXY: CPT

## 2022-09-12 PROCEDURE — 36415 COLL VENOUS BLD VENIPUNCTURE: CPT

## 2022-09-12 PROCEDURE — 3051F HG A1C>EQUAL 7.0%<8.0%: CPT | Performed by: FAMILY MEDICINE

## 2022-09-12 PROCEDURE — 83036 HEMOGLOBIN GLYCOSYLATED A1C: CPT

## 2022-09-12 PROCEDURE — 85025 COMPLETE CBC W/AUTO DIFF WBC: CPT

## 2022-09-12 PROCEDURE — 82570 ASSAY OF URINE CREATININE: CPT

## 2022-09-12 PROCEDURE — 80053 COMPREHEN METABOLIC PANEL: CPT

## 2022-09-12 PROCEDURE — 84443 ASSAY THYROID STIM HORMONE: CPT

## 2022-09-12 PROCEDURE — 80061 LIPID PANEL: CPT

## 2022-09-12 PROCEDURE — 82043 UR ALBUMIN QUANTITATIVE: CPT

## 2022-09-14 ENCOUNTER — OFFICE VISIT (OUTPATIENT)
Dept: FAMILY MEDICINE CLINIC | Facility: CLINIC | Age: 52
End: 2022-09-14
Payer: COMMERCIAL

## 2022-09-14 VITALS
OXYGEN SATURATION: 98 % | HEART RATE: 102 BPM | HEIGHT: 66 IN | RESPIRATION RATE: 16 BRPM | SYSTOLIC BLOOD PRESSURE: 118 MMHG | WEIGHT: 171 LBS | BODY MASS INDEX: 27.48 KG/M2 | DIASTOLIC BLOOD PRESSURE: 68 MMHG

## 2022-09-14 DIAGNOSIS — Z00.00 ENCOUNTER FOR ANNUAL PHYSICAL EXAM: ICD-10-CM

## 2022-09-14 DIAGNOSIS — E11.00 TYPE 2 DIABETES MELLITUS WITH HYPEROSMOLARITY WITHOUT COMA, WITHOUT LONG-TERM CURRENT USE OF INSULIN (HCC): Primary | ICD-10-CM

## 2022-09-14 DIAGNOSIS — Z12.5 PROSTATE CANCER SCREENING: ICD-10-CM

## 2022-09-14 DIAGNOSIS — R93.1 AGATSTON CORONARY ARTERY CALCIUM SCORE BETWEEN 100 AND 199: ICD-10-CM

## 2022-09-14 DIAGNOSIS — E78.2 MIXED HYPERLIPIDEMIA: ICD-10-CM

## 2022-09-14 DIAGNOSIS — I10 ESSENTIAL HYPERTENSION: Chronic | ICD-10-CM

## 2022-09-14 DIAGNOSIS — E55.9 VITAMIN D DEFICIENCY: ICD-10-CM

## 2022-09-14 DIAGNOSIS — N52.2 DRUG-INDUCED ERECTILE DYSFUNCTION: ICD-10-CM

## 2022-09-14 PROBLEM — R10.84 GENERALIZED ABDOMINAL PAIN: Status: RESOLVED | Noted: 2022-01-20 | Resolved: 2022-09-14

## 2022-09-14 LAB — 1,25(OH)2D3 SERPL-MCNC: 40.1 PG/ML (ref 24.8–81.5)

## 2022-09-14 PROCEDURE — 3078F DIAST BP <80 MM HG: CPT | Performed by: FAMILY MEDICINE

## 2022-09-14 PROCEDURE — 99396 PREV VISIT EST AGE 40-64: CPT | Performed by: FAMILY MEDICINE

## 2022-09-14 PROCEDURE — 3074F SYST BP LT 130 MM HG: CPT | Performed by: FAMILY MEDICINE

## 2022-09-14 PROCEDURE — 99214 OFFICE O/P EST MOD 30 MIN: CPT | Performed by: FAMILY MEDICINE

## 2022-09-14 RX ORDER — TADALAFIL 20 MG/1
20 TABLET ORAL DAILY PRN
Qty: 20 TABLET | Refills: 0 | Status: SHIPPED | OUTPATIENT
Start: 2022-09-14

## 2022-09-14 RX ORDER — ICOSAPENT ETHYL 1000 MG/1
2 CAPSULE ORAL 2 TIMES DAILY
Qty: 360 CAPSULE | Refills: 3 | Status: SHIPPED | OUTPATIENT
Start: 2022-09-14

## 2022-09-14 NOTE — PROGRESS NOTES
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  Patient did have comprehensive executive physical at [facility] on March 31, 2021  Patient's hemoglobin A1c has improved tremendously which is now at 7 7%  Patient did have whole-body CT scan which was largely unrevealing  Cholesterol is still slightly elevated  Had DEXA scan, EKG, exercise stress test   His overall health has improved  TSH 2 745, normal CBC, hemoglobin A1c at 7 7%, total cholesterol 219, triglycerides 256, HDL 32, , urine is negative for microalbumin        Past Medical History:   Diagnosis Date    Anxiety     only when flying    Chronic frontal sinusitis     last assessed 03/02/2016    COVID-19 virus infection 4/1/2020    Diabetes mellitus ([address] )     Fracture of great toe     last assessed 11/06/2014    GERD (gastroesophageal reflux disease)     Hyperlipidemia     Hypertension     Palpitations     last assessed 11/06/2012    RUQ abdominal pain 9/24/2021       Family History   Problem Relation Age of Onset    Diabetes Mother     Liver cancer Mother     Hypertension Mother     Colon cancer Father 76       Past Surgical History:   Procedure Laterality Date    COLONOSCOPY      HERNIA REPAIR Bilateral     HI MANIPULAPHUC RUIZ JT W ANESTHESIA Right 4/3/2017    Procedure: SHOULDER MANIPULATION UNDER ANESTHESIA ;  Surgeon: Leo Corado MD;  [facility];  Service: Orthopedics    UPPER GASTROINTESTINAL ENDOSCOPY          reports that he has been smoking cigars  He has never used smokeless tobacco  He reports current alcohol use   He reports that he does not use drugs        Current Outpatient Medications:     ALPRAZolam (XANAX) 0 25 mg tablet, Take 1 tablet (0 25 mg total) by mouth every 8 (eight) hours as needed for anxiety, Disp: 30 tablet, Rfl: 0    aspirin 81 mg chewable tablet, Adult Aspirin Low Strength 81 MG CHEW 1 PO Q D  Quantity: 0;  Refills: 0    [employer], Provider M D ;  Started 11-Oct-2007 Active, Disp: , Rfl:     azelastine (ASTELIN) 0 1 % nasal spray, INHALE ONE SPRAY INTO EACH NOSTRIL TWO TIMES A DAY AS DIRECTED - GENERIC FOR ASTELIN, Disp: 30 mL, Rfl: 0    CHOLECALCIFEROL PO, Take 1,000 Units by mouth, Disp: , Rfl:     diltiazem (CARDIZEM) 30 mg tablet, Take 1 tablet (30 mg total) by mouth daily, Disp: 90 tablet, Rfl: 1    esomeprazole (NexIUM) 40 MG capsule, TAKE ONE CAPSULE BY MOUTH EVERY DAY, Disp: 90 capsule, Rfl: 1    Farxiga 10 MG TABS, TAKE ONE TABLET BY MOUTH EVERY DAY, Disp: 30 tablet, Rfl: 5    fexofenadine (ALLEGRA) 180 MG tablet, Take 1 tablet (180 mg total) by mouth daily, Disp: 30 tablet, Rfl: 5    glipiZIDE (GLUCOTROL XL) 10 mg 24 hr tablet, Take 1 tablet (10 mg total) by mouth daily, Disp: 90 tablet, Rfl: 1    glucose blood test strip, Test 1 Squirt 2 (two) times a day  , Disp: , Rfl:     Icosapent Ethyl (Vascepa) 1 g CAPS, Take 2 capsules (2 g total) by mouth 2 (two) times a day, Disp: 360 capsule, Rfl: 3    Janumet XR  MG TB24, TAKE TWO TABLETS BY MOUTH EVERY DAY, Disp: 180 tablet, Rfl: 1    meloxicam (MOBIC) 15 mg tablet, TAKE ONE TABLET BY MOUTH EVERY DAY, Disp: 90 tablet, Rfl: 1    montelukast (SINGULAIR) 10 mg tablet, TAKE ONE TABLET BY MOUTH AT BEDTIME, Disp: 90 tablet, Rfl: 0    Phytonadione (K 100) 100 MCG TABS, Take 1 tablet (100 mcg total) by mouth daily, Disp: 100 tablet, Rfl: 1    pioglitazone (ACTOS) 45 mg tablet, TAKE ONE TABLET BY MOUTH EVERY DAY, Disp: 90 tablet, Rfl: 3    quinapril (ACCUPRIL) 5 mg tablet, TAKE ONE TABLET BY MOUTH EVERY DAY, Disp: 90 tablet, Rfl: 1    rosuvastatin (CRESTOR) 10 MG tablet, Take 1 tablet (10 mg total) by mouth daily, Disp: 90 tablet, Rfl: 0    sucralfate (CARAFATE) 1 g tablet, Take 1 tablet (1 g total) by mouth 2 (two) times a day, Disp: 60 tablet, Rfl: 1    tadalafil (CIALIS) 20 MG tablet, Take 1 tablet (20 mg total) by mouth daily as needed for erectile dysfunction, Disp: 20 tablet, Rfl: 0    The following portions of the patient's history were reviewed and updated as appropriate: allergies, current medications, past family history, past medical history, past social history, past surgical history and problem list     Review of Systems   Constitutional: Negative  HENT: Negative  Eyes: Negative  Respiratory: Negative  Cardiovascular: Negative  Gastrointestinal: Negative  Endocrine: Negative  Genitourinary: Negative  Musculoskeletal: Negative  Skin: Negative  Allergic/Immunologic: Negative  Neurological: Negative  Hematological: Negative  Psychiatric/Behavioral: Negative  All other systems reviewed and are negative  Objective:    /68   Pulse 102   Resp 16   Ht 5' 6" (1 676 m)   Wt 77 6 kg (171 lb)   SpO2 98%   BMI 27 60 kg/m²      Physical Exam  Vitals and nursing note reviewed  Constitutional:       General: He is not in acute distress  Appearance: Normal appearance  He is well-developed and normal weight  He is not ill-appearing  HENT:      Head: Normocephalic and atraumatic  Right Ear: Tympanic membrane, ear canal and external ear normal       Left Ear: Tympanic membrane, ear canal and external ear normal       Nose: Nose normal       Mouth/Throat:      Mouth: Mucous membranes are moist    Eyes:      Extraocular Movements: Extraocular movements intact  Conjunctiva/sclera: Conjunctivae normal       Pupils: Pupils are equal, round, and reactive to light  Cardiovascular:      Rate and Rhythm: Normal rate and regular rhythm  Pulses: Normal pulses        Heart sounds: Normal heart sounds  No murmur heard  Pulmonary:      Effort: Pulmonary effort is normal       Breath sounds: Normal breath sounds  No wheezing  Abdominal:      General: Abdomen is flat  Bowel sounds are normal       Palpations: Abdomen is soft  Musculoskeletal:         General: Normal range of motion  Cervical back: Normal range of motion and neck supple  Skin:     General: Skin is warm and dry  Neurological:      General: No focal deficit present  Mental Status: He is alert and oriented to person, place, and time  Psychiatric:         Mood and Affect: Mood normal          Behavior: Behavior normal          Thought Content:  Thought content normal          Judgment: Judgment normal            Recent Results (from the past 1008 hour(s))   CBC and differential    Collection Time: 09/12/22  8:46 AM   Result Value Ref Range    WBC 6 18 4 31 - 10 16 Thousand/uL    RBC 5 63 (H) 3 88 - 5 62 Million/uL    Hemoglobin 15 0 12 0 - 17 0 g/dL    Hematocrit 45 2 36 5 - 49 3 %    MCV 80 (L) 82 - 98 fL    MCH 26 6 (L) 26 8 - 34 3 pg    MCHC 33 2 31 4 - 37 4 g/dL    RDW 13 3 11 6 - 15 1 %    MPV 10 0 8 9 - 12 7 fL    Platelets 690 753 - 373 Thousands/uL    nRBC 0 /100 WBCs    Neutrophils Relative 54 43 - 75 %    Immat GRANS % 1 0 - 2 %    Lymphocytes Relative 34 14 - 44 %    Monocytes Relative 7 4 - 12 %    Eosinophils Relative 3 0 - 6 %    Basophils Relative 1 0 - 1 %    Neutrophils Absolute 3 34 1 85 - 7 62 Thousands/µL    Immature Grans Absolute 0 05 0 00 - 0 20 Thousand/uL    Lymphocytes Absolute 2 10 0 60 - 4 47 Thousands/µL    Monocytes Absolute 0 42 0 17 - 1 22 Thousand/µL    Eosinophils Absolute 0 21 0 00 - 0 61 Thousand/µL    Basophils Absolute 0 06 0 00 - 0 10 Thousands/µL   Comprehensive metabolic panel    Collection Time: 09/12/22  8:46 AM   Result Value Ref Range    Sodium 135 135 - 147 mmol/L    Potassium 4 2 3 5 - 5 3 mmol/L    Chloride 101 96 - 108 mmol/L    CO2 27 21 - 32 mmol/L    ANION GAP 7 4 - 13 mmol/L    BUN 17 5 - 25 mg/dL    Creatinine 0 87 0 60 - 1 30 mg/dL    Glucose, Fasting 215 (H) 65 - 99 mg/dL    Calcium 9 4 8 4 - 10 2 mg/dL    AST 13 13 - 39 U/L    ALT 17 7 - 52 U/L    Alkaline Phosphatase 54 34 - 104 U/L    Total Protein 7 1 6 4 - 8 4 g/dL    Albumin 4 5 3 5 - 5 0 g/dL    Total Bilirubin 0 53 0 20 - 1 00 mg/dL    eGFR 99 ml/min/1 73sq m   Hemoglobin A1C    Collection Time: 09/12/22  8:46 AM   Result Value Ref Range    Hemoglobin A1C 7 7 (H) Normal 3 8-5 6%; PreDiabetic 5 7-6 4%; Diabetic >=6 5%; Glycemic control for adults with diabetes <7 0% %     mg/dl   Lipid panel    Collection Time: 09/12/22  8:46 AM   Result Value Ref Range    Cholesterol 219 (H) See Comment mg/dL    Triglycerides 256 (H) See Comment mg/dL    HDL, Direct 32 (L) >=40 mg/dL    LDL Calculated 136 (H) 0 - 100 mg/dL    Non-HDL-Chol (CHOL-HDL) 187 mg/dl   Microalbumin / creatinine urine ratio    Collection Time: 09/12/22  8:46 AM   Result Value Ref Range    Creatinine, Ur 41 8 mg/dL    Microalbum  ,U,Random <5 0 0 0 - 20 0 mg/L    Microalb Creat Ratio <12 0 - 30 mg/g creatinine   TSH, 3rd generation with Free T4 reflex    Collection Time: 09/12/22  8:46 AM   Result Value Ref Range    TSH 3RD GENERATON 2 745 0 450 - 4 500 uIU/mL   Vitamin D 1,25 dihydroxy    Collection Time: 09/12/22  8:46 AM   Result Value Ref Range    Vit D, 1,25-Dihydroxy 40 1 24 8 - 81 5 pg/mL       Assessment/Plan:    Type 2 diabetes mellitus with hyperosmolarity without coma, without long-term current use of insulin (HCC)    Lab Results   Component Value Date    HGBA1C 7 7 (H) 09/12/2022     Significant improvement in hemoglobin A1c  Patient remains on Janumet XR, Farxiga and Glucotrol XL  Patient does follow diabetic diet  Repeat diabetic parameters in 6 months  Essential hypertension  Hypertension remains very well controlled on Cardizem and quinapril  Continue same      Agatston coronary artery calcium score between 100 and 199  Repeat cardiac calcium CT scanning has been done which did not show any change in his calcium deposition in the LAD  He did have a normal stress test   He remains active  Remains on Accupril, Crestor  Continue to control risk factors    Mixed hyperlipidemia  Cholesterol is elevated  Continue to watch dietary intake of fats and cholesterol  Repeat lipid panel in 6 months  Continue Crestor 10 mg once daily and Vascepa 4 g daily    Drug-induced erectile dysfunction  Multifactorial   Patient states that sildenafil is effective but does not seem the last that long  Will trial the patient with generic Cialis 20 mg on a p r n  Basis    Prostate cancer screening  Screening PSA with next set of labs    Encounter for annual physical exam  Annual physical examination performed          Problem List Items Addressed This Visit        Endocrine    Type 2 diabetes mellitus with hyperosmolarity without coma, without long-term current use of insulin ([facility] [address] ) - Primary       Lab Results   Component Value Date    HGBA1C 7 7 (H) 09/12/2022     Significant improvement in hemoglobin A1c  Patient remains on Janumet XR, Farxiga and Glucotrol XL  Patient does follow diabetic diet  Repeat diabetic parameters in 6 months  Relevant Orders    Comprehensive metabolic panel    Hemoglobin A1C    Microalbumin / creatinine urine ratio       Cardiovascular and Mediastinum    Essential hypertension (Chronic)     Hypertension remains very well controlled on Cardizem and quinapril  Continue same  Relevant Orders    CBC and differential    TSH, 3rd generation with Free T4 reflex    Agatston coronary artery calcium score between 100 and 199     Repeat cardiac calcium CT scanning has been done which did not show any change in his calcium deposition in the LAD  He did have a normal stress test   He remains active  Remains on Accupril, Crestor    Continue to control risk factors         Relevant Medications    tadalafil (CIALIS) 20 MG tablet    Other Relevant Orders    CBC and differential       Other    Drug-induced erectile dysfunction     Multifactorial   Patient states that sildenafil is effective but does not seem the last that long  Will trial the patient with generic Cialis 20 mg on a p r n  Basis         Relevant Medications    tadalafil (CIALIS) 20 MG tablet    Encounter for annual physical exam     Annual physical examination performed         Mixed hyperlipidemia     Cholesterol is elevated  Continue to watch dietary intake of fats and cholesterol  Repeat lipid panel in 6 months    Continue Crestor 10 mg once daily and Vascepa 4 g daily         Relevant Medications    Icosapent Ethyl (Vascepa) 1 g CAPS    Other Relevant Orders    Comprehensive metabolic panel    Lipid panel    Prostate cancer screening     Screening PSA with next set of labs         Relevant Orders    PSA, Total Screen    Vitamin D deficiency    Relevant Orders    Vitamin D 1,25 dihydroxy

## 2022-09-15 PROBLEM — Z00.00 ENCOUNTER FOR ANNUAL PHYSICAL EXAM: Status: ACTIVE | Noted: 2022-09-15

## 2022-09-15 NOTE — ASSESSMENT & PLAN NOTE
Cholesterol is elevated  Continue to watch dietary intake of fats and cholesterol  Repeat lipid panel in 6 months    Continue Crestor 10 mg once daily and Vascepa 4 g daily

## 2022-09-15 NOTE — ASSESSMENT & PLAN NOTE
Multifactorial   Patient states that sildenafil is effective but does not seem the last that long  Will trial the patient with generic Cialis 20 mg on a p r n   Basis

## 2022-09-15 NOTE — ASSESSMENT & PLAN NOTE
Lab Results   Component Value Date    HGBA1C 7 7 (H) 09/12/2022     Significant improvement in hemoglobin A1c  Patient remains on Janumet XR, Farxiga and Glucotrol XL  Patient does follow diabetic diet  Repeat diabetic parameters in 6 months

## 2022-09-15 NOTE — ASSESSMENT & PLAN NOTE
Repeat cardiac calcium CT scanning has been done which did not show any change in his calcium deposition in the LAD  He did have a normal stress test   He remains active  Remains on Accupril, Crestor    Continue to control risk factors

## 2022-10-12 PROBLEM — Z12.5 PROSTATE CANCER SCREENING: Status: RESOLVED | Noted: 2021-09-01 | Resolved: 2022-10-12

## 2022-10-12 PROBLEM — Z12.11 SCREEN FOR COLON CANCER: Status: RESOLVED | Noted: 2021-09-29 | Resolved: 2022-10-12

## 2022-10-12 PROBLEM — Z12.11 COLON CANCER SCREENING: Status: RESOLVED | Noted: 2021-09-01 | Resolved: 2022-10-12

## 2022-10-23 DIAGNOSIS — E11.00 TYPE 2 DIABETES MELLITUS WITH HYPEROSMOLARITY WITHOUT COMA, WITHOUT LONG-TERM CURRENT USE OF INSULIN (HCC): ICD-10-CM

## 2022-10-24 RX ORDER — GLIPIZIDE 10 MG/1
TABLET, FILM COATED, EXTENDED RELEASE ORAL
Qty: 90 TABLET | Refills: 1 | Status: SHIPPED | OUTPATIENT
Start: 2022-10-24

## 2022-11-10 DIAGNOSIS — J30.1 SEASONAL ALLERGIC RHINITIS DUE TO POLLEN: ICD-10-CM

## 2022-11-10 RX ORDER — AZELASTINE 1 MG/ML
2 SPRAY, METERED NASAL 2 TIMES DAILY
Qty: 30 ML | Refills: 11 | Status: SHIPPED | OUTPATIENT
Start: 2022-11-10 | End: 2023-05-06 | Stop reason: SDUPTHER

## 2022-11-13 DIAGNOSIS — E11.9 TYPE 2 DIABETES MELLITUS WITHOUT COMPLICATION, WITHOUT LONG-TERM CURRENT USE OF INSULIN (HCC): ICD-10-CM

## 2022-11-14 RX ORDER — SITAGLIPTIN AND METFORMIN HYDROCHLORIDE 1000; 50 MG/1; MG/1
2 TABLET, FILM COATED, EXTENDED RELEASE ORAL DAILY
Qty: 180 TABLET | Refills: 0 | Status: SHIPPED | OUTPATIENT
Start: 2022-11-14

## 2022-11-16 ENCOUNTER — OFFICE VISIT (OUTPATIENT)
Dept: FAMILY MEDICINE CLINIC | Facility: CLINIC | Age: 52
End: 2022-11-16

## 2022-11-16 VITALS
BODY MASS INDEX: 27.32 KG/M2 | RESPIRATION RATE: 16 BRPM | HEIGHT: 66 IN | OXYGEN SATURATION: 98 % | WEIGHT: 170 LBS | TEMPERATURE: 97.3 F | HEART RATE: 102 BPM | DIASTOLIC BLOOD PRESSURE: 74 MMHG | SYSTOLIC BLOOD PRESSURE: 122 MMHG

## 2022-11-16 DIAGNOSIS — L84 CLAVUS: Primary | ICD-10-CM

## 2022-11-16 NOTE — ASSESSMENT & PLAN NOTE
Small painful clavus on the bottom of the right foot    -I treated this using a 15 scalpel blade to joann down the skin of the area and was actually able to remove the central core  Immediate relief    No pain with putting pressure on his foot

## 2022-11-16 NOTE — PROGRESS NOTES
Subjective:      Patient ID: Dequan Romero is a 47 y o  male  71-year-old male with past medical history of diabetes mellitus type 2 presents to the office complaining of painful spot on the ball of his right foot  He noticed this after raking leaves  Does not recall any inciting injuries or trauma  Area did become red, inflamed and painful to walk on      Past Medical History:   Diagnosis Date   • Anxiety     only when flying   • Chronic frontal sinusitis     last assessed 03/02/2016   • COVID-19 virus infection 4/1/2020   • Diabetes mellitus ([facility] [address] )    • Fracture of great toe     last assessed 11/06/2014   • GERD (gastroesophageal reflux disease)    • Hyperlipidemia    • Hypertension    • Palpitations     last assessed 11/06/2012   • RUQ abdominal pain 9/24/2021       Family History   Problem Relation Age of Onset   • Diabetes Mother    • Liver cancer Mother    • Hypertension Mother    • Colon cancer Father 76       Past Surgical History:   Procedure Laterality Date   • COLONOSCOPY     • HERNIA REPAIR Bilateral    • ID MANIPULATN SHLDR JT W ANESTHESIA Right 4/3/2017    Procedure: SHOULDER MANIPULATION UNDER ANESTHESIA ;  Surgeon: Sarina Oleary MD;  [facility];  Service: Orthopedics   • UPPER GASTROINTESTINAL ENDOSCOPY          reports that he has been smoking cigars  He has never used smokeless tobacco  He reports current alcohol use  He reports that he does not use drugs        Current Outpatient Medications:   •  ALPRAZolam (XANAX) 0 25 mg tablet, Take 1 tablet (0 25 mg total) by mouth every 8 (eight) hours as needed for anxiety, Disp: 30 tablet, Rfl: 0  •  azelastine (ASTELIN) 0 1 % nasal spray, 2 sprays into each nostril 2 (two) times a day Use in each nostril as directed, Disp: 30 mL, Rfl: 11  •  esomeprazole (NexIUM) 40 MG capsule, TAKE ONE CAPSULE BY MOUTH EVERY DAY, Disp: 90 capsule, Rfl: 1  •  Farxiga 10 MG TABS, TAKE ONE TABLET BY MOUTH EVERY DAY, Disp: 30 tablet, Rfl: 5  •  fexofenadine (ALLEGRA) 180 MG tablet, Take 1 tablet (180 mg total) by mouth daily, Disp: 30 tablet, Rfl: 5  •  glipiZIDE (GLUCOTROL XL) 10 mg 24 hr tablet, TAKE ONE TABLET BY MOUTH EVERY DAY, Disp: 90 tablet, Rfl: 1  •  Icosapent Ethyl (Vascepa) 1 g CAPS, Take 2 capsules (2 g total) by mouth 2 (two) times a day, Disp: 360 capsule, Rfl: 3  •  meloxicam (MOBIC) 15 mg tablet, TAKE ONE TABLET BY MOUTH EVERY DAY, Disp: 90 tablet, Rfl: 1  •  montelukast (SINGULAIR) 10 mg tablet, TAKE ONE TABLET BY MOUTH AT BEDTIME, Disp: 90 tablet, Rfl: 0  •  Phytonadione (K 100) 100 MCG TABS, Take 1 tablet (100 mcg total) by mouth daily, Disp: 100 tablet, Rfl: 1  •  pioglitazone (ACTOS) 45 mg tablet, TAKE ONE TABLET BY MOUTH EVERY DAY, Disp: 90 tablet, Rfl: 3  •  quinapril (ACCUPRIL) 5 mg tablet, TAKE ONE TABLET BY MOUTH EVERY DAY, Disp: 90 tablet, Rfl: 1  •  rosuvastatin (CRESTOR) 10 MG tablet, Take 1 tablet (10 mg total) by mouth daily, Disp: 90 tablet, Rfl: 0  •  SITagliptin-metFORMIN HCl ER (Janumet XR)  MG TB24, Take 2 tablets by mouth daily, Disp: 180 tablet, Rfl: 0  •  sucralfate (CARAFATE) 1 g tablet, Take 1 tablet (1 g total) by mouth 2 (two) times a day, Disp: 60 tablet, Rfl: 1  •  tadalafil (CIALIS) 20 MG tablet, Take 1 tablet (20 mg total) by mouth daily as needed for erectile dysfunction, Disp: 20 tablet, Rfl: 0  •  ALPRAZolam (XANAX) 0 25 mg tablet, Take 1 tablet (0 25 mg total) by mouth every 8 (eight) hours as needed for anxiety, Disp: 30 tablet, Rfl: 0  •  aspirin 81 mg chewable tablet, Adult Aspirin Low Strength 81 MG CHEW 1 PO Q D  Quantity: 0;  Refills: 0    [employer], Provider M D ;  Started 11-Oct-2007 Active, Disp: , Rfl:   •  azelastine (ASTELIN) 0 1 % nasal spray, 2 sprays into each nostril 2 (two) times a day Use in each nostril as directed, Disp: 30 mL, Rfl: 11  •  CHOLECALCIFEROL PO, Take 1,000 Units by mouth, Disp: , Rfl:   •  diltiazem (CARDIZEM) 30 mg tablet, Take 1 tablet (30 mg total) by mouth daily, Disp: 90 tablet, Rfl: 1  •  Farxiga 10 MG TABS, TAKE ONE TABLET BY MOUTH EVERY DAY, Disp: 30 tablet, Rfl: 5  •  fexofenadine (ALLEGRA) 180 MG tablet, Take 1 tablet (180 mg total) by mouth daily, Disp: 30 tablet, Rfl: 5  •  glipiZIDE (GLUCOTROL XL) 10 mg 24 hr tablet, TAKE ONE TABLET BY MOUTH EVERY DAY, Disp: 90 tablet, Rfl: 1  •  glucose blood test strip, Test 1 Squirt 2 (two) times a day  , Disp: , Rfl:   •  Icosapent Ethyl (Vascepa) 1 g CAPS, Take 2 capsules (2 g total) by mouth 2 (two) times a day, Disp: 360 capsule, Rfl: 3  •  meloxicam (MOBIC) 15 mg tablet, TAKE ONE TABLET BY MOUTH EVERY DAY, Disp: 90 tablet, Rfl: 1  •  montelukast (SINGULAIR) 10 mg tablet, TAKE ONE TABLET BY MOUTH AT BEDTIME, Disp: 90 tablet, Rfl: 0  •  Phytonadione (K 100) 100 MCG TABS, Take 1 tablet (100 mcg total) by mouth daily, Disp: 100 tablet, Rfl: 1  •  pioglitazone (ACTOS) 45 mg tablet, TAKE ONE TABLET BY MOUTH EVERY DAY, Disp: 90 tablet, Rfl: 3  •  quinapril (ACCUPRIL) 5 mg tablet, TAKE ONE TABLET BY MOUTH EVERY DAY, Disp: 90 tablet, Rfl: 1  •  rosuvastatin (CRESTOR) 10 MG tablet, Take 1 tablet (10 mg total) by mouth daily, Disp: 90 tablet, Rfl: 0  •  sucralfate (CARAFATE) 1 g tablet, Take 1 tablet (1 g total) by mouth 2 (two) times a day, Disp: 60 tablet, Rfl: 1  •  tadalafil (CIALIS) 20 MG tablet, Take 1 tablet (20 mg total) by mouth daily as needed for erectile dysfunction, Disp: 20 tablet, Rfl: 0    The following portions of the patient's history were reviewed and updated as appropriate: allergies, current medications, past family history, past medical history, past social history, past surgical history and problem list     Review of Systems        Objective:    /74   Pulse 102   Temp (!) 97 3 °F (36 3 °C)   Resp 16   Ht 5' 6" (1 676 m)   Wt 77 1 kg (170 lb)   SpO2 98%   BMI 27 44 kg/m²      Physical Exam  Vitals and nursing note reviewed  Constitutional:       Appearance: Normal appearance     Musculoskeletal: Feet:    Feet:      Right foot:      Skin integrity: Skin integrity normal       Left foot:      Skin integrity: Skin integrity normal    Neurological:      Mental Status: He is alert  No results found for this or any previous visit (from the past 1008 hour(s))  Assessment/Plan:    Clavus  Small painful clavus on the bottom of the right foot    -I treated this using a 15 scalpel blade to joann down the skin of the area and was actually able to remove the central core  Immediate relief  No pain with putting pressure on his foot          Problem List Items Addressed This Visit        Musculoskeletal and Integument    Clavus - Primary     Small painful clavus on the bottom of the right foot    -I treated this using a 15 scalpel blade to joann down the skin of the area and was actually able to remove the central core  Immediate relief    No pain with putting pressure on his foot

## 2022-11-22 DIAGNOSIS — I10 ESSENTIAL HYPERTENSION: ICD-10-CM

## 2022-11-22 RX ORDER — QUINAPRIL 5 1/1
TABLET ORAL
Qty: 90 TABLET | Refills: 1 | Status: SHIPPED | OUTPATIENT
Start: 2022-11-22 | End: 2022-11-25

## 2022-11-24 DIAGNOSIS — I10 ESSENTIAL HYPERTENSION: ICD-10-CM

## 2022-11-25 RX ORDER — QUINAPRIL 5 1/1
TABLET ORAL
Qty: 90 TABLET | Refills: 1 | Status: SHIPPED | OUTPATIENT
Start: 2022-11-25

## 2022-12-05 DIAGNOSIS — K21.9 GASTROESOPHAGEAL REFLUX DISEASE: ICD-10-CM

## 2022-12-05 RX ORDER — ESOMEPRAZOLE MAGNESIUM 40 MG/1
40 CAPSULE, DELAYED RELEASE ORAL DAILY
Qty: 90 CAPSULE | Refills: 0 | Status: SHIPPED | OUTPATIENT
Start: 2022-12-05

## 2022-12-26 DIAGNOSIS — E78.2 MIXED HYPERLIPIDEMIA: ICD-10-CM

## 2022-12-26 DIAGNOSIS — J30.9 ALLERGIC RHINITIS: ICD-10-CM

## 2022-12-27 RX ORDER — ICOSAPENT ETHYL 1000 MG/1
2 CAPSULE ORAL 2 TIMES DAILY
Qty: 360 CAPSULE | Refills: 0 | Status: SHIPPED | OUTPATIENT
Start: 2022-12-27

## 2022-12-27 RX ORDER — MONTELUKAST SODIUM 10 MG/1
10 TABLET ORAL
Qty: 90 TABLET | Refills: 0 | Status: SHIPPED | OUTPATIENT
Start: 2022-12-27

## 2022-12-29 NOTE — RESULT ENCOUNTER NOTE
Please call patient and notify that results are negative for COVID-19    Happy travels to [facility] Calcipotriene Pregnancy And Lactation Text: This medication has not been proven safe during pregnancy. It is unknown if this medication is excreted in breast milk.

## 2022-12-30 DIAGNOSIS — E11.65 TYPE 2 DIABETES MELLITUS WITH HYPERGLYCEMIA, WITHOUT LONG-TERM CURRENT USE OF INSULIN (HCC): Chronic | ICD-10-CM

## 2022-12-30 RX ORDER — DAPAGLIFLOZIN 10 MG/1
TABLET, FILM COATED ORAL
Qty: 30 TABLET | Refills: 5 | Status: SHIPPED | OUTPATIENT
Start: 2022-12-30 | End: 2023-01-03

## 2022-12-31 DIAGNOSIS — E11.65 TYPE 2 DIABETES MELLITUS WITH HYPERGLYCEMIA, WITHOUT LONG-TERM CURRENT USE OF INSULIN (HCC): Chronic | ICD-10-CM

## 2023-01-03 RX ORDER — DAPAGLIFLOZIN 10 MG/1
TABLET, FILM COATED ORAL
Qty: 30 TABLET | Refills: 5 | Status: SHIPPED | OUTPATIENT
Start: 2023-01-03

## 2023-01-26 DIAGNOSIS — E11.9 TYPE 2 DIABETES MELLITUS WITHOUT COMPLICATION, WITHOUT LONG-TERM CURRENT USE OF INSULIN (HCC): Chronic | ICD-10-CM

## 2023-01-26 RX ORDER — PIOGLITAZONEHYDROCHLORIDE 45 MG/1
TABLET ORAL
Qty: 90 TABLET | Refills: 3 | Status: SHIPPED | OUTPATIENT
Start: 2023-01-26

## 2023-02-05 DIAGNOSIS — M75.102 ROTATOR CUFF SYNDROME OF LEFT SHOULDER: ICD-10-CM

## 2023-02-06 DIAGNOSIS — E11.9 TYPE 2 DIABETES MELLITUS WITHOUT COMPLICATION, WITHOUT LONG-TERM CURRENT USE OF INSULIN (HCC): ICD-10-CM

## 2023-02-06 RX ORDER — MELOXICAM 15 MG/1
TABLET ORAL
Qty: 90 TABLET | Refills: 1 | Status: SHIPPED | OUTPATIENT
Start: 2023-02-06

## 2023-02-07 ENCOUNTER — APPOINTMENT (OUTPATIENT)
Dept: LAB | Facility: CLINIC | Age: 53
End: 2023-02-07

## 2023-02-07 DIAGNOSIS — E78.2 MIXED HYPERLIPIDEMIA: ICD-10-CM

## 2023-02-07 DIAGNOSIS — E55.9 VITAMIN D DEFICIENCY: ICD-10-CM

## 2023-02-07 DIAGNOSIS — R93.1 AGATSTON CORONARY ARTERY CALCIUM SCORE BETWEEN 100 AND 199: ICD-10-CM

## 2023-02-07 DIAGNOSIS — I10 ESSENTIAL HYPERTENSION: Chronic | ICD-10-CM

## 2023-02-07 DIAGNOSIS — E11.00 TYPE 2 DIABETES MELLITUS WITH HYPEROSMOLARITY WITHOUT COMA, WITHOUT LONG-TERM CURRENT USE OF INSULIN (HCC): ICD-10-CM

## 2023-02-07 DIAGNOSIS — Z12.5 PROSTATE CANCER SCREENING: ICD-10-CM

## 2023-02-07 LAB
ALBUMIN SERPL BCP-MCNC: 4.5 G/DL (ref 3.5–5)
ALP SERPL-CCNC: 52 U/L (ref 34–104)
ALT SERPL W P-5'-P-CCNC: 17 U/L (ref 7–52)
ANION GAP SERPL CALCULATED.3IONS-SCNC: 7 MMOL/L (ref 4–13)
AST SERPL W P-5'-P-CCNC: 12 U/L (ref 13–39)
BASOPHILS # BLD AUTO: 0.04 THOUSANDS/ÂΜL (ref 0–0.1)
BASOPHILS NFR BLD AUTO: 1 % (ref 0–1)
BILIRUB SERPL-MCNC: 0.53 MG/DL (ref 0.2–1)
BUN SERPL-MCNC: 18 MG/DL (ref 5–25)
CALCIUM SERPL-MCNC: 9.6 MG/DL (ref 8.4–10.2)
CHLORIDE SERPL-SCNC: 101 MMOL/L (ref 96–108)
CHOLEST SERPL-MCNC: 213 MG/DL
CO2 SERPL-SCNC: 28 MMOL/L (ref 21–32)
CREAT SERPL-MCNC: 0.76 MG/DL (ref 0.6–1.3)
CREAT UR-MCNC: 61.4 MG/DL
EOSINOPHIL # BLD AUTO: 0.17 THOUSAND/ÂΜL (ref 0–0.61)
EOSINOPHIL NFR BLD AUTO: 3 % (ref 0–6)
ERYTHROCYTE [DISTWIDTH] IN BLOOD BY AUTOMATED COUNT: 13.4 % (ref 11.6–15.1)
EST. AVERAGE GLUCOSE BLD GHB EST-MCNC: 180 MG/DL
GFR SERPL CREATININE-BSD FRML MDRD: 105 ML/MIN/1.73SQ M
GLUCOSE P FAST SERPL-MCNC: 212 MG/DL (ref 65–99)
HBA1C MFR BLD: 7.9 %
HCT VFR BLD AUTO: 45.9 % (ref 36.5–49.3)
HDLC SERPL-MCNC: 32 MG/DL
HGB BLD-MCNC: 14.5 G/DL (ref 12–17)
IMM GRANULOCYTES # BLD AUTO: 0.04 THOUSAND/UL (ref 0–0.2)
IMM GRANULOCYTES NFR BLD AUTO: 1 % (ref 0–2)
LDLC SERPL CALC-MCNC: 128 MG/DL (ref 0–100)
LYMPHOCYTES # BLD AUTO: 1.92 THOUSANDS/ÂΜL (ref 0.6–4.47)
LYMPHOCYTES NFR BLD AUTO: 33 % (ref 14–44)
MCH RBC QN AUTO: 25.5 PG (ref 26.8–34.3)
MCHC RBC AUTO-ENTMCNC: 31.6 G/DL (ref 31.4–37.4)
MCV RBC AUTO: 81 FL (ref 82–98)
MICROALBUMIN UR-MCNC: <5 MG/L (ref 0–20)
MICROALBUMIN/CREAT 24H UR: <8 MG/G CREATININE (ref 0–30)
MONOCYTES # BLD AUTO: 0.45 THOUSAND/ÂΜL (ref 0.17–1.22)
MONOCYTES NFR BLD AUTO: 8 % (ref 4–12)
NEUTROPHILS # BLD AUTO: 3.16 THOUSANDS/ÂΜL (ref 1.85–7.62)
NEUTS SEG NFR BLD AUTO: 54 % (ref 43–75)
NONHDLC SERPL-MCNC: 181 MG/DL
NRBC BLD AUTO-RTO: 0 /100 WBCS
PLATELET # BLD AUTO: 281 THOUSANDS/UL (ref 149–390)
PMV BLD AUTO: 10.2 FL (ref 8.9–12.7)
POTASSIUM SERPL-SCNC: 4.5 MMOL/L (ref 3.5–5.3)
PROT SERPL-MCNC: 6.9 G/DL (ref 6.4–8.4)
PSA SERPL-MCNC: 0.9 NG/ML (ref 0–4)
RBC # BLD AUTO: 5.68 MILLION/UL (ref 3.88–5.62)
SODIUM SERPL-SCNC: 136 MMOL/L (ref 135–147)
TRIGL SERPL-MCNC: 265 MG/DL
TSH SERPL DL<=0.05 MIU/L-ACNC: 2.54 UIU/ML (ref 0.45–4.5)
WBC # BLD AUTO: 5.78 THOUSAND/UL (ref 4.31–10.16)

## 2023-02-07 RX ORDER — SITAGLIPTIN AND METFORMIN HYDROCHLORIDE 1000; 50 MG/1; MG/1
2 TABLET, FILM COATED, EXTENDED RELEASE ORAL DAILY
Qty: 180 TABLET | Refills: 0 | Status: SHIPPED | OUTPATIENT
Start: 2023-02-07 | End: 2023-02-09

## 2023-02-09 ENCOUNTER — RA CDI HCC (OUTPATIENT)
Dept: OTHER | Facility: HOSPITAL | Age: 53
End: 2023-02-09

## 2023-02-09 ENCOUNTER — TELEPHONE (OUTPATIENT)
Dept: FAMILY MEDICINE CLINIC | Facility: CLINIC | Age: 53
End: 2023-02-09

## 2023-02-09 DIAGNOSIS — E11.9 TYPE 2 DIABETES MELLITUS WITHOUT COMPLICATION, WITHOUT LONG-TERM CURRENT USE OF INSULIN (HCC): ICD-10-CM

## 2023-02-09 LAB — 1,25(OH)2D3 SERPL-MCNC: 32.2 PG/ML (ref 24.8–81.5)

## 2023-02-09 RX ORDER — METFORMIN HYDROCHLORIDE EXTENDED-RELEASE TABLETS 1000 MG/1
1000 TABLET, FILM COATED, EXTENDED RELEASE ORAL 2 TIMES DAILY WITH MEALS
Qty: 180 TABLET | Refills: 3 | Status: SHIPPED | OUTPATIENT
Start: 2023-02-09 | End: 2023-02-09

## 2023-02-09 RX ORDER — LINAGLIPTIN 5 MG/1
5 TABLET, FILM COATED ORAL DAILY
Qty: 90 TABLET | Refills: 3 | Status: SHIPPED | OUTPATIENT
Start: 2023-02-09 | End: 2023-05-04 | Stop reason: SDUPTHER

## 2023-02-09 RX ORDER — METFORMIN HYDROCHLORIDE 500 MG/1
1000 TABLET, EXTENDED RELEASE ORAL 2 TIMES DAILY WITH MEALS
Qty: 360 TABLET | Refills: 3 | Status: SHIPPED | OUTPATIENT
Start: 2023-02-09 | End: 2023-05-11 | Stop reason: SDUPTHER

## 2023-02-09 NOTE — PROGRESS NOTES
[facility]  coding opportunities       Chart reviewed, no opportunity found: CHART REVIEWED, NO OPPORTUNITY FOUND        Patients Insurance        Commercial Insurance: [employer]

## 2023-02-09 NOTE — TELEPHONE ENCOUNTER
Please call the patient and notify him that insurance will not cover Janumet extended release combination tablet but will cover products   Prescription for Januvia 100 mg once daily and extended release metformin 1 g twice daily sent to pharmacy  Seems kind of stupid because the  of Januvia is also the manufacture of Janumet    [employer] insurances

## 2023-02-09 NOTE — TELEPHONE ENCOUNTER
Please inform the patient that there has been a change in the medication  Insurance companies suck however you and the pharmacist were able to figure out what is covered    That being said he is good to be taking a lot more pills to equal what 1 pill did

## 2023-02-09 NOTE — TELEPHONE ENCOUNTER
I spoke with Pharmacist and she stated that Metformin ER 500mg BID is covered and Tradjenta 5mg once daily will also be covered in place of Januvia  If you are fine with this please send new scripts to the pharmacy

## 2023-02-15 ENCOUNTER — OFFICE VISIT (OUTPATIENT)
Dept: FAMILY MEDICINE CLINIC | Facility: CLINIC | Age: 53
End: 2023-02-15

## 2023-02-15 VITALS
SYSTOLIC BLOOD PRESSURE: 124 MMHG | HEART RATE: 90 BPM | BODY MASS INDEX: 28.28 KG/M2 | WEIGHT: 176 LBS | RESPIRATION RATE: 16 BRPM | HEIGHT: 66 IN | DIASTOLIC BLOOD PRESSURE: 68 MMHG

## 2023-02-15 DIAGNOSIS — I10 ESSENTIAL HYPERTENSION: Chronic | ICD-10-CM

## 2023-02-15 DIAGNOSIS — E55.9 VITAMIN D DEFICIENCY: ICD-10-CM

## 2023-02-15 DIAGNOSIS — F40.243 FEAR OF FLYING: ICD-10-CM

## 2023-02-15 DIAGNOSIS — E78.5 HYPERLIPIDEMIA, UNSPECIFIED HYPERLIPIDEMIA TYPE: ICD-10-CM

## 2023-02-15 DIAGNOSIS — E11.00 TYPE 2 DIABETES MELLITUS WITH HYPEROSMOLARITY WITHOUT COMA, WITHOUT LONG-TERM CURRENT USE OF INSULIN (HCC): Primary | ICD-10-CM

## 2023-02-15 DIAGNOSIS — R93.1 AGATSTON CORONARY ARTERY CALCIUM SCORE BETWEEN 100 AND 199: ICD-10-CM

## 2023-02-15 DIAGNOSIS — E78.2 MIXED HYPERLIPIDEMIA: ICD-10-CM

## 2023-02-15 RX ORDER — QUINAPRIL 5 1/1
5 TABLET ORAL DAILY
Qty: 90 TABLET | Refills: 1 | Status: SHIPPED | OUTPATIENT
Start: 2023-02-15

## 2023-02-15 RX ORDER — ROSUVASTATIN CALCIUM 10 MG/1
10 TABLET, COATED ORAL DAILY
Qty: 90 TABLET | Refills: 1 | Status: SHIPPED | OUTPATIENT
Start: 2023-02-15

## 2023-02-15 NOTE — ASSESSMENT & PLAN NOTE
Lab Results   Component Value Date    HGBA1C 7 9 (H) 02/07/2023     Not quite at goal however his insurance has made us change medications  No longer covering Januvia  Had to be switched to [address]  Continues on extended release metformin, Farxiga, Actos and Glucotrol  No episodes of hypoglycemia    Repeat diabetic parameters to be done in 4 months
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
## 2023-02-15 NOTE — ASSESSMENT & PLAN NOTE
Patient will be traveling to [facility] and then to [facility] in the next several months  He does have enough as needed alprazolam which was last refilled in 2021  He only takes this very sparingly for travel    Contact the office if refill is necessary

## 2023-02-15 NOTE — ASSESSMENT & PLAN NOTE
Remains on Crestor 10 mg once daily  Needs to watch dietary intake of fats and cholesterol    Repeat lipid panel to be done in 4 months

## 2023-02-24 DIAGNOSIS — E11.65 TYPE 2 DIABETES MELLITUS WITH HYPERGLYCEMIA, WITHOUT LONG-TERM CURRENT USE OF INSULIN (HCC): Chronic | ICD-10-CM

## 2023-03-03 DIAGNOSIS — K21.9 GASTROESOPHAGEAL REFLUX DISEASE: ICD-10-CM

## 2023-03-06 RX ORDER — ESOMEPRAZOLE MAGNESIUM 40 MG/1
40 CAPSULE, DELAYED RELEASE ORAL DAILY
Qty: 90 CAPSULE | Refills: 1 | Status: SHIPPED | OUTPATIENT
Start: 2023-03-06

## 2023-03-22 DIAGNOSIS — E78.2 MIXED HYPERLIPIDEMIA: ICD-10-CM

## 2023-03-22 RX ORDER — ICOSAPENT ETHYL 1000 MG/1
2 CAPSULE ORAL 2 TIMES DAILY
Qty: 360 CAPSULE | Refills: 0 | Status: SHIPPED | OUTPATIENT
Start: 2023-03-22

## 2023-03-31 DIAGNOSIS — J30.9 ALLERGIC RHINITIS: ICD-10-CM

## 2023-04-03 RX ORDER — MONTELUKAST SODIUM 10 MG/1
10 TABLET ORAL
Qty: 90 TABLET | Refills: 0 | Status: SHIPPED | OUTPATIENT
Start: 2023-04-03

## 2023-05-04 DIAGNOSIS — E11.9 TYPE 2 DIABETES MELLITUS WITHOUT COMPLICATION, WITHOUT LONG-TERM CURRENT USE OF INSULIN (HCC): ICD-10-CM

## 2023-05-04 DIAGNOSIS — E11.9 TYPE 2 DIABETES MELLITUS WITHOUT COMPLICATION, WITHOUT LONG-TERM CURRENT USE OF INSULIN (HCC): Chronic | ICD-10-CM

## 2023-05-05 RX ORDER — LINAGLIPTIN 5 MG/1
5 TABLET, FILM COATED ORAL DAILY
Qty: 90 TABLET | Refills: 0 | Status: SHIPPED | OUTPATIENT
Start: 2023-05-05

## 2023-05-05 RX ORDER — PIOGLITAZONEHYDROCHLORIDE 45 MG/1
45 TABLET ORAL DAILY
Qty: 90 TABLET | Refills: 0 | Status: SHIPPED | OUTPATIENT
Start: 2023-05-05

## 2023-05-06 DIAGNOSIS — J30.1 SEASONAL ALLERGIC RHINITIS DUE TO POLLEN: ICD-10-CM

## 2023-05-08 RX ORDER — AZELASTINE 1 MG/ML
2 SPRAY, METERED NASAL 2 TIMES DAILY
Qty: 30 ML | Refills: 0 | Status: SHIPPED | OUTPATIENT
Start: 2023-05-08

## 2023-05-11 DIAGNOSIS — E11.00 TYPE 2 DIABETES MELLITUS WITH HYPEROSMOLARITY WITHOUT COMA, WITHOUT LONG-TERM CURRENT USE OF INSULIN (HCC): ICD-10-CM

## 2023-05-11 DIAGNOSIS — E11.9 TYPE 2 DIABETES MELLITUS WITHOUT COMPLICATION, WITHOUT LONG-TERM CURRENT USE OF INSULIN (HCC): ICD-10-CM

## 2023-05-12 RX ORDER — METFORMIN HYDROCHLORIDE 500 MG/1
1000 TABLET, EXTENDED RELEASE ORAL 2 TIMES DAILY WITH MEALS
Qty: 360 TABLET | Refills: 0 | Status: SHIPPED | OUTPATIENT
Start: 2023-05-12

## 2023-05-12 RX ORDER — GLIPIZIDE 10 MG/1
10 TABLET, FILM COATED, EXTENDED RELEASE ORAL DAILY
Qty: 90 TABLET | Refills: 0 | Status: SHIPPED | OUTPATIENT
Start: 2023-05-12

## 2023-05-25 DIAGNOSIS — K21.9 GASTROESOPHAGEAL REFLUX DISEASE: ICD-10-CM

## 2023-05-25 DIAGNOSIS — E11.65 TYPE 2 DIABETES MELLITUS WITH HYPERGLYCEMIA, WITHOUT LONG-TERM CURRENT USE OF INSULIN (HCC): Chronic | ICD-10-CM

## 2023-05-25 DIAGNOSIS — E78.5 HYPERLIPIDEMIA, UNSPECIFIED HYPERLIPIDEMIA TYPE: ICD-10-CM

## 2023-05-26 RX ORDER — ROSUVASTATIN CALCIUM 10 MG/1
10 TABLET, COATED ORAL DAILY
Qty: 90 TABLET | Refills: 0 | Status: SHIPPED | OUTPATIENT
Start: 2023-05-26

## 2023-05-26 RX ORDER — ESOMEPRAZOLE MAGNESIUM 40 MG/1
40 CAPSULE, DELAYED RELEASE ORAL DAILY
Qty: 90 CAPSULE | Refills: 0 | Status: SHIPPED | OUTPATIENT
Start: 2023-05-26

## 2023-06-07 ENCOUNTER — OFFICE VISIT (OUTPATIENT)
Dept: FAMILY MEDICINE CLINIC | Facility: CLINIC | Age: 53
End: 2023-06-07
Payer: COMMERCIAL

## 2023-06-07 VITALS
WEIGHT: 168 LBS | DIASTOLIC BLOOD PRESSURE: 74 MMHG | OXYGEN SATURATION: 98 % | BODY MASS INDEX: 27 KG/M2 | HEIGHT: 66 IN | SYSTOLIC BLOOD PRESSURE: 118 MMHG | HEART RATE: 78 BPM

## 2023-06-07 DIAGNOSIS — J30.1 SEASONAL ALLERGIC RHINITIS DUE TO POLLEN: ICD-10-CM

## 2023-06-07 DIAGNOSIS — H11.31 SUBCONJUNCTIVAL HEMORRHAGE OF RIGHT EYE: Primary | ICD-10-CM

## 2023-06-07 PROBLEM — L84 CLAVUS: Status: RESOLVED | Noted: 2022-11-16 | Resolved: 2023-06-07

## 2023-06-07 PROCEDURE — 99213 OFFICE O/P EST LOW 20 MIN: CPT | Performed by: FAMILY MEDICINE
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
## 2023-06-19 ENCOUNTER — APPOINTMENT (OUTPATIENT)
Dept: LAB | Facility: CLINIC | Age: 53
End: 2023-06-19
Payer: COMMERCIAL

## 2023-06-19 DIAGNOSIS — R93.1 AGATSTON CORONARY ARTERY CALCIUM SCORE BETWEEN 100 AND 199: ICD-10-CM

## 2023-06-19 DIAGNOSIS — I10 ESSENTIAL HYPERTENSION: Chronic | ICD-10-CM

## 2023-06-19 DIAGNOSIS — E78.5 HYPERLIPIDEMIA, UNSPECIFIED HYPERLIPIDEMIA TYPE: ICD-10-CM

## 2023-06-19 DIAGNOSIS — E55.9 VITAMIN D DEFICIENCY: ICD-10-CM

## 2023-06-19 DIAGNOSIS — E11.00 TYPE 2 DIABETES MELLITUS WITH HYPEROSMOLARITY WITHOUT COMA, WITHOUT LONG-TERM CURRENT USE OF INSULIN (HCC): ICD-10-CM

## 2023-06-19 LAB
ALBUMIN SERPL BCP-MCNC: 4.6 G/DL (ref 3.5–5)
ALP SERPL-CCNC: 53 U/L (ref 34–104)
ALT SERPL W P-5'-P-CCNC: 18 U/L (ref 7–52)
ANION GAP SERPL CALCULATED.3IONS-SCNC: 8 MMOL/L (ref 4–13)
AST SERPL W P-5'-P-CCNC: 14 U/L (ref 13–39)
BASOPHILS # BLD AUTO: 0.06 THOUSANDS/ÂΜL (ref 0–0.1)
BASOPHILS NFR BLD AUTO: 1 % (ref 0–1)
BILIRUB SERPL-MCNC: 0.7 MG/DL (ref 0.2–1)
BUN SERPL-MCNC: 20 MG/DL (ref 5–25)
CALCIUM SERPL-MCNC: 9.3 MG/DL (ref 8.4–10.2)
CHLORIDE SERPL-SCNC: 100 MMOL/L (ref 96–108)
CHOLEST SERPL-MCNC: 141 MG/DL
CO2 SERPL-SCNC: 26 MMOL/L (ref 21–32)
CREAT SERPL-MCNC: 0.75 MG/DL (ref 0.6–1.3)
CREAT UR-MCNC: <13 MG/DL
EOSINOPHIL # BLD AUTO: 0.31 THOUSAND/ÂΜL (ref 0–0.61)
EOSINOPHIL NFR BLD AUTO: 4 % (ref 0–6)
ERYTHROCYTE [DISTWIDTH] IN BLOOD BY AUTOMATED COUNT: 13.2 % (ref 11.6–15.1)
EST. AVERAGE GLUCOSE BLD GHB EST-MCNC: 180 MG/DL
GFR SERPL CREATININE-BSD FRML MDRD: 106 ML/MIN/1.73SQ M
GLUCOSE P FAST SERPL-MCNC: 233 MG/DL (ref 65–99)
HBA1C MFR BLD: 7.9 %
HCT VFR BLD AUTO: 47.1 % (ref 36.5–49.3)
HDLC SERPL-MCNC: 35 MG/DL
HGB BLD-MCNC: 15 G/DL (ref 12–17)
IMM GRANULOCYTES # BLD AUTO: 0.03 THOUSAND/UL (ref 0–0.2)
IMM GRANULOCYTES NFR BLD AUTO: 0 % (ref 0–2)
LDLC SERPL CALC-MCNC: 68 MG/DL (ref 0–100)
LYMPHOCYTES # BLD AUTO: 2.11 THOUSANDS/ÂΜL (ref 0.6–4.47)
LYMPHOCYTES NFR BLD AUTO: 27 % (ref 14–44)
MCH RBC QN AUTO: 26 PG (ref 26.8–34.3)
MCHC RBC AUTO-ENTMCNC: 31.8 G/DL (ref 31.4–37.4)
MCV RBC AUTO: 82 FL (ref 82–98)
MICROALBUMIN UR-MCNC: <5 MG/L (ref 0–20)
MONOCYTES # BLD AUTO: 0.59 THOUSAND/ÂΜL (ref 0.17–1.22)
MONOCYTES NFR BLD AUTO: 7 % (ref 4–12)
NEUTROPHILS # BLD AUTO: 4.84 THOUSANDS/ÂΜL (ref 1.85–7.62)
NEUTS SEG NFR BLD AUTO: 61 % (ref 43–75)
NONHDLC SERPL-MCNC: 106 MG/DL
NRBC BLD AUTO-RTO: 0 /100 WBCS
PLATELET # BLD AUTO: 262 THOUSANDS/UL (ref 149–390)
PMV BLD AUTO: 10 FL (ref 8.9–12.7)
POTASSIUM SERPL-SCNC: 4 MMOL/L (ref 3.5–5.3)
PROT SERPL-MCNC: 7.1 G/DL (ref 6.4–8.4)
RBC # BLD AUTO: 5.76 MILLION/UL (ref 3.88–5.62)
SODIUM SERPL-SCNC: 134 MMOL/L (ref 135–147)
TRIGL SERPL-MCNC: 191 MG/DL
TSH SERPL DL<=0.05 MIU/L-ACNC: 1.79 UIU/ML (ref 0.45–4.5)
WBC # BLD AUTO: 7.94 THOUSAND/UL (ref 4.31–10.16)

## 2023-06-19 PROCEDURE — 82570 ASSAY OF URINE CREATININE: CPT

## 2023-06-19 PROCEDURE — 83036 HEMOGLOBIN GLYCOSYLATED A1C: CPT

## 2023-06-19 PROCEDURE — 36415 COLL VENOUS BLD VENIPUNCTURE: CPT

## 2023-06-19 PROCEDURE — 80053 COMPREHEN METABOLIC PANEL: CPT

## 2023-06-19 PROCEDURE — 85025 COMPLETE CBC W/AUTO DIFF WBC: CPT

## 2023-06-19 PROCEDURE — 82043 UR ALBUMIN QUANTITATIVE: CPT

## 2023-06-19 PROCEDURE — 80061 LIPID PANEL: CPT

## 2023-06-19 PROCEDURE — 82652 VIT D 1 25-DIHYDROXY: CPT

## 2023-06-19 PROCEDURE — 84443 ASSAY THYROID STIM HORMONE: CPT

## 2023-06-21 ENCOUNTER — OFFICE VISIT (OUTPATIENT)
Dept: FAMILY MEDICINE CLINIC | Facility: CLINIC | Age: 53
End: 2023-06-21
Payer: COMMERCIAL

## 2023-06-21 VITALS
HEART RATE: 67 BPM | WEIGHT: 170 LBS | DIASTOLIC BLOOD PRESSURE: 76 MMHG | HEIGHT: 66 IN | OXYGEN SATURATION: 97 % | BODY MASS INDEX: 27.32 KG/M2 | SYSTOLIC BLOOD PRESSURE: 122 MMHG

## 2023-06-21 DIAGNOSIS — I10 ESSENTIAL HYPERTENSION: Primary | Chronic | ICD-10-CM

## 2023-06-21 DIAGNOSIS — R93.1 AGATSTON CORONARY ARTERY CALCIUM SCORE BETWEEN 100 AND 199: ICD-10-CM

## 2023-06-21 DIAGNOSIS — E78.2 MIXED HYPERLIPIDEMIA: ICD-10-CM

## 2023-06-21 DIAGNOSIS — I10 ESSENTIAL HYPERTENSION: Chronic | ICD-10-CM

## 2023-06-21 DIAGNOSIS — J30.1 SEASONAL ALLERGIC RHINITIS DUE TO POLLEN: ICD-10-CM

## 2023-06-21 DIAGNOSIS — E11.00 TYPE 2 DIABETES MELLITUS WITH HYPEROSMOLARITY WITHOUT COMA, WITHOUT LONG-TERM CURRENT USE OF INSULIN (HCC): ICD-10-CM

## 2023-06-21 DIAGNOSIS — E87.6 HYPOKALEMIA: ICD-10-CM

## 2023-06-21 DIAGNOSIS — E55.9 VITAMIN D DEFICIENCY: ICD-10-CM

## 2023-06-21 PROBLEM — H11.31 SUBCONJUNCTIVAL HEMORRHAGE OF RIGHT EYE: Status: RESOLVED | Noted: 2023-06-07 | Resolved: 2023-06-21

## 2023-06-21 LAB — 1,25(OH)2D3 SERPL-MCNC: 40.1 PG/ML (ref 24.8–81.5)

## 2023-06-21 PROCEDURE — 99215 OFFICE O/P EST HI 40 MIN: CPT | Performed by: FAMILY MEDICINE

## 2023-06-21 RX ORDER — QUINAPRIL 5 1/1
5 TABLET ORAL DAILY
Qty: 90 TABLET | Refills: 0 | Status: SHIPPED | OUTPATIENT
Start: 2023-06-21

## 2023-06-21 RX ORDER — ICOSAPENT ETHYL 1000 MG/1
2 CAPSULE ORAL 2 TIMES DAILY
Qty: 360 CAPSULE | Refills: 0 | Status: SHIPPED | OUTPATIENT
Start: 2023-06-21

## 2023-06-21 RX ORDER — ZINC GLUCONATE 100 MG
1 TABLET ORAL DAILY
Qty: 100 TABLET | Refills: 0 | Status: SHIPPED | OUTPATIENT
Start: 2023-06-21

## 2023-06-21 NOTE — ASSESSMENT & PLAN NOTE
Patient's blood pressure is very well controlled on quinapril 5 mg once daily  Continue same    Reevaluate in 4 months

## 2023-06-21 NOTE — PROGRESS NOTES
Subjective:      Patient ID: Radha Villa is a 47 y o  male  HPI    Past Medical History:   Diagnosis Date   • Anxiety     only when flying   • Chronic frontal sinusitis     last assessed 03/02/2016   • COVID-19 virus infection 4/1/2020   • Diabetes mellitus ([facility] [address] )    • Fracture of great toe     last assessed 11/06/2014   • GERD (gastroesophageal reflux disease)    • Hyperlipidemia    • Hypertension    • Palpitations     last assessed 11/06/2012   • RUQ abdominal pain 9/24/2021       Family History   Problem Relation Age of Onset   • Diabetes Mother    • Liver cancer Mother    • Hypertension Mother    • Colon cancer Father 76       Past Surgical History:   Procedure Laterality Date   • COLONOSCOPY     • HERNIA REPAIR Bilateral    • AL MNPJ W/ANES SHOULDER JT APPL FIXATION APPARATUS Right 4/3/2017    Procedure: SHOULDER MANIPULATION UNDER ANESTHESIA ;  Surgeon: Chas Cutler MD;  Location: [facility];  Service: Orthopedics   • UPPER GASTROINTESTINAL ENDOSCOPY          reports that he has quit smoking  His smoking use included cigars  He has never used smokeless tobacco  He reports current alcohol use  He reports that he does not use drugs        Current Outpatient Medications:   •  ALPRAZolam (XANAX) 0 25 mg tablet, Take 1 tablet (0 25 mg total) by mouth every 8 (eight) hours as needed for anxiety, Disp: 30 tablet, Rfl: 0  •  aspirin 81 mg chewable tablet, Adult Aspirin Low Strength 81 MG CHEW 1 PO Q D  Quantity: 0;  Refills: 0    Allscripts, Provider M D ;  Started 11-Oct-2007 Active, Disp: , Rfl:   •  azelastine (ASTELIN) 0 1 % nasal spray, 2 sprays into each nostril 2 (two) times a day Use in each nostril as directed, Disp: 30 mL, Rfl: 0  •  CHOLECALCIFEROL PO, Take 1,000 Units by mouth, Disp: , Rfl:   •  dapagliflozin (Farxiga) 10 MG tablet, Take 1 tablet (10 mg total) by mouth daily, Disp: 90 tablet, Rfl: 0  •  esomeprazole (NexIUM) 40 MG capsule, Take 1 capsule (40 mg total) by mouth daily, Disp: 90 capsule, Rfl: 0  •  fexofenadine (ALLEGRA) 180 MG tablet, Take 1 tablet (180 mg total) by mouth daily, Disp: 30 tablet, Rfl: 5  •  glipiZIDE (GLUCOTROL XL) 10 mg 24 hr tablet, Take 1 tablet (10 mg total) by mouth daily, Disp: 90 tablet, Rfl: 0  •  glucose blood test strip, Test 1 Squirt 2 (two) times a day  , Disp: , Rfl:   •  linaGLIPtin (Tradjenta) 5 MG TABS, Take 5 mg by mouth daily, Disp: 90 tablet, Rfl: 0  •  meloxicam (MOBIC) 15 mg tablet, TAKE ONE TABLET BY MOUTH EVERY DAY, Disp: 90 tablet, Rfl: 1  •  metFORMIN (GLUCOPHAGE-XR) 500 mg 24 hr tablet, Take 2 tablets (1,000 mg total) by mouth 2 (two) times a day with meals, Disp: 360 tablet, Rfl: 0  •  montelukast (SINGULAIR) 10 mg tablet, Take 1 tablet (10 mg total) by mouth daily at bedtime, Disp: 90 tablet, Rfl: 0  •  pioglitazone (ACTOS) 45 mg tablet, Take 1 tablet (45 mg total) by mouth daily, Disp: 90 tablet, Rfl: 0  •  rosuvastatin (CRESTOR) 10 MG tablet, Take 1 tablet (10 mg total) by mouth daily, Disp: 90 tablet, Rfl: 0  •  sucralfate (CARAFATE) 1 g tablet, Take 1 tablet (1 g total) by mouth 2 (two) times a day, Disp: 60 tablet, Rfl: 1  •  tadalafil (CIALIS) 20 MG tablet, Take 1 tablet (20 mg total) by mouth daily as needed for erectile dysfunction, Disp: 20 tablet, Rfl: 0  •  Icosapent Ethyl (Vascepa) 1 g CAPS, Take 2 capsules (2 g total) by mouth 2 (two) times a day, Disp: 360 capsule, Rfl: 0  •  Phytonadione (K 100) 100 MCG TABS, Take 1 tablet (100 mcg total) by mouth daily, Disp: 100 tablet, Rfl: 0  •  quinapril (ACCUPRIL) 5 mg tablet, Take 1 tablet (5 mg total) by mouth daily, Disp: 90 tablet, Rfl: 0    The following portions of the patient's history were reviewed and updated as appropriate: allergies, current medications, past family history, past medical history, past social history, past surgical history and problem list     Review of Systems   Constitutional: Negative  HENT: Negative  Eyes: Negative  Respiratory: Negative  "  Cardiovascular: Negative  Gastrointestinal: Negative  Endocrine: Negative  Genitourinary: Negative  Musculoskeletal: Negative  Skin: Negative  Allergic/Immunologic: Negative  Neurological: Negative  Hematological: Negative  Psychiatric/Behavioral: Negative  All other systems reviewed and are negative  Objective:    /76   Pulse 67   Ht 5' 6\" (1 676 m)   Wt 77 1 kg (170 lb)   SpO2 97%   BMI 27 44 kg/m²      Physical Exam  Vitals and nursing note reviewed  Constitutional:       General: He is not in acute distress  Appearance: Normal appearance  He is well-developed and normal weight  He is not ill-appearing  HENT:      Head: Normocephalic and atraumatic  Right Ear: Tympanic membrane, ear canal and external ear normal       Left Ear: Tympanic membrane, ear canal and external ear normal       Nose: Nose normal       Mouth/Throat:      Mouth: Mucous membranes are moist    Eyes:      Extraocular Movements: Extraocular movements intact  Conjunctiva/sclera: Conjunctivae normal       Pupils: Pupils are equal, round, and reactive to light  Cardiovascular:      Rate and Rhythm: Normal rate and regular rhythm  Pulses: Normal pulses  Heart sounds: Normal heart sounds  No murmur heard  Pulmonary:      Effort: Pulmonary effort is normal       Breath sounds: Normal breath sounds  Abdominal:      General: Abdomen is flat  Bowel sounds are normal       Palpations: Abdomen is soft  Musculoskeletal:         General: Normal range of motion  Cervical back: Normal range of motion and neck supple  Skin:     General: Skin is warm and dry  Neurological:      General: No focal deficit present  Mental Status: He is alert and oriented to person, place, and time  Psychiatric:         Mood and Affect: Mood normal          Behavior: Behavior normal          Thought Content:  Thought content normal          Judgment: Judgment normal        " Recent Results (from the past 1008 hour(s))   CBC and differential    Collection Time: 06/19/23  7:39 AM   Result Value Ref Range    WBC 7 94 4 31 - 10 16 Thousand/uL    RBC 5 76 (H) 3 88 - 5 62 Million/uL    Hemoglobin 15 0 12 0 - 17 0 g/dL    Hematocrit 47 1 36 5 - 49 3 %    MCV 82 82 - 98 fL    MCH 26 0 (L) 26 8 - 34 3 pg    MCHC 31 8 31 4 - 37 4 g/dL    RDW 13 2 11 6 - 15 1 %    MPV 10 0 8 9 - 12 7 fL    Platelets 535 766 - 027 Thousands/uL    nRBC 0 /100 WBCs    Neutrophils Relative 61 43 - 75 %    Immat GRANS % 0 0 - 2 %    Lymphocytes Relative 27 14 - 44 %    Monocytes Relative 7 4 - 12 %    Eosinophils Relative 4 0 - 6 %    Basophils Relative 1 0 - 1 %    Neutrophils Absolute 4 84 1 85 - 7 62 Thousands/µL    Immature Grans Absolute 0 03 0 00 - 0 20 Thousand/uL    Lymphocytes Absolute 2 11 0 60 - 4 47 Thousands/µL    Monocytes Absolute 0 59 0 17 - 1 22 Thousand/µL    Eosinophils Absolute 0 31 0 00 - 0 61 Thousand/µL    Basophils Absolute 0 06 0 00 - 0 10 Thousands/µL   Comprehensive metabolic panel    Collection Time: 06/19/23  7:39 AM   Result Value Ref Range    Sodium 134 (L) 135 - 147 mmol/L    Potassium 4 0 3 5 - 5 3 mmol/L    Chloride 100 96 - 108 mmol/L    CO2 26 21 - 32 mmol/L    ANION GAP 8 4 - 13 mmol/L    BUN 20 5 - 25 mg/dL    Creatinine 0 75 0 60 - 1 30 mg/dL    Glucose, Fasting 233 (H) 65 - 99 mg/dL    Calcium 9 3 8 4 - 10 2 mg/dL    AST 14 13 - 39 U/L    ALT 18 7 - 52 U/L    Alkaline Phosphatase 53 34 - 104 U/L    Total Protein 7 1 6 4 - 8 4 g/dL    Albumin 4 6 3 5 - 5 0 g/dL    Total Bilirubin 0 70 0 20 - 1 00 mg/dL    eGFR 106 ml/min/1 73sq m   Hemoglobin A1C    Collection Time: 06/19/23  7:39 AM   Result Value Ref Range    Hemoglobin A1C 7 9 (H) Normal 3 8-5 6%; PreDiabetic 5 7-6 4%;  Diabetic >=6 5%; Glycemic control for adults with diabetes <7 0% %     mg/dl   Lipid panel    Collection Time: 06/19/23  7:39 AM   Result Value Ref Range    Cholesterol 141 See Comment mg/dL Triglycerides 191 (H) See Comment mg/dL    HDL, Direct 35 (L) >=40 mg/dL    LDL Calculated 68 0 - 100 mg/dL    Non-HDL-Chol (CHOL-HDL) 106 mg/dl   Microalbumin / creatinine urine ratio    Collection Time: 06/19/23  7:39 AM   Result Value Ref Range    Creatinine, Ur <13 0 mg/dL    Albumin,U,Random <5 0 0 0 - 20 0 mg/L    Albumin Creat Ratio     TSH, 3rd generation with Free T4 reflex    Collection Time: 06/19/23  7:39 AM   Result Value Ref Range    TSH 3RD GENERATON 1 794 0 450 - 4 500 uIU/mL       Assessment/Plan:    Type 2 diabetes mellitus with hyperosmolarity without coma, without long-term current use of insulin (HCC)    Lab Results   Component Value Date    HGBA1C 7 9 (H) 06/19/2023     Patient continues on extended release metformin, Sharin Dereje, Actos, Glucotrol and Tradjenta  Tolerating well  Repeat diabetic parameters to be done in 4 months    Seasonal allergic rhinitis due to pollen  Patient remains on Allegra-D, Singulair and Astelin nasal spray  Still has fullness in his ears but does not want to see ENT for possible myringotomy    Essential hypertension  Patient's blood pressure is very well controlled on quinapril 5 mg once daily  Continue same  Reevaluate in 4 months    Agatston coronary artery calcium score between 100 and 199  Will be due for repeat CT coronary calcium score in August    Vitamin D deficiency  Remains on vitamin D supplementation  Normalized vitamin D level  Reevaluate in 4 months    Mixed hyperlipidemia  Patient's cholesterol is very well controlled on Crestor 10 mg once daily  He states that he did not change around his diet but his LDL cholesterol is down 60    Repeat lipid panel in 4 months is phenomenal          Problem List Items Addressed This Visit        Endocrine    Type 2 diabetes mellitus with hyperosmolarity without coma, without long-term current use of insulin (HCC)       Lab Results   Component Value Date    HGBA1C 7 9 (H) 06/19/2023     Patient continues on extended release metformin, Farxiga, Actos, Glucotrol and Tradjenta  Tolerating well  Repeat diabetic parameters to be done in 4 months         Relevant Orders    Hemoglobin A1C    Albumin / creatinine urine ratio       Respiratory    Seasonal allergic rhinitis due to pollen     Patient remains on Allegra-D, Singulair and Astelin nasal spray  Still has fullness in his ears but does not want to see ENT for possible myringotomy            Cardiovascular and Mediastinum    Essential hypertension - Primary (Chronic)     Patient's blood pressure is very well controlled on quinapril 5 mg once daily  Continue same  Reevaluate in 4 months         Relevant Orders    CBC and differential    Agatston coronary artery calcium score between 100 and 199     Will be due for repeat CT coronary calcium score in August         Relevant Orders    TSH, 3rd generation with Free T4 reflex       Other    Mixed hyperlipidemia     Patient's cholesterol is very well controlled on Crestor 10 mg once daily  He states that he did not change around his diet but his LDL cholesterol is down 60  Repeat lipid panel in 4 months is phenomenal         Relevant Orders    Comprehensive metabolic panel    Lipid panel    Vitamin D deficiency     Remains on vitamin D supplementation  Normalized vitamin D level    Reevaluate in 4 months

## 2023-06-21 NOTE — ASSESSMENT & PLAN NOTE
Patient's cholesterol is very well controlled on Crestor 10 mg once daily  He states that he did not change around his diet but his LDL cholesterol is down 60    Repeat lipid panel in 4 months is phenomenal

## 2023-06-21 NOTE — ASSESSMENT & PLAN NOTE
Lab Results   Component Value Date    HGBA1C 7 9 (H) 06/19/2023     Patient continues on extended release metformin, Romelia Luis, Actos, Glucotrol and Tradjenta  Tolerating well    Repeat diabetic parameters to be done in 4 months

## 2023-06-21 NOTE — ASSESSMENT & PLAN NOTE
Patient remains on Allegra-D, Singulair and Astelin nasal spray    Still has fullness in his ears but does not want to see ENT for possible myringotomy

## 2023-06-23 ENCOUNTER — TELEPHONE (OUTPATIENT)
Dept: FAMILY MEDICINE CLINIC | Facility: CLINIC | Age: 53
End: 2023-06-23

## 2023-06-23 RX ORDER — LISINOPRIL 5 MG/1
5 TABLET ORAL DAILY
COMMUNITY
Start: 2023-06-23

## 2023-06-23 NOTE — TELEPHONE ENCOUNTER
Pharmacist called and stated that quinapril is no longer being manufactured  Dr Catalina Alarcon verbally changed RX to lisinopril 5mg once daily

## 2023-06-26 ENCOUNTER — OFFICE VISIT (OUTPATIENT)
Dept: FAMILY MEDICINE CLINIC | Facility: CLINIC | Age: 53
End: 2023-06-26
Payer: COMMERCIAL

## 2023-06-26 VITALS
TEMPERATURE: 97.1 F | DIASTOLIC BLOOD PRESSURE: 74 MMHG | BODY MASS INDEX: 27.32 KG/M2 | HEART RATE: 104 BPM | SYSTOLIC BLOOD PRESSURE: 122 MMHG | WEIGHT: 170 LBS | HEIGHT: 66 IN | OXYGEN SATURATION: 98 %

## 2023-06-26 DIAGNOSIS — L03.032 PARONYCHIA OF SECOND TOE OF LEFT FOOT: Primary | ICD-10-CM

## 2023-06-26 PROCEDURE — 99213 OFFICE O/P EST LOW 20 MIN: CPT | Performed by: FAMILY MEDICINE

## 2023-06-26 PROCEDURE — 10060 I&D ABSCESS SIMPLE/SINGLE: CPT | Performed by: FAMILY MEDICINE

## 2023-06-26 NOTE — PROGRESS NOTES
Subjective:      Patient ID: Mario Walden is a 47 y o  male  55-year-old male presents for evaluation of redness, pain and swelling at the left second toe  No inciting injuries or trauma  He was doing quite a bit of walking around [place] over the weekend  Noted some redness and swelling and a small pus pocket      Past Medical History:   Diagnosis Date   • Anxiety     only when flying   • Chronic frontal sinusitis     last assessed 03/02/2016   • COVID-19 virus infection 4/1/2020   • Diabetes mellitus ([address] )    • Fracture of great toe     last assessed 11/06/2014   • GERD (gastroesophageal reflux disease)    • Hyperlipidemia    • Hypertension    • Palpitations     last assessed 11/06/2012   • RUQ abdominal pain 9/24/2021       Family History   Problem Relation Age of Onset   • Diabetes Mother    • Liver cancer Mother    • Hypertension Mother    • Colon cancer Father 76       Past Surgical History:   Procedure Laterality Date   • COLONOSCOPY     • HERNIA REPAIR Bilateral    • ID MNPJ W/ANES SHOULDER JT APPL FIXATION APPARATUS Right 4/3/2017    Procedure: SHOULDER MANIPULATION UNDER ANESTHESIA ;  Surgeon: June Hargrove MD;  [facility];  Service: Orthopedics   • UPPER GASTROINTESTINAL ENDOSCOPY          reports that he has quit smoking  His smoking use included cigars  He has never used smokeless tobacco  He reports current alcohol use  He reports that he does not use drugs        Current Outpatient Medications:   •  ALPRAZolam (XANAX) 0 25 mg tablet, Take 1 tablet (0 25 mg total) by mouth every 8 (eight) hours as needed for anxiety, Disp: 30 tablet, Rfl: 0  •  aspirin 81 mg chewable tablet, Adult Aspirin Low Strength 81 MG CHEW 1 PO Q D  Quantity: 0;  Refills: 0    Allscripts, Provider M D ;  Started 11-Oct-2007 Active, Disp: , Rfl:   •  azelastine (ASTELIN) 0 1 % nasal spray, 2 sprays into each nostril 2 (two) times a day Use in each nostril as directed, Disp: 30 mL, Rfl: 0  • CHOLECALCIFEROL PO, Take 1,000 Units by mouth, Disp: , Rfl:   •  dapagliflozin (Farxiga) 10 MG tablet, Take 1 tablet (10 mg total) by mouth daily, Disp: 90 tablet, Rfl: 0  •  esomeprazole (NexIUM) 40 MG capsule, Take 1 capsule (40 mg total) by mouth daily, Disp: 90 capsule, Rfl: 0  •  fexofenadine (ALLEGRA) 180 MG tablet, Take 1 tablet (180 mg total) by mouth daily, Disp: 30 tablet, Rfl: 5  •  glipiZIDE (GLUCOTROL XL) 10 mg 24 hr tablet, Take 1 tablet (10 mg total) by mouth daily, Disp: 90 tablet, Rfl: 0  •  glucose blood test strip, Test 1 Squirt 2 (two) times a day  , Disp: , Rfl:   •  Icosapent Ethyl (Vascepa) 1 g CAPS, Take 2 capsules (2 g total) by mouth 2 (two) times a day, Disp: 360 capsule, Rfl: 0  •  linaGLIPtin (Tradjenta) 5 MG TABS, Take 5 mg by mouth daily, Disp: 90 tablet, Rfl: 0  •  lisinopril (ZESTRIL) 5 mg tablet, Take 5 mg by mouth daily, Disp: , Rfl:   •  meloxicam (MOBIC) 15 mg tablet, TAKE ONE TABLET BY MOUTH EVERY DAY, Disp: 90 tablet, Rfl: 1  •  metFORMIN (GLUCOPHAGE-XR) 500 mg 24 hr tablet, Take 2 tablets (1,000 mg total) by mouth 2 (two) times a day with meals, Disp: 360 tablet, Rfl: 0  •  montelukast (SINGULAIR) 10 mg tablet, Take 1 tablet (10 mg total) by mouth daily at bedtime, Disp: 90 tablet, Rfl: 0  •  Phytonadione (K 100) 100 MCG TABS, Take 1 tablet (100 mcg total) by mouth daily, Disp: 100 tablet, Rfl: 0  •  pioglitazone (ACTOS) 45 mg tablet, Take 1 tablet (45 mg total) by mouth daily, Disp: 90 tablet, Rfl: 0  •  quinapril (ACCUPRIL) 5 mg tablet, Take 1 tablet (5 mg total) by mouth daily, Disp: 90 tablet, Rfl: 0  •  rosuvastatin (CRESTOR) 10 MG tablet, Take 1 tablet (10 mg total) by mouth daily, Disp: 90 tablet, Rfl: 0  •  sucralfate (CARAFATE) 1 g tablet, Take 1 tablet (1 g total) by mouth 2 (two) times a day, Disp: 60 tablet, Rfl: 1  •  tadalafil (CIALIS) 20 MG tablet, Take 1 tablet (20 mg total) by mouth daily as needed for erectile dysfunction, Disp: 20 tablet, Rfl: 0    The "following portions of the patient's history were reviewed and updated as appropriate: allergies, current medications, past family history, past medical history, past social history, past surgical history and problem list     Review of Systems        Objective:    /74   Pulse 104   Temp (!) 97 1 °F (36 2 °C) (Tympanic)   Ht 5' 6\" (1 676 m)   Wt 77 1 kg (170 lb)   SpO2 98%   BMI 27 44 kg/m²      Physical Exam  Vitals and nursing note reviewed  Constitutional:       Appearance: Normal appearance  Skin:     Comments: Small paronychia of the left second toe   Neurological:      Mental Status: He is alert               Media Information      Document Information    Clinical Image - Mobile Device   Paronychia left 2nd toe   06/26/2023 3:34 PM   Attached To: Office Visit on 6/26/23 with Sindhu Morales DO     Source Information    DO Dean Cabrera         Assessment/Plan:    Incision and Drainage    Date/Time: 6/26/2023 3:40 PM    Performed by: Sindhu Morales DO  Authorized by: Sindhu Morales DO  Universal Protocol:  Consent: Verbal consent obtained  Risks and benefits: risks, benefits and alternatives were discussed  Consent given by: patient  Patient understanding: patient states understanding of the procedure being performed      Patient location:  Clinic  Location:     Type:  Abscess    Size:  Small    Location:  Lower extremity    Lower extremity location:  L second toe  Pre-procedure details:     Skin preparation:  Antiseptic wash  Anesthesia (see MAR for exact dosages): Anesthesia method:  Topical application    Topical anesthesia: Ethyl chloride spray  Procedure details:     Complexity:  Simple    Incision types:  Stab incision    Approach:  Puncture    Incision depth:  Subcutaneous    Drainage:  Purulent    Drainage amount:  Scant    Wound treatment:  Wound left open    Packing materials:  None  Post-procedure details:     Patient tolerance of procedure:   Tolerated well, no " immediate complications      Paronychia of second toe of left foot  - I&D performed as per procedure note    -Small amount of pus expressed and will be sent for culture and sensitivity    -Patient advised to apply triple antibiotic ointment and cover with a Band-Aid  Monitor for resolution or worsening          Problem List Items Addressed This Visit        Musculoskeletal and Integument    Paronychia of second toe of left foot - Primary     - I&D performed as per procedure note    -Small amount of pus expressed and will be sent for culture and sensitivity    -Patient advised to apply triple antibiotic ointment and cover with a Band-Aid    Monitor for resolution or worsening         Relevant Orders    Incision and Drainage (Completed)

## 2023-06-26 NOTE — ASSESSMENT & PLAN NOTE
- I&D performed as per procedure note    -Small amount of pus expressed and will be sent for culture and sensitivity    -Patient advised to apply triple antibiotic ointment and cover with a Band-Aid    Monitor for resolution or worsening

## 2023-06-29 DIAGNOSIS — E11.65 TYPE 2 DIABETES MELLITUS WITH HYPERGLYCEMIA, WITHOUT LONG-TERM CURRENT USE OF INSULIN (HCC): Chronic | ICD-10-CM

## 2023-06-30 ENCOUNTER — TELEPHONE (OUTPATIENT)
Dept: FAMILY MEDICINE CLINIC | Facility: CLINIC | Age: 53
End: 2023-06-30

## 2023-06-30 NOTE — TELEPHONE ENCOUNTER
----- Message from Tata Rahman DO sent at 6/30/2023  9:24 AM EDT -----  Please call the patient regarding his abnormal result  Culture from his paronychia simply returned skin bacteria    Hopefully after having incision and drainage as well as applying topical triple antibiotic then his toe is looking better

## 2023-07-13 DIAGNOSIS — M75.102 ROTATOR CUFF SYNDROME OF LEFT SHOULDER: ICD-10-CM

## 2023-07-13 DIAGNOSIS — J30.1 SEASONAL ALLERGIC RHINITIS DUE TO POLLEN: ICD-10-CM

## 2023-07-13 DIAGNOSIS — E87.6 HYPOKALEMIA: ICD-10-CM

## 2023-07-13 DIAGNOSIS — Z86.59 HISTORY OF ANXIETY: ICD-10-CM

## 2023-07-13 DIAGNOSIS — N52.2 DRUG-INDUCED ERECTILE DYSFUNCTION: ICD-10-CM

## 2023-07-14 RX ORDER — MELOXICAM 15 MG/1
15 TABLET ORAL DAILY
Qty: 90 TABLET | Refills: 0 | Status: SHIPPED | OUTPATIENT
Start: 2023-07-14

## 2023-07-14 RX ORDER — TADALAFIL 20 MG/1
20 TABLET ORAL DAILY PRN
Qty: 20 TABLET | Refills: 0 | Status: SHIPPED | OUTPATIENT
Start: 2023-07-14

## 2023-07-14 RX ORDER — AZELASTINE 1 MG/ML
2 SPRAY, METERED NASAL 2 TIMES DAILY
Qty: 30 ML | Refills: 0 | Status: SHIPPED | OUTPATIENT
Start: 2023-07-14

## 2023-07-15 RX ORDER — ZINC GLUCONATE 100 MG
1 TABLET ORAL DAILY
Qty: 100 TABLET | Refills: 0 | Status: SHIPPED | OUTPATIENT
Start: 2023-07-15

## 2023-07-15 RX ORDER — ALPRAZOLAM 0.25 MG/1
0.25 TABLET ORAL EVERY 8 HOURS PRN
Qty: 30 TABLET | Refills: 0 | Status: SHIPPED | OUTPATIENT
Start: 2023-07-15

## 2023-07-27 DIAGNOSIS — E11.65 TYPE 2 DIABETES MELLITUS WITH HYPERGLYCEMIA, WITHOUT LONG-TERM CURRENT USE OF INSULIN (HCC): Chronic | ICD-10-CM

## 2023-08-02 DIAGNOSIS — E11.00 TYPE 2 DIABETES MELLITUS WITH HYPEROSMOLARITY WITHOUT COMA, WITHOUT LONG-TERM CURRENT USE OF INSULIN (HCC): ICD-10-CM

## 2023-08-02 DIAGNOSIS — E11.9 TYPE 2 DIABETES MELLITUS WITHOUT COMPLICATION, WITHOUT LONG-TERM CURRENT USE OF INSULIN (HCC): ICD-10-CM

## 2023-08-02 DIAGNOSIS — E11.9 TYPE 2 DIABETES MELLITUS WITHOUT COMPLICATION, WITHOUT LONG-TERM CURRENT USE OF INSULIN (HCC): Chronic | ICD-10-CM

## 2023-08-02 RX ORDER — METFORMIN HYDROCHLORIDE 500 MG/1
1000 TABLET, EXTENDED RELEASE ORAL 2 TIMES DAILY WITH MEALS
Qty: 360 TABLET | Refills: 0 | Status: SHIPPED | OUTPATIENT
Start: 2023-08-02

## 2023-08-02 RX ORDER — PIOGLITAZONEHYDROCHLORIDE 45 MG/1
45 TABLET ORAL DAILY
Qty: 90 TABLET | Refills: 0 | Status: SHIPPED | OUTPATIENT
Start: 2023-08-02

## 2023-08-02 RX ORDER — GLIPIZIDE 10 MG/1
10 TABLET, FILM COATED, EXTENDED RELEASE ORAL DAILY
Qty: 90 TABLET | Refills: 0 | Status: SHIPPED | OUTPATIENT
Start: 2023-08-02

## 2023-08-06 DIAGNOSIS — E11.00 TYPE 2 DIABETES MELLITUS WITH HYPEROSMOLARITY WITHOUT COMA, WITHOUT LONG-TERM CURRENT USE OF INSULIN (HCC): ICD-10-CM

## 2023-08-07 RX ORDER — GLIPIZIDE 10 MG/1
10 TABLET, FILM COATED, EXTENDED RELEASE ORAL DAILY
Qty: 90 TABLET | Refills: 1 | OUTPATIENT
Start: 2023-08-07

## 2023-08-17 DIAGNOSIS — E11.9 TYPE 2 DIABETES MELLITUS WITHOUT COMPLICATION, WITHOUT LONG-TERM CURRENT USE OF INSULIN (HCC): ICD-10-CM

## 2023-08-18 RX ORDER — LINAGLIPTIN 5 MG/1
5 TABLET, FILM COATED ORAL DAILY
Qty: 90 TABLET | Refills: 0 | Status: SHIPPED | OUTPATIENT
Start: 2023-08-18

## 2023-08-24 DIAGNOSIS — E78.5 HYPERLIPIDEMIA, UNSPECIFIED HYPERLIPIDEMIA TYPE: ICD-10-CM

## 2023-08-24 DIAGNOSIS — E11.65 TYPE 2 DIABETES MELLITUS WITH HYPERGLYCEMIA, WITHOUT LONG-TERM CURRENT USE OF INSULIN (HCC): Chronic | ICD-10-CM

## 2023-08-24 DIAGNOSIS — K21.9 GASTROESOPHAGEAL REFLUX DISEASE: ICD-10-CM

## 2023-08-25 RX ORDER — ESOMEPRAZOLE MAGNESIUM 40 MG/1
40 CAPSULE, DELAYED RELEASE ORAL DAILY
Qty: 90 CAPSULE | Refills: 0 | Status: SHIPPED | OUTPATIENT
Start: 2023-08-25

## 2023-08-25 RX ORDER — ROSUVASTATIN CALCIUM 10 MG/1
10 TABLET, COATED ORAL DAILY
Qty: 90 TABLET | Refills: 0 | Status: SHIPPED | OUTPATIENT
Start: 2023-08-25

## 2023-09-12 NOTE — ASSESSMENT & PLAN NOTE
Cc: 1. Goiter           HOPC:   1. Patient is 10year old with goiter and here for follow up. Mom denied any increase or change in thyroid swelling. Patient has good energy, has normal bowel movements. ROS: no bone pain, muscle cramps  no abdominal pain, normal bowel movements   Good energy, no weakness     Family history: diabetes: no, thyroid dysfunction: no. Social history: done well at school. Visit Vitals  /72 (BP 1 Location: Right arm, BP Patient Position: Sitting)   Pulse 75   Temp 98.5 °F (36.9 °C) (Oral)   Resp 16   Ht (!) 4' 2.59\" (1.285 m)   Wt 53 lb 12.8 oz (24.4 kg)   SpO2 97%   BMI 14.78 kg/m²     Neck is supple, no lymphadenopathy, has mild to moderate thyromegaly, no nodules, no dark circles around the neck S1 s2 heard normal rhythm  Abdomen is nondistended. Labs from last visit reviewed:   Component      Latest Ref Rng & Units 1/18/2019 1/18/2019 1/18/2019 1/18/2019          11:22 AM 11:22 AM 11:22 AM 11:22 AM   TSH      0.600 - 4.840 uIU/mL    1.020   T4, Free      0.90 - 1.67 ng/dL   1.05    Thyroglobulin Ab      0.0 - 0.9 IU/mL  <1.0     Thyroid peroxidase Ab      0 - 18 IU/mL 14      Thyroid gland is heterogeneous in echotexture and similar in appearance of prior study. No dominant nodules are identified.     The right lobe measures 4.2 x 1.6 x 1.3 cm and the left lobe measures 4 x 1.2 x  1 cm. The isthmus measures 4 mm.   IMPRESSION: No significant change in the heterogeneous appearance of the  thyroid. No dominant nodules are identified. A/P:   1. Goiter, no change since last visit. 2. No autoimmune markers for thyroid at present  3. Reviewed the images of thyroid ultrasound in the clinic and gland is heterogenous and we will continue to follow up. Total time= 25 minutes and more than 50 % of the time was spent on  Counseling. Follow up in 8 months.
Chief Complaint   Patient presents with    Other     goiter f/u
Continue on over-the-counter vitamin-D supplementation    Check vitamin-D level with next set of labs
Continue to control risk factors including diabetes, hypertension, hyperlipidemia  Patient remains on antihypertensive medications, Crestor  He just recently had negative stress testing    Repeat CT coronary calcium score is unchanged
Lab Results   Component Value Date    HGBA1C 7 3 10/07/2019     Hemoglobin A1c continues to trend down words  Patient will remain on Janumet, Actos, glipizide and  Farxiga    Repeat diabetic parameters in 4 months
No specific cause was identified for elevated ALESHIA in the past   With the patient having diffuse arthritic symptoms once again will order ALEHSIA, sed rate with next set of labs    Hopefully this is mostly just bursitis and mild arthritis
Patient does have significantly restricted range of motion of the left shoulder  He has had prior history frozen shoulder on the right side  Does have mild crepitation in the left shoulder  Patient does have history old sporting injuries but nothing that he can identify to cause pain in his left shoulder  With severely restricted range of motion I would like to perform MRI of the left shoulder  X-ray would not be worthwhile  May need referral back to orthopedic surgeon for treatment  He does have stretching exercises at home    Will be placed on meloxicam
Previously the patient did respond to meloxicam   Will place him back on to meloxicam
Well controlled on Accupril 5 mg once daily and Cardizem 30 mg once daily  Continue same    Refill not necessary at this time
Well controlled on Crestor 10 mg once daily  Continue same  Recheck lipid profile in 4 months    Goal LDL less than 70
Will increase dose of sildenafil to 50 mg on a p r n   Basis
Size Of Lesion In Cm (Optional): 0.2
X Size Of Lesion In Cm (Optional): 0
Detail Level: Detailed
Size Of Lesion In Cm (Optional): 0.3
Size Of Lesion In Cm (Optional): 0.6

## 2023-09-21 DIAGNOSIS — E11.65 TYPE 2 DIABETES MELLITUS WITH HYPERGLYCEMIA, WITHOUT LONG-TERM CURRENT USE OF INSULIN (HCC): Chronic | ICD-10-CM

## 2023-09-21 DIAGNOSIS — I10 PRIMARY HYPERTENSION: Primary | ICD-10-CM

## 2023-09-22 RX ORDER — LISINOPRIL 5 MG/1
5 TABLET ORAL DAILY
Qty: 90 TABLET | Refills: 0 | Status: SHIPPED | OUTPATIENT
Start: 2023-09-22

## 2023-09-25 ENCOUNTER — TELEPHONE (OUTPATIENT)
Age: 53
End: 2023-09-25

## 2023-10-19 DIAGNOSIS — M75.102 ROTATOR CUFF SYNDROME OF LEFT SHOULDER: ICD-10-CM

## 2023-10-20 RX ORDER — MELOXICAM 15 MG/1
15 TABLET ORAL DAILY
Qty: 90 TABLET | Refills: 0 | Status: SHIPPED | OUTPATIENT
Start: 2023-10-20

## 2023-11-01 ENCOUNTER — APPOINTMENT (OUTPATIENT)
Dept: LAB | Facility: CLINIC | Age: 53
End: 2023-11-01
Payer: COMMERCIAL

## 2023-11-01 ENCOUNTER — OFFICE VISIT (OUTPATIENT)
Dept: FAMILY MEDICINE CLINIC | Facility: CLINIC | Age: 53
End: 2023-11-01
Payer: COMMERCIAL

## 2023-11-01 VITALS
OXYGEN SATURATION: 98 % | DIASTOLIC BLOOD PRESSURE: 72 MMHG | HEIGHT: 66 IN | RESPIRATION RATE: 16 BRPM | WEIGHT: 163 LBS | HEART RATE: 102 BPM | BODY MASS INDEX: 26.2 KG/M2 | SYSTOLIC BLOOD PRESSURE: 128 MMHG

## 2023-11-01 DIAGNOSIS — E78.2 MIXED HYPERLIPIDEMIA: ICD-10-CM

## 2023-11-01 DIAGNOSIS — E11.00 TYPE 2 DIABETES MELLITUS WITH HYPEROSMOLARITY WITHOUT COMA, WITHOUT LONG-TERM CURRENT USE OF INSULIN (HCC): ICD-10-CM

## 2023-11-01 DIAGNOSIS — Z12.5 PROSTATE CANCER SCREENING: ICD-10-CM

## 2023-11-01 DIAGNOSIS — I10 ESSENTIAL HYPERTENSION: Chronic | ICD-10-CM

## 2023-11-01 DIAGNOSIS — L98.9 LESION OF SKIN OF NOSE: ICD-10-CM

## 2023-11-01 DIAGNOSIS — R93.1 AGATSTON CORONARY ARTERY CALCIUM SCORE BETWEEN 100 AND 199: ICD-10-CM

## 2023-11-01 DIAGNOSIS — E55.9 VITAMIN D DEFICIENCY: ICD-10-CM

## 2023-11-01 DIAGNOSIS — N52.2 DRUG-INDUCED ERECTILE DYSFUNCTION: ICD-10-CM

## 2023-11-01 DIAGNOSIS — J30.9 ALLERGIC RHINITIS: ICD-10-CM

## 2023-11-01 DIAGNOSIS — Z00.00 ENCOUNTER FOR ANNUAL PHYSICAL EXAM: Primary | ICD-10-CM

## 2023-11-01 PROBLEM — F52.8 PSYCHOSEXUAL DYSFUNCTION ASSOCIATED WITH INHIBITED LIBIDO: Status: RESOLVED | Noted: 2018-06-20 | Resolved: 2023-11-01

## 2023-11-01 LAB
ALBUMIN SERPL BCP-MCNC: 4.6 G/DL (ref 3.5–5)
ALP SERPL-CCNC: 60 U/L (ref 34–104)
ALT SERPL W P-5'-P-CCNC: 17 U/L (ref 7–52)
ANION GAP SERPL CALCULATED.3IONS-SCNC: 6 MMOL/L
AST SERPL W P-5'-P-CCNC: 11 U/L (ref 13–39)
BASOPHILS # BLD AUTO: 0.06 THOUSANDS/ÂΜL (ref 0–0.1)
BASOPHILS NFR BLD AUTO: 1 % (ref 0–1)
BILIRUB SERPL-MCNC: 0.36 MG/DL (ref 0.2–1)
BUN SERPL-MCNC: 20 MG/DL (ref 5–25)
CALCIUM SERPL-MCNC: 9.6 MG/DL (ref 8.4–10.2)
CHLORIDE SERPL-SCNC: 99 MMOL/L (ref 96–108)
CHOLEST SERPL-MCNC: 141 MG/DL
CO2 SERPL-SCNC: 31 MMOL/L (ref 21–32)
CREAT SERPL-MCNC: 0.8 MG/DL (ref 0.6–1.3)
CREAT UR-MCNC: 75.7 MG/DL
EOSINOPHIL # BLD AUTO: 0.25 THOUSAND/ÂΜL (ref 0–0.61)
EOSINOPHIL NFR BLD AUTO: 4 % (ref 0–6)
ERYTHROCYTE [DISTWIDTH] IN BLOOD BY AUTOMATED COUNT: 13.2 % (ref 11.6–15.1)
EST. AVERAGE GLUCOSE BLD GHB EST-MCNC: 180 MG/DL
GFR SERPL CREATININE-BSD FRML MDRD: 102 ML/MIN/1.73SQ M
GLUCOSE P FAST SERPL-MCNC: 245 MG/DL (ref 65–99)
HBA1C MFR BLD: 7.9 %
HCT VFR BLD AUTO: 48.7 % (ref 36.5–49.3)
HDLC SERPL-MCNC: 40 MG/DL
HGB BLD-MCNC: 15.5 G/DL (ref 12–17)
IMM GRANULOCYTES # BLD AUTO: 0.03 THOUSAND/UL (ref 0–0.2)
IMM GRANULOCYTES NFR BLD AUTO: 1 % (ref 0–2)
LDLC SERPL CALC-MCNC: 74 MG/DL (ref 0–100)
LYMPHOCYTES # BLD AUTO: 2.05 THOUSANDS/ÂΜL (ref 0.6–4.47)
LYMPHOCYTES NFR BLD AUTO: 31 % (ref 14–44)
MCH RBC QN AUTO: 26.5 PG (ref 26.8–34.3)
MCHC RBC AUTO-ENTMCNC: 31.8 G/DL (ref 31.4–37.4)
MCV RBC AUTO: 83 FL (ref 82–98)
MICROALBUMIN UR-MCNC: <7 MG/L
MICROALBUMIN/CREAT 24H UR: <9 MG/G CREATININE (ref 0–30)
MONOCYTES # BLD AUTO: 0.45 THOUSAND/ÂΜL (ref 0.17–1.22)
MONOCYTES NFR BLD AUTO: 7 % (ref 4–12)
NEUTROPHILS # BLD AUTO: 3.75 THOUSANDS/ÂΜL (ref 1.85–7.62)
NEUTS SEG NFR BLD AUTO: 56 % (ref 43–75)
NONHDLC SERPL-MCNC: 101 MG/DL
NRBC BLD AUTO-RTO: 0 /100 WBCS
PLATELET # BLD AUTO: 276 THOUSANDS/UL (ref 149–390)
PMV BLD AUTO: 10 FL (ref 8.9–12.7)
POTASSIUM SERPL-SCNC: 4.7 MMOL/L (ref 3.5–5.3)
PROT SERPL-MCNC: 7.3 G/DL (ref 6.4–8.4)
RBC # BLD AUTO: 5.85 MILLION/UL (ref 3.88–5.62)
SODIUM SERPL-SCNC: 136 MMOL/L (ref 135–147)
TRIGL SERPL-MCNC: 136 MG/DL
TSH SERPL DL<=0.05 MIU/L-ACNC: 3.06 UIU/ML (ref 0.45–4.5)
WBC # BLD AUTO: 6.59 THOUSAND/UL (ref 4.31–10.16)

## 2023-11-01 PROCEDURE — 80061 LIPID PANEL: CPT

## 2023-11-01 PROCEDURE — 80053 COMPREHEN METABOLIC PANEL: CPT

## 2023-11-01 PROCEDURE — 84443 ASSAY THYROID STIM HORMONE: CPT

## 2023-11-01 PROCEDURE — 82043 UR ALBUMIN QUANTITATIVE: CPT

## 2023-11-01 PROCEDURE — 36415 COLL VENOUS BLD VENIPUNCTURE: CPT

## 2023-11-01 PROCEDURE — 99396 PREV VISIT EST AGE 40-64: CPT | Performed by: FAMILY MEDICINE

## 2023-11-01 PROCEDURE — 85025 COMPLETE CBC W/AUTO DIFF WBC: CPT

## 2023-11-01 PROCEDURE — 82570 ASSAY OF URINE CREATININE: CPT

## 2023-11-01 PROCEDURE — 83036 HEMOGLOBIN GLYCOSYLATED A1C: CPT

## 2023-11-01 PROCEDURE — 99214 OFFICE O/P EST MOD 30 MIN: CPT | Performed by: FAMILY MEDICINE

## 2023-11-01 RX ORDER — MONTELUKAST SODIUM 10 MG/1
10 TABLET ORAL
Qty: 90 TABLET | Refills: 0 | Status: SHIPPED | OUTPATIENT
Start: 2023-11-01

## 2023-11-01 RX ORDER — VARDENAFIL HYDROCHLORIDE 20 MG/1
20 TABLET ORAL DAILY PRN
Qty: 10 TABLET | Refills: 0 | Status: SHIPPED | OUTPATIENT
Start: 2023-11-01

## 2023-11-01 NOTE — ASSESSMENT & PLAN NOTE
Lab Results   Component Value Date    HGBA1C 7.9 (H) 06/19/2023     Repeat hemoglobin A1c is pending. Patient remains on extended release metformin, Actos, Glucotrol and Rachel. Could not tolerate GLP-1 agonist as a cause severe diarrhea. Patient has lost 7 pounds. Complemented on this. Patient is overdue for diabetic eye examination.   He does go to optometrist at [facility] and will schedule an appointment for next week

## 2023-11-01 NOTE — ASSESSMENT & PLAN NOTE
Patient did have recent repeat coronary calcium score. Continue to control risk factors for heart disease including hypertension, hyperlipidemia and especially his diabetes.   He did well with exercise stress test

## 2023-11-01 NOTE — ASSESSMENT & PLAN NOTE
Cholesterol remains very well controlled on Crestor 10 mg once daily. Repeat lipid panel to be done in 4 months.

## 2023-11-01 NOTE — ASSESSMENT & PLAN NOTE
This is multifactorial in nature. I did explain to the patient that this is likely related to longstanding history of diabetes as well as also an effective age. He has been on as needed sildenafil and most recently as needed Cialis. We will try him at maximal dosage of Levitra to see if that will work any better. Doubtful.   I did explain that to him

## 2023-11-01 NOTE — ASSESSMENT & PLAN NOTE
Hypertension remains well controlled on lisinopril. No longer on quinapril as it is no longer being manufactured.   Reevaluate in 4 months

## 2023-11-01 NOTE — ASSESSMENT & PLAN NOTE
I do not like the appearance of the lesion on his nose. It is slightly hyperkeratotic with some vascularity.

## 2023-11-01 NOTE — PROGRESS NOTES
Subjective:      Patient ID: Haylie Dickerson is a 47 y.o. male. 73-year-old male with past medical history of diabetes mellitus type 2, hypertension, hyperlipidemia, erectile dysfunction presents to the office for annual physical examination as well as follow-up of chronic conditions. Patient does complain of persistent allergy symptoms despite Astelin nasal spray and taking Singulair. Also complains of erectile dysfunction. Cialis 20 mg does produce some effect but nothing long-lasting. Patient just had blood work done this morning which showed normal TSH, CBC, CMP with the exception of impaired fasting glucose of 245, total cholesterol 141, triglycerides 136, HDL 40, LDL 74. Patient did have comprehensive health screening performed at [facility] which she does every 2 years which includes stress testing, CT coronary calcium score, blood work. Hemoglobin A1c at that time 2 months ago was 7.6%.   Could not tolerate GLP-1 due to severe gastrointestinal symptoms        Past Medical History:   Diagnosis Date   • Anxiety     only when flying   • Chronic frontal sinusitis     last assessed 03/02/2016   • COVID-19 virus infection 4/1/2020   • Diabetes mellitus ([address])    • Fracture of great toe     last assessed 11/06/2014   • GERD (gastroesophageal reflux disease)    • Hyperlipidemia    • Hypertension    • Palpitations     last assessed 11/06/2012   • RUQ abdominal pain 9/24/2021       Family History   Problem Relation Age of Onset   • Diabetes Mother    • Liver cancer Mother    • Hypertension Mother    • Colon cancer Father 76       Past Surgical History:   Procedure Laterality Date   • COLONOSCOPY     • HERNIA REPAIR Bilateral    • GA MNPJ W/ANES SHOULDER JT APPL FIXATION APPARATUS Right 4/3/2017    Procedure: SHOULDER MANIPULATION UNDER ANESTHESIA ;  Surgeon: Lima Bee MD;  Location: [facility];  Service: Orthopedics   • UPPER GASTROINTESTINAL ENDOSCOPY          reports that he has quit smoking. His smoking use included cigars. He has never used smokeless tobacco. He reports current alcohol use. He reports that he does not use drugs.       Current Outpatient Medications:   •  ALPRAZolam (XANAX) 0.25 mg tablet, Take 1 tablet (0.25 mg total) by mouth every 8 (eight) hours as needed for anxiety, Disp: 30 tablet, Rfl: 0  •  aspirin 81 mg chewable tablet, Adult Aspirin Low Strength 81 MG CHEW 1 PO Q D  Quantity: 0;  Refills: 0    Allsckay, Ilana CHAMBERS;  Started 11-Oct-2007 Active, Disp: , Rfl:   •  azelastine (ASTELIN) 0.1 % nasal spray, 2 sprays into each nostril 2 (two) times a day Use in each nostril as directed, Disp: 30 mL, Rfl: 0  •  CHOLECALCIFEROL PO, Take 1,000 Units by mouth, Disp: , Rfl:   •  dapagliflozin (Farxiga) 10 MG tablet, Take 1 tablet (10 mg total) by mouth daily, Disp: 90 tablet, Rfl: 0  •  esomeprazole (NexIUM) 40 MG capsule, Take 1 capsule (40 mg total) by mouth daily, Disp: 90 capsule, Rfl: 0  •  fexofenadine (ALLEGRA) 180 MG tablet, Take 1 tablet (180 mg total) by mouth daily, Disp: 30 tablet, Rfl: 5  •  glipiZIDE (GLUCOTROL XL) 10 mg 24 hr tablet, Take 1 tablet (10 mg total) by mouth daily, Disp: 90 tablet, Rfl: 0  •  glucose blood test strip, Test 1 Squirt 2 (two) times a day  , Disp: , Rfl:   •  Icosapent Ethyl (Vascepa) 1 g CAPS, Take 2 capsules (2 g total) by mouth 2 (two) times a day, Disp: 360 capsule, Rfl: 3  •  linaGLIPtin (Tradjenta) 5 MG TABS, Take 5 mg by mouth daily, Disp: 90 tablet, Rfl: 0  •  lisinopril (ZESTRIL) 5 mg tablet, Take 1 tablet (5 mg total) by mouth daily, Disp: 90 tablet, Rfl: 0  •  meloxicam (MOBIC) 15 mg tablet, Take 1 tablet (15 mg total) by mouth daily, Disp: 90 tablet, Rfl: 0  •  metFORMIN (GLUCOPHAGE-XR) 500 mg 24 hr tablet, Take 2 tablets (1,000 mg total) by mouth 2 (two) times a day with meals, Disp: 360 tablet, Rfl: 0  •  montelukast (SINGULAIR) 10 mg tablet, Take 1 tablet (10 mg total) by mouth daily at bedtime, Disp: 90 tablet, Rfl: 0  • Phytonadione (K 100) 100 MCG TABS, Take 1 tablet (100 mcg total) by mouth daily, Disp: 100 tablet, Rfl: 0  •  pioglitazone (ACTOS) 45 mg tablet, Take 1 tablet (45 mg total) by mouth daily, Disp: 90 tablet, Rfl: 0  •  quinapril (ACCUPRIL) 5 mg tablet, Take 1 tablet (5 mg total) by mouth daily, Disp: 90 tablet, Rfl: 0  •  rosuvastatin (CRESTOR) 10 MG tablet, Take 1 tablet (10 mg total) by mouth daily, Disp: 90 tablet, Rfl: 0  •  sucralfate (CARAFATE) 1 g tablet, Take 1 tablet (1 g total) by mouth 2 (two) times a day, Disp: 60 tablet, Rfl: 1  •  vardenafil (LEVITRA) 20 MG tablet, Take 1 tablet (20 mg total) by mouth daily as needed for erectile dysfunction, Disp: 10 tablet, Rfl: 0    The following portions of the patient's history were reviewed and updated as appropriate: allergies, current medications, past family history, past medical history, past social history, past surgical history and problem list.    Review of Systems   Constitutional: Negative. HENT:  Positive for congestion and tinnitus. Eyes: Negative. Respiratory: Negative. Cardiovascular: Negative. Gastrointestinal: Negative. Endocrine: Negative. Genitourinary: Negative. Erectile dysfunction   Musculoskeletal: Negative. Skin: Negative. Allergic/Immunologic: Negative. Neurological: Negative. Hematological: Negative. Psychiatric/Behavioral: Negative. All other systems reviewed and are negative. Objective:    /72   Pulse 102   Resp 16   Ht 5' 6" (1.676 m)   Wt 73.9 kg (163 lb)   SpO2 98%   BMI 26.31 kg/m²      Physical Exam  Vitals and nursing note reviewed. Constitutional:       General: He is not in acute distress. Appearance: Normal appearance. He is well-developed and normal weight. He is not ill-appearing. HENT:      Head: Normocephalic and atraumatic.       Right Ear: Tympanic membrane, ear canal and external ear normal.      Left Ear: Tympanic membrane, ear canal and external ear normal.      Nose: Nose normal.      Mouth/Throat:      Mouth: Mucous membranes are moist.   Eyes:      Extraocular Movements: Extraocular movements intact. Conjunctiva/sclera: Conjunctivae normal.      Pupils: Pupils are equal, round, and reactive to light. Cardiovascular:      Rate and Rhythm: Normal rate and regular rhythm. Pulses: Normal pulses. Heart sounds: Normal heart sounds. No murmur heard. Pulmonary:      Effort: Pulmonary effort is normal.      Breath sounds: Normal breath sounds. Abdominal:      General: Abdomen is flat. Bowel sounds are normal.      Palpations: Abdomen is soft. Musculoskeletal:         General: Normal range of motion. Cervical back: Normal range of motion and neck supple. Skin:     General: Skin is warm and dry. Findings: Lesion present. Neurological:      General: No focal deficit present. Mental Status: He is alert and oriented to person, place, and time. Psychiatric:         Mood and Affect: Mood normal.         Behavior: Behavior normal.         Thought Content: Thought content normal.         Judgment: Judgment normal.          Media Information      Document Information    Clinical Image - Mobile Device   Lesion bridge of nose   11/01/2023 9:33 AM   Attached To:    Office Visit on 11/1/23 with Tank Granger DO   Source Information    Tank Granger DO  [facility]       Recent Results (from the past 1008 hour(s))   CBC and differential    Collection Time: 11/01/23  6:58 AM   Result Value Ref Range    WBC 6.59 4.31 - 10.16 Thousand/uL    RBC 5.85 (H) 3.88 - 5.62 Million/uL    Hemoglobin 15.5 12.0 - 17.0 g/dL    Hematocrit 48.7 36.5 - 49.3 %    MCV 83 82 - 98 fL    MCH 26.5 (L) 26.8 - 34.3 pg    MCHC 31.8 31.4 - 37.4 g/dL    RDW 13.2 11.6 - 15.1 %    MPV 10.0 8.9 - 12.7 fL    Platelets 687 761 - 586 Thousands/uL    nRBC 0 /100 WBCs    Neutrophils Relative 56 43 - 75 %    Immat GRANS % 1 0 - 2 %    Lymphocytes Relative 31 14 - 44 % Monocytes Relative 7 4 - 12 %    Eosinophils Relative 4 0 - 6 %    Basophils Relative 1 0 - 1 %    Neutrophils Absolute 3.75 1.85 - 7.62 Thousands/µL    Immature Grans Absolute 0.03 0.00 - 0.20 Thousand/uL    Lymphocytes Absolute 2.05 0.60 - 4.47 Thousands/µL    Monocytes Absolute 0.45 0.17 - 1.22 Thousand/µL    Eosinophils Absolute 0.25 0.00 - 0.61 Thousand/µL    Basophils Absolute 0.06 0.00 - 0.10 Thousands/µL   Comprehensive metabolic panel    Collection Time: 11/01/23  6:58 AM   Result Value Ref Range    Sodium 136 135 - 147 mmol/L    Potassium 4.7 3.5 - 5.3 mmol/L    Chloride 99 96 - 108 mmol/L    CO2 31 21 - 32 mmol/L    ANION GAP 6 mmol/L    BUN 20 5 - 25 mg/dL    Creatinine 0.80 0.60 - 1.30 mg/dL    Glucose, Fasting 245 (H) 65 - 99 mg/dL    Calcium 9.6 8.4 - 10.2 mg/dL    AST 11 (L) 13 - 39 U/L    ALT 17 7 - 52 U/L    Alkaline Phosphatase 60 34 - 104 U/L    Total Protein 7.3 6.4 - 8.4 g/dL    Albumin 4.6 3.5 - 5.0 g/dL    Total Bilirubin 0.36 0.20 - 1.00 mg/dL    eGFR 102 ml/min/1.73sq m   Lipid panel    Collection Time: 11/01/23  6:58 AM   Result Value Ref Range    Cholesterol 141 See Comment mg/dL    Triglycerides 136 See Comment mg/dL    HDL, Direct 40 >=40 mg/dL    LDL Calculated 74 0 - 100 mg/dL    Non-HDL-Chol (CHOL-HDL) 101 mg/dl   TSH, 3rd generation with Free T4 reflex    Collection Time: 11/01/23  6:58 AM   Result Value Ref Range    TSH 3RD GENERATON 3.055 0.450 - 4.500 uIU/mL       Assessment/Plan:    Type 2 diabetes mellitus with hyperosmolarity without coma, without long-term current use of insulin (HCC)    Lab Results   Component Value Date    HGBA1C 7.9 (H) 06/19/2023     Repeat hemoglobin A1c is pending. Patient remains on extended release metformin, Actos, Glucotrol and [place]. Could not tolerate GLP-1 agonist as a cause severe diarrhea. Patient has lost 7 pounds. Complemented on this. Patient is overdue for diabetic eye examination.   He does go to optometrist at [facility] and will schedule an appointment for next week    Allergic rhinitis  Continues on Singulair as well as Astelin nasal spray    Essential hypertension  Hypertension remains well controlled on lisinopril. No longer on quinapril as it is no longer being manufactured. Reevaluate in 4 months    Agatston coronary artery calcium score between 100 and 199  Patient did have recent repeat coronary calcium score. Continue to control risk factors for heart disease including hypertension, hyperlipidemia and especially his diabetes. He did well with exercise stress test    Lesion of skin of nose  I do not like the appearance of the lesion on his nose. It is slightly hyperkeratotic with some vascularity. Vitamin D deficiency  Continue on vitamin D supplementation. Repeat vitamin D level to be done in 4 months    Prostate cancer screening  Screening PSA with next set of labs    Mixed hyperlipidemia  Cholesterol remains very well controlled on Crestor 10 mg once daily. Repeat lipid panel to be done in 4 months. Encounter for annual physical exam  Annual physical examination performed    Drug-induced erectile dysfunction  This is multifactorial in nature. I did explain to the patient that this is likely related to longstanding history of diabetes as well as also an effective age. He has been on as needed sildenafil and most recently as needed Cialis. We will try him at maximal dosage of Levitra to see if that will work any better. Doubtful. I did explain that to him          Problem List Items Addressed This Visit        Endocrine    Type 2 diabetes mellitus with hyperosmolarity without coma, without long-term current use of insulin ([address])       Lab Results   Component Value Date    HGBA1C 7.9 (H) 06/19/2023     Repeat hemoglobin A1c is pending. Patient remains on extended release metformin, Actos, Glucotrol and [place]. Could not tolerate GLP-1 agonist as a cause severe diarrhea. Patient has lost 7 pounds. Complemented on this. Patient is overdue for diabetic eye examination. He does go to optometrist at [facility] and will schedule an appointment for next week         Relevant Orders    Comprehensive metabolic panel    Hemoglobin A1C    Albumin / creatinine urine ratio       Respiratory    Allergic rhinitis     Continues on Singulair as well as Astelin nasal spray         Relevant Medications    montelukast (SINGULAIR) 10 mg tablet       Cardiovascular and Mediastinum    Essential hypertension (Chronic)     Hypertension remains well controlled on lisinopril. No longer on quinapril as it is no longer being manufactured. Reevaluate in 4 months         Relevant Orders    CBC and differential    TSH, 3rd generation with Free T4 reflex    Agatston coronary artery calcium score between 100 and 199     Patient did have recent repeat coronary calcium score. Continue to control risk factors for heart disease including hypertension, hyperlipidemia and especially his diabetes. He did well with exercise stress test         Relevant Medications    vardenafil (LEVITRA) 20 MG tablet    Other Relevant Orders    CBC and differential       Musculoskeletal and Integument    Lesion of skin of nose     I do not like the appearance of the lesion on his nose. It is slightly hyperkeratotic with some vascularity. Relevant Orders    Ambulatory Referral to Dermatology       Other    Drug-induced erectile dysfunction     This is multifactorial in nature. I did explain to the patient that this is likely related to longstanding history of diabetes as well as also an effective age. He has been on as needed sildenafil and most recently as needed Cialis. We will try him at maximal dosage of Levitra to see if that will work any better. Doubtful.   I did explain that to him         Relevant Medications    vardenafil (LEVITRA) 20 MG tablet    Encounter for annual physical exam - Primary     Annual physical examination performed Mixed hyperlipidemia     Cholesterol remains very well controlled on Crestor 10 mg once daily. Repeat lipid panel to be done in 4 months. Relevant Orders    Lipid panel    Prostate cancer screening     Screening PSA with next set of labs         Relevant Orders    PSA, Total Screen    Vitamin D deficiency     Continue on vitamin D supplementation.   Repeat vitamin D level to be done in 4 months         Relevant Orders    Vitamin D 1,25 dihydroxy

## 2023-11-10 DIAGNOSIS — E11.9 TYPE 2 DIABETES MELLITUS WITHOUT COMPLICATION, WITHOUT LONG-TERM CURRENT USE OF INSULIN (HCC): ICD-10-CM

## 2023-11-10 DIAGNOSIS — E11.9 TYPE 2 DIABETES MELLITUS WITHOUT COMPLICATION, WITHOUT LONG-TERM CURRENT USE OF INSULIN (HCC): Chronic | ICD-10-CM

## 2023-11-10 RX ORDER — METFORMIN HYDROCHLORIDE 500 MG/1
1000 TABLET, EXTENDED RELEASE ORAL 2 TIMES DAILY WITH MEALS
Qty: 360 TABLET | Refills: 0 | Status: SHIPPED | OUTPATIENT
Start: 2023-11-10

## 2023-11-10 RX ORDER — PIOGLITAZONEHYDROCHLORIDE 45 MG/1
45 TABLET ORAL DAILY
Qty: 90 TABLET | Refills: 0 | Status: SHIPPED | OUTPATIENT
Start: 2023-11-10

## 2023-11-16 DIAGNOSIS — K21.9 GASTROESOPHAGEAL REFLUX DISEASE: ICD-10-CM

## 2023-11-16 DIAGNOSIS — E11.9 TYPE 2 DIABETES MELLITUS WITHOUT COMPLICATION, WITHOUT LONG-TERM CURRENT USE OF INSULIN (HCC): ICD-10-CM

## 2023-11-17 RX ORDER — LINAGLIPTIN 5 MG/1
5 TABLET, FILM COATED ORAL DAILY
Qty: 90 TABLET | Refills: 1 | Status: SHIPPED | OUTPATIENT
Start: 2023-11-17

## 2023-11-17 RX ORDER — ESOMEPRAZOLE MAGNESIUM 40 MG/1
40 CAPSULE, DELAYED RELEASE ORAL DAILY
Qty: 90 CAPSULE | Refills: 1 | Status: SHIPPED | OUTPATIENT
Start: 2023-11-17

## 2023-11-29 DIAGNOSIS — E78.5 HYPERLIPIDEMIA, UNSPECIFIED HYPERLIPIDEMIA TYPE: ICD-10-CM

## 2023-11-29 DIAGNOSIS — E11.00 TYPE 2 DIABETES MELLITUS WITH HYPEROSMOLARITY WITHOUT COMA, WITHOUT LONG-TERM CURRENT USE OF INSULIN (HCC): ICD-10-CM

## 2023-11-29 RX ORDER — GLIPIZIDE 10 MG/1
10 TABLET, FILM COATED, EXTENDED RELEASE ORAL DAILY
Qty: 90 TABLET | Refills: 0 | Status: SHIPPED | OUTPATIENT
Start: 2023-11-29

## 2023-11-29 RX ORDER — ROSUVASTATIN CALCIUM 10 MG/1
10 TABLET, COATED ORAL DAILY
Qty: 90 TABLET | Refills: 0 | Status: SHIPPED | OUTPATIENT
Start: 2023-11-29

## 2023-12-13 ENCOUNTER — TELEPHONE (OUTPATIENT)
Age: 53
End: 2023-12-13

## 2023-12-13 NOTE — TELEPHONE ENCOUNTER
Left message for mom to call back need exact date pt returned to school Pt called in because he hasn't heard anything from Dermatology.  Offered to give him their number, pt is driving, he's going to check the portal for the referral and call back if he needs any help

## 2023-12-22 DIAGNOSIS — E11.65 TYPE 2 DIABETES MELLITUS WITH HYPERGLYCEMIA, WITHOUT LONG-TERM CURRENT USE OF INSULIN (HCC): Chronic | ICD-10-CM

## 2023-12-22 DIAGNOSIS — I10 PRIMARY HYPERTENSION: ICD-10-CM

## 2023-12-22 DIAGNOSIS — E78.2 MIXED HYPERLIPIDEMIA: ICD-10-CM

## 2023-12-26 RX ORDER — LISINOPRIL 5 MG/1
5 TABLET ORAL DAILY
Qty: 90 TABLET | Refills: 1 | Status: SHIPPED | OUTPATIENT
Start: 2023-12-26

## 2023-12-27 RX ORDER — ICOSAPENT ETHYL 1000 MG/1
2 CAPSULE ORAL 2 TIMES DAILY
Qty: 360 CAPSULE | Refills: 3 | Status: SHIPPED | OUTPATIENT
Start: 2023-12-27

## 2023-12-31 PROBLEM — Z12.5 PROSTATE CANCER SCREENING: Status: RESOLVED | Noted: 2021-09-01 | Resolved: 2023-12-31

## 2024-01-02 ENCOUNTER — AMB VIDEO VISIT (OUTPATIENT)
Dept: OTHER | Facility: HOSPITAL | Age: 54
End: 2024-01-02

## 2024-01-02 VITALS — HEART RATE: 72 BPM | RESPIRATION RATE: 20 BRPM

## 2024-01-02 DIAGNOSIS — J06.9 UPPER RESPIRATORY TRACT INFECTION, UNSPECIFIED TYPE: Primary | ICD-10-CM

## 2024-01-02 PROBLEM — L03.032 PARONYCHIA OF SECOND TOE OF LEFT FOOT: Status: RESOLVED | Noted: 2023-06-26 | Resolved: 2024-01-02

## 2024-01-02 PROBLEM — Z00.00 ENCOUNTER FOR ANNUAL PHYSICAL EXAM: Status: RESOLVED | Noted: 2022-09-15 | Resolved: 2024-01-02

## 2024-01-02 PROCEDURE — ECARE PR SL URGENT CARE VIRTUAL VISIT: Performed by: PHYSICIAN ASSISTANT

## 2024-01-03 ENCOUNTER — AMB VIDEO VISIT (OUTPATIENT)
Dept: OTHER | Facility: HOSPITAL | Age: 54
End: 2024-01-03

## 2024-01-03 NOTE — PATIENT INSTRUCTIONS
"[facility] Phone number is [phone] if you need assistance or have further questions  note in \"Letters\" section of [employer]. Can print if opened from a web browser    You likely have a virus such as the flu or a cold. Please schedule a follow-up with your PCP within the next 2 days for recheck. Go to the ER for new worsening or concerning symptoms including but not limited to shortness of breath or chest pain    You can have fever for 3-5 days with a viral illness and then any additional symptoms that develop (congestion,cough, nausea, diarrhea) can last for another 7 days.      Stay out of work/school until fever free for 24 hours without use of tylenol or motrin     Make sure you have a thermometer and if you feel chills or sweats check it and write it down.  Take Tylenol (acetaminophen) and Motrin (ibuprofen) for fevers, body aches, and headaches. Alternate Motrin and Tylenol every 3 hours for more consistent relief.     Drink plenty of fluids to stay well hydrated- at least 2 L per day for adults  Water, hot water with lemon or honey, warm tea.   Can drink Pedialyte, Gatorade/Powerade zero, but should mix with water.      For diarrhea, vomiting and abdominal pain   Milk or juice can make diarrhea worse.  follow \"BRAT\" diet Bananas, Rice, Applesauce, Toast (also plain pasta or crackers)  Small frequent meals   Allow diarrhea to run its course. Most cases will resolved in 3-5 days     Use over-the-counter saline nasal spray/allergy medication for congestion, runny nose, and postnasal drip  Mucinex (guaifenesin) helps to thin and loosen mucous.   Claritin, Zyrtec, Xyzal, or Allegra are non-drowsy antihistamines- helps dry out mucous (will make mucous darker)  Delsym or robitussin (dextromethorphan) is a cough suppressant.  Oral decongestants- pseudoephedrine behind the counter pill helps with nasal and sinus congestion  Nasal Decongestants- phenylephrine or fluticasone nasal spray (oxymetazoline up to 3 " days)  Vicks to the front/back of the chest bottom of the feet with socks     For sore throat relief  Warm salt water gargles, warm tea with honey as needed  Cool mist humidifier at bedside- helps keep airway moist  Chloraseptic spray or throat lozenges with benzocaine (cepacol max strength).

## 2024-01-03 NOTE — CARE ANYWHERE EVISITS
Visit Summary for Milton Orozco - Gender: Male - Date of Birth: 1970  Date: [id number] - Duration: 9 minutes  Patient: Milton Orozco  Provider: Shannon Severino PA-C    Patient Contact Information  Address  [address]  [place]; [place] [address]  [phone]    Visit Topics  Flu-Like Symptoms [Added By: Self - 2024-01-03]  Cold [Added By: Self - 2024-01-03]    Triage Questions   What is your current physical address in the event of a medical emergency? Answer []  Are you allergic to any medications? Answer []  Are you now or could you be pregnant? Answer []  Do you have any immune system compromise or chronic lung   disease? Answer []  Do you have any vulnerable family members in the home (infant, pregnant, cancer, elderly)? Answer []     Conversation Transcripts  [0A][0A] [Notification] You are connected with Shannon Severino PA-C, Urgent Care Specialist.[0A][Notification] Milton Orozco is located in [place].[0A][Notification] Milton Orozco has shared health history...[0A]    Diagnosis  Acute upper respiratory infection, unspecified    Procedures  Value: [id number] Code: CPT-4 UNLISTED E&M SERVICE    Medications Prescribed    No prescriptions ordered    Electronically signed by: Severino PA-C, Shannon(NPI [id number])

## 2024-01-03 NOTE — PROGRESS NOTES
Required Documentation:  Encounter provider Shannon D Severino, PA-C    Provider located at [facility]  VIRTUAL CARE   [address] [facility] [address] [id number]    Identify all parties in room with patient during virtual visit:  No one else    The patient was identified by name and date of birth. Milton Orozco was informed that this is a telemedicine visit and that the visit is being conducted through the [facility] [employer] platform. He agrees to proceed..  My office door was closed. No one else was in the room.  He acknowledged consent and understanding of privacy and security of the video platform. The patient has agreed to participate and understands they can discontinue the visit at any time.    Verification of patient location:    Patient is located at home in the following state in which I hold an active license [place]    Patient is aware this is a billable service.     Reason for visit is No chief complaint on file.       Subjective  HPI   Pt with hx HTN, DM. Reports he feels unwell, but not similar to COVID that he had before. Reports head congestion, chills, fatigue, PND, body aches, HA, rhinorrhea, slight cough starting 2 days ago. Taking tylenol with some relief. No fever, CP, SOB. COVID testing not completed.    Past Medical History:   Diagnosis Date    Anxiety     only when flying    Chronic frontal sinusitis     last assessed 03/02/2016    COVID-19 virus infection 4/1/2020    Diabetes mellitus (HCC)     Fracture of great toe     last assessed 11/06/2014    GERD (gastroesophageal reflux disease)     Hyperlipidemia     Hypertension     Palpitations     last assessed 11/06/2012    RUQ abdominal pain 9/24/2021       Past Surgical History:   Procedure Laterality Date    COLONOSCOPY      HERNIA REPAIR Bilateral     ND MNPJ W/ANES SHOULDER JT APPL FIXATION APPARATUS Right 4/3/2017    Procedure: SHOULDER MANIPULATION UNDER ANESTHESIA ;  Surgeon: Berto Leon MD;   Location: [facility];  Service: Orthopedics    UPPER GASTROINTESTINAL ENDOSCOPY          Allergies   Allergen Reactions    Trulicity [Dulaglutide] GI Intolerance       Review of Systems   Constitutional:  Positive for chills. Negative for fever.   HENT:  Positive for congestion, postnasal drip, rhinorrhea and sinus pressure (ethmoid region, chronic). Negative for nosebleeds.    Eyes:  Negative for redness.   Respiratory:  Positive for cough. Negative for shortness of breath.    Cardiovascular:  Negative for chest pain.   Gastrointestinal:  Negative for blood in stool.   Genitourinary:  Negative for hematuria.   Musculoskeletal:  Negative for gait problem.   Skin:  Negative for rash.   Neurological:  Negative for seizures.   Psychiatric/Behavioral:  Negative for behavioral problems.        Video Exam    Vitals:    01/02/24 2059   Pulse: 72   Resp: 20       Physical Exam  Constitutional:       General: He is not in acute distress.     Appearance: Normal appearance. He is normal weight. He is not ill-appearing or toxic-appearing.      Comments: Fit appearing   HENT:      Head: Normocephalic and atraumatic.      Nose: No rhinorrhea.      Mouth/Throat:      Mouth: Mucous membranes are moist.   Eyes:      Conjunctiva/sclera: Conjunctivae normal.   Cardiovascular:      Rate and Rhythm: Normal rate.   Pulmonary:      Effort: Pulmonary effort is normal. No respiratory distress.      Breath sounds: No wheezing (no gross audible wheeze through computer).   Musculoskeletal:      Cervical back: Normal range of motion.   Skin:     Findings: No rash (on face or neck).   Neurological:      Mental Status: He is alert.      Cranial Nerves: No dysarthria or facial asymmetry.   Psychiatric:         Mood and Affect: Mood normal.         Behavior: Behavior normal.       Offered COVID/flu testing which patient declined  Visit Time  Total Visit Duration: 10 minutes    Assessment/Plan:    Diagnoses and all orders for this visit:    Upper  "respiratory tract infection, unspecified type        Patient Instructions   [facility] Phone number is [phone] if you need assistance or have further questions  note in \"Letters\" section of [employer]. Can print if opened from a web browser    You likely have a virus such as the flu or a cold. Please schedule a follow-up with your PCP within the next 2 days for recheck. Go to the ER for new worsening or concerning symptoms including but not limited to shortness of breath or chest pain    You can have fever for 3-5 days with a viral illness and then any additional symptoms that develop (congestion,cough, nausea, diarrhea) can last for another 7 days.      Stay out of work/school until fever free for 24 hours without use of tylenol or motrin     Make sure you have a thermometer and if you feel chills or sweats check it and write it down.  Take Tylenol (acetaminophen) and Motrin (ibuprofen) for fevers, body aches, and headaches. Alternate Motrin and Tylenol every 3 hours for more consistent relief.     Drink plenty of fluids to stay well hydrated- at least 2 L per day for adults  Water, hot water with lemon or honey, warm tea.   Can drink Pedialyte, Gatorade/Powerade zero, but should mix with water.      For diarrhea, vomiting and abdominal pain   Milk or juice can make diarrhea worse.  follow \"BRAT\" diet Bananas, Rice, Applesauce, Toast (also plain pasta or crackers)  Small frequent meals   Allow diarrhea to run its course. Most cases will resolved in 3-5 days     Use over-the-counter saline nasal spray/allergy medication for congestion, runny nose, and postnasal drip  Mucinex (guaifenesin) helps to thin and loosen mucous.   Claritin, Zyrtec, Xyzal, or Allegra are non-drowsy antihistamines- helps dry out mucous (will make mucous darker)  Delsym or robitussin (dextromethorphan) is a cough suppressant.  Oral decongestants- pseudoephedrine behind the counter pill helps with nasal and sinus congestion  Nasal " Decongestants- phenylephrine or fluticasone nasal spray (oxymetazoline up to 3 days)  Vicks to the front/back of the chest bottom of the feet with socks     For sore throat relief  Warm salt water gargles, warm tea with honey as needed  Cool mist humidifier at bedside- helps keep airway moist  Chloraseptic spray or throat lozenges with benzocaine (cepacol max strength).

## 2024-02-01 DIAGNOSIS — J30.9 ALLERGIC RHINITIS: ICD-10-CM

## 2024-02-01 DIAGNOSIS — M75.102 ROTATOR CUFF SYNDROME OF LEFT SHOULDER: ICD-10-CM

## 2024-02-02 RX ORDER — MONTELUKAST SODIUM 10 MG/1
10 TABLET ORAL
Qty: 90 TABLET | Refills: 0 | Status: SHIPPED | OUTPATIENT
Start: 2024-02-02

## 2024-02-02 RX ORDER — MELOXICAM 15 MG/1
15 TABLET ORAL DAILY
Qty: 90 TABLET | Refills: 1 | Status: SHIPPED | OUTPATIENT
Start: 2024-02-02

## 2024-02-09 ENCOUNTER — DOCUMENTATION (OUTPATIENT)
Dept: FAMILY MEDICINE CLINIC | Facility: CLINIC | Age: 54
End: 2024-02-09

## 2024-02-09 DIAGNOSIS — E11.9 TYPE 2 DIABETES MELLITUS WITHOUT COMPLICATION, WITHOUT LONG-TERM CURRENT USE OF INSULIN (HCC): Primary | Chronic | ICD-10-CM

## 2024-02-09 RX ORDER — PIOGLITAZONEHYDROCHLORIDE 45 MG/1
45 TABLET ORAL DAILY
Qty: 90 TABLET | Refills: 3 | Status: SHIPPED | OUTPATIENT
Start: 2024-02-09

## 2024-02-09 RX ORDER — METFORMIN HYDROCHLORIDE 500 MG/1
1000 TABLET, EXTENDED RELEASE ORAL 2 TIMES DAILY WITH MEALS
Qty: 360 TABLET | Refills: 3 | Status: SHIPPED | OUTPATIENT
Start: 2024-02-09

## 2024-02-14 DIAGNOSIS — E11.9 TYPE 2 DIABETES MELLITUS WITHOUT COMPLICATION, WITHOUT LONG-TERM CURRENT USE OF INSULIN (HCC): ICD-10-CM

## 2024-02-14 RX ORDER — LINAGLIPTIN 5 MG/1
5 TABLET, FILM COATED ORAL DAILY
Qty: 90 TABLET | Refills: 1 | Status: SHIPPED | OUTPATIENT
Start: 2024-02-14

## 2024-02-19 NOTE — TELEPHONE ENCOUNTER
Medication Refill Request     Name linaGLIPtin (Tradjenta) 5 MG TABS    Dose/Frequency once daily  Quantity 90  Verified pharmacy   [x]  Verified ordering Provider   [x]  Does patient have enough for the next 3 days? Yes [] No [x]    Patient stating pharmacy told him medication needs prior auth

## 2024-02-20 ENCOUNTER — TELEPHONE (OUTPATIENT)
Age: 54
End: 2024-02-20

## 2024-02-20 RX ORDER — LINAGLIPTIN 5 MG/1
5 TABLET, FILM COATED ORAL DAILY
Qty: 90 TABLET | Refills: 1 | OUTPATIENT
Start: 2024-02-20

## 2024-02-21 DIAGNOSIS — K21.9 GASTROESOPHAGEAL REFLUX DISEASE: ICD-10-CM

## 2024-02-21 NOTE — TELEPHONE ENCOUNTER
PA for Tradjenta 5 mg tabs Approved     Date(s) approved 2- to 2-        Patient advised by [x] Parset Message                      [] Phone call       Pharmacy advised by [x]Fax                                     []Phone call    Approval letter scanned into Media Yes

## 2024-02-21 NOTE — TELEPHONE ENCOUNTER
PA for Tradjenta 5 mg tabs     Submitted via    []CMM-KEY   [x][employer]-Case ID # [id number]  []Faxed to plan   []Other website   []Phone call Case ID #     Office notes sent, clinical questions answered. Awaiting determination    Turnaround time for your insurance to make a decision on your Prior Authorization can take 7-21 business days.

## 2024-02-22 RX ORDER — ESOMEPRAZOLE MAGNESIUM 40 MG/1
40 CAPSULE, DELAYED RELEASE ORAL DAILY
Qty: 90 CAPSULE | Refills: 1 | Status: SHIPPED | OUTPATIENT
Start: 2024-02-22

## 2024-02-28 ENCOUNTER — APPOINTMENT (OUTPATIENT)
Dept: LAB | Facility: CLINIC | Age: 54
End: 2024-02-28
Payer: COMMERCIAL

## 2024-02-28 DIAGNOSIS — E78.5 HYPERLIPIDEMIA, UNSPECIFIED HYPERLIPIDEMIA TYPE: ICD-10-CM

## 2024-02-28 DIAGNOSIS — J30.1 SEASONAL ALLERGIC RHINITIS DUE TO POLLEN: ICD-10-CM

## 2024-02-28 DIAGNOSIS — E11.00 TYPE 2 DIABETES MELLITUS WITH HYPEROSMOLARITY WITHOUT COMA, WITHOUT LONG-TERM CURRENT USE OF INSULIN (HCC): ICD-10-CM

## 2024-02-28 DIAGNOSIS — R93.1 AGATSTON CORONARY ARTERY CALCIUM SCORE BETWEEN 100 AND 199: ICD-10-CM

## 2024-02-28 DIAGNOSIS — Z12.5 PROSTATE CANCER SCREENING: ICD-10-CM

## 2024-02-28 DIAGNOSIS — I10 ESSENTIAL HYPERTENSION: Chronic | ICD-10-CM

## 2024-02-28 DIAGNOSIS — E55.9 VITAMIN D DEFICIENCY: ICD-10-CM

## 2024-02-28 DIAGNOSIS — E78.2 MIXED HYPERLIPIDEMIA: ICD-10-CM

## 2024-02-28 LAB
ALBUMIN SERPL BCP-MCNC: 4.6 G/DL (ref 3.5–5)
ALP SERPL-CCNC: 56 U/L (ref 34–104)
ALT SERPL W P-5'-P-CCNC: 16 U/L (ref 7–52)
ANION GAP SERPL CALCULATED.3IONS-SCNC: 7 MMOL/L
AST SERPL W P-5'-P-CCNC: 12 U/L (ref 13–39)
BASOPHILS # BLD AUTO: 0.06 THOUSANDS/ÂΜL (ref 0–0.1)
BASOPHILS NFR BLD AUTO: 1 % (ref 0–1)
BILIRUB SERPL-MCNC: 0.41 MG/DL (ref 0.2–1)
BUN SERPL-MCNC: 27 MG/DL (ref 5–25)
CALCIUM SERPL-MCNC: 9.5 MG/DL (ref 8.4–10.2)
CHLORIDE SERPL-SCNC: 101 MMOL/L (ref 96–108)
CHOLEST SERPL-MCNC: 133 MG/DL
CO2 SERPL-SCNC: 27 MMOL/L (ref 21–32)
CREAT SERPL-MCNC: 0.81 MG/DL (ref 0.6–1.3)
CREAT UR-MCNC: 44.9 MG/DL
EOSINOPHIL # BLD AUTO: 0.22 THOUSAND/ÂΜL (ref 0–0.61)
EOSINOPHIL NFR BLD AUTO: 4 % (ref 0–6)
ERYTHROCYTE [DISTWIDTH] IN BLOOD BY AUTOMATED COUNT: 13.4 % (ref 11.6–15.1)
EST. AVERAGE GLUCOSE BLD GHB EST-MCNC: 194 MG/DL
GFR SERPL CREATININE-BSD FRML MDRD: 102 ML/MIN/1.73SQ M
GLUCOSE P FAST SERPL-MCNC: 226 MG/DL (ref 65–99)
HBA1C MFR BLD: 8.4 %
HCT VFR BLD AUTO: 47 % (ref 36.5–49.3)
HDLC SERPL-MCNC: 39 MG/DL
HGB BLD-MCNC: 14.8 G/DL (ref 12–17)
IMM GRANULOCYTES # BLD AUTO: 0.03 THOUSAND/UL (ref 0–0.2)
IMM GRANULOCYTES NFR BLD AUTO: 1 % (ref 0–2)
LDLC SERPL CALC-MCNC: 68 MG/DL (ref 0–100)
LYMPHOCYTES # BLD AUTO: 1.79 THOUSANDS/ÂΜL (ref 0.6–4.47)
LYMPHOCYTES NFR BLD AUTO: 29 % (ref 14–44)
MCH RBC QN AUTO: 25.7 PG (ref 26.8–34.3)
MCHC RBC AUTO-ENTMCNC: 31.5 G/DL (ref 31.4–37.4)
MCV RBC AUTO: 82 FL (ref 82–98)
MICROALBUMIN UR-MCNC: <7 MG/L
MICROALBUMIN/CREAT 24H UR: <16 MG/G CREATININE (ref 0–30)
MONOCYTES # BLD AUTO: 0.52 THOUSAND/ÂΜL (ref 0.17–1.22)
MONOCYTES NFR BLD AUTO: 8 % (ref 4–12)
NEUTROPHILS # BLD AUTO: 3.65 THOUSANDS/ÂΜL (ref 1.85–7.62)
NEUTS SEG NFR BLD AUTO: 57 % (ref 43–75)
NONHDLC SERPL-MCNC: 94 MG/DL
NRBC BLD AUTO-RTO: 0 /100 WBCS
PLATELET # BLD AUTO: 260 THOUSANDS/UL (ref 149–390)
PMV BLD AUTO: 10.2 FL (ref 8.9–12.7)
POTASSIUM SERPL-SCNC: 4.4 MMOL/L (ref 3.5–5.3)
PROT SERPL-MCNC: 7.1 G/DL (ref 6.4–8.4)
PSA SERPL-MCNC: 1.16 NG/ML (ref 0–4)
RBC # BLD AUTO: 5.75 MILLION/UL (ref 3.88–5.62)
SODIUM SERPL-SCNC: 135 MMOL/L (ref 135–147)
TRIGL SERPL-MCNC: 128 MG/DL
TSH SERPL DL<=0.05 MIU/L-ACNC: 2.75 UIU/ML (ref 0.45–4.5)
WBC # BLD AUTO: 6.27 THOUSAND/UL (ref 4.31–10.16)

## 2024-02-28 PROCEDURE — 82043 UR ALBUMIN QUANTITATIVE: CPT

## 2024-02-28 PROCEDURE — 36415 COLL VENOUS BLD VENIPUNCTURE: CPT

## 2024-02-28 PROCEDURE — G0103 PSA SCREENING: HCPCS

## 2024-02-28 PROCEDURE — 80053 COMPREHEN METABOLIC PANEL: CPT

## 2024-02-28 PROCEDURE — 85025 COMPLETE CBC W/AUTO DIFF WBC: CPT

## 2024-02-28 PROCEDURE — 84443 ASSAY THYROID STIM HORMONE: CPT

## 2024-02-28 PROCEDURE — 82652 VIT D 1 25-DIHYDROXY: CPT

## 2024-02-28 PROCEDURE — 82570 ASSAY OF URINE CREATININE: CPT

## 2024-02-28 PROCEDURE — 83036 HEMOGLOBIN GLYCOSYLATED A1C: CPT

## 2024-02-28 PROCEDURE — 80061 LIPID PANEL: CPT

## 2024-02-28 RX ORDER — GLIPIZIDE 10 MG/1
10 TABLET, FILM COATED, EXTENDED RELEASE ORAL DAILY
Qty: 90 TABLET | Refills: 0 | Status: SHIPPED | OUTPATIENT
Start: 2024-02-28

## 2024-02-28 RX ORDER — AZELASTINE 1 MG/ML
2 SPRAY, METERED NASAL 2 TIMES DAILY
Qty: 30 ML | Refills: 0 | Status: SHIPPED | OUTPATIENT
Start: 2024-02-28

## 2024-02-28 RX ORDER — ROSUVASTATIN CALCIUM 10 MG/1
10 TABLET, COATED ORAL DAILY
Qty: 90 TABLET | Refills: 0 | Status: SHIPPED | OUTPATIENT
Start: 2024-02-28

## 2024-02-29 LAB — 1,25(OH)2D3 SERPL-MCNC: 38.2 PG/ML (ref 24.8–81.5)

## 2024-03-06 ENCOUNTER — OFFICE VISIT (OUTPATIENT)
Dept: FAMILY MEDICINE CLINIC | Facility: CLINIC | Age: 54
End: 2024-03-06
Payer: COMMERCIAL

## 2024-03-06 VITALS
DIASTOLIC BLOOD PRESSURE: 78 MMHG | OXYGEN SATURATION: 98 % | SYSTOLIC BLOOD PRESSURE: 125 MMHG | WEIGHT: 170 LBS | HEIGHT: 66 IN | HEART RATE: 95 BPM | BODY MASS INDEX: 27.32 KG/M2

## 2024-03-06 DIAGNOSIS — L98.9 LESION OF SKIN OF NOSE: ICD-10-CM

## 2024-03-06 DIAGNOSIS — E11.00 TYPE 2 DIABETES MELLITUS WITH HYPEROSMOLARITY WITHOUT COMA, WITHOUT LONG-TERM CURRENT USE OF INSULIN (HCC): Primary | ICD-10-CM

## 2024-03-06 DIAGNOSIS — R09.81 NASAL CONGESTION: ICD-10-CM

## 2024-03-06 DIAGNOSIS — R10.811 RIGHT UPPER QUADRANT ABDOMINAL TENDERNESS WITHOUT REBOUND TENDERNESS: ICD-10-CM

## 2024-03-06 DIAGNOSIS — E78.2 MIXED HYPERLIPIDEMIA: ICD-10-CM

## 2024-03-06 DIAGNOSIS — I10 ESSENTIAL HYPERTENSION: Chronic | ICD-10-CM

## 2024-03-06 DIAGNOSIS — E55.9 VITAMIN D DEFICIENCY: ICD-10-CM

## 2024-03-06 PROCEDURE — 99215 OFFICE O/P EST HI 40 MIN: CPT | Performed by: FAMILY MEDICINE

## 2024-03-06 NOTE — ASSESSMENT & PLAN NOTE
Lab Results   Component Value Date    HGBA1C 8.4 (H) 02/28/2024     Unfortunately diabetes is still not ideally controlled.  He has never been less than 7.5%.  He is maximized on oral medications.  He could not tolerate GLP-1 agonist which caused him severe diarrhea.  I am providing referral to endocrinology for additional suggestions.  Unfortunately he is too young and has been diabetic for 2 longer period of time.  He is eventually going to have to go onto insulin therapy which she understands but was just trying to avoid

## 2024-03-06 NOTE — ASSESSMENT & PLAN NOTE
His cholesterol really remains very well-controlled on Crestor 10 mg once daily.  Total cholesterol 133 and an LDL of 68.  Great numbers.  Repeat labs in 4 months

## 2024-03-06 NOTE — PROGRESS NOTES
Subjective:      Patient ID: Milton Orozco is a 53 y.o. male.    53-year-old male with past medical history of diabetes mellitus type 2, hypertension, elevated coronary artery calcium score, hyperlipidemia presents to the office for 4-month follow-up.  Patient has been noting right upper quadrant abdominal discomfort that seems to radiate to his back and is worse depending upon what he is eating.  No overt nausea or vomiting.  Patient did have workup previously back in 2021 with right upper quadrant ultrasound which did not show any evidence of structural abnormality of the gallbladder.  He did have labs completed which showed that his hemoglobin A1c increased at 8.4% and his fasting blood sugar was 226.  He really does try very hard to eliminate carbohydrates.  He is very reluctant about going on to insulin.  He does have family history of diabetes in both parents.  He does try to remain physically active.  Patient was also scheduled with [facility] dermatology for evaluation of hyperkeratotic lesion on the right side of his nose and they put him on a waiting list and he is scheduled to be seen in July    Patient's shoes and socks removed.    Right Foot/Ankle   Right Foot Inspection  Skin Exam: skin normal and skin intact. No dry skin, no warmth, no callus, no erythema, no maceration, no abnormal color, no pre-ulcer, no ulcer and no callus.     Toe Exam: ROM and strength within normal limits.     Sensory   Vibration: intact  Proprioception: intact  Monofilament testing: intact    Vascular  Capillary refills: < 3 seconds  The right DP pulse is 2+. The right PT pulse is 2+.     Left Foot/Ankle  Left Foot Inspection  Skin Exam: skin normal and skin intact. No dry skin, no warmth, no erythema, no maceration, normal color, no pre-ulcer, no ulcer and no callus.     Toe Exam: ROM and strength within normal limits.     Sensory   Vibration: intact  Proprioception: intact  Monofilament testing:  intact    Vascular  Capillary refills: < 3 seconds  The left DP pulse is 2+. The left PT pulse is 2+.     Assign Risk Category  No deformity present  No loss of protective sensation  No weak pulses  Risk: 0       Past Medical History:   Diagnosis Date   • Anxiety     only when flying   • Chronic frontal sinusitis     last assessed 03/02/2016   • COVID-19 virus infection 4/1/2020   • Diabetes mellitus (HCC)    • Fracture of great toe     last assessed 11/06/2014   • GERD (gastroesophageal reflux disease)    • Hyperlipidemia    • Hypertension    • Palpitations     last assessed 11/06/2012   • RUQ abdominal pain 9/24/2021       Family History   Problem Relation Age of Onset   • Diabetes Mother    • Liver cancer Mother    • Hypertension Mother    • Colon cancer Father 68       Past Surgical History:   Procedure Laterality Date   • COLONOSCOPY     • HERNIA REPAIR Bilateral    • ID MNPJ W/ANES SHOULDER JT APPL FIXATION APPARATUS Right 4/3/2017    Procedure: SHOULDER MANIPULATION UNDER ANESTHESIA ;  Surgeon: Berto Leon MD;  [facility];  Service: Orthopedics   • UPPER GASTROINTESTINAL ENDOSCOPY          reports that he has quit smoking. His smoking use included cigars. He has never used smokeless tobacco. He reports current alcohol use. He reports that he does not use drugs.      Current Outpatient Medications:   •  ALPRAZolam (XANAX) 0.25 mg tablet, Take 1 tablet (0.25 mg total) by mouth every 8 (eight) hours as needed for anxiety, Disp: 30 tablet, Rfl: 0  •  aspirin 81 mg chewable tablet, Adult Aspirin Low Strength 81 MG CHEW 1 PO Q D  Quantity: 0;  Refills: 0    [employer], Provider M.D.;  Started 11-Oct-2007 Active, Disp: , Rfl:   •  azelastine (ASTELIN) 0.1 % nasal spray, 2 sprays into each nostril 2 (two) times a day Use in each nostril as directed, Disp: 30 mL, Rfl: 0  •  CHOLECALCIFEROL PO, Take 1,000 Units by mouth, Disp: , Rfl:   •  dapagliflozin (Farxiga) 10 MG tablet, Take 1 tablet (10 mg total) by  mouth daily, Disp: 90 tablet, Rfl: 1  •  esomeprazole (NexIUM) 40 MG capsule, Take 1 capsule (40 mg total) by mouth daily, Disp: 90 capsule, Rfl: 1  •  fexofenadine (ALLEGRA) 180 MG tablet, Take 1 tablet (180 mg total) by mouth daily, Disp: 30 tablet, Rfl: 5  •  glipiZIDE (GLUCOTROL XL) 10 mg 24 hr tablet, Take 1 tablet (10 mg total) by mouth daily, Disp: 90 tablet, Rfl: 0  •  glucose blood test strip, Test 1 Squirt 2 (two) times a day  , Disp: , Rfl:   •  Icosapent Ethyl (Vascepa) 1 g CAPS, Take 2 capsules (2 g total) by mouth 2 (two) times a day, Disp: 360 capsule, Rfl: 3  •  linaGLIPtin (Tradjenta) 5 MG TABS, Take 5 mg by mouth daily, Disp: 90 tablet, Rfl: 1  •  lisinopril (ZESTRIL) 5 mg tablet, Take 1 tablet (5 mg total) by mouth daily, Disp: 90 tablet, Rfl: 1  •  meloxicam (MOBIC) 15 mg tablet, Take 1 tablet (15 mg total) by mouth daily, Disp: 90 tablet, Rfl: 1  •  metFORMIN (GLUCOPHAGE-XR) 500 mg 24 hr tablet, Take 2 tablets (1,000 mg total) by mouth 2 (two) times a day with meals, Disp: 360 tablet, Rfl: 3  •  montelukast (SINGULAIR) 10 mg tablet, Take 1 tablet (10 mg total) by mouth daily at bedtime, Disp: 90 tablet, Rfl: 0  •  Phytonadione (K 100) 100 MCG TABS, Take 1 tablet (100 mcg total) by mouth daily, Disp: 100 tablet, Rfl: 0  •  pioglitazone (ACTOS) 45 mg tablet, Take 1 tablet (45 mg total) by mouth daily, Disp: 90 tablet, Rfl: 3  •  quinapril (ACCUPRIL) 5 mg tablet, Take 1 tablet (5 mg total) by mouth daily, Disp: 90 tablet, Rfl: 0  •  rosuvastatin (CRESTOR) 10 MG tablet, Take 1 tablet (10 mg total) by mouth daily, Disp: 90 tablet, Rfl: 0  •  sucralfate (CARAFATE) 1 g tablet, Take 1 tablet (1 g total) by mouth 2 (two) times a day, Disp: 60 tablet, Rfl: 1  •  vardenafil (LEVITRA) 20 MG tablet, Take 1 tablet (20 mg total) by mouth daily as needed for erectile dysfunction, Disp: 10 tablet, Rfl: 0    The following portions of the patient's history were reviewed and updated as appropriate: allergies,  "current medications, past family history, past medical history, past social history, past surgical history and problem list.    Review of Systems   Constitutional: Negative.    HENT:  Positive for congestion and tinnitus.    Eyes: Negative.    Respiratory: Negative.     Cardiovascular: Negative.    Gastrointestinal:  Positive for abdominal pain. Negative for constipation, diarrhea, nausea and vomiting.   Endocrine: Negative.    Genitourinary: Negative.    Musculoskeletal: Negative.    Skin: Negative.         Skin lesion still on the right side of his nose   Allergic/Immunologic: Negative.    Neurological: Negative.    Hematological: Negative.    Psychiatric/Behavioral: Negative.     All other systems reviewed and are negative.          Objective:    /78   Pulse 95   Ht 5' 6\" (1.676 m)   Wt 77.1 kg (170 lb)   SpO2 98%   BMI 27.44 kg/m²      Physical Exam  Vitals and nursing note reviewed.   Constitutional:       General: He is not in acute distress.     Appearance: Normal appearance. He is well-developed and normal weight. He is not ill-appearing.   HENT:      Head: Normocephalic and atraumatic.      Right Ear: Tympanic membrane, ear canal and external ear normal.      Left Ear: Tympanic membrane, ear canal and external ear normal.      Nose: Nose normal.      Mouth/Throat:      Mouth: Mucous membranes are moist.   Eyes:      Extraocular Movements: Extraocular movements intact.      Conjunctiva/sclera: Conjunctivae normal.      Pupils: Pupils are equal, round, and reactive to light.   Cardiovascular:      Rate and Rhythm: Normal rate and regular rhythm.      Pulses: Normal pulses. no weak pulses.           Dorsalis pedis pulses are 2+ on the right side and 2+ on the left side.        Posterior tibial pulses are 2+ on the right side and 2+ on the left side.      Heart sounds: Normal heart sounds. No murmur heard.  Pulmonary:      Effort: Pulmonary effort is normal.      Breath sounds: Normal breath sounds. "   Abdominal:      General: Abdomen is flat. Bowel sounds are normal. There is no distension.      Palpations: Abdomen is soft. There is no mass.      Tenderness: There is abdominal tenderness (Right upper quadrant). There is no guarding or rebound.      Hernia: No hernia is present.   Musculoskeletal:         General: Normal range of motion.      Cervical back: Normal range of motion and neck supple.   Feet:      Right foot:      Skin integrity: No ulcer, skin breakdown, erythema, warmth, callus or dry skin.      Left foot:      Skin integrity: No ulcer, skin breakdown, erythema, warmth, callus or dry skin.   Skin:     General: Skin is warm and dry.      Findings: Lesion present.   Neurological:      General: No focal deficit present.      Mental Status: He is alert and oriented to person, place, and time.   Psychiatric:         Mood and Affect: Mood normal.         Behavior: Behavior normal.         Thought Content: Thought content normal.         Judgment: Judgment normal.           Recent Results (from the past 1008 hour(s))   CBC and differential    Collection Time: 02/28/24  7:31 AM   Result Value Ref Range    WBC 6.27 4.31 - 10.16 Thousand/uL    RBC 5.75 (H) 3.88 - 5.62 Million/uL    Hemoglobin 14.8 12.0 - 17.0 g/dL    Hematocrit 47.0 36.5 - 49.3 %    MCV 82 82 - 98 fL    MCH 25.7 (L) 26.8 - 34.3 pg    MCHC 31.5 31.4 - 37.4 g/dL    RDW 13.4 11.6 - 15.1 %    MPV 10.2 8.9 - 12.7 fL    Platelets 260 149 - 390 Thousands/uL    nRBC 0 /100 WBCs    Neutrophils Relative 57 43 - 75 %    Immat GRANS % 1 0 - 2 %    Lymphocytes Relative 29 14 - 44 %    Monocytes Relative 8 4 - 12 %    Eosinophils Relative 4 0 - 6 %    Basophils Relative 1 0 - 1 %    Neutrophils Absolute 3.65 1.85 - 7.62 Thousands/µL    Immature Grans Absolute 0.03 0.00 - 0.20 Thousand/uL    Lymphocytes Absolute 1.79 0.60 - 4.47 Thousands/µL    Monocytes Absolute 0.52 0.17 - 1.22 Thousand/µL    Eosinophils Absolute 0.22 0.00 - 0.61 Thousand/µL    Basophils  Absolute 0.06 0.00 - 0.10 Thousands/µL   Comprehensive metabolic panel    Collection Time: 02/28/24  7:31 AM   Result Value Ref Range    Sodium 135 135 - 147 mmol/L    Potassium 4.4 3.5 - 5.3 mmol/L    Chloride 101 96 - 108 mmol/L    CO2 27 21 - 32 mmol/L    ANION GAP 7 mmol/L    BUN 27 (H) 5 - 25 mg/dL    Creatinine 0.81 0.60 - 1.30 mg/dL    Glucose, Fasting 226 (H) 65 - 99 mg/dL    Calcium 9.5 8.4 - 10.2 mg/dL    AST 12 (L) 13 - 39 U/L    ALT 16 7 - 52 U/L    Alkaline Phosphatase 56 34 - 104 U/L    Total Protein 7.1 6.4 - 8.4 g/dL    Albumin 4.6 3.5 - 5.0 g/dL    Total Bilirubin 0.41 0.20 - 1.00 mg/dL    eGFR 102 ml/min/1.73sq m   Hemoglobin A1C    Collection Time: 02/28/24  7:31 AM   Result Value Ref Range    Hemoglobin A1C 8.4 (H) Normal 4.0-5.6%; PreDiabetic 5.7-6.4%; Diabetic >=6.5%; Glycemic control for adults with diabetes <7.0% %     mg/dl   Lipid panel    Collection Time: 02/28/24  7:31 AM   Result Value Ref Range    Cholesterol 133 See Comment mg/dL    Triglycerides 128 See Comment mg/dL    HDL, Direct 39 (L) >=40 mg/dL    LDL Calculated 68 0 - 100 mg/dL    Non-HDL-Chol (CHOL-HDL) 94 mg/dl   Albumin / creatinine urine ratio    Collection Time: 02/28/24  7:31 AM   Result Value Ref Range    Creatinine, Ur 44.9 Reference range not established. mg/dL    Albumin,U,Random <7.0 <20.0 mg/L    Albumin Creat Ratio <16 0 - 30 mg/g creatinine   TSH, 3rd generation with Free T4 reflex    Collection Time: 02/28/24  7:31 AM   Result Value Ref Range    TSH 3RD GENERATON 2.753 0.450 - 4.500 uIU/mL   Vitamin D 1,25 dihydroxy    Collection Time: 02/28/24  7:31 AM   Result Value Ref Range    Vit D, 1,25-Dihydroxy 38.2 24.8 - 81.5 pg/mL   PSA, Total Screen    Collection Time: 02/28/24  7:31 AM   Result Value Ref Range    PSA 1.16 0.00 - 4.00 ng/mL       Assessment/Plan:    Type 2 diabetes mellitus with hyperosmolarity without coma, without long-term current use of insulin (HCC)    Lab Results   Component Value Date     HGBA1C 8.4 (H) 02/28/2024     Unfortunately diabetes is still not ideally controlled.  He has never been less than 7.5%.  He is maximized on oral medications.  He could not tolerate GLP-1 agonist which caused him severe diarrhea.  I am providing referral to endocrinology for additional suggestions.  Unfortunately he is too young and has been diabetic for 2 longer period of time.  He is eventually going to have to go onto insulin therapy which she understands but was just trying to avoid    Essential hypertension  Remains on lisinopril 5 mg once daily.  Hypertension is well-controlled    Lesion of skin of nose  Hyperkeratotic lesion.  Scheduled with [facility] dermatology for July.  That was requested 4 months ago.  Referral to Dr. Rachel GAMA abdominal tenderness  Check right upper quadrant ultrasound.  If gallbladder is structurally normal then he will likely need to be sent for HIDA scan to make certain that he does not have biliary dyskinesia which is a possibility with being a diabetic for so long    Mixed hyperlipidemia  His cholesterol really remains very well-controlled on Crestor 10 mg once daily.  Total cholesterol 133 and an LDL of 68.  Great numbers.  Repeat labs in 4 months          Problem List Items Addressed This Visit        Endocrine    Type 2 diabetes mellitus with hyperosmolarity without coma, without long-term current use of insulin (HCC) - Primary       Lab Results   Component Value Date    HGBA1C 8.4 (H) 02/28/2024     Unfortunately diabetes is still not ideally controlled.  He has never been less than 7.5%.  He is maximized on oral medications.  He could not tolerate GLP-1 agonist which caused him severe diarrhea.  I am providing referral to endocrinology for additional suggestions.  Unfortunately he is too young and has been diabetic for 2 longer period of time.  He is eventually going to have to go onto insulin therapy which she understands but was just trying to avoid         Relevant Orders     Ambulatory Referral to Endocrinology    Comprehensive metabolic panel    Hemoglobin A1C    Albumin / creatinine urine ratio       Cardiovascular and Mediastinum    Essential hypertension (Chronic)     Remains on lisinopril 5 mg once daily.  Hypertension is well-controlled         Relevant Orders    CBC and differential    TSH, 3rd generation with Free T4 reflex       Musculoskeletal and Integument    Lesion of skin of nose     Hyperkeratotic lesion.  Scheduled with [facility] dermatology for July.  That was requested 4 months ago.  Referral to Dr. Ramos         Relevant Orders    Ambulatory Referral to Dermatology       Other    Mixed hyperlipidemia     His cholesterol really remains very well-controlled on Crestor 10 mg once daily.  Total cholesterol 133 and an LDL of 68.  Great numbers.  Repeat labs in 4 months         Relevant Orders    Lipid panel    Nasal congestion    RUQ abdominal tenderness     Check right upper quadrant ultrasound.  If gallbladder is structurally normal then he will likely need to be sent for HIDA scan to make certain that he does not have biliary dyskinesia which is a possibility with being a diabetic for so long         Relevant Orders    US right upper quadrant    Vitamin D deficiency

## 2024-03-06 NOTE — ASSESSMENT & PLAN NOTE
Hyperkeratotic lesion.  Scheduled with [facility] dermatology for July.  That was requested 4 months ago.  Referral to Dr. Ramos

## 2024-03-06 NOTE — ASSESSMENT & PLAN NOTE
Check right upper quadrant ultrasound.  If gallbladder is structurally normal then he will likely need to be sent for HIDA scan to make certain that he does not have biliary dyskinesia which is a possibility with being a diabetic for so long

## 2024-03-20 ENCOUNTER — HOSPITAL ENCOUNTER (OUTPATIENT)
Dept: ULTRASOUND IMAGING | Facility: HOSPITAL | Age: 54
Discharge: HOME | End: 2024-03-20
Payer: COMMERCIAL

## 2024-03-20 DIAGNOSIS — R10.811 RIGHT UPPER QUADRANT ABDOMINAL TENDERNESS WITHOUT REBOUND TENDERNESS: ICD-10-CM

## 2024-03-20 PROCEDURE — 76705 ECHO EXAM OF ABDOMEN: CPT

## 2024-03-24 DIAGNOSIS — E11.65 TYPE 2 DIABETES MELLITUS WITH HYPERGLYCEMIA, WITHOUT LONG-TERM CURRENT USE OF INSULIN (HCC): Chronic | ICD-10-CM

## 2024-03-24 RX ORDER — DAPAGLIFLOZIN 10 MG/1
10 TABLET, FILM COATED ORAL DAILY
Qty: 90 TABLET | Refills: 1 | Status: SHIPPED | OUTPATIENT
Start: 2024-03-24

## 2024-03-28 ENCOUNTER — TELEPHONE (OUTPATIENT)
Dept: FAMILY MEDICINE CLINIC | Facility: CLINIC | Age: 54
End: 2024-03-28

## 2024-03-28 ENCOUNTER — TELEPHONE (OUTPATIENT)
Age: 54
End: 2024-03-28

## 2024-03-28 DIAGNOSIS — N28.9 RENAL LESION: Primary | ICD-10-CM

## 2024-03-28 NOTE — TELEPHONE ENCOUNTER
----- Message from George Lilly DO sent at 3/28/2024  8:39 AM EDT -----  Please call the patient regarding his abnormal result.  Gallbladder appears to be normal.  Incidental finding of a  spot on the right kidney which needs to be evaluated further with a CT scan.  I have placed order for that CT scan

## 2024-04-04 DIAGNOSIS — N28.9 RENAL LESION: Primary | ICD-10-CM

## 2024-04-09 DIAGNOSIS — N28.9 RENAL LESION: Primary | ICD-10-CM

## 2024-04-15 ENCOUNTER — TELEPHONE (OUTPATIENT)
Age: 54
End: 2024-04-15

## 2024-04-15 NOTE — TELEPHONE ENCOUNTER
Baylee from [facility] on behalf of [employer] called and needs the order for this pts CT with and without contrast to be faxed over to [facility] at [phone]  Ty

## 2024-04-19 DIAGNOSIS — R10.811 RIGHT UPPER QUADRANT ABDOMINAL TENDERNESS WITHOUT REBOUND TENDERNESS: Primary | ICD-10-CM

## 2024-04-26 ENCOUNTER — TELEPHONE (OUTPATIENT)
Dept: ENDOCRINOLOGY | Facility: CLINIC | Age: 54
End: 2024-04-26

## 2024-05-02 DIAGNOSIS — M75.102 ROTATOR CUFF SYNDROME OF LEFT SHOULDER: ICD-10-CM

## 2024-05-02 DIAGNOSIS — J30.9 ALLERGIC RHINITIS: ICD-10-CM

## 2024-05-03 RX ORDER — MELOXICAM 15 MG/1
15 TABLET ORAL DAILY
Qty: 90 TABLET | Refills: 0 | Status: SHIPPED | OUTPATIENT
Start: 2024-05-03

## 2024-05-03 RX ORDER — MONTELUKAST SODIUM 10 MG/1
10 TABLET ORAL
Qty: 90 TABLET | Refills: 1 | Status: SHIPPED | OUTPATIENT
Start: 2024-05-03

## 2024-05-09 ENCOUNTER — TELEPHONE (OUTPATIENT)
Dept: FAMILY MEDICINE CLINIC | Facility: CLINIC | Age: 54
End: 2024-05-09

## 2024-05-09 ENCOUNTER — HOSPITAL ENCOUNTER (OUTPATIENT)
Dept: NUCLEAR MEDICINE | Facility: HOSPITAL | Age: 54
Discharge: HOME | End: 2024-05-09
Payer: COMMERCIAL

## 2024-05-09 DIAGNOSIS — R10.811 RIGHT UPPER QUADRANT ABDOMINAL TENDERNESS WITHOUT REBOUND TENDERNESS: ICD-10-CM

## 2024-05-09 PROCEDURE — A9537 TC99M MEBROFENIN: HCPCS

## 2024-05-09 PROCEDURE — 78227 HEPATOBIL SYST IMAGE W/DRUG: CPT

## 2024-05-09 RX ORDER — SINCALIDE 5 UG/5ML
0.02 INJECTION, POWDER, LYOPHILIZED, FOR SOLUTION INTRAVENOUS ONCE
Status: COMPLETED | OUTPATIENT
Start: 2024-05-09 | End: 2024-05-09

## 2024-05-09 RX ADMIN — SINCALIDE 1.5 MCG: 5 INJECTION, POWDER, LYOPHILIZED, FOR SOLUTION INTRAVENOUS at 10:06

## 2024-05-09 NOTE — TELEPHONE ENCOUNTER
----- Message from George Lilly DO sent at 5/9/2024  2:28 PM EDT -----  Please call patient and notify that results are normal.  No evidence of biliary disease.  His gallbladder ejection fraction is completely normal.  Gallbladder has been ruled out as a cause for right upper quadrant abdominal discomfort.

## 2024-05-09 NOTE — RESULT ENCOUNTER NOTE
Please call patient and notify that results are normal.  No evidence of biliary disease.  His gallbladder ejection fraction is completely normal.  Gallbladder has been ruled out as a cause for right upper quadrant abdominal discomfort.

## 2024-05-12 ENCOUNTER — PATIENT MESSAGE (OUTPATIENT)
Dept: FAMILY MEDICINE CLINIC | Facility: CLINIC | Age: 54
End: 2024-05-12

## 2024-05-12 DIAGNOSIS — E11.9 TYPE 2 DIABETES MELLITUS WITHOUT COMPLICATION, WITHOUT LONG-TERM CURRENT USE OF INSULIN (HCC): Chronic | ICD-10-CM

## 2024-05-12 RX ORDER — METFORMIN HYDROCHLORIDE 500 MG/1
1000 TABLET, EXTENDED RELEASE ORAL 2 TIMES DAILY WITH MEALS
Qty: 360 TABLET | Refills: 1 | Status: SHIPPED | OUTPATIENT
Start: 2024-05-12

## 2024-05-20 DIAGNOSIS — K21.9 GASTROESOPHAGEAL REFLUX DISEASE: ICD-10-CM

## 2024-05-20 DIAGNOSIS — E11.9 TYPE 2 DIABETES MELLITUS WITHOUT COMPLICATION, WITHOUT LONG-TERM CURRENT USE OF INSULIN (HCC): Chronic | ICD-10-CM

## 2024-05-20 DIAGNOSIS — E11.9 TYPE 2 DIABETES MELLITUS WITHOUT COMPLICATION, WITHOUT LONG-TERM CURRENT USE OF INSULIN (HCC): ICD-10-CM

## 2024-05-21 RX ORDER — ESOMEPRAZOLE MAGNESIUM 40 MG/1
40 CAPSULE, DELAYED RELEASE ORAL DAILY
Qty: 90 CAPSULE | Refills: 1 | Status: SHIPPED | OUTPATIENT
Start: 2024-05-21

## 2024-05-21 RX ORDER — LINAGLIPTIN 5 MG/1
5 TABLET, FILM COATED ORAL DAILY
Qty: 90 TABLET | Refills: 1 | Status: SHIPPED | OUTPATIENT
Start: 2024-05-21

## 2024-05-21 RX ORDER — PIOGLITAZONEHYDROCHLORIDE 45 MG/1
45 TABLET ORAL DAILY
Qty: 90 TABLET | Refills: 1 | Status: SHIPPED | OUTPATIENT
Start: 2024-05-21

## 2024-05-29 ENCOUNTER — OFFICE VISIT (OUTPATIENT)
Dept: FAMILY MEDICINE CLINIC | Facility: CLINIC | Age: 54
End: 2024-05-29
Payer: COMMERCIAL

## 2024-05-29 VITALS
HEIGHT: 66 IN | SYSTOLIC BLOOD PRESSURE: 128 MMHG | BODY MASS INDEX: 27.64 KG/M2 | WEIGHT: 172 LBS | OXYGEN SATURATION: 98 % | DIASTOLIC BLOOD PRESSURE: 68 MMHG | RESPIRATION RATE: 16 BRPM

## 2024-05-29 DIAGNOSIS — R10.84 GENERALIZED ABDOMINAL PAIN: ICD-10-CM

## 2024-05-29 DIAGNOSIS — L85.8: ICD-10-CM

## 2024-05-29 DIAGNOSIS — R10.811 RIGHT UPPER QUADRANT ABDOMINAL TENDERNESS WITHOUT REBOUND TENDERNESS: Primary | ICD-10-CM

## 2024-05-29 DIAGNOSIS — K21.9 GASTROESOPHAGEAL REFLUX DISEASE WITHOUT ESOPHAGITIS: ICD-10-CM

## 2024-05-29 PROCEDURE — 99214 OFFICE O/P EST MOD 30 MIN: CPT | Performed by: FAMILY MEDICINE

## 2024-05-29 RX ORDER — SUCRALFATE 1 G/1
1 TABLET ORAL 2 TIMES DAILY
Qty: 60 TABLET | Refills: 1 | Status: SHIPPED | OUTPATIENT
Start: 2024-05-29

## 2024-05-29 NOTE — ASSESSMENT & PLAN NOTE
Patient does have history of hiatal hernia.  Remains on Nexium 40 mg once daily.    Last EGD was performed in 2021

## 2024-05-29 NOTE — ASSESSMENT & PLAN NOTE
Lesion has the appearance of keratoacanthoma    -Referred back to dermatology for further evaluation

## 2024-05-29 NOTE — ASSESSMENT & PLAN NOTE
Right upper quadrant ultrasound negative, HIDA scan negative    Will try placing patient on Carafate 1 g twice daily to see if coating agent will help with his symptoms.  He remains on proton pump inhibitor    If Carafate provides no relief then I may refer back to gastroenterology for consultation and recommendations

## 2024-05-29 NOTE — PROGRESS NOTES
Subjective:      Patient ID: Milton Orozco is a 53 y.o. male.    Patient presents for follow-up regarding right upper quadrant abdominal discomfort that radiates towards his back.  Right upper quadrant ultrasound and HIDA scan were both normal.  Patient does not necessarily get this discomfort while he is eating.  Had EGD in 2021 which showed 2 cm hiatal hernia with irregular Z-line.  Patient does take meloxicam 15 mg on a daily basis which she has been doing for some time.  He will note that he will have a cough as though it feels as though something is coming off.  He does take Nexium 40 mg daily.  Not scheduled for repeat upper endoscopy until 2026.  Not what he would consider overt pain.  He is supposed to go to [facility] for whole-body evaluation including whole-body CT scan.  In the past the patient was on Carafate.  Normal bowel movements.  No diarrhea.  No constipation    Patient also noted raised scaly lesion on his left shoulder        Past Medical History:   Diagnosis Date   • Anxiety     only when flying   • Chronic frontal sinusitis     last assessed 03/02/2016   • COVID-19 virus infection 4/1/2020   • Diabetes mellitus (HCC)    • Fracture of great toe     last assessed 11/06/2014   • GERD (gastroesophageal reflux disease)    • Hyperlipidemia    • Hypertension    • Palpitations     last assessed 11/06/2012   • RUQ abdominal pain 9/24/2021       Family History   Problem Relation Age of Onset   • Diabetes Mother    • Liver cancer Mother    • Hypertension Mother    • Colon cancer Father 68       Past Surgical History:   Procedure Laterality Date   • COLONOSCOPY     • HERNIA REPAIR Bilateral    • OR MNPJ W/ANES SHOULDER JT APPL FIXATION APPARATUS Right 4/3/2017    Procedure: SHOULDER MANIPULATION UNDER ANESTHESIA ;  Surgeon: Berto Leon MD;  Location: [facility];  Service: Orthopedics   • UPPER GASTROINTESTINAL ENDOSCOPY          reports that he has quit smoking. His smoking use  included cigars. He has never used smokeless tobacco. He reports current alcohol use. He reports that he does not use drugs.      Current Outpatient Medications:   •  ALPRAZolam (XANAX) 0.25 mg tablet, Take 1 tablet (0.25 mg total) by mouth every 8 (eight) hours as needed for anxiety, Disp: 30 tablet, Rfl: 0  •  aspirin 81 mg chewable tablet, Adult Aspirin Low Strength 81 MG CHEW 1 PO Q D  Quantity: 0;  Refills: 0    Allscrivenessa, Ilana CHAMBERS;  Started 11-Oct-2007 Active, Disp: , Rfl:   •  azelastine (ASTELIN) 0.1 % nasal spray, 2 sprays into each nostril 2 (two) times a day Use in each nostril as directed, Disp: 30 mL, Rfl: 0  •  CHOLECALCIFEROL PO, Take 1,000 Units by mouth, Disp: , Rfl:   •  esomeprazole (NexIUM) 40 MG capsule, Take 1 capsule (40 mg total) by mouth daily, Disp: 90 capsule, Rfl: 1  •  Farxiga 10 MG tablet, TAKE ONE TABLET BY MOUTH EVERY DAY, Disp: 90 tablet, Rfl: 1  •  fexofenadine (ALLEGRA) 180 MG tablet, Take 1 tablet (180 mg total) by mouth daily, Disp: 30 tablet, Rfl: 5  •  glipiZIDE (GLUCOTROL XL) 10 mg 24 hr tablet, Take 1 tablet (10 mg total) by mouth daily, Disp: 90 tablet, Rfl: 0  •  glucose blood test strip, Test 1 Squirt 2 (two) times a day  , Disp: , Rfl:   •  Icosapent Ethyl (Vascepa) 1 g CAPS, Take 2 capsules (2 g total) by mouth 2 (two) times a day, Disp: 360 capsule, Rfl: 3  •  linaGLIPtin (Tradjenta) 5 MG TABS, Take 5 mg by mouth daily, Disp: 90 tablet, Rfl: 1  •  lisinopril (ZESTRIL) 5 mg tablet, Take 1 tablet (5 mg total) by mouth daily, Disp: 90 tablet, Rfl: 1  •  meloxicam (MOBIC) 15 mg tablet, Take 1 tablet (15 mg total) by mouth daily, Disp: 90 tablet, Rfl: 0  •  metFORMIN (GLUCOPHAGE-XR) 500 mg 24 hr tablet, Take 2 tablets (1,000 mg total) by mouth 2 (two) times a day with meals, Disp: 360 tablet, Rfl: 1  •  montelukast (SINGULAIR) 10 mg tablet, Take 1 tablet (10 mg total) by mouth daily at bedtime, Disp: 90 tablet, Rfl: 1  •  Phytonadione (K 100) 100 MCG TABS, Take 1  "tablet (100 mcg total) by mouth daily, Disp: 100 tablet, Rfl: 0  •  pioglitazone (ACTOS) 45 mg tablet, Take 1 tablet (45 mg total) by mouth daily, Disp: 90 tablet, Rfl: 1  •  quinapril (ACCUPRIL) 5 mg tablet, Take 1 tablet (5 mg total) by mouth daily, Disp: 90 tablet, Rfl: 0  •  rosuvastatin (CRESTOR) 10 MG tablet, Take 1 tablet (10 mg total) by mouth daily, Disp: 90 tablet, Rfl: 0  •  sucralfate (CARAFATE) 1 g tablet, Take 1 tablet (1 g total) by mouth 2 (two) times a day, Disp: 60 tablet, Rfl: 1  •  vardenafil (LEVITRA) 20 MG tablet, Take 1 tablet (20 mg total) by mouth daily as needed for erectile dysfunction, Disp: 10 tablet, Rfl: 0    The following portions of the patient's history were reviewed and updated as appropriate: allergies, current medications, past family history, past medical history, past social history, past surgical history and problem list.    Review of Systems   Constitutional: Negative.    HENT:  Negative for congestion and tinnitus.    Eyes: Negative.    Respiratory: Negative.     Cardiovascular: Negative.    Gastrointestinal:  Positive for abdominal pain. Negative for constipation, diarrhea, nausea and vomiting.   Endocrine: Negative.    Genitourinary: Negative.    Musculoskeletal: Negative.    Skin: Negative.         Raised lesion on the left shoulder   Allergic/Immunologic: Negative.    Neurological: Negative.    Hematological: Negative.    Psychiatric/Behavioral: Negative.     All other systems reviewed and are negative.          Objective:    /68   Resp 16   Ht 5' 6\" (1.676 m)   Wt 78 kg (172 lb)   SpO2 98%   BMI 27.76 kg/m²      Physical Exam  Vitals and nursing note reviewed.   Constitutional:       General: He is not in acute distress.     Appearance: Normal appearance. He is well-developed and normal weight. He is not ill-appearing.   HENT:      Head: Normocephalic and atraumatic.   Cardiovascular:      Pulses:           Dorsalis pedis pulses are 2+ on the right side and " 2+ on the left side.        Posterior tibial pulses are 2+ on the right side and 2+ on the left side.      Heart sounds: No murmur heard.  Pulmonary:      Effort: Pulmonary effort is normal.      Breath sounds: Normal breath sounds.   Abdominal:      General: Abdomen is flat. Bowel sounds are normal. There is no distension.      Palpations: Abdomen is soft. There is no mass.      Tenderness: There is abdominal tenderness (Right upper quadrant). There is no guarding or rebound.      Hernia: No hernia is present.   Feet:      Right foot:      Skin integrity: No ulcer, skin breakdown, erythema, warmth, callus or dry skin.      Left foot:      Skin integrity: No ulcer, skin breakdown, erythema, warmth, callus or dry skin.   Skin:     General: Skin is warm and dry.      Findings: Lesion (Hyperkeratotic raised lesion on the left shoulder which appears to be a keratoacanthoma) present.   Neurological:      General: No focal deficit present.      Mental Status: He is alert and oriented to person, place, and time.   Psychiatric:         Mood and Affect: Mood normal.         Behavior: Behavior normal.         Thought Content: Thought content normal.         Judgment: Judgment normal.          Media Information      Document Information    Clinical Image - Mobile Device   Left shoulder KA   05/29/2024 11:22 AM   Attached To:   Office Visit on 5/29/24 with George Lilly DO   Source Information    George Lilly DO  [facility]   Document History        No results found for this or any previous visit (from the past 1008 hour(s)).    Assessment/Plan:    Keratoacanthoma of shoulder  Lesion has the appearance of keratoacanthoma    -Referred back to dermatology for further evaluation    Gastroesophageal reflux disease without esophagitis  Patient does have history of hiatal hernia.  Remains on Nexium 40 mg once daily.    Last EGD was performed in 2021    RUQ abdominal tenderness  Right upper quadrant ultrasound negative, HIDA scan  negative    Will try placing patient on Carafate 1 g twice daily to see if coating agent will help with his symptoms.  He remains on proton pump inhibitor    If Carafate provides no relief then I may refer back to gastroenterology for consultation and recommendations          Problem List Items Addressed This Visit        Digestive    Gastroesophageal reflux disease without esophagitis     Patient does have history of hiatal hernia.  Remains on Nexium 40 mg once daily.    Last EGD was performed in 2021         Relevant Medications    sucralfate (CARAFATE) 1 g tablet       Musculoskeletal and Integument    Keratoacanthoma of shoulder     Lesion has the appearance of keratoacanthoma    -Referred back to dermatology for further evaluation         Relevant Orders    Ambulatory Referral to Dermatology       Other    RUQ abdominal tenderness - Primary     Right upper quadrant ultrasound negative, HIDA scan negative    Will try placing patient on Carafate 1 g twice daily to see if coating agent will help with his symptoms.  He remains on proton pump inhibitor    If Carafate provides no relief then I may refer back to gastroenterology for consultation and recommendations        Other Visit Diagnoses     Generalized abdominal pain        Relevant Medications    sucralfate (CARAFATE) 1 g tablet

## 2024-05-31 ENCOUNTER — TELEPHONE (OUTPATIENT)
Dept: FAMILY MEDICINE CLINIC | Facility: CLINIC | Age: 54
End: 2024-05-31

## 2024-06-05 DIAGNOSIS — E11.00 TYPE 2 DIABETES MELLITUS WITH HYPEROSMOLARITY WITHOUT COMA, WITHOUT LONG-TERM CURRENT USE OF INSULIN (HCC): ICD-10-CM

## 2024-06-05 DIAGNOSIS — E78.5 HYPERLIPIDEMIA, UNSPECIFIED HYPERLIPIDEMIA TYPE: ICD-10-CM

## 2024-06-06 RX ORDER — ROSUVASTATIN CALCIUM 10 MG/1
10 TABLET, COATED ORAL DAILY
Qty: 90 TABLET | Refills: 1 | Status: SHIPPED | OUTPATIENT
Start: 2024-06-06

## 2024-06-06 RX ORDER — GLIPIZIDE 10 MG/1
10 TABLET, FILM COATED, EXTENDED RELEASE ORAL DAILY
Qty: 90 TABLET | Refills: 1 | Status: SHIPPED | OUTPATIENT
Start: 2024-06-06

## 2024-06-19 DIAGNOSIS — I10 PRIMARY HYPERTENSION: ICD-10-CM

## 2024-06-19 RX ORDER — LISINOPRIL 5 MG/1
5 TABLET ORAL DAILY
Qty: 90 TABLET | Refills: 1 | Status: SHIPPED | OUTPATIENT
Start: 2024-06-19

## 2024-06-24 DIAGNOSIS — E87.6 HYPOKALEMIA: ICD-10-CM

## 2024-06-24 DIAGNOSIS — E78.2 MIXED HYPERLIPIDEMIA: ICD-10-CM

## 2024-06-24 DIAGNOSIS — E11.65 TYPE 2 DIABETES MELLITUS WITH HYPERGLYCEMIA, WITHOUT LONG-TERM CURRENT USE OF INSULIN (HCC): Chronic | ICD-10-CM

## 2024-06-24 RX ORDER — ICOSAPENT ETHYL 1 G/1
2 CAPSULE ORAL 2 TIMES DAILY
Qty: 360 CAPSULE | Refills: 0 | Status: CANCELLED | OUTPATIENT
Start: 2024-06-24

## 2024-06-24 RX ORDER — DAPAGLIFLOZIN 10 MG/1
10 TABLET, FILM COATED ORAL DAILY
Qty: 90 TABLET | Refills: 1 | Status: SHIPPED | OUTPATIENT
Start: 2024-06-24

## 2024-06-25 ENCOUNTER — TELEPHONE (OUTPATIENT)
Age: 54
End: 2024-06-25

## 2024-06-25 RX ORDER — ZINC GLUCONATE 100 MG
1 TABLET ORAL DAILY
Qty: 100 TABLET | Refills: 3 | Status: SHIPPED | OUTPATIENT
Start: 2024-06-25

## 2024-06-25 RX ORDER — OMEGA-3-ACID ETHYL ESTERS 1 G/1
2 CAPSULE, LIQUID FILLED ORAL 2 TIMES DAILY
Qty: 360 CAPSULE | Refills: 3 | Status: SHIPPED | OUTPATIENT
Start: 2024-06-25

## 2024-06-25 NOTE — TELEPHONE ENCOUNTER
Refill must be reviewed and completed by the office or provider. The refill is unable to be approved or denied by the medication management team.      Off-Protocol [id number]/2024 07:12 PM   Protocol Details Medication not assigned to a protocol, review manually.

## 2024-06-25 NOTE — TELEPHONE ENCOUNTER
Sury from [facility] pharmacy called requesting clarification if patient is on     omega-3-acid ethyl esters (LOVAZA) 1 g capsule   OR  Icosapent Ethyl (Vascepa) 1 g CAPS.     Please call pharmacy to confirm with PCP response.

## 2024-06-25 NOTE — TELEPHONE ENCOUNTER
Refill must be reviewed and completed by the office or provider. The refill is unable to be approved or denied by the medication management team.      Off-Protocol [id number]/24/2024 07:12 PM   Protocol Details Medication not assigned to a protocol, review manually.

## 2024-07-20 DIAGNOSIS — J30.1 SEASONAL ALLERGIC RHINITIS DUE TO POLLEN: ICD-10-CM

## 2024-07-20 RX ORDER — AZELASTINE 1 MG/ML
2 SPRAY, METERED NASAL 2 TIMES DAILY
Qty: 30 ML | Refills: 0 | Status: SHIPPED | OUTPATIENT
Start: 2024-07-20

## 2024-07-30 DIAGNOSIS — Z86.59 HISTORY OF ANXIETY: ICD-10-CM

## 2024-07-30 DIAGNOSIS — M75.102 ROTATOR CUFF SYNDROME OF LEFT SHOULDER: ICD-10-CM

## 2024-07-30 RX ORDER — MELOXICAM 15 MG/1
15 TABLET ORAL DAILY
Qty: 100 TABLET | Refills: 1 | Status: SHIPPED | OUTPATIENT
Start: 2024-07-30

## 2024-07-31 RX ORDER — ALPRAZOLAM 0.25 MG/1
0.25 TABLET ORAL EVERY 8 HOURS PRN
Qty: 30 TABLET | Refills: 0 | Status: SHIPPED | OUTPATIENT
Start: 2024-07-31

## 2024-08-11 DIAGNOSIS — E11.9 TYPE 2 DIABETES MELLITUS WITHOUT COMPLICATION, WITHOUT LONG-TERM CURRENT USE OF INSULIN (HCC): Chronic | ICD-10-CM

## 2024-08-11 RX ORDER — METFORMIN HCL 500 MG
1000 TABLET, EXTENDED RELEASE 24 HR ORAL 2 TIMES DAILY WITH MEALS
Qty: 360 TABLET | Refills: 1 | Status: SHIPPED | OUTPATIENT
Start: 2024-08-11

## 2024-08-20 DIAGNOSIS — J30.1 SEASONAL ALLERGIC RHINITIS DUE TO POLLEN: ICD-10-CM

## 2024-08-20 DIAGNOSIS — E11.9 TYPE 2 DIABETES MELLITUS WITHOUT COMPLICATION, WITHOUT LONG-TERM CURRENT USE OF INSULIN (HCC): Chronic | ICD-10-CM

## 2024-08-21 RX ORDER — AZELASTINE 1 MG/ML
2 SPRAY, METERED NASAL 2 TIMES DAILY
Qty: 30 ML | Refills: 2 | Status: SHIPPED | OUTPATIENT
Start: 2024-08-21

## 2024-08-21 RX ORDER — PIOGLITAZONEHYDROCHLORIDE 45 MG/1
45 TABLET ORAL DAILY
Qty: 90 TABLET | Refills: 1 | Status: SHIPPED | OUTPATIENT
Start: 2024-08-21

## 2024-08-24 DIAGNOSIS — K21.9 GASTROESOPHAGEAL REFLUX DISEASE: ICD-10-CM

## 2024-08-24 DIAGNOSIS — E11.9 TYPE 2 DIABETES MELLITUS WITHOUT COMPLICATION, WITHOUT LONG-TERM CURRENT USE OF INSULIN (HCC): ICD-10-CM

## 2024-08-26 RX ORDER — ESOMEPRAZOLE MAGNESIUM 40 MG/1
40 CAPSULE, DELAYED RELEASE ORAL DAILY
Qty: 90 CAPSULE | Refills: 0 | Status: SHIPPED | OUTPATIENT
Start: 2024-08-26

## 2024-08-26 RX ORDER — LINAGLIPTIN 5 MG/1
5 TABLET, FILM COATED ORAL DAILY
Qty: 90 TABLET | Refills: 0 | Status: SHIPPED | OUTPATIENT
Start: 2024-08-26

## 2024-09-07 DIAGNOSIS — E11.00 TYPE 2 DIABETES MELLITUS WITH HYPEROSMOLARITY WITHOUT COMA, WITHOUT LONG-TERM CURRENT USE OF INSULIN (HCC): ICD-10-CM

## 2024-09-07 RX ORDER — GLIPIZIDE 10 MG/1
10 TABLET, FILM COATED, EXTENDED RELEASE ORAL DAILY
Qty: 90 TABLET | Refills: 1 | Status: SHIPPED | OUTPATIENT
Start: 2024-09-07

## 2024-09-08 DIAGNOSIS — E78.5 HYPERLIPIDEMIA, UNSPECIFIED HYPERLIPIDEMIA TYPE: ICD-10-CM

## 2024-09-08 RX ORDER — ROSUVASTATIN CALCIUM 10 MG/1
10 TABLET, COATED ORAL DAILY
Qty: 90 TABLET | Refills: 1 | Status: SHIPPED | OUTPATIENT
Start: 2024-09-08 | End: 2024-09-11 | Stop reason: SDUPTHER

## 2024-09-09 ENCOUNTER — APPOINTMENT (OUTPATIENT)
Dept: LAB | Facility: CLINIC | Age: 54
End: 2024-09-09
Payer: COMMERCIAL

## 2024-09-09 DIAGNOSIS — I10 ESSENTIAL HYPERTENSION: Chronic | ICD-10-CM

## 2024-09-09 DIAGNOSIS — E11.00 TYPE 2 DIABETES MELLITUS WITH HYPEROSMOLARITY WITHOUT COMA, WITHOUT LONG-TERM CURRENT USE OF INSULIN (HCC): ICD-10-CM

## 2024-09-09 DIAGNOSIS — E78.2 MIXED HYPERLIPIDEMIA: ICD-10-CM

## 2024-09-09 LAB
ALBUMIN SERPL BCG-MCNC: 4.7 G/DL (ref 3.5–5)
ALP SERPL-CCNC: 60 U/L (ref 34–104)
ALT SERPL W P-5'-P-CCNC: 19 U/L (ref 7–52)
ANION GAP SERPL CALCULATED.3IONS-SCNC: 9 MMOL/L (ref 4–13)
AST SERPL W P-5'-P-CCNC: 12 U/L (ref 13–39)
BASOPHILS # BLD AUTO: 0.06 THOUSANDS/ÂΜL (ref 0–0.1)
BASOPHILS NFR BLD AUTO: 1 % (ref 0–1)
BILIRUB SERPL-MCNC: 0.27 MG/DL (ref 0.2–1)
BUN SERPL-MCNC: 22 MG/DL (ref 5–25)
CALCIUM SERPL-MCNC: 9.5 MG/DL (ref 8.4–10.2)
CHLORIDE SERPL-SCNC: 101 MMOL/L (ref 96–108)
CHOLEST SERPL-MCNC: 133 MG/DL
CO2 SERPL-SCNC: 27 MMOL/L (ref 21–32)
CREAT SERPL-MCNC: 0.88 MG/DL (ref 0.6–1.3)
CREAT UR-MCNC: 42.1 MG/DL
EOSINOPHIL # BLD AUTO: 0.3 THOUSAND/ÂΜL (ref 0–0.61)
EOSINOPHIL NFR BLD AUTO: 4 % (ref 0–6)
ERYTHROCYTE [DISTWIDTH] IN BLOOD BY AUTOMATED COUNT: 13.4 % (ref 11.6–15.1)
EST. AVERAGE GLUCOSE BLD GHB EST-MCNC: 180 MG/DL
GFR SERPL CREATININE-BSD FRML MDRD: 98 ML/MIN/1.73SQ M
GLUCOSE P FAST SERPL-MCNC: 241 MG/DL (ref 65–99)
HBA1C MFR BLD: 7.9 %
HCT VFR BLD AUTO: 47.5 % (ref 36.5–49.3)
HDLC SERPL-MCNC: 37 MG/DL
HGB BLD-MCNC: 15.3 G/DL (ref 12–17)
IMM GRANULOCYTES # BLD AUTO: 0.03 THOUSAND/UL (ref 0–0.2)
IMM GRANULOCYTES NFR BLD AUTO: 0 % (ref 0–2)
LDLC SERPL CALC-MCNC: 19 MG/DL (ref 0–100)
LYMPHOCYTES # BLD AUTO: 2.34 THOUSANDS/ÂΜL (ref 0.6–4.47)
LYMPHOCYTES NFR BLD AUTO: 27 % (ref 14–44)
MCH RBC QN AUTO: 26.7 PG (ref 26.8–34.3)
MCHC RBC AUTO-ENTMCNC: 32.2 G/DL (ref 31.4–37.4)
MCV RBC AUTO: 83 FL (ref 82–98)
MICROALBUMIN UR-MCNC: <7 MG/L
MONOCYTES # BLD AUTO: 0.53 THOUSAND/ÂΜL (ref 0.17–1.22)
MONOCYTES NFR BLD AUTO: 6 % (ref 4–12)
NEUTROPHILS # BLD AUTO: 5.28 THOUSANDS/ÂΜL (ref 1.85–7.62)
NEUTS SEG NFR BLD AUTO: 62 % (ref 43–75)
NONHDLC SERPL-MCNC: 96 MG/DL
NRBC BLD AUTO-RTO: 0 /100 WBCS
PLATELET # BLD AUTO: 276 THOUSANDS/UL (ref 149–390)
PMV BLD AUTO: 10.2 FL (ref 8.9–12.7)
POTASSIUM SERPL-SCNC: 4.8 MMOL/L (ref 3.5–5.3)
PROT SERPL-MCNC: 6.9 G/DL (ref 6.4–8.4)
RBC # BLD AUTO: 5.74 MILLION/UL (ref 3.88–5.62)
SODIUM SERPL-SCNC: 137 MMOL/L (ref 135–147)
TRIGL SERPL-MCNC: 383 MG/DL
TSH SERPL DL<=0.05 MIU/L-ACNC: 2.65 UIU/ML (ref 0.45–4.5)
WBC # BLD AUTO: 8.54 THOUSAND/UL (ref 4.31–10.16)

## 2024-09-09 PROCEDURE — 82043 UR ALBUMIN QUANTITATIVE: CPT

## 2024-09-09 PROCEDURE — 82570 ASSAY OF URINE CREATININE: CPT

## 2024-09-09 PROCEDURE — 84443 ASSAY THYROID STIM HORMONE: CPT

## 2024-09-09 PROCEDURE — 85025 COMPLETE CBC W/AUTO DIFF WBC: CPT

## 2024-09-09 PROCEDURE — 80061 LIPID PANEL: CPT

## 2024-09-09 PROCEDURE — 80053 COMPREHEN METABOLIC PANEL: CPT

## 2024-09-09 PROCEDURE — 83036 HEMOGLOBIN GLYCOSYLATED A1C: CPT

## 2024-09-09 PROCEDURE — 36415 COLL VENOUS BLD VENIPUNCTURE: CPT

## 2024-09-10 RX ORDER — EZETIMIBE 10 MG/1
TABLET ORAL
COMMUNITY
Start: 2024-07-19

## 2024-09-11 ENCOUNTER — OFFICE VISIT (OUTPATIENT)
Dept: FAMILY MEDICINE CLINIC | Facility: CLINIC | Age: 54
End: 2024-09-11
Payer: COMMERCIAL

## 2024-09-11 VITALS
SYSTOLIC BLOOD PRESSURE: 118 MMHG | WEIGHT: 170 LBS | HEIGHT: 66 IN | BODY MASS INDEX: 27.32 KG/M2 | OXYGEN SATURATION: 98 % | HEART RATE: 84 BPM | DIASTOLIC BLOOD PRESSURE: 76 MMHG

## 2024-09-11 DIAGNOSIS — E11.00 TYPE 2 DIABETES MELLITUS WITH HYPEROSMOLARITY WITHOUT COMA, WITHOUT LONG-TERM CURRENT USE OF INSULIN (HCC): Primary | ICD-10-CM

## 2024-09-11 DIAGNOSIS — E78.2 MIXED HYPERLIPIDEMIA: ICD-10-CM

## 2024-09-11 DIAGNOSIS — E55.9 VITAMIN D DEFICIENCY: ICD-10-CM

## 2024-09-11 DIAGNOSIS — I10 ESSENTIAL HYPERTENSION: Chronic | ICD-10-CM

## 2024-09-11 DIAGNOSIS — R93.1 AGATSTON CORONARY ARTERY CALCIUM SCORE BETWEEN 100 AND 199: ICD-10-CM

## 2024-09-11 DIAGNOSIS — E78.5 HYPERLIPIDEMIA, UNSPECIFIED HYPERLIPIDEMIA TYPE: ICD-10-CM

## 2024-09-11 PROCEDURE — 99215 OFFICE O/P EST HI 40 MIN: CPT | Performed by: FAMILY MEDICINE

## 2024-09-11 RX ORDER — DULAGLUTIDE 0.75 MG/.5ML
0.75 INJECTION, SOLUTION SUBCUTANEOUS
Qty: 6 ML | Refills: 0 | Status: SHIPPED | OUTPATIENT
Start: 2024-09-11

## 2024-09-11 RX ORDER — OMEGA-3-ACID ETHYL ESTERS 1 G/1
2 CAPSULE, LIQUID FILLED ORAL 2 TIMES DAILY
Qty: 360 CAPSULE | Refills: 3 | Status: SHIPPED | OUTPATIENT
Start: 2024-09-11

## 2024-09-11 RX ORDER — ROSUVASTATIN CALCIUM 5 MG/1
5 TABLET, COATED ORAL DAILY
Qty: 90 TABLET | Refills: 1 | Status: SHIPPED | OUTPATIENT
Start: 2024-09-11

## 2024-09-11 NOTE — ASSESSMENT & PLAN NOTE
Goal LDL is less than 70.  He was placed on Zetia by physician from [facility].  His LDL is at 19.  This is exceedingly low.  Instead of discontinuing Zetia which was prescribed by another physician I will reduce his dose of rosuvastatin so that he is only taking 5 mg once daily

## 2024-09-11 NOTE — PROGRESS NOTES
Subjective:      Patient ID: Milton Orozco is a 54 y.o. male.    54-year-old male with past medical history of diabetes mellitus type 2, hypertension, elevated coronary artery calcium score, hyperlipidemia presents to the office for 4-month follow-up.  Overall the patient has been feeling well.  Works long hours.  He is driving 2 to 3 hours for work and works 10-hour days.  Does try to remain physically active.  Does try to watch his dietary intake of carbohydrates.  It is football season, the [employer] suck yet he is refraining from drowning his sorrows with any alcohol.  He is given up beer for football season.  Depending on how bad the [employer] really are he may want to reconsider that but so far he has done well.  Hemoglobin A1c has improved.  Patient does go for annual health screening through [facility].  Patient is reluctant about going on insulin therapy feeling that it would be cost prohibitive and knowing that he can do better with his diet.  In the past he was on Trulicity which he tolerated well at this 0.75 mg dose and then when we tried to increase his dose to 1.5 mg weekly he developed significant gastrointestinal symptoms.  He would like to try going back onto lower dose Trulicity.  Patient has no evidence of endorgan damage.  He is current with eye examinations.  No evidence of microalbumin in his urine, no significant calcification noted on CT coronary calcium score.  Expecting to have his first grandchild in the not-too-distant future and that grandchild will be living with him in his house.  Physician at [facility] did prescribe Zetia.  His LDL cholesterol is now 19.  Remainder of labs reviewed which showed normalized TSH, normal CBC, CMP with the exception of fasting glucose of 241, hemoglobin A1c 7.9%, urine is negative for microalbumin, total cholesterol 133, triglycerides 3-3, HDL 37, LDL 19        Past Medical History:   Diagnosis Date    Anxiety     only when flying    Chronic  frontal sinusitis     last assessed 03/02/2016    COVID-19 virus infection 4/1/2020    Diabetes mellitus (HCC)     Fracture of great toe     last assessed 11/06/2014    GERD (gastroesophageal reflux disease)     Hyperlipidemia     Hypertension     Palpitations     last assessed 11/06/2012    RUQ abdominal pain 9/24/2021       Family History   Problem Relation Age of Onset    Diabetes Mother     Liver cancer Mother     Hypertension Mother     Colon cancer Father 68       Past Surgical History:   Procedure Laterality Date    COLONOSCOPY      HERNIA REPAIR Bilateral     OH MNPJ W/ANES SHOULDER JT APPL FIXATION APPARATUS Right 4/3/2017    Procedure: SHOULDER MANIPULATION UNDER ANESTHESIA ;  Surgeon: Berto Leon MD;  Location: [facility];  Service: Orthopedics    UPPER GASTROINTESTINAL ENDOSCOPY          reports that he has quit smoking. His smoking use included cigars. He has never used smokeless tobacco. He reports current alcohol use. He reports that he does not use drugs.      Current Outpatient Medications:     ALPRAZolam (XANAX) 0.25 mg tablet, Take 1 tablet (0.25 mg total) by mouth every 8 (eight) hours as needed for anxiety, Disp: 30 tablet, Rfl: 0    aspirin 81 mg chewable tablet, Adult Aspirin Low Strength 81 MG CHEW 1 PO Q D  Quantity: 0;  Refills: 0    [employer], Provider M.D.;  Started 11-Oct-2007 Active, Disp: , Rfl:     azelastine (ASTELIN) 0.1 % nasal spray, 2 sprays into each nostril 2 (two) times a day Use in each nostril as directed, Disp: 30 mL, Rfl: 2    CHOLECALCIFEROL PO, Take 1,000 Units by mouth, Disp: , Rfl:     dapagliflozin (Farxiga) 10 MG tablet, Take 1 tablet (10 mg total) by mouth daily, Disp: 90 tablet, Rfl: 1    dulaglutide (Trulicity) 0.75 MG/0.5ML injection, Inject 0.5 mL (0.75 mg total) under the skin every 7 days, Disp: 6 mL, Rfl: 0    esomeprazole (NexIUM) 40 MG capsule, Take 1 capsule (40 mg total) by mouth daily, Disp: 90 capsule, Rfl: 0    ezetimibe (ZETIA) 10 mg tablet, ,  Disp: , Rfl:     fexofenadine (ALLEGRA) 180 MG tablet, Take 1 tablet (180 mg total) by mouth daily, Disp: 30 tablet, Rfl: 5    glipiZIDE (GLUCOTROL XL) 10 mg 24 hr tablet, Take 1 tablet (10 mg total) by mouth daily, Disp: 90 tablet, Rfl: 1    glucose blood test strip, Test 1 Squirt 2 (two) times a day  , Disp: , Rfl:     linaGLIPtin (Tradjenta) 5 MG TABS, Take 5 mg by mouth daily, Disp: 90 tablet, Rfl: 0    lisinopril (ZESTRIL) 5 mg tablet, TAKE ONE TABLET BY MOUTH EVERY DAY, Disp: 90 tablet, Rfl: 1    meloxicam (MOBIC) 15 mg tablet, Take 1 tablet (15 mg total) by mouth daily, Disp: 100 tablet, Rfl: 1    metFORMIN (GLUCOPHAGE-XR) 500 mg 24 hr tablet, Take 2 tablets (1,000 mg total) by mouth 2 (two) times a day with meals, Disp: 360 tablet, Rfl: 1    montelukast (SINGULAIR) 10 mg tablet, Take 1 tablet (10 mg total) by mouth daily at bedtime, Disp: 90 tablet, Rfl: 1    omega-3-acid ethyl esters (LOVAZA) 1 g capsule, Take 2 capsules (2 g total) by mouth 2 (two) times a day, Disp: 360 capsule, Rfl: 3    Phytonadione (K 100) 100 MCG TABS, Take 1 tablet (100 mcg total) by mouth daily, Disp: 100 tablet, Rfl: 3    pioglitazone (ACTOS) 45 mg tablet, Take 1 tablet (45 mg total) by mouth daily, Disp: 90 tablet, Rfl: 1    quinapril (ACCUPRIL) 5 mg tablet, Take 1 tablet (5 mg total) by mouth daily, Disp: 90 tablet, Rfl: 0    rosuvastatin (CRESTOR) 5 mg tablet, Take 1 tablet (5 mg total) by mouth daily, Disp: 90 tablet, Rfl: 1    sucralfate (CARAFATE) 1 g tablet, Take 1 tablet (1 g total) by mouth 2 (two) times a day, Disp: 60 tablet, Rfl: 1    vardenafil (LEVITRA) 20 MG tablet, Take 1 tablet (20 mg total) by mouth daily as needed for erectile dysfunction, Disp: 10 tablet, Rfl: 0    The following portions of the patient's history were reviewed and updated as appropriate: allergies, current medications, past family history, past medical history, past social history, past surgical history and problem list.    Review of Systems  "  Constitutional: Negative.    HENT: Negative.     Eyes: Negative.    Respiratory: Negative.     Cardiovascular: Negative.    Gastrointestinal: Negative.    Endocrine: Negative.    Genitourinary: Negative.    Musculoskeletal: Negative.    Skin: Negative.    Allergic/Immunologic: Negative.    Neurological: Negative.    Hematological: Negative.    Psychiatric/Behavioral: Negative.     All other systems reviewed and are negative.          Objective:    /76   Pulse 84   Ht 5' 6\" (1.676 m)   Wt 77.1 kg (170 lb)   SpO2 98%   BMI 27.44 kg/m²      Physical Exam  Vitals and nursing note reviewed.   Constitutional:       General: He is not in acute distress.     Appearance: Normal appearance. He is well-developed and normal weight. He is not ill-appearing.   HENT:      Head: Normocephalic and atraumatic.      Right Ear: Tympanic membrane, ear canal and external ear normal.      Left Ear: Tympanic membrane, ear canal and external ear normal.      Nose: Nose normal.      Mouth/Throat:      Mouth: Mucous membranes are moist.   Eyes:      Extraocular Movements: Extraocular movements intact.      Conjunctiva/sclera: Conjunctivae normal.      Pupils: Pupils are equal, round, and reactive to light.   Cardiovascular:      Rate and Rhythm: Normal rate and regular rhythm.      Pulses: Normal pulses.      Heart sounds: Normal heart sounds. No murmur heard.  Pulmonary:      Effort: Pulmonary effort is normal.      Breath sounds: Normal breath sounds.   Abdominal:      General: Abdomen is flat. Bowel sounds are normal.      Palpations: Abdomen is soft.   Musculoskeletal:         General: Normal range of motion.      Cervical back: Normal range of motion and neck supple.   Skin:     General: Skin is warm and dry.   Neurological:      General: No focal deficit present.      Mental Status: He is alert and oriented to person, place, and time.   Psychiatric:         Mood and Affect: Mood normal.         Behavior: Behavior normal.    "      Thought Content: Thought content normal.         Judgment: Judgment normal.           Recent Results (from the past 1008 hour(s))   CBC and differential    Collection Time: 09/09/24  6:17 AM   Result Value Ref Range    WBC 8.54 4.31 - 10.16 Thousand/uL    RBC 5.74 (H) 3.88 - 5.62 Million/uL    Hemoglobin 15.3 12.0 - 17.0 g/dL    Hematocrit 47.5 36.5 - 49.3 %    MCV 83 82 - 98 fL    MCH 26.7 (L) 26.8 - 34.3 pg    MCHC 32.2 31.4 - 37.4 g/dL    RDW 13.4 11.6 - 15.1 %    MPV 10.2 8.9 - 12.7 fL    Platelets 276 149 - 390 Thousands/uL    nRBC 0 /100 WBCs    Segmented % 62 43 - 75 %    Immature Grans % 0 0 - 2 %    Lymphocytes % 27 14 - 44 %    Monocytes % 6 4 - 12 %    Eosinophils Relative 4 0 - 6 %    Basophils Relative 1 0 - 1 %    Absolute Neutrophils 5.28 1.85 - 7.62 Thousands/µL    Absolute Immature Grans 0.03 0.00 - 0.20 Thousand/uL    Absolute Lymphocytes 2.34 0.60 - 4.47 Thousands/µL    Absolute Monocytes 0.53 0.17 - 1.22 Thousand/µL    Eosinophils Absolute 0.30 0.00 - 0.61 Thousand/µL    Basophils Absolute 0.06 0.00 - 0.10 Thousands/µL   Comprehensive metabolic panel    Collection Time: 09/09/24  6:17 AM   Result Value Ref Range    Sodium 137 135 - 147 mmol/L    Potassium 4.8 3.5 - 5.3 mmol/L    Chloride 101 96 - 108 mmol/L    CO2 27 21 - 32 mmol/L    ANION GAP 9 4 - 13 mmol/L    BUN 22 5 - 25 mg/dL    Creatinine 0.88 0.60 - 1.30 mg/dL    Glucose, Fasting 241 (H) 65 - 99 mg/dL    Calcium 9.5 8.4 - 10.2 mg/dL    AST 12 (L) 13 - 39 U/L    ALT 19 7 - 52 U/L    Alkaline Phosphatase 60 34 - 104 U/L    Total Protein 6.9 6.4 - 8.4 g/dL    Albumin 4.7 3.5 - 5.0 g/dL    Total Bilirubin 0.27 0.20 - 1.00 mg/dL    eGFR 98 ml/min/1.73sq m   Hemoglobin A1C    Collection Time: 09/09/24  6:17 AM   Result Value Ref Range    Hemoglobin A1C 7.9 (H) Normal 4.0-5.6%; PreDiabetic 5.7-6.4%; Diabetic >=6.5%; Glycemic control for adults with diabetes <7.0% %     mg/dl   Albumin / creatinine urine ratio    Collection Time:  09/09/24  6:17 AM   Result Value Ref Range    Creatinine, Ur 42.1 Reference range not established. mg/dL    Albumin,U,Random <7.0 <20.0 mg/L    Albumin Creat Ratio     Lipid panel    Collection Time: 09/09/24  6:17 AM   Result Value Ref Range    Cholesterol 133 See Comment mg/dL    Triglycerides 383 (H) See Comment mg/dL    HDL, Direct 37 (L) >=40 mg/dL    LDL Calculated 19 0 - 100 mg/dL    Non-HDL-Chol (CHOL-HDL) 96 mg/dl   TSH, 3rd generation with Free T4 reflex    Collection Time: 09/09/24  6:17 AM   Result Value Ref Range    TSH 3RD GENERATON 2.654 0.450 - 4.500 uIU/mL       Assessment/Plan:    Essential hypertension  Hypertension remains well-controlled on lisinopril 5 mg once daily.  Continue same    Agatston coronary artery calcium score between 100 and 199  Continue to control risk factors for heart disease including hyperlipidemia, hypertension and diabetes.  Has had exercise stress testing in the past which was normal.  He does get CT coronary calcium score done on a semiannual basis    At this point he was placed on Zetia and that has resulted in an overly aggressive reduction in his LDL.  We do not like to see LDL cholesterol less than 30 as there has been shown for increased incidence of hemorrhagic stroke with an exceedingly low LDL    Type 2 diabetes mellitus with hyperosmolarity without coma, without long-term current use of insulin ([facility])    Lab Results   Component Value Date    HGBA1C 7.9 (H) 09/09/2024     Patient has had some improvement in his hemoglobin A1c.  Patient would like to revisit going back on the lower dose of Trulicity to make certain that he does not have side effects and that it improved his hemoglobin A1c.  He is still very reluctant about going on insulin therapy    Patient has no evidence of endorgan damage from his diabetes.  This is very closely monitored    -Patient will continue on Farxiga, Glucotrol, Tradjenta, metformin, Actos.  Patient would be willing to try Trulicity.   Will try Trulicity at 0.5 mg once weekly    -Repeat diabetic parameters in 4 months    Vitamin D deficiency  Remains on vitamin D supplementation.  Repeat vitamin D level to be done in 4 months    Mixed hyperlipidemia  Goal LDL is less than 70.  He was placed on Zetia by physician from [facility] review.  His LDL is at 19.  This is exceedingly low.  Instead of discontinuing Zetia which was prescribed by another physician I will reduce his dose of rosuvastatin so that he is only taking 5 mg once daily          Problem List Items Addressed This Visit          Cardiovascular and Mediastinum    Essential hypertension (Chronic)     Hypertension remains well-controlled on lisinopril 5 mg once daily.  Continue same         Relevant Orders    CBC and differential    TSH, 3rd generation with Free T4 reflex    Agatston coronary artery calcium score between 100 and 199     Continue to control risk factors for heart disease including hyperlipidemia, hypertension and diabetes.  Has had exercise stress testing in the past which was normal.  He does get CT coronary calcium score done on a semiannual basis    At this point he was placed on Zetia and that has resulted in an overly aggressive reduction in his LDL.  We do not like to see LDL cholesterol less than 30 as there has been shown for increased incidence of hemorrhagic stroke with an exceedingly low LDL            Endocrine    Type 2 diabetes mellitus with hyperosmolarity without coma, without long-term current use of insulin ([facility]) - Primary       Lab Results   Component Value Date    HGBA1C 7.9 (H) 09/09/2024     Patient has had some improvement in his hemoglobin A1c.  Patient would like to revisit going back on the lower dose of Trulicity to make certain that he does not have side effects and that it improved his hemoglobin A1c.  He is still very reluctant about going on insulin therapy    Patient has no evidence of endorgan damage from his diabetes.  This is very closely  monitored    -Patient will continue on Farxiga, Glucotrol, Tradjenta, metformin, Actos.  Patient would be willing to try Trulicity.  Will try Trulicity at 0.5 mg once weekly    -Repeat diabetic parameters in 4 months         Relevant Medications    dulaglutide (Trulicity) 0.75 MG/0.5ML injection    Other Relevant Orders    Albumin / creatinine urine ratio    Comprehensive metabolic panel    Hemoglobin A1C       Other    Mixed hyperlipidemia     Goal LDL is less than 70.  He was placed on Zetia by physician from [facility].  His LDL is at 19.  This is exceedingly low.  Instead of discontinuing Zetia which was prescribed by another physician I will reduce his dose of rosuvastatin so that he is only taking 5 mg once daily         Relevant Medications    ezetimibe (ZETIA) 10 mg tablet    rosuvastatin (CRESTOR) 5 mg tablet    omega-3-acid ethyl esters (LOVAZA) 1 g capsule    Other Relevant Orders    Comprehensive metabolic panel    Lipid panel    Vitamin D deficiency     Remains on vitamin D supplementation.  Repeat vitamin D level to be done in 4 months          Other Visit Diagnoses       Hyperlipidemia, unspecified hyperlipidemia type        Relevant Medications    ezetimibe (ZETIA) 10 mg tablet    rosuvastatin (CRESTOR) 5 mg tablet    omega-3-acid ethyl esters (LOVAZA) 1 g capsule

## 2024-09-11 NOTE — ASSESSMENT & PLAN NOTE
Continue to control risk factors for heart disease including hyperlipidemia, hypertension and diabetes.  Has had exercise stress testing in the past which was normal.  He does get CT coronary calcium score done on a semiannual basis    At this point he was placed on Zetia and that has resulted in an overly aggressive reduction in his LDL.  We do not like to see LDL cholesterol less than 30 as there has been shown for increased incidence of hemorrhagic stroke with an exceedingly low LDL

## 2024-09-11 NOTE — ASSESSMENT & PLAN NOTE
Lab Results   Component Value Date    HGBA1C 7.9 (H) 09/09/2024     Patient has had some improvement in his hemoglobin A1c.  Patient would like to revisit going back on the lower dose of Trulicity to make certain that he does not have side effects and that it improved his hemoglobin A1c.  He is still very reluctant about going on insulin therapy    Patient has no evidence of endorgan damage from his diabetes.  This is very closely monitored    -Patient will continue on Farxiga, Glucotrol, Tradjenta, metformin, Actos.  Patient would be willing to try Trulicity.  Will try Trulicity at 0.5 mg once weekly    -Repeat diabetic parameters in 4 months

## 2024-09-12 ENCOUNTER — TELEPHONE (OUTPATIENT)
Age: 54
End: 2024-09-12

## 2024-09-12 NOTE — TELEPHONE ENCOUNTER
PA for trulicity 0.75 mg APPROVED     Date(s) approved September 12, 2024 to September 12,     Case # [id number]     Patient advised by          [x][email or web address] Message  []Phone call   []LMOM  []L/M to call office as no active Communication consent on file  []Unable to leave detailed message as VM not approved on Communication consent       Pharmacy advised by    [x]Fax  []Phone call

## 2024-09-12 NOTE — TELEPHONE ENCOUNTER
PA for trulicity 0.75 mg SUBMITTED     via    []M-KEY:   [x][employer]-Case ID # [id number]  []Faxed to plan   []Other website   []Phone call Case ID #     Office notes sent, clinical questions answered. Awaiting determination    Turnaround time for your insurance to make a decision on your Prior Authorization can take 7-21 business days.

## 2024-09-24 ENCOUNTER — TELEPHONE (OUTPATIENT)
Age: 54
End: 2024-09-24

## 2024-09-24 NOTE — TELEPHONE ENCOUNTER
[facility] called to confirm if the pt will be stopping the Tradjenta 5 mg or taking it along with the Trulicity 0.75 mg. Please advise.

## 2024-09-28 DIAGNOSIS — E11.65 TYPE 2 DIABETES MELLITUS WITH HYPERGLYCEMIA, WITHOUT LONG-TERM CURRENT USE OF INSULIN (HCC): Chronic | ICD-10-CM

## 2024-09-28 RX ORDER — DAPAGLIFLOZIN 10 MG/1
10 TABLET, FILM COATED ORAL DAILY
Qty: 90 TABLET | Refills: 1 | Status: SHIPPED | OUTPATIENT
Start: 2024-09-28

## 2024-10-21 DIAGNOSIS — E78.2 MIXED HYPERLIPIDEMIA: Primary | ICD-10-CM

## 2024-10-21 DIAGNOSIS — J30.1 SEASONAL ALLERGIC RHINITIS DUE TO POLLEN: ICD-10-CM

## 2024-10-22 RX ORDER — EZETIMIBE 10 MG/1
10 TABLET ORAL DAILY
Qty: 90 TABLET | Refills: 0 | Status: SHIPPED | OUTPATIENT
Start: 2024-10-22

## 2024-10-22 RX ORDER — AZELASTINE 1 MG/ML
2 SPRAY, METERED NASAL 2 TIMES DAILY
Qty: 30 ML | Refills: 2 | Status: SHIPPED | OUTPATIENT
Start: 2024-10-22

## 2024-10-30 DIAGNOSIS — M75.102 ROTATOR CUFF SYNDROME OF LEFT SHOULDER: ICD-10-CM

## 2024-10-31 RX ORDER — MELOXICAM 15 MG/1
15 TABLET ORAL DAILY
Qty: 100 TABLET | Refills: 3 | Status: SHIPPED | OUTPATIENT
Start: 2024-10-31

## 2024-11-01 ENCOUNTER — TELEPHONE (OUTPATIENT)
Dept: FAMILY MEDICINE CLINIC | Facility: CLINIC | Age: 54
End: 2024-11-01

## 2024-11-01 DIAGNOSIS — E11.00 TYPE 2 DIABETES MELLITUS WITH HYPEROSMOLARITY WITHOUT COMA, WITHOUT LONG-TERM CURRENT USE OF INSULIN (HCC): Primary | ICD-10-CM

## 2024-11-01 NOTE — TELEPHONE ENCOUNTER
Order placed for Dr. James of breath on my associates.  Diabetic eye examination would be covered under medical insurance.  It is not part of vision insurance.  Optometrist at [facility] is still able to do a diabetic eye examination and charge to medical insurance.  Somebody did not know what they were doing

## 2024-11-01 NOTE — TELEPHONE ENCOUNTER
Milton was unable to get a diabetic eye exam at his August appointment at [facility] because he said his vision insurance didn't cover it. I offered a nurse visit for us to do an Iris which should be covered under his medical insurance but he is requesting a referral to an Ophthalmologist. He will call back if interested in the Iris.

## 2024-11-06 DIAGNOSIS — M75.102 ROTATOR CUFF SYNDROME OF LEFT SHOULDER: ICD-10-CM

## 2024-11-06 DIAGNOSIS — E11.9 TYPE 2 DIABETES MELLITUS WITHOUT COMPLICATION, WITHOUT LONG-TERM CURRENT USE OF INSULIN (HCC): Chronic | ICD-10-CM

## 2024-11-06 RX ORDER — MELOXICAM 15 MG/1
15 TABLET ORAL DAILY
Qty: 100 TABLET | Refills: 0 | Status: CANCELLED | OUTPATIENT
Start: 2024-11-06

## 2024-11-07 RX ORDER — METFORMIN HYDROCHLORIDE 500 MG/1
1000 TABLET, EXTENDED RELEASE ORAL 2 TIMES DAILY WITH MEALS
Qty: 360 TABLET | Refills: 1 | Status: SHIPPED | OUTPATIENT
Start: 2024-11-07

## 2024-11-07 NOTE — TELEPHONE ENCOUNTER
E-Prescribing Status: Receipt confirmed by pharmacy (10/31/2024 12:51 PM EDT)   100 with 3 refills

## 2024-11-20 DIAGNOSIS — K21.9 GASTROESOPHAGEAL REFLUX DISEASE: ICD-10-CM

## 2024-11-20 DIAGNOSIS — E11.9 TYPE 2 DIABETES MELLITUS WITHOUT COMPLICATION, WITHOUT LONG-TERM CURRENT USE OF INSULIN (HCC): Chronic | ICD-10-CM

## 2024-11-21 RX ORDER — PIOGLITAZONE 45 MG/1
45 TABLET ORAL DAILY
Qty: 90 TABLET | Refills: 1 | Status: SHIPPED | OUTPATIENT
Start: 2024-11-21

## 2024-11-21 RX ORDER — ESOMEPRAZOLE MAGNESIUM 40 MG/1
40 CAPSULE, DELAYED RELEASE ORAL DAILY
Qty: 90 CAPSULE | Refills: 1 | Status: SHIPPED | OUTPATIENT
Start: 2024-11-21

## 2024-11-28 DIAGNOSIS — E11.9 TYPE 2 DIABETES MELLITUS WITHOUT COMPLICATION, WITHOUT LONG-TERM CURRENT USE OF INSULIN (HCC): ICD-10-CM

## 2024-11-29 RX ORDER — LINAGLIPTIN 5 MG/1
5 TABLET, FILM COATED ORAL DAILY
Qty: 90 TABLET | Refills: 1 | Status: SHIPPED | OUTPATIENT
Start: 2024-11-29

## 2024-12-01 DIAGNOSIS — E78.2 MIXED HYPERLIPIDEMIA: ICD-10-CM

## 2024-12-03 RX ORDER — OMEGA-3-ACID ETHYL ESTERS 1 G/1
2 CAPSULE, LIQUID FILLED ORAL 2 TIMES DAILY
Qty: 360 CAPSULE | Refills: 1 | Status: SHIPPED | OUTPATIENT
Start: 2024-12-03

## 2024-12-04 DIAGNOSIS — E78.5 HYPERLIPIDEMIA, UNSPECIFIED HYPERLIPIDEMIA TYPE: ICD-10-CM

## 2024-12-05 RX ORDER — ROSUVASTATIN CALCIUM 5 MG/1
5 TABLET, COATED ORAL DAILY
Qty: 90 TABLET | Refills: 1 | Status: SHIPPED | OUTPATIENT
Start: 2024-12-05

## 2024-12-18 DIAGNOSIS — I10 PRIMARY HYPERTENSION: ICD-10-CM

## 2024-12-18 RX ORDER — LISINOPRIL 5 MG/1
5 TABLET ORAL DAILY
Qty: 90 TABLET | Refills: 1 | Status: SHIPPED | OUTPATIENT
Start: 2024-12-18

## 2024-12-23 ENCOUNTER — TELEPHONE (OUTPATIENT)
Dept: OTHER | Facility: OTHER | Age: 54
End: 2024-12-23

## 2024-12-23 DIAGNOSIS — E11.00 TYPE 2 DIABETES MELLITUS WITH HYPEROSMOLARITY WITHOUT COMA, WITHOUT LONG-TERM CURRENT USE OF INSULIN (HCC): ICD-10-CM

## 2024-12-23 NOTE — TELEPHONE ENCOUNTER
Milton calling to get an apt for December 26th.  He stated he has a cut on his toe and he is diabetic.  He wants to make sure it does not get infected. He wanted to see JUDE Lilly but I stated he is not in that day.  Can we book patient with another provider.     Thank you.

## 2024-12-26 ENCOUNTER — OFFICE VISIT (OUTPATIENT)
Dept: FAMILY MEDICINE CLINIC | Facility: CLINIC | Age: 54
End: 2024-12-26
Payer: COMMERCIAL

## 2024-12-26 VITALS
HEIGHT: 66 IN | WEIGHT: 166 LBS | BODY MASS INDEX: 26.68 KG/M2 | OXYGEN SATURATION: 98 % | DIASTOLIC BLOOD PRESSURE: 78 MMHG | HEART RATE: 88 BPM | SYSTOLIC BLOOD PRESSURE: 122 MMHG

## 2024-12-26 DIAGNOSIS — E11.00 TYPE 2 DIABETES MELLITUS WITH HYPEROSMOLARITY WITHOUT COMA, WITHOUT LONG-TERM CURRENT USE OF INSULIN (HCC): ICD-10-CM

## 2024-12-26 DIAGNOSIS — L03.039: Primary | ICD-10-CM

## 2024-12-26 PROCEDURE — 10060 I&D ABSCESS SIMPLE/SINGLE: CPT | Performed by: FAMILY MEDICINE

## 2024-12-26 PROCEDURE — 99214 OFFICE O/P EST MOD 30 MIN: CPT | Performed by: FAMILY MEDICINE

## 2024-12-26 NOTE — ASSESSMENT & PLAN NOTE
Orders:  •  amoxicillin-clavulanate (AUGMENTIN) 875-125 mg per tablet; Take 1 tablet by mouth every 12 (twelve) hours for 10 days  •  Incision and Drainage  Follow-up if not better or increase in redness

## 2024-12-26 NOTE — PROGRESS NOTES
Name: Milton Orozco      : 1970      MRN: [id number]  Encounter Provider: Karon Espinoza MD  Encounter Date: 2024   Encounter department: [facility]    Assessment & Plan  Abscess around fifth toenail    Orders:  •  amoxicillin-clavulanate (AUGMENTIN) 875-125 mg per tablet; Take 1 tablet by mouth every 12 (twelve) hours for 10 days  •  Incision and Drainage  Follow-up if not better or increase in redness  Type 2 diabetes mellitus with hyperosmolarity without coma, without long-term current use of insulin ([facility])    Lab Results   Component Value Date    HGBA1C 7.9 (H) 2024   Cont same med ,               History of Present Illness     He is diabetic, and for last 5 days he has noted a small bubble on the right fifth toe, and some redness on the toe, he did not notice any fever chills injury, no recent cut on the toes, pain is minimal and he wears the shoe when he is not wearing shoes no pain  Taking all his medication, Trulicity weekly    Toe Pain     Incision and Drainage    Date/Time: 2024 9:00 AM    Performed by: Karon Espinoza MD  Authorized by: Karon Espinoza MD  Universal Protocol:  Procedure performed by: (nurse)  Consent: Verbal consent obtained.  Patient understanding: patient states understanding of the procedure being performed    Patient location:  Clinic  Location:     Type:  Abscess    Location:  Lower extremity    Lower extremity location:  R little toe  Pre-procedure details:     Skin preparation:  Antiseptic wash  Anesthesia (see MAR for exact dosages):     Anesthesia method:  None  Procedure details:     Complexity:  Simple    Needle aspiration: no      Incision types:  Single straight    Scalpel blade:  15    Approach:  Open    Incision depth:  Subcutaneous    Drainage:  Purulent    Drainage amount:  Scant    Wound treatment:  Wound left open    Packing materials:  None  Post-procedure details:     Patient tolerance of procedure:  Tolerated well, no immediate  complications  Comments:      Sterile bandage applied , advised to change bandage daily for 3-4 days       Review of Systems   Constitutional: Negative.  Negative for fever.   Eyes: Negative.    Respiratory: Negative.     Cardiovascular: Negative.    Skin:  Positive for color change.        Redness rt fifth toe      Past Medical History:   Diagnosis Date   • Anxiety     only when flying   • Chronic frontal sinusitis     last assessed 03/02/2016   • COVID-19 virus infection 4/1/2020   • Diabetes mellitus (HCC)    • Fracture of great toe     last assessed 11/06/2014   • GERD (gastroesophageal reflux disease)    • Hyperlipidemia    • Hypertension    • Palpitations     last assessed 11/06/2012   • RUQ abdominal pain 9/24/2021     Past Surgical History:   Procedure Laterality Date   • COLONOSCOPY     • HERNIA REPAIR Bilateral    • WI MNPJ W/ANES SHOULDER JT APPL FIXATION APPARATUS Right 4/3/2017    Procedure: SHOULDER MANIPULATION UNDER ANESTHESIA ;  Surgeon: Berto Leon MD;  [facility];  Service: Orthopedics   • UPPER GASTROINTESTINAL ENDOSCOPY       Family History   Problem Relation Age of Onset   • Diabetes Mother    • Liver cancer Mother    • Hypertension Mother    • Colon cancer Father 68     Social History     Tobacco Use   • Smoking status: Former     Types: Cigars   • Smokeless tobacco: Never   Vaping Use   • Vaping status: Never Used   Substance and Sexual Activity   • Alcohol use: Yes     Comment: socially   • Drug use: No   • Sexual activity: Yes     Partners: Female     Current Outpatient Medications on File Prior to Visit   Medication Sig   • ALPRAZolam (XANAX) 0.25 mg tablet Take 1 tablet (0.25 mg total) by mouth every 8 (eight) hours as needed for anxiety   • aspirin 81 mg chewable tablet Adult Aspirin Low Strength 81 MG CHEW  1 PO Q D   Quantity: 0;  Refills: 0       Ilana Tidwell M.D.;  Started 11-Oct-2007  Active   • azelastine (ASTELIN) 0.1 % nasal spray 2 sprays into each nostril 2  "(two) times a day Use in each nostril as directed   • CHOLECALCIFEROL PO Take 1,000 Units by mouth   • dapagliflozin (Farxiga) 10 MG tablet Take 1 tablet (10 mg total) by mouth daily   • dulaglutide (Trulicity) 0.75 MG/0.5ML injection Inject 0.5 mL (0.75 mg total) under the skin every 7 days   • esomeprazole (NexIUM) 40 MG capsule Take 1 capsule (40 mg total) by mouth daily   • ezetimibe (ZETIA) 10 mg tablet Take 1 tablet (10 mg total) by mouth daily   • fexofenadine (ALLEGRA) 180 MG tablet Take 1 tablet (180 mg total) by mouth daily   • glipiZIDE (GLUCOTROL XL) 10 mg 24 hr tablet Take 1 tablet (10 mg total) by mouth daily   • glucose blood test strip Test 1 Squirt 2 (two) times a day     • linaGLIPtin (Tradjenta) 5 MG TABS Take 5 mg by mouth daily   • lisinopril (ZESTRIL) 5 mg tablet TAKE ONE TABLET BY MOUTH EVERY DAY   • meloxicam (MOBIC) 15 mg tablet Take 1 tablet (15 mg total) by mouth daily   • metFORMIN (GLUCOPHAGE-XR) 500 mg 24 hr tablet Take 2 tablets (1,000 mg total) by mouth 2 (two) times a day with meals   • montelukast (SINGULAIR) 10 mg tablet Take 1 tablet (10 mg total) by mouth daily at bedtime   • omega-3-acid ethyl esters (LOVAZA) 1 g capsule Take 2 capsules (2 g total) by mouth 2 (two) times a day   • Phytonadione (K 100) 100 MCG TABS Take 1 tablet (100 mcg total) by mouth daily   • pioglitazone (ACTOS) 45 mg tablet Take 1 tablet (45 mg total) by mouth daily   • quinapril (ACCUPRIL) 5 mg tablet Take 1 tablet (5 mg total) by mouth daily   • rosuvastatin (CRESTOR) 5 mg tablet Take 1 tablet (5 mg total) by mouth daily   • sucralfate (CARAFATE) 1 g tablet Take 1 tablet (1 g total) by mouth 2 (two) times a day   • vardenafil (LEVITRA) 20 MG tablet Take 1 tablet (20 mg total) by mouth daily as needed for erectile dysfunction     No Known Allergies  Immunization History   Administered Date(s) Administered   • INFLUENZA 10/14/2018     Objective   /78   Pulse 88   Ht 5' 6\" (1.676 m)   Wt 75.3 kg " (166 lb)   SpO2 98%   BMI 26.79 kg/m²     Physical Exam  Vitals and nursing note reviewed.   Constitutional:       Appearance: Normal appearance.   Cardiovascular:      Rate and Rhythm: Normal rate.   Pulmonary:      Effort: Pulmonary effort is normal.   Musculoskeletal:         General: Swelling present. Normal range of motion.      Comments: Erythema on rt little toe and small raised pus area    Skin:     Comments: Small yellow skin and fluctuation on rt little toe

## 2024-12-28 RX ORDER — DULAGLUTIDE 0.75 MG/.5ML
INJECTION, SOLUTION SUBCUTANEOUS
Qty: 6 ML | Refills: 1 | Status: SHIPPED | OUTPATIENT
Start: 2024-12-28

## 2025-01-01 DIAGNOSIS — E11.65 TYPE 2 DIABETES MELLITUS WITH HYPERGLYCEMIA, WITHOUT LONG-TERM CURRENT USE OF INSULIN (HCC): Chronic | ICD-10-CM

## 2025-01-01 DIAGNOSIS — E11.00 TYPE 2 DIABETES MELLITUS WITH HYPEROSMOLARITY WITHOUT COMA, WITHOUT LONG-TERM CURRENT USE OF INSULIN (HCC): ICD-10-CM

## 2025-01-01 RX ORDER — DAPAGLIFLOZIN 10 MG/1
10 TABLET, FILM COATED ORAL DAILY
Qty: 90 TABLET | Refills: 1 | Status: SHIPPED | OUTPATIENT
Start: 2025-01-01

## 2025-01-01 RX ORDER — DULAGLUTIDE 0.75 MG/.5ML
INJECTION, SOLUTION SUBCUTANEOUS
Qty: 6 ML | Refills: 0 | OUTPATIENT
Start: 2025-01-01

## 2025-01-21 ENCOUNTER — APPOINTMENT (OUTPATIENT)
Dept: LAB | Facility: CLINIC | Age: 55
End: 2025-01-21
Payer: COMMERCIAL

## 2025-01-21 DIAGNOSIS — E11.00 TYPE 2 DIABETES MELLITUS WITH HYPEROSMOLARITY WITHOUT COMA, WITHOUT LONG-TERM CURRENT USE OF INSULIN (HCC): ICD-10-CM

## 2025-01-21 DIAGNOSIS — E78.2 MIXED HYPERLIPIDEMIA: ICD-10-CM

## 2025-01-21 DIAGNOSIS — I10 ESSENTIAL HYPERTENSION: Chronic | ICD-10-CM

## 2025-01-21 LAB
ALBUMIN SERPL BCG-MCNC: 4.7 G/DL (ref 3.5–5)
ALP SERPL-CCNC: 61 U/L (ref 34–104)
ALT SERPL W P-5'-P-CCNC: 21 U/L (ref 7–52)
ANION GAP SERPL CALCULATED.3IONS-SCNC: 7 MMOL/L (ref 4–13)
AST SERPL W P-5'-P-CCNC: 15 U/L (ref 13–39)
BASOPHILS # BLD AUTO: 0.07 THOUSANDS/ΜL (ref 0–0.1)
BASOPHILS NFR BLD AUTO: 1 % (ref 0–1)
BILIRUB SERPL-MCNC: 0.47 MG/DL (ref 0.2–1)
BUN SERPL-MCNC: 23 MG/DL (ref 5–25)
CALCIUM SERPL-MCNC: 9.4 MG/DL (ref 8.4–10.2)
CHLORIDE SERPL-SCNC: 103 MMOL/L (ref 96–108)
CHOLEST SERPL-MCNC: 111 MG/DL (ref ?–200)
CO2 SERPL-SCNC: 28 MMOL/L (ref 21–32)
CREAT SERPL-MCNC: 0.82 MG/DL (ref 0.6–1.3)
CREAT UR-MCNC: 58.2 MG/DL
EOSINOPHIL # BLD AUTO: 0.28 THOUSAND/ΜL (ref 0–0.61)
EOSINOPHIL NFR BLD AUTO: 4 % (ref 0–6)
ERYTHROCYTE [DISTWIDTH] IN BLOOD BY AUTOMATED COUNT: 13.2 % (ref 11.6–15.1)
EST. AVERAGE GLUCOSE BLD GHB EST-MCNC: 177 MG/DL
GFR SERPL CREATININE-BSD FRML MDRD: 100 ML/MIN/1.73SQ M
GLUCOSE P FAST SERPL-MCNC: 189 MG/DL (ref 65–99)
HBA1C MFR BLD: 7.8 %
HCT VFR BLD AUTO: 46.8 % (ref 36.5–49.3)
HDLC SERPL-MCNC: 30 MG/DL
HGB BLD-MCNC: 14.9 G/DL (ref 12–17)
IMM GRANULOCYTES # BLD AUTO: 0.03 THOUSAND/UL (ref 0–0.2)
IMM GRANULOCYTES NFR BLD AUTO: 1 % (ref 0–2)
LDLC SERPL CALC-MCNC: 30 MG/DL (ref 0–100)
LYMPHOCYTES # BLD AUTO: 2.19 THOUSANDS/ΜL (ref 0.6–4.47)
LYMPHOCYTES NFR BLD AUTO: 34 % (ref 14–44)
MCH RBC QN AUTO: 26.2 PG (ref 26.8–34.3)
MCHC RBC AUTO-ENTMCNC: 31.8 G/DL (ref 31.4–37.4)
MCV RBC AUTO: 82 FL (ref 82–98)
MICROALBUMIN UR-MCNC: <7 MG/L
MONOCYTES # BLD AUTO: 0.6 THOUSAND/ΜL (ref 0.17–1.22)
MONOCYTES NFR BLD AUTO: 9 % (ref 4–12)
NEUTROPHILS # BLD AUTO: 3.3 THOUSANDS/ΜL (ref 1.85–7.62)
NEUTS SEG NFR BLD AUTO: 51 % (ref 43–75)
NONHDLC SERPL-MCNC: 81 MG/DL
NRBC BLD AUTO-RTO: 0 /100 WBCS
PLATELET # BLD AUTO: 262 THOUSANDS/UL (ref 149–390)
PMV BLD AUTO: 10.2 FL (ref 8.9–12.7)
POTASSIUM SERPL-SCNC: 4.4 MMOL/L (ref 3.5–5.3)
PROT SERPL-MCNC: 7 G/DL (ref 6.4–8.4)
RBC # BLD AUTO: 5.68 MILLION/UL (ref 3.88–5.62)
SODIUM SERPL-SCNC: 138 MMOL/L (ref 135–147)
TRIGL SERPL-MCNC: 256 MG/DL (ref ?–150)
TSH SERPL DL<=0.05 MIU/L-ACNC: 2.96 UIU/ML (ref 0.45–4.5)
WBC # BLD AUTO: 6.47 THOUSAND/UL (ref 4.31–10.16)

## 2025-01-21 PROCEDURE — 80061 LIPID PANEL: CPT

## 2025-01-21 PROCEDURE — 82570 ASSAY OF URINE CREATININE: CPT

## 2025-01-21 PROCEDURE — 82043 UR ALBUMIN QUANTITATIVE: CPT

## 2025-01-21 PROCEDURE — 85025 COMPLETE CBC W/AUTO DIFF WBC: CPT

## 2025-01-21 PROCEDURE — 36415 COLL VENOUS BLD VENIPUNCTURE: CPT

## 2025-01-21 PROCEDURE — 83036 HEMOGLOBIN GLYCOSYLATED A1C: CPT

## 2025-01-21 PROCEDURE — 80053 COMPREHEN METABOLIC PANEL: CPT

## 2025-01-21 PROCEDURE — 84443 ASSAY THYROID STIM HORMONE: CPT

## 2025-01-22 ENCOUNTER — RESULTS FOLLOW-UP (OUTPATIENT)
Dept: FAMILY MEDICINE CLINIC | Facility: CLINIC | Age: 55
End: 2025-01-22

## 2025-01-22 ENCOUNTER — OFFICE VISIT (OUTPATIENT)
Dept: FAMILY MEDICINE CLINIC | Facility: CLINIC | Age: 55
End: 2025-01-22
Payer: COMMERCIAL

## 2025-01-22 VITALS
OXYGEN SATURATION: 98 % | DIASTOLIC BLOOD PRESSURE: 82 MMHG | WEIGHT: 169 LBS | RESPIRATION RATE: 16 BRPM | SYSTOLIC BLOOD PRESSURE: 124 MMHG | BODY MASS INDEX: 27.16 KG/M2 | HEIGHT: 66 IN | HEART RATE: 70 BPM

## 2025-01-22 DIAGNOSIS — Z00.00 ANNUAL PHYSICAL EXAM: ICD-10-CM

## 2025-01-22 DIAGNOSIS — Z12.5 PROSTATE CANCER SCREENING: ICD-10-CM

## 2025-01-22 DIAGNOSIS — E78.2 MIXED HYPERLIPIDEMIA: ICD-10-CM

## 2025-01-22 DIAGNOSIS — E55.9 VITAMIN D DEFICIENCY: ICD-10-CM

## 2025-01-22 DIAGNOSIS — I10 ESSENTIAL HYPERTENSION: Primary | Chronic | ICD-10-CM

## 2025-01-22 DIAGNOSIS — E11.00 TYPE 2 DIABETES MELLITUS WITH HYPEROSMOLARITY WITHOUT COMA, WITHOUT LONG-TERM CURRENT USE OF INSULIN (HCC): ICD-10-CM

## 2025-01-22 PROBLEM — L03.039: Status: RESOLVED | Noted: 2024-12-26 | Resolved: 2025-01-22

## 2025-01-22 PROBLEM — L98.9 LESION OF SKIN OF NOSE: Status: RESOLVED | Noted: 2023-11-01 | Resolved: 2025-01-22

## 2025-01-22 PROCEDURE — 99396 PREV VISIT EST AGE 40-64: CPT | Performed by: FAMILY MEDICINE

## 2025-01-22 PROCEDURE — 99214 OFFICE O/P EST MOD 30 MIN: CPT | Performed by: FAMILY MEDICINE

## 2025-01-22 RX ORDER — DULAGLUTIDE 1.5 MG/.5ML
1.5 INJECTION, SOLUTION SUBCUTANEOUS WEEKLY
Qty: 2 ML | Refills: 3 | Status: SHIPPED | OUTPATIENT
Start: 2025-01-22

## 2025-01-22 NOTE — PROGRESS NOTES
Subjective:      Patient ID: Milton Orozco is a 54 y.o. male.    54-year-old male with past medical history of diabetes mellitus type 2, hypertension, elevated coronary artery calcium score, hyperlipidemia presents to the office for 4-month follow-up.  Overall the patient has been feeling well.  Still working very long hours.  Does not always have time to exercise which she knows that he should do.  Has not been drinking any alcohol.  Tolerating Trulicity 0.75 mg weekly very well without any side effects.  Labs reviewed which showed normal CBC, TSH, urine is negative for microalbumin, normal CMP with the exception of fasting glucose at 189, hemoglobin A1c decreased from 7.9 to 7.8%, total cholesterol 111, triglycerides 256, HDL 30, LDL 30.  He will be going for complete executive health examination with [facility] in June.        Past Medical History:   Diagnosis Date   • Anxiety     only when flying   • Chronic frontal sinusitis     last assessed 03/02/2016   • COVID-19 virus infection 4/1/2020   • Diabetes mellitus (HCC)    • Fracture of great toe     last assessed 11/06/2014   • GERD (gastroesophageal reflux disease)    • Hyperlipidemia    • Hypertension    • Palpitations     last assessed 11/06/2012   • RUQ abdominal pain 9/24/2021       Family History   Problem Relation Age of Onset   • Diabetes Mother    • Liver cancer Mother    • Hypertension Mother    • Colon cancer Father 68       Past Surgical History:   Procedure Laterality Date   • COLONOSCOPY     • HERNIA REPAIR Bilateral    • [place] MNPJ W/ANES SHOULDER JT APPL FIXATION APPARATUS Right 4/3/2017    Procedure: SHOULDER MANIPULATION UNDER ANESTHESIA ;  Surgeon: Berto Leon MD;  Location: [facility];  Service: Orthopedics   • UPPER GASTROINTESTINAL ENDOSCOPY          reports that he has quit smoking. His smoking use included cigars. He has never used smokeless tobacco. He reports current alcohol use. He reports that he does not use  drugs.      Current Outpatient Medications:   •  ALPRAZolam (XANAX) 0.25 mg tablet, Take 1 tablet (0.25 mg total) by mouth every 8 (eight) hours as needed for anxiety, Disp: 30 tablet, Rfl: 0  •  aspirin 81 mg chewable tablet, Adult Aspirin Low Strength 81 MG CHEW 1 PO Q D  Quantity: 0;  Refills: 0    Allscrivenessa, Ilana CHAMBERS;  Started 11-Oct-2007 Active, Disp: , Rfl:   •  azelastine (ASTELIN) 0.1 % nasal spray, 2 sprays into each nostril 2 (two) times a day Use in each nostril as directed, Disp: 30 mL, Rfl: 2  •  CHOLECALCIFEROL PO, Take 1,000 Units by mouth, Disp: , Rfl:   •  dapagliflozin (Farxiga) 10 MG tablet, Take 1 tablet (10 mg total) by mouth daily, Disp: 90 tablet, Rfl: 1  •  Dulaglutide (Trulicity) 1.5 MG/0.5ML SOAJ, Inject 1.5 mg under the skin once a week, Disp: 2 mL, Rfl: 3  •  esomeprazole (NexIUM) 40 MG capsule, Take 1 capsule (40 mg total) by mouth daily, Disp: 90 capsule, Rfl: 1  •  ezetimibe (ZETIA) 10 mg tablet, Take 1 tablet (10 mg total) by mouth daily, Disp: 90 tablet, Rfl: 0  •  fexofenadine (ALLEGRA) 180 MG tablet, Take 1 tablet (180 mg total) by mouth daily, Disp: 30 tablet, Rfl: 5  •  glipiZIDE (GLUCOTROL XL) 10 mg 24 hr tablet, Take 1 tablet (10 mg total) by mouth daily, Disp: 90 tablet, Rfl: 1  •  glucose blood test strip, Test 1 Squirt 2 (two) times a day  , Disp: , Rfl:   •  linaGLIPtin (Tradjenta) 5 MG TABS, Take 5 mg by mouth daily, Disp: 90 tablet, Rfl: 1  •  lisinopril (ZESTRIL) 5 mg tablet, TAKE ONE TABLET BY MOUTH EVERY DAY, Disp: 90 tablet, Rfl: 1  •  meloxicam (MOBIC) 15 mg tablet, Take 1 tablet (15 mg total) by mouth daily, Disp: 100 tablet, Rfl: 3  •  metFORMIN (GLUCOPHAGE-XR) 500 mg 24 hr tablet, Take 2 tablets (1,000 mg total) by mouth 2 (two) times a day with meals, Disp: 360 tablet, Rfl: 1  •  montelukast (SINGULAIR) 10 mg tablet, Take 1 tablet (10 mg total) by mouth daily at bedtime, Disp: 90 tablet, Rfl: 1  •  omega-3-acid ethyl esters (LOVAZA) 1 g capsule, Take 2  "capsules (2 g total) by mouth 2 (two) times a day, Disp: 360 capsule, Rfl: 1  •  Phytonadione (K 100) 100 MCG TABS, Take 1 tablet (100 mcg total) by mouth daily, Disp: 100 tablet, Rfl: 3  •  pioglitazone (ACTOS) 45 mg tablet, Take 1 tablet (45 mg total) by mouth daily, Disp: 90 tablet, Rfl: 1  •  rosuvastatin (CRESTOR) 5 mg tablet, Take 1 tablet (5 mg total) by mouth daily, Disp: 90 tablet, Rfl: 1  •  sucralfate (CARAFATE) 1 g tablet, Take 1 tablet (1 g total) by mouth 2 (two) times a day, Disp: 60 tablet, Rfl: 1  •  vardenafil (LEVITRA) 20 MG tablet, Take 1 tablet (20 mg total) by mouth daily as needed for erectile dysfunction, Disp: 10 tablet, Rfl: 0    The following portions of the patient's history were reviewed and updated as appropriate: allergies, current medications, past family history, past medical history, past social history, past surgical history and problem list.    Review of Systems   Constitutional: Negative.    HENT: Negative.     Eyes: Negative.    Respiratory: Negative.     Cardiovascular: Negative.    Gastrointestinal: Negative.    Endocrine: Negative.    Genitourinary: Negative.    Musculoskeletal: Negative.    Skin: Negative.    Allergic/Immunologic: Negative.    Neurological: Negative.    Hematological: Negative.    Psychiatric/Behavioral: Negative.     All other systems reviewed and are negative.          Objective:    /82   Pulse 70   Resp 16   Ht 5' 6\" (1.676 m)   Wt 76.7 kg (169 lb)   SpO2 98%   BMI 27.28 kg/m²      Physical Exam  Vitals and nursing note reviewed.   Constitutional:       General: He is not in acute distress.     Appearance: Normal appearance. He is well-developed and normal weight. He is not ill-appearing.   HENT:      Head: Normocephalic and atraumatic.      Right Ear: Tympanic membrane, ear canal and external ear normal.      Left Ear: Tympanic membrane, ear canal and external ear normal.      Nose: Nose normal.      Mouth/Throat:      Mouth: Mucous membranes " are moist.   Eyes:      Extraocular Movements: Extraocular movements intact.      Conjunctiva/sclera: Conjunctivae normal.      Pupils: Pupils are equal, round, and reactive to light.   Cardiovascular:      Rate and Rhythm: Normal rate and regular rhythm.      Pulses: Normal pulses.      Heart sounds: Normal heart sounds. No murmur heard.  Pulmonary:      Effort: Pulmonary effort is normal.      Breath sounds: Normal breath sounds.   Abdominal:      General: Abdomen is flat. Bowel sounds are normal.      Palpations: Abdomen is soft.   Musculoskeletal:         General: Normal range of motion.      Cervical back: Normal range of motion and neck supple.   Skin:     General: Skin is warm and dry.   Neurological:      General: No focal deficit present.      Mental Status: He is alert and oriented to person, place, and time.   Psychiatric:         Mood and Affect: Mood normal.         Behavior: Behavior normal.         Thought Content: Thought content normal.         Judgment: Judgment normal.           Recent Results (from the past 6 weeks)   CBC and differential    Collection Time: 01/21/25  6:08 AM   Result Value Ref Range    WBC 6.47 4.31 - 10.16 Thousand/uL    RBC 5.68 (H) 3.88 - 5.62 Million/uL    Hemoglobin 14.9 12.0 - 17.0 g/dL    Hematocrit 46.8 36.5 - 49.3 %    MCV 82 82 - 98 fL    MCH 26.2 (L) 26.8 - 34.3 pg    MCHC 31.8 31.4 - 37.4 g/dL    RDW 13.2 11.6 - 15.1 %    MPV 10.2 8.9 - 12.7 fL    Platelets 262 149 - 390 Thousands/uL    nRBC 0 /100 WBCs    Segmented % 51 43 - 75 %    Immature Grans % 1 0 - 2 %    Lymphocytes % 34 14 - 44 %    Monocytes % 9 4 - 12 %    Eosinophils Relative 4 0 - 6 %    Basophils Relative 1 0 - 1 %    Absolute Neutrophils 3.30 1.85 - 7.62 Thousands/µL    Absolute Immature Grans 0.03 0.00 - 0.20 Thousand/uL    Absolute Lymphocytes 2.19 0.60 - 4.47 Thousands/µL    Absolute Monocytes 0.60 0.17 - 1.22 Thousand/µL    Eosinophils Absolute 0.28 0.00 - 0.61 Thousand/µL    Basophils Absolute  0.07 0.00 - 0.10 Thousands/µL   Albumin / creatinine urine ratio    Collection Time: 01/21/25  6:08 AM   Result Value Ref Range    Creatinine, Ur 58.2 Reference range not established. mg/dL    Albumin,U,Random <7.0 <20.0 mg/L    Albumin Creat Ratio     Comprehensive metabolic panel    Collection Time: 01/21/25  6:08 AM   Result Value Ref Range    Sodium 138 135 - 147 mmol/L    Potassium 4.4 3.5 - 5.3 mmol/L    Chloride 103 96 - 108 mmol/L    CO2 28 21 - 32 mmol/L    ANION GAP 7 4 - 13 mmol/L    BUN 23 5 - 25 mg/dL    Creatinine 0.82 0.60 - 1.30 mg/dL    Glucose, Fasting 189 (H) 65 - 99 mg/dL    Calcium 9.4 8.4 - 10.2 mg/dL    AST 15 13 - 39 U/L    ALT 21 7 - 52 U/L    Alkaline Phosphatase 61 34 - 104 U/L    Total Protein 7.0 6.4 - 8.4 g/dL    Albumin 4.7 3.5 - 5.0 g/dL    Total Bilirubin 0.47 0.20 - 1.00 mg/dL    eGFR 100 ml/min/1.73sq m   Hemoglobin A1C    Collection Time: 01/21/25  6:08 AM   Result Value Ref Range    Hemoglobin A1C 7.8 (H) Normal 4.0-5.6%; PreDiabetic 5.7-6.4%; Diabetic >=6.5%; Glycemic control for adults with diabetes <7.0% %     mg/dl   Lipid panel    Collection Time: 01/21/25  6:08 AM   Result Value Ref Range    Cholesterol 111 See Comment mg/dL    Triglycerides 256 (H) See Comment mg/dL    HDL, Direct 30 (L) >=40 mg/dL    LDL Calculated 30 0 - 100 mg/dL    Non-HDL-Chol (CHOL-HDL) 81 mg/dl   TSH, 3rd generation with Free T4 reflex    Collection Time: 01/21/25  6:08 AM   Result Value Ref Range    TSH 3RD GENERATON 2.960 0.450 - 4.500 uIU/mL       Assessment/Plan:    Essential hypertension  Once again his hypertension remains very well-controlled on lisinopril 5 mg once daily.  Continue same.    Type 2 diabetes mellitus with hyperosmolarity without coma, without long-term current use of insulin (HCC)    Lab Results   Component Value Date    HGBA1C 7.8 (H) 01/21/2025     Patient is on Farxiga, glipizide, Tradjenta, metformin, pioglitazone.  Tolerating all these medications in addition to  Trulicity.  Will increase his dose of Trulicity from 0.75 mg weekly to 1.5 mg weekly.  Last time when he was on Trulicity and we did try to increase his dosage she developed significant gastrointestinal side effects.  Hopefully that does not occur.    -Repeat diabetic parameters to be done in 4 months    Vitamin D deficiency  Remains on vitamin D supplementation    Mixed hyperlipidemia  Patient's cholesterol is very well-controlled.  Goal LDL less than 70 which he is at.  Remains on Zetia as well as rosuvastatin.  Repeat lipid panel to be done in 4 months    Annual physical exam  Annual physical examination performed          Problem List Items Addressed This Visit        Cardiovascular and Mediastinum    Essential hypertension - Primary (Chronic)    Once again his hypertension remains very well-controlled on lisinopril 5 mg once daily.  Continue same.         Relevant Orders    CBC and differential    TSH, 3rd generation with Free T4 reflex       Endocrine    Type 2 diabetes mellitus with hyperosmolarity without coma, without long-term current use of insulin ([facility])      Lab Results   Component Value Date    HGBA1C 7.8 (H) 01/21/2025     Patient is on Farxiga, glipizide, Tradjenta, metformin, pioglitazone.  Tolerating all these medications in addition to Trulicity.  Will increase his dose of Trulicity from 0.75 mg weekly to 1.5 mg weekly.  Last time when he was on Trulicity and we did try to increase his dosage she developed significant gastrointestinal side effects.  Hopefully that does not occur.    -Repeat diabetic parameters to be done in 4 months         Relevant Medications    Dulaglutide (Trulicity) 1.5 MG/0.5ML SOAJ    Other Relevant Orders    Albumin / creatinine urine ratio    Hemoglobin A1C       Other    Annual physical exam    Annual physical examination performed         Relevant Orders    CBC and differential    Mixed hyperlipidemia    Patient's cholesterol is very well-controlled.  Goal LDL less than  70 which he is at.  Remains on Zetia as well as rosuvastatin.  Repeat lipid panel to be done in 4 months         Relevant Orders    Comprehensive metabolic panel    Lipid panel    Vitamin D deficiency    Remains on vitamin D supplementation        Other Visit Diagnoses       Prostate cancer screening        Relevant Orders    PSA, Total Screen

## 2025-01-22 NOTE — ASSESSMENT & PLAN NOTE
Lab Results   Component Value Date    HGBA1C 7.8 (H) 01/21/2025     Patient is on Farxiga, glipizide, Tradjenta, metformin, pioglitazone.  Tolerating all these medications in addition to Trulicity.  Will increase his dose of Trulicity from 0.75 mg weekly to 1.5 mg weekly.  Last time when he was on Trulicity and we did try to increase his dosage she developed significant gastrointestinal side effects.  Hopefully that does not occur.    -Repeat diabetic parameters to be done in 4 months

## 2025-01-22 NOTE — ASSESSMENT & PLAN NOTE
Patient's cholesterol is very well-controlled.  Goal LDL less than 70 which he is at.  Remains on Zetia as well as rosuvastatin.  Repeat lipid panel to be done in 4 months

## 2025-01-22 NOTE — ASSESSMENT & PLAN NOTE
Once again his hypertension remains very well-controlled on lisinopril 5 mg once daily.  Continue same.

## 2025-01-29 DIAGNOSIS — E78.2 MIXED HYPERLIPIDEMIA: ICD-10-CM

## 2025-01-30 RX ORDER — EZETIMIBE 10 MG/1
10 TABLET ORAL DAILY
Qty: 90 TABLET | Refills: 1 | Status: SHIPPED | OUTPATIENT
Start: 2025-01-30

## 2025-02-05 DIAGNOSIS — E11.9 TYPE 2 DIABETES MELLITUS WITHOUT COMPLICATION, WITHOUT LONG-TERM CURRENT USE OF INSULIN (HCC): Chronic | ICD-10-CM

## 2025-02-05 RX ORDER — METFORMIN HYDROCHLORIDE 500 MG/1
1000 TABLET, EXTENDED RELEASE ORAL 2 TIMES DAILY WITH MEALS
Qty: 360 TABLET | Refills: 0 | Status: SHIPPED | OUTPATIENT
Start: 2025-02-05

## 2025-02-12 DIAGNOSIS — M75.102 ROTATOR CUFF SYNDROME OF LEFT SHOULDER: ICD-10-CM

## 2025-02-13 RX ORDER — MELOXICAM 15 MG/1
15 TABLET ORAL DAILY
Qty: 100 TABLET | Refills: 0 | OUTPATIENT
Start: 2025-02-13

## 2025-02-16 DIAGNOSIS — E11.00 TYPE 2 DIABETES MELLITUS WITH HYPEROSMOLARITY WITHOUT COMA, WITHOUT LONG-TERM CURRENT USE OF INSULIN (HCC): ICD-10-CM

## 2025-02-16 DIAGNOSIS — K21.9 GASTROESOPHAGEAL REFLUX DISEASE: ICD-10-CM

## 2025-02-17 RX ORDER — ESOMEPRAZOLE MAGNESIUM 40 MG/1
40 CAPSULE, DELAYED RELEASE ORAL DAILY
Qty: 90 CAPSULE | Refills: 1 | Status: SHIPPED | OUTPATIENT
Start: 2025-02-17

## 2025-02-17 RX ORDER — DULAGLUTIDE 1.5 MG/.5ML
1.5 INJECTION, SOLUTION SUBCUTANEOUS WEEKLY
Qty: 2 ML | Refills: 1 | Status: SHIPPED | OUTPATIENT
Start: 2025-02-17

## 2025-03-05 DIAGNOSIS — E78.5 HYPERLIPIDEMIA, UNSPECIFIED HYPERLIPIDEMIA TYPE: ICD-10-CM

## 2025-03-05 DIAGNOSIS — E11.00 TYPE 2 DIABETES MELLITUS WITH HYPEROSMOLARITY WITHOUT COMA, WITHOUT LONG-TERM CURRENT USE OF INSULIN (HCC): ICD-10-CM

## 2025-03-06 RX ORDER — ROSUVASTATIN CALCIUM 5 MG/1
5 TABLET, COATED ORAL DAILY
Qty: 90 TABLET | Refills: 0 | OUTPATIENT
Start: 2025-03-06

## 2025-03-06 RX ORDER — GLIPIZIDE 10 MG/1
10 TABLET, FILM COATED, EXTENDED RELEASE ORAL DAILY
Qty: 90 TABLET | Refills: 1 | Status: SHIPPED | OUTPATIENT
Start: 2025-03-06

## 2025-03-12 ENCOUNTER — DOCUMENTATION (OUTPATIENT)
Dept: FAMILY MEDICINE CLINIC | Facility: CLINIC | Age: 55
End: 2025-03-12

## 2025-03-12 DIAGNOSIS — E78.5 HYPERLIPIDEMIA, UNSPECIFIED HYPERLIPIDEMIA TYPE: Primary | ICD-10-CM

## 2025-03-12 DIAGNOSIS — E11.9 TYPE 2 DIABETES MELLITUS WITHOUT COMPLICATION, WITHOUT LONG-TERM CURRENT USE OF INSULIN (HCC): Chronic | ICD-10-CM

## 2025-03-12 RX ORDER — ROSUVASTATIN CALCIUM 5 MG/1
5 TABLET, COATED ORAL DAILY
Qty: 90 TABLET | Refills: 3 | Status: SHIPPED | OUTPATIENT
Start: 2025-03-12

## 2025-03-13 DIAGNOSIS — M75.102 ROTATOR CUFF SYNDROME OF LEFT SHOULDER: ICD-10-CM

## 2025-03-13 RX ORDER — MELOXICAM 15 MG/1
15 TABLET ORAL DAILY
Qty: 100 TABLET | Refills: 3 | Status: SHIPPED | OUTPATIENT
Start: 2025-03-13

## 2025-03-13 RX ORDER — PIOGLITAZONE 45 MG/1
45 TABLET ORAL DAILY
Qty: 90 TABLET | Refills: 0 | OUTPATIENT
Start: 2025-03-13

## 2025-03-31 DIAGNOSIS — E78.5 HYPERLIPIDEMIA, UNSPECIFIED HYPERLIPIDEMIA TYPE: ICD-10-CM

## 2025-03-31 DIAGNOSIS — E78.2 MIXED HYPERLIPIDEMIA: ICD-10-CM

## 2025-03-31 RX ORDER — ROSUVASTATIN CALCIUM 5 MG/1
5 TABLET, COATED ORAL DAILY
Qty: 90 TABLET | Refills: 0 | Status: CANCELLED | OUTPATIENT
Start: 2025-03-31

## 2025-03-31 RX ORDER — OMEGA-3-ACID ETHYL ESTERS 1 G/1
2 CAPSULE, LIQUID FILLED ORAL 2 TIMES DAILY
Qty: 360 CAPSULE | Refills: 0 | Status: SHIPPED | OUTPATIENT
Start: 2025-03-31

## 2025-04-02 DIAGNOSIS — E11.65 TYPE 2 DIABETES MELLITUS WITH HYPERGLYCEMIA, WITHOUT LONG-TERM CURRENT USE OF INSULIN (HCC): Chronic | ICD-10-CM

## 2025-04-03 RX ORDER — DAPAGLIFLOZIN 10 MG/1
10 TABLET, FILM COATED ORAL DAILY
Qty: 90 TABLET | Refills: 1 | Status: SHIPPED | OUTPATIENT
Start: 2025-04-03

## 2025-04-09 DIAGNOSIS — J30.1 SEASONAL ALLERGIC RHINITIS DUE TO POLLEN: ICD-10-CM

## 2025-04-09 DIAGNOSIS — E11.9 TYPE 2 DIABETES MELLITUS WITHOUT COMPLICATION, WITHOUT LONG-TERM CURRENT USE OF INSULIN (HCC): ICD-10-CM

## 2025-04-10 RX ORDER — AZELASTINE 1 MG/ML
2 SPRAY, METERED NASAL 2 TIMES DAILY
Qty: 30 ML | Refills: 3 | Status: SHIPPED | OUTPATIENT
Start: 2025-04-10

## 2025-04-10 RX ORDER — LINAGLIPTIN 5 MG/1
5 TABLET, FILM COATED ORAL DAILY
Qty: 90 TABLET | Refills: 1 | Status: SHIPPED | OUTPATIENT
Start: 2025-04-10

## 2025-04-23 ENCOUNTER — TELEPHONE (OUTPATIENT)
Dept: FAMILY MEDICINE CLINIC | Facility: CLINIC | Age: 55
End: 2025-04-23

## 2025-04-23 DIAGNOSIS — E11.9 TYPE 2 DIABETES MELLITUS WITHOUT COMPLICATION, WITHOUT LONG-TERM CURRENT USE OF INSULIN (HCC): ICD-10-CM

## 2025-04-23 NOTE — TELEPHONE ENCOUNTER
[facility] pharmacy called to request PA - linaGLIPtin (Tradjenta) 5 MG   Dr Vijaya Justice coverage, they have started covermymeds inquiry    Forwarding to PA team for review.

## 2025-04-24 RX ORDER — LINAGLIPTIN 5 MG/1
5 TABLET, FILM COATED ORAL DAILY
Qty: 90 TABLET | Refills: 0 | OUTPATIENT
Start: 2025-04-24

## 2025-04-25 ENCOUNTER — TELEPHONE (OUTPATIENT)
Age: 55
End: 2025-04-25

## 2025-04-25 NOTE — TELEPHONE ENCOUNTER
PA LINAGLIPTIN (Tradjenta) 5 MG SUBM[place]ED    [place] [employer]     via    []CMM-KEY:    [x]Surescripts-Case ID # [id number]  []Availity-Auth ID #  NDC #    []Faxed to plan   []Other website    []Phone call Case ID #      []PA sent as URGENT    All office notes, labs and other pertaining documents and studies sent. Clinical questions answered. Awaiting determination from insurance company.     Turnaround time for your insurance to make a decision on your Prior Authorization can take 7-21 business days.

## 2025-04-25 NOTE — TELEPHONE ENCOUNTER
Patient is requesting a phone call from Dr. Lilly regarding his Tradjenta prescription. He would like to understand why it's taking so long to get his medication. Please refer to telephone encounter 04/23/25 for notes regarding prior auth. His initial refill request was 04/09/25 and the prior auth was submitted today, 04/25/25. Patient was informed that documentation in the chart shows that notification that a prior auth is necessary was received 04/23/25.    Please contact patient at phone #[phone]

## 2025-04-25 NOTE — TELEPHONE ENCOUNTER
Patient called to check the status of his Tradjenta prescription. He said that he's been without medication for 2 weeks. He was informed that a prior auth is required and it was submitted today, 04/25/25. Patient initially requested his refill 04/09/25 and was told by [facility] pharmacy that the office was notified three times that a prior auth is required.    Can the prior auth be resubmitted as urgent?

## 2025-04-28 DIAGNOSIS — E11.00 TYPE 2 DIABETES MELLITUS WITH HYPEROSMOLARITY WITHOUT COMA, WITHOUT LONG-TERM CURRENT USE OF INSULIN (HCC): Primary | ICD-10-CM

## 2025-04-28 RX ORDER — SITAGLIPTIN 50 MG/1
1 TABLET ORAL DAILY
Qty: 90 TABLET | Refills: 1 | Status: SHIPPED | OUTPATIENT
Start: 2025-04-28

## 2025-04-28 NOTE — TELEPHONE ENCOUNTER
PA LINAGLIPTIN (Tradjenta) 5 MG DENIED    Reason:(Screenshot if applicable)        Message sent to office clinical pool Yes    Denial letter scanned into Media Yes    Appeal started No (Provider will need to decide if appeal is warranted and send clinical documentation to Prior Authorization Team for initiation.)    **Please follow up with your patient regarding denial and next steps**

## 2025-04-28 NOTE — TELEPHONE ENCOUNTER
Insurance is not covering Tradjenta.  They will cover Zituvio which is the same medication as Januvia which is the same class of drug as the Tradjenta.  This is the first time that I am receiving any information regarding denial of Tradjenta.  I do not see that [facility] pharmacy notified our office 3 times that a prior authorization was required.  Zituvio sent to the pharmacy

## 2025-05-07 DIAGNOSIS — E11.9 TYPE 2 DIABETES MELLITUS WITHOUT COMPLICATION, WITHOUT LONG-TERM CURRENT USE OF INSULIN (HCC): Chronic | ICD-10-CM

## 2025-05-07 DIAGNOSIS — E11.00 TYPE 2 DIABETES MELLITUS WITH HYPEROSMOLARITY WITHOUT COMA, WITHOUT LONG-TERM CURRENT USE OF INSULIN (HCC): ICD-10-CM

## 2025-05-07 RX ORDER — METFORMIN HYDROCHLORIDE 500 MG/1
1000 TABLET, EXTENDED RELEASE ORAL 2 TIMES DAILY WITH MEALS
Qty: 360 TABLET | Refills: 0 | Status: SHIPPED | OUTPATIENT
Start: 2025-05-07

## 2025-05-08 RX ORDER — DULAGLUTIDE 1.5 MG/.5ML
1.5 INJECTION, SOLUTION SUBCUTANEOUS WEEKLY
Qty: 2 ML | Refills: 0 | Status: SHIPPED | OUTPATIENT
Start: 2025-05-08

## 2025-05-14 DIAGNOSIS — K21.9 GASTROESOPHAGEAL REFLUX DISEASE: ICD-10-CM

## 2025-05-15 RX ORDER — ESOMEPRAZOLE MAGNESIUM 40 MG/1
40 CAPSULE, DELAYED RELEASE ORAL DAILY
Qty: 90 CAPSULE | Refills: 1 | Status: SHIPPED | OUTPATIENT
Start: 2025-05-15

## 2025-05-30 DIAGNOSIS — E11.00 TYPE 2 DIABETES MELLITUS WITH HYPEROSMOLARITY WITHOUT COMA, WITHOUT LONG-TERM CURRENT USE OF INSULIN (HCC): ICD-10-CM

## 2025-05-31 RX ORDER — GLIPIZIDE 10 MG/1
10 TABLET, FILM COATED, EXTENDED RELEASE ORAL DAILY
Qty: 90 TABLET | Refills: 0 | Status: SHIPPED | OUTPATIENT
Start: 2025-05-31

## 2025-06-02 ENCOUNTER — RESULTS FOLLOW-UP (OUTPATIENT)
Dept: FAMILY MEDICINE CLINIC | Facility: CLINIC | Age: 55
End: 2025-06-02

## 2025-06-02 ENCOUNTER — APPOINTMENT (OUTPATIENT)
Dept: LAB | Facility: CLINIC | Age: 55
End: 2025-06-02
Attending: FAMILY MEDICINE
Payer: COMMERCIAL

## 2025-06-02 DIAGNOSIS — I10 ESSENTIAL HYPERTENSION: Chronic | ICD-10-CM

## 2025-06-02 DIAGNOSIS — E78.2 MIXED HYPERLIPIDEMIA: ICD-10-CM

## 2025-06-02 DIAGNOSIS — E11.00 TYPE 2 DIABETES MELLITUS WITH HYPEROSMOLARITY WITHOUT COMA, WITHOUT LONG-TERM CURRENT USE OF INSULIN (HCC): ICD-10-CM

## 2025-06-02 DIAGNOSIS — Z12.5 PROSTATE CANCER SCREENING: ICD-10-CM

## 2025-06-02 DIAGNOSIS — Z00.00 ANNUAL PHYSICAL EXAM: ICD-10-CM

## 2025-06-02 LAB
ALBUMIN SERPL BCG-MCNC: 4.7 G/DL (ref 3.5–5)
ALP SERPL-CCNC: 52 U/L (ref 34–104)
ALT SERPL W P-5'-P-CCNC: 25 U/L (ref 7–52)
ANION GAP SERPL CALCULATED.3IONS-SCNC: 6 MMOL/L (ref 4–13)
AST SERPL W P-5'-P-CCNC: 16 U/L (ref 13–39)
BASOPHILS # BLD AUTO: 0.07 THOUSANDS/ÂΜL (ref 0–0.1)
BASOPHILS NFR BLD AUTO: 1 % (ref 0–1)
BILIRUB SERPL-MCNC: 0.4 MG/DL (ref 0.2–1)
BUN SERPL-MCNC: 23 MG/DL (ref 5–25)
CALCIUM SERPL-MCNC: 9.1 MG/DL (ref 8.4–10.2)
CHLORIDE SERPL-SCNC: 103 MMOL/L (ref 96–108)
CHOLEST SERPL-MCNC: 94 MG/DL (ref ?–200)
CO2 SERPL-SCNC: 28 MMOL/L (ref 21–32)
CREAT SERPL-MCNC: 0.7 MG/DL (ref 0.6–1.3)
CREAT UR-MCNC: 58.2 MG/DL
EOSINOPHIL # BLD AUTO: 0.3 THOUSAND/ÂΜL (ref 0–0.61)
EOSINOPHIL NFR BLD AUTO: 4 % (ref 0–6)
ERYTHROCYTE [DISTWIDTH] IN BLOOD BY AUTOMATED COUNT: 13.1 % (ref 11.6–15.1)
EST. AVERAGE GLUCOSE BLD GHB EST-MCNC: 143 MG/DL
GFR SERPL CREATININE-BSD FRML MDRD: 107 ML/MIN/1.73SQ M
GLUCOSE P FAST SERPL-MCNC: 155 MG/DL (ref 65–99)
HBA1C MFR BLD: 6.6 %
HCT VFR BLD AUTO: 45.9 % (ref 36.5–49.3)
HDLC SERPL-MCNC: 33 MG/DL
HGB BLD-MCNC: 14.7 G/DL (ref 12–17)
IMM GRANULOCYTES # BLD AUTO: 0.02 THOUSAND/UL (ref 0–0.2)
IMM GRANULOCYTES NFR BLD AUTO: 0 % (ref 0–2)
LDLC SERPL CALC-MCNC: 36 MG/DL (ref 0–100)
LYMPHOCYTES # BLD AUTO: 2.27 THOUSANDS/ÂΜL (ref 0.6–4.47)
LYMPHOCYTES NFR BLD AUTO: 33 % (ref 14–44)
MCH RBC QN AUTO: 26.7 PG (ref 26.8–34.3)
MCHC RBC AUTO-ENTMCNC: 32 G/DL (ref 31.4–37.4)
MCV RBC AUTO: 84 FL (ref 82–98)
MICROALBUMIN UR-MCNC: <7 MG/L
MONOCYTES # BLD AUTO: 0.52 THOUSAND/ÂΜL (ref 0.17–1.22)
MONOCYTES NFR BLD AUTO: 8 % (ref 4–12)
NEUTROPHILS # BLD AUTO: 3.7 THOUSANDS/ÂΜL (ref 1.85–7.62)
NEUTS SEG NFR BLD AUTO: 54 % (ref 43–75)
NONHDLC SERPL-MCNC: 61 MG/DL
NRBC BLD AUTO-RTO: 0 /100 WBCS
PLATELET # BLD AUTO: 259 THOUSANDS/UL (ref 149–390)
PMV BLD AUTO: 9.8 FL (ref 8.9–12.7)
POTASSIUM SERPL-SCNC: 4 MMOL/L (ref 3.5–5.3)
PROT SERPL-MCNC: 7 G/DL (ref 6.4–8.4)
PSA SERPL-MCNC: 1.26 NG/ML (ref 0–4)
RBC # BLD AUTO: 5.5 MILLION/UL (ref 3.88–5.62)
SODIUM SERPL-SCNC: 137 MMOL/L (ref 135–147)
TRIGL SERPL-MCNC: 127 MG/DL (ref ?–150)
TSH SERPL DL<=0.05 MIU/L-ACNC: 2.35 UIU/ML (ref 0.45–4.5)
WBC # BLD AUTO: 6.88 THOUSAND/UL (ref 4.31–10.16)

## 2025-06-02 PROCEDURE — 36415 COLL VENOUS BLD VENIPUNCTURE: CPT

## 2025-06-02 PROCEDURE — 80053 COMPREHEN METABOLIC PANEL: CPT

## 2025-06-02 PROCEDURE — 82043 UR ALBUMIN QUANTITATIVE: CPT

## 2025-06-02 PROCEDURE — 84443 ASSAY THYROID STIM HORMONE: CPT

## 2025-06-02 PROCEDURE — 80061 LIPID PANEL: CPT

## 2025-06-02 PROCEDURE — G0103 PSA SCREENING: HCPCS

## 2025-06-02 PROCEDURE — 85025 COMPLETE CBC W/AUTO DIFF WBC: CPT

## 2025-06-02 PROCEDURE — 83036 HEMOGLOBIN GLYCOSYLATED A1C: CPT

## 2025-06-02 PROCEDURE — 82570 ASSAY OF URINE CREATININE: CPT

## 2025-06-04 ENCOUNTER — OFFICE VISIT (OUTPATIENT)
Dept: FAMILY MEDICINE CLINIC | Facility: CLINIC | Age: 55
End: 2025-06-04
Payer: COMMERCIAL

## 2025-06-04 VITALS
BODY MASS INDEX: 25.55 KG/M2 | DIASTOLIC BLOOD PRESSURE: 64 MMHG | RESPIRATION RATE: 16 BRPM | HEIGHT: 66 IN | HEART RATE: 70 BPM | WEIGHT: 159 LBS | OXYGEN SATURATION: 98 % | SYSTOLIC BLOOD PRESSURE: 128 MMHG

## 2025-06-04 DIAGNOSIS — Z12.5 PROSTATE CANCER SCREENING: ICD-10-CM

## 2025-06-04 DIAGNOSIS — R93.1 AGATSTON CORONARY ARTERY CALCIUM SCORE BETWEEN 100 AND 199: ICD-10-CM

## 2025-06-04 DIAGNOSIS — E78.2 MIXED HYPERLIPIDEMIA: ICD-10-CM

## 2025-06-04 DIAGNOSIS — E55.9 VITAMIN D DEFICIENCY: ICD-10-CM

## 2025-06-04 DIAGNOSIS — I10 ESSENTIAL HYPERTENSION: Primary | Chronic | ICD-10-CM

## 2025-06-04 DIAGNOSIS — M75.102 ROTATOR CUFF SYNDROME OF LEFT SHOULDER: ICD-10-CM

## 2025-06-04 DIAGNOSIS — E11.00 TYPE 2 DIABETES MELLITUS WITH HYPEROSMOLARITY WITHOUT COMA, WITHOUT LONG-TERM CURRENT USE OF INSULIN (HCC): ICD-10-CM

## 2025-06-04 DIAGNOSIS — Z86.59 HISTORY OF ANXIETY: ICD-10-CM

## 2025-06-04 PROBLEM — R10.811 RUQ ABDOMINAL TENDERNESS: Status: RESOLVED | Noted: 2024-03-06 | Resolved: 2025-06-04

## 2025-06-04 PROCEDURE — 99214 OFFICE O/P EST MOD 30 MIN: CPT | Performed by: FAMILY MEDICINE

## 2025-06-04 RX ORDER — ALPRAZOLAM 0.25 MG
0.25 TABLET ORAL EVERY 8 HOURS PRN
Qty: 30 TABLET | Refills: 0 | Status: SHIPPED | OUTPATIENT
Start: 2025-06-04

## 2025-06-04 NOTE — ASSESSMENT & PLAN NOTE
Lab Results   Component Value Date    HGBA1C 6.6 (H) 06/02/2025     Tremendous improvement in hemoglobin A1c down to 6.6%.  Patient will continue on Farxiga, glipizide, pioglitazone, metformin.  Continue current regimen.  Continue following diabetic diet.  Repeat diabetic parameters to be done in January

## 2025-06-04 NOTE — PROGRESS NOTES
"  Subjective:      Patient ID: Milton Orozco is a 54 y.o. male.    54-year-old male presents for 6-month follow-up of chronic conditions.  Patient has lost an additional 10 pounds through dietary modification as well as exercise.  He did cut out alcohol entirely for the past month and has noted significant improvement in glycemic control.  Labs reviewed which showed increased cholesterol, hemoglobin A1c at 6.6% which is one of his best measures.  He feels great.  He will have executive health examination at [facility] in [facility]UGUST        Past Medical History[1]    Family History[2]    Past Surgical History[3]     reports that he has quit smoking. His smoking use included cigars. He has never used smokeless tobacco. He reports current alcohol use. He reports that he does not use drugs.    Current Medications[4]    The following portions of the patient's history were reviewed and updated as appropriate: allergies, current medications, past family history, past medical history, past social history, past surgical history and problem list.    Review of Systems   Constitutional: Negative.    HENT: Negative.     Eyes: Negative.    Respiratory: Negative.     Cardiovascular: Negative.    Gastrointestinal: Negative.    Endocrine: Negative.    Genitourinary: Negative.    Musculoskeletal: Negative.    Skin: Negative.    Allergic/Immunologic: Negative.    Neurological: Negative.    Hematological: Negative.    Psychiatric/Behavioral: Negative.     All other systems reviewed and are negative.          Objective:    /64   Pulse 70   Resp 16   Ht 5' 6\" (1.676 m)   Wt 72.1 kg (159 lb)   SpO2 98%   BMI 25.66 kg/m²      Physical Exam  Vitals and nursing note reviewed.   Constitutional:       General: He is not in acute distress.     Appearance: Normal appearance. He is well-developed and normal weight. He is not ill-appearing.   HENT:      Head: Normocephalic and atraumatic.      Right Ear: Tympanic membrane, ear " canal and external ear normal.      Left Ear: Tympanic membrane, ear canal and external ear normal.      Nose: Nose normal.      Mouth/Throat:      Mouth: Mucous membranes are moist.      Pharynx: Oropharynx is clear.     Eyes:      Extraocular Movements: Extraocular movements intact.      Conjunctiva/sclera: Conjunctivae normal.      Pupils: Pupils are equal, round, and reactive to light.       Cardiovascular:      Rate and Rhythm: Normal rate and regular rhythm.      Pulses: Normal pulses. no weak pulses.           Dorsalis pedis pulses are 2+ on the right side and 2+ on the left side.        Posterior tibial pulses are 2+ on the right side and 2+ on the left side.      Heart sounds: Normal heart sounds. No murmur heard.  Pulmonary:      Effort: Pulmonary effort is normal.      Breath sounds: Normal breath sounds.   Abdominal:      General: Abdomen is flat. Bowel sounds are normal.      Palpations: Abdomen is soft.     Musculoskeletal:         General: Normal range of motion.      Cervical back: Normal range of motion and neck supple.   Feet:      Right foot:      Skin integrity: No ulcer, skin breakdown, erythema, warmth, callus or dry skin.      Left foot:      Skin integrity: No ulcer, skin breakdown, erythema, warmth, callus or dry skin.     Skin:     General: Skin is warm and dry.     Neurological:      General: No focal deficit present.      Mental Status: He is alert and oriented to person, place, and time. Mental status is at baseline.     Psychiatric:         Mood and Affect: Mood normal.         Behavior: Behavior normal.         Thought Content: Thought content normal.         Judgment: Judgment normal.           Recent Results (from the past 6 weeks)   CBC and differential    Collection Time: 06/02/25  6:20 AM   Result Value Ref Range    WBC 6.88 4.31 - 10.16 Thousand/uL    RBC 5.50 3.88 - 5.62 Million/uL    Hemoglobin 14.7 12.0 - 17.0 g/dL    Hematocrit 45.9 36.5 - 49.3 %    MCV 84 82 - 98 fL    MCH  26.7 (L) 26.8 - 34.3 pg    MCHC 32.0 31.4 - 37.4 g/dL    RDW 13.1 11.6 - 15.1 %    MPV 9.8 8.9 - 12.7 fL    Platelets 259 149 - 390 Thousands/uL    nRBC 0 /100 WBCs    Segmented % 54 43 - 75 %    Immature Grans % 0 0 - 2 %    Lymphocytes % 33 14 - 44 %    Monocytes % 8 4 - 12 %    Eosinophils Relative 4 0 - 6 %    Basophils Relative 1 0 - 1 %    Absolute Neutrophils 3.70 1.85 - 7.62 Thousands/µL    Absolute Immature Grans 0.02 0.00 - 0.20 Thousand/uL    Absolute Lymphocytes 2.27 0.60 - 4.47 Thousands/µL    Absolute Monocytes 0.52 0.17 - 1.22 Thousand/µL    Eosinophils Absolute 0.30 0.00 - 0.61 Thousand/µL    Basophils Absolute 0.07 0.00 - 0.10 Thousands/µL   Albumin / creatinine urine ratio    Collection Time: 06/02/25  6:20 AM   Result Value Ref Range    Creatinine, Ur 58.2 Reference range not established. mg/dL    Albumin,U,Random <7.0 <20.0 mg/L    Albumin Creat Ratio     Comprehensive metabolic panel    Collection Time: 06/02/25  6:20 AM   Result Value Ref Range    Sodium 137 135 - 147 mmol/L    Potassium 4.0 3.5 - 5.3 mmol/L    Chloride 103 96 - 108 mmol/L    CO2 28 21 - 32 mmol/L    ANION GAP 6 4 - 13 mmol/L    BUN 23 5 - 25 mg/dL    Creatinine 0.70 0.60 - 1.30 mg/dL    Glucose, Fasting 155 (H) 65 - 99 mg/dL    Calcium 9.1 8.4 - 10.2 mg/dL    AST 16 13 - 39 U/L    ALT 25 7 - 52 U/L    Alkaline Phosphatase 52 34 - 104 U/L    Total Protein 7.0 6.4 - 8.4 g/dL    Albumin 4.7 3.5 - 5.0 g/dL    Total Bilirubin 0.40 0.20 - 1.00 mg/dL    eGFR 107 ml/min/1.73sq m   Hemoglobin A1C    Collection Time: 06/02/25  6:20 AM   Result Value Ref Range    Hemoglobin A1C 6.6 (H) Normal 4.0-5.6%; PreDiabetic 5.7-6.4%; Diabetic >=6.5%; Glycemic control for adults with diabetes <7.0% %     mg/dl   Lipid panel    Collection Time: 06/02/25  6:20 AM   Result Value Ref Range    Cholesterol 94 See Comment mg/dL    Triglycerides 127 See Comment mg/dL    HDL, Direct 33 (L) >=40 mg/dL    LDL Calculated 36 0 - 100 mg/dL    Non-HDL-Chol  (CHOL-HDL) 61 mg/dl   PSA, Total Screen    Collection Time: 06/02/25  6:20 AM   Result Value Ref Range    PSA 1.259 0.000 - 4.000 ng/mL   TSH, 3rd generation with Free T4 reflex    Collection Time: 06/02/25  6:20 AM   Result Value Ref Range    TSH 3RD GENERATON 2.350 0.450 - 4.500 uIU/mL       Assessment/Plan:    Agatston coronary artery calcium score between 100 and 199  Continue to control risk factors for heart disease including diabetes, hypertension, hyperlipidemia.  CT coronary calcium score is done on a semiannual basis and will be due in August.  Continue on rosuvastatin and Zetia.  Repeat lipid panel in January    Essential hypertension  Hypertension remains very well-controlled on lisinopril 5 mg once daily    Type 2 diabetes mellitus with hyperosmolarity without coma, without long-term current use of insulin ([facility])    Lab Results   Component Value Date    HGBA1C 6.6 (H) 06/02/2025     Tremendous improvement in hemoglobin A1c down to 6.6%.  Patient will continue on Farxiga, glipizide, pioglitazone, metformin.  Continue current regimen.  Continue following diabetic diet.  Repeat diabetic parameters to be done in January    Mixed hyperlipidemia  Patient's cholesterol is very well-controlled.  Goal LDL less than 70 which he is at.  Remains on Zetia as well as rosuvastatin.  Repeat lipid panel to be done in 6 months    Vitamin D deficiency  Remains on vitamin D supplementation          Problem List Items Addressed This Visit        Cardiovascular and Mediastinum    Essential hypertension - Primary (Chronic)    Hypertension remains very well-controlled on lisinopril 5 mg once daily         Relevant Orders    CBC and differential    TSH, 3rd generation with Free T4 reflex    Agatston coronary artery calcium score between 100 and 199    Continue to control risk factors for heart disease including diabetes, hypertension, hyperlipidemia.  CT coronary calcium score is done on a semiannual basis and will be due in  August.  Continue on rosuvastatin and Zetia.  Repeat lipid panel in January         Relevant Orders    CBC and differential    TSH, 3rd generation with Free T4 reflex       Endocrine    Type 2 diabetes mellitus with hyperosmolarity without coma, without long-term current use of insulin ([facility])      Lab Results   Component Value Date    HGBA1C 6.6 (H) 06/02/2025     Tremendous improvement in hemoglobin A1c down to 6.6%.  Patient will continue on Farxiga, glipizide, pioglitazone, metformin.  Continue current regimen.  Continue following diabetic diet.  Repeat diabetic parameters to be done in January         Relevant Orders    Hemoglobin A1C    Albumin / creatinine urine ratio       Other    Mixed hyperlipidemia    Patient's cholesterol is very well-controlled.  Goal LDL less than 70 which he is at.  Remains on Zetia as well as rosuvastatin.  Repeat lipid panel to be done in 6 months         Relevant Orders    Comprehensive metabolic panel    Lipid panel    Vitamin D deficiency    Remains on vitamin D supplementation        Other Visit Diagnoses       History of anxiety        Relevant Medications    ALPRAZolam (XANAX) 0.25 mg tablet      Prostate cancer screening        Relevant Orders    PSA, Total Screen            Patient's shoes and socks removed.    Right Foot/Ankle   Right Foot Inspection  Skin Exam: skin normal and skin intact. No dry skin, no warmth, no callus, no erythema, no maceration, no abnormal color, no pre-ulcer, no ulcer and no callus.     Toe Exam: ROM and strength within normal limits.     Sensory   Vibration: intact  Proprioception: intact  Monofilament testing: intact    Vascular  Capillary refills: < 3 seconds  The right DP pulse is 2+. The right PT pulse is 2+.     Left Foot/Ankle  Left Foot Inspection  Skin Exam: skin normal and skin intact. No dry skin, no warmth, no erythema, no maceration, normal color, no pre-ulcer, no ulcer and no callus.     Toe Exam: ROM and strength within normal limits.      Sensory   Vibration: intact  Proprioception: intact  Monofilament testing: intact    Vascular  Capillary refills: < 3 seconds  The left DP pulse is 2+. The left PT pulse is 2+.     Assign Risk Category  No deformity present  No loss of protective sensation  No weak pulses  Risk: 0            [1]  Past Medical History:  Diagnosis Date   • Anxiety     only when flying   • Chronic frontal sinusitis     last assessed 03/02/2016   • COVID-19 virus infection 4/1/2020   • Diabetes mellitus (HCC)    • Fracture of great toe     last assessed 11/06/2014   • GERD (gastroesophageal reflux disease)    • Hyperlipidemia    • Hypertension    • Palpitations     last assessed 11/06/2012   • RUQ abdominal pain 9/24/2021   [2]  Family History  Problem Relation Name Age of Onset   • Diabetes Mother     • Liver cancer Mother     • Hypertension Mother     • Colon cancer Father  68   [3]  Past Surgical History:  Procedure Laterality Date   • COLONOSCOPY     • HERNIA REPAIR Bilateral    • DC MNPJ W/ANES SHOULDER JT APPL FIXATION APPARATUS Right 4/3/2017    Procedure: SHOULDER MANIPULATION UNDER ANESTHESIA ;  Surgeon: Berto Leon MD;  Location: [facility];  Service: Orthopedics   • UPPER GASTROINTESTINAL ENDOSCOPY     [4]    Current Outpatient Medications:   •  ALPRAZolam (XANAX) 0.25 mg tablet, Take 1 tablet (0.25 mg total) by mouth every 8 (eight) hours as needed for anxiety, Disp: 30 tablet, Rfl: 0  •  aspirin 81 mg chewable tablet, , Disp: , Rfl:   •  azelastine (ASTELIN) 0.1 % nasal spray, 2 sprays into each nostril 2 (two) times a day Use in each nostril as directed, Disp: 30 mL, Rfl: 3  •  CHOLECALCIFEROL PO, Take 1,000 Units by mouth, Disp: , Rfl:   •  dapagliflozin (Farxiga) 10 MG tablet, Take 1 tablet (10 mg total) by mouth daily, Disp: 90 tablet, Rfl: 1  •  Dulaglutide (Trulicity) 1.5 MG/0.5ML SOAJ, Inject 1.5 mg under the skin once a week, Disp: 2 mL, Rfl: 0  •  esomeprazole (NexIUM) 40 MG capsule, Take 1 capsule (40 mg  total) by mouth daily, Disp: 90 capsule, Rfl: 1  •  ezetimibe (ZETIA) 10 mg tablet, Take 1 tablet (10 mg total) by mouth daily, Disp: 90 tablet, Rfl: 1  •  fexofenadine (ALLEGRA) 180 MG tablet, Take 1 tablet (180 mg total) by mouth daily, Disp: 30 tablet, Rfl: 5  •  glipiZIDE (GLUCOTROL XL) 10 mg 24 hr tablet, Take 1 tablet (10 mg total) by mouth daily, Disp: 90 tablet, Rfl: 0  •  glucose blood test strip, Test 1 Squirt in the morning and 1 Squirt in the evening., Disp: , Rfl:   •  lisinopril (ZESTRIL) 5 mg tablet, TAKE ONE TABLET BY MOUTH EVERY DAY, Disp: 90 tablet, Rfl: 1  •  meloxicam (MOBIC) 15 mg tablet, Take 1 tablet (15 mg total) by mouth daily, Disp: 100 tablet, Rfl: 3  •  metFORMIN (GLUCOPHAGE-XR) 500 mg 24 hr tablet, Take 2 tablets (1,000 mg total) by mouth 2 (two) times a day with meals, Disp: 360 tablet, Rfl: 0  •  montelukast (SINGULAIR) 10 mg tablet, Take 1 tablet (10 mg total) by mouth daily at bedtime, Disp: 90 tablet, Rfl: 1  •  omega-3-acid ethyl esters (LOVAZA) 1 g capsule, Take 2 capsules (2 g total) by mouth 2 (two) times a day, Disp: 360 capsule, Rfl: 0  •  Phytonadione (K 100) 100 MCG TABS, Take 1 tablet (100 mcg total) by mouth daily, Disp: 100 tablet, Rfl: 3  •  pioglitazone (ACTOS) 45 mg tablet, Take 1 tablet (45 mg total) by mouth daily, Disp: 90 tablet, Rfl: 1  •  rosuvastatin (CRESTOR) 5 mg tablet, Take 1 tablet (5 mg total) by mouth daily, Disp: 90 tablet, Rfl: 3  •  SITagliptin (Zituvio) 50 MG TABS, Take 1 tablet by mouth in the morning, Disp: 90 tablet, Rfl: 1  •  vardenafil (LEVITRA) 20 MG tablet, Take 1 tablet (20 mg total) by mouth daily as needed for erectile dysfunction, Disp: 10 tablet, Rfl: 0

## 2025-06-04 NOTE — ASSESSMENT & PLAN NOTE
Continue to control risk factors for heart disease including diabetes, hypertension, hyperlipidemia.  CT coronary calcium score is done on a semiannual basis and will be due in August.  Continue on rosuvastatin and Zetia.  Repeat lipid panel in January

## 2025-06-04 NOTE — ASSESSMENT & PLAN NOTE
Patient's cholesterol is very well-controlled.  Goal LDL less than 70 which he is at.  Remains on Zetia as well as rosuvastatin.  Repeat lipid panel to be done in 6 months

## 2025-06-05 RX ORDER — DULAGLUTIDE 1.5 MG/.5ML
INJECTION, SOLUTION SUBCUTANEOUS
Qty: 2 ML | Refills: 1 | Status: SHIPPED | OUTPATIENT
Start: 2025-06-05

## 2025-06-05 RX ORDER — MELOXICAM 15 MG/1
15 TABLET ORAL DAILY
Qty: 100 TABLET | Refills: 0 | Status: SHIPPED | OUTPATIENT
Start: 2025-06-05

## 2025-06-17 DIAGNOSIS — E78.5 HYPERLIPIDEMIA, UNSPECIFIED HYPERLIPIDEMIA TYPE: ICD-10-CM

## 2025-06-18 RX ORDER — ROSUVASTATIN CALCIUM 5 MG/1
5 TABLET, COATED ORAL DAILY
Qty: 90 TABLET | Refills: 3 | Status: SHIPPED | OUTPATIENT
Start: 2025-06-18

## 2025-06-23 DIAGNOSIS — I10 PRIMARY HYPERTENSION: ICD-10-CM

## 2025-06-23 RX ORDER — LISINOPRIL 5 MG/1
5 TABLET ORAL DAILY
Qty: 90 TABLET | Refills: 1 | Status: SHIPPED | OUTPATIENT
Start: 2025-06-23

## 2025-07-02 DIAGNOSIS — E11.65 TYPE 2 DIABETES MELLITUS WITH HYPERGLYCEMIA, WITHOUT LONG-TERM CURRENT USE OF INSULIN (HCC): Chronic | ICD-10-CM

## 2025-07-02 DIAGNOSIS — E78.2 MIXED HYPERLIPIDEMIA: ICD-10-CM

## 2025-07-02 DIAGNOSIS — E11.00 TYPE 2 DIABETES MELLITUS WITH HYPEROSMOLARITY WITHOUT COMA, WITHOUT LONG-TERM CURRENT USE OF INSULIN (HCC): ICD-10-CM

## 2025-07-03 DIAGNOSIS — E11.00 TYPE 2 DIABETES MELLITUS WITH HYPEROSMOLARITY WITHOUT COMA, WITHOUT LONG-TERM CURRENT USE OF INSULIN (HCC): ICD-10-CM

## 2025-07-03 RX ORDER — DAPAGLIFLOZIN 10 MG/1
10 TABLET, FILM COATED ORAL DAILY
Qty: 90 TABLET | Refills: 1 | Status: SHIPPED | OUTPATIENT
Start: 2025-07-03

## 2025-07-03 RX ORDER — DULAGLUTIDE 1.5 MG/.5ML
1.5 INJECTION, SOLUTION SUBCUTANEOUS WEEKLY
Qty: 2 ML | Refills: 3 | Status: SHIPPED | OUTPATIENT
Start: 2025-07-03

## 2025-07-03 RX ORDER — DULAGLUTIDE 1.5 MG/.5ML
INJECTION, SOLUTION SUBCUTANEOUS
Qty: 2 ML | Refills: 0 | OUTPATIENT
Start: 2025-07-03

## 2025-07-03 RX ORDER — OMEGA-3-ACID ETHYL ESTERS 1 G/1
2 CAPSULE, LIQUID FILLED ORAL 2 TIMES DAILY
Qty: 360 CAPSULE | Refills: 0 | Status: SHIPPED | OUTPATIENT
Start: 2025-07-03

## 2025-07-23 DIAGNOSIS — E78.2 MIXED HYPERLIPIDEMIA: ICD-10-CM

## 2025-07-23 DIAGNOSIS — E11.00 TYPE 2 DIABETES MELLITUS WITH HYPEROSMOLARITY WITHOUT COMA, WITHOUT LONG-TERM CURRENT USE OF INSULIN (HCC): ICD-10-CM

## 2025-07-25 RX ORDER — EZETIMIBE 10 MG/1
10 TABLET ORAL DAILY
Qty: 90 TABLET | Refills: 1 | Status: SHIPPED | OUTPATIENT
Start: 2025-07-25

## 2025-07-25 RX ORDER — SITAGLIPTIN 50 MG/1
1 TABLET ORAL DAILY
Qty: 90 TABLET | Refills: 0 | OUTPATIENT
Start: 2025-07-25

## 2025-07-31 DIAGNOSIS — E11.00 TYPE 2 DIABETES MELLITUS WITH HYPEROSMOLARITY WITHOUT COMA, WITHOUT LONG-TERM CURRENT USE OF INSULIN (HCC): ICD-10-CM

## 2025-08-01 RX ORDER — DULAGLUTIDE 1.5 MG/.5ML
INJECTION, SOLUTION SUBCUTANEOUS
Qty: 2 ML | Refills: 1 | OUTPATIENT
Start: 2025-08-01

## 2025-08-18 DIAGNOSIS — K21.9 GASTROESOPHAGEAL REFLUX DISEASE: ICD-10-CM

## 2025-08-19 RX ORDER — ESOMEPRAZOLE MAGNESIUM 40 MG/1
40 CAPSULE, DELAYED RELEASE ORAL DAILY
Qty: 90 CAPSULE | Refills: 1 | Status: SHIPPED | OUTPATIENT
Start: 2025-08-19